# Patient Record
Sex: MALE | Race: WHITE | NOT HISPANIC OR LATINO | Employment: OTHER | URBAN - METROPOLITAN AREA
[De-identification: names, ages, dates, MRNs, and addresses within clinical notes are randomized per-mention and may not be internally consistent; named-entity substitution may affect disease eponyms.]

---

## 2018-02-14 ENCOUNTER — APPOINTMENT (OUTPATIENT)
Dept: RADIOLOGY | Facility: MEDICAL CENTER | Age: 63
DRG: 304 | End: 2018-02-14
Attending: EMERGENCY MEDICINE
Payer: COMMERCIAL

## 2018-02-14 ENCOUNTER — HOSPITAL ENCOUNTER (INPATIENT)
Facility: MEDICAL CENTER | Age: 63
LOS: 3 days | DRG: 304 | End: 2018-02-17
Attending: EMERGENCY MEDICINE | Admitting: INTERNAL MEDICINE
Payer: COMMERCIAL

## 2018-02-14 ENCOUNTER — OFFICE VISIT (OUTPATIENT)
Dept: MEDICAL GROUP | Facility: MEDICAL CENTER | Age: 63
End: 2018-02-14
Payer: COMMERCIAL

## 2018-02-14 ENCOUNTER — RESOLUTE PROFESSIONAL BILLING HOSPITAL PROF FEE (OUTPATIENT)
Dept: HOSPITALIST | Facility: MEDICAL CENTER | Age: 63
End: 2018-02-14
Payer: COMMERCIAL

## 2018-02-14 ENCOUNTER — APPOINTMENT (OUTPATIENT)
Dept: RADIOLOGY | Facility: MEDICAL CENTER | Age: 63
DRG: 304 | End: 2018-02-14
Payer: COMMERCIAL

## 2018-02-14 VITALS
HEART RATE: 104 BPM | DIASTOLIC BLOOD PRESSURE: 140 MMHG | HEIGHT: 66 IN | BODY MASS INDEX: 33.09 KG/M2 | SYSTOLIC BLOOD PRESSURE: 200 MMHG | OXYGEN SATURATION: 88 % | TEMPERATURE: 99.4 F | WEIGHT: 205.91 LBS

## 2018-02-14 DIAGNOSIS — I16.1 HYPERTENSIVE EMERGENCY: ICD-10-CM

## 2018-02-14 DIAGNOSIS — Z00.00 HEALTH CARE MAINTENANCE: ICD-10-CM

## 2018-02-14 DIAGNOSIS — Z76.89 ENCOUNTER TO ESTABLISH CARE: ICD-10-CM

## 2018-02-14 DIAGNOSIS — R00.0 TACHYCARDIA: ICD-10-CM

## 2018-02-14 DIAGNOSIS — E66.9 OBESITY (BMI 30.0-34.9): ICD-10-CM

## 2018-02-14 DIAGNOSIS — R06.02 SOB (SHORTNESS OF BREATH): ICD-10-CM

## 2018-02-14 DIAGNOSIS — R09.02 HYPOXEMIA: ICD-10-CM

## 2018-02-14 DIAGNOSIS — R06.02 SOB (SHORTNESS OF BREATH) ON EXERTION: ICD-10-CM

## 2018-02-14 DIAGNOSIS — I50.9 CONGESTIVE HEART FAILURE, UNSPECIFIED CONGESTIVE HEART FAILURE CHRONICITY, UNSPECIFIED CONGESTIVE HEART FAILURE TYPE: ICD-10-CM

## 2018-02-14 DIAGNOSIS — M79.89 LEG SWELLING: ICD-10-CM

## 2018-02-14 DIAGNOSIS — I16.0 HYPERTENSIVE URGENCY: ICD-10-CM

## 2018-02-14 PROBLEM — I11.9: Status: ACTIVE | Noted: 2018-02-14

## 2018-02-14 PROBLEM — E66.811 OBESITY (BMI 30.0-34.9): Status: ACTIVE | Noted: 2018-02-14

## 2018-02-14 LAB
ALBUMIN SERPL BCP-MCNC: 3.4 G/DL (ref 3.2–4.9)
ALBUMIN/GLOB SERPL: 1.4 G/DL
ALP SERPL-CCNC: 78 U/L (ref 30–99)
ALT SERPL-CCNC: 30 U/L (ref 2–50)
ANION GAP SERPL CALC-SCNC: 10 MMOL/L (ref 0–11.9)
APTT PPP: 28.2 SEC (ref 24.7–36)
AST SERPL-CCNC: 27 U/L (ref 12–45)
BASOPHILS # BLD AUTO: 0.5 % (ref 0–1.8)
BASOPHILS # BLD: 0.05 K/UL (ref 0–0.12)
BILIRUB SERPL-MCNC: 2.1 MG/DL (ref 0.1–1.5)
BNP SERPL-MCNC: 1187 PG/ML (ref 0–100)
BUN SERPL-MCNC: 21 MG/DL (ref 8–22)
CALCIUM SERPL-MCNC: 8.6 MG/DL (ref 8.4–10.2)
CHLORIDE SERPL-SCNC: 107 MMOL/L (ref 96–112)
CO2 SERPL-SCNC: 20 MMOL/L (ref 20–33)
CREAT SERPL-MCNC: 1.72 MG/DL (ref 0.5–1.4)
EOSINOPHIL # BLD AUTO: 0.3 K/UL (ref 0–0.51)
EOSINOPHIL NFR BLD: 3 % (ref 0–6.9)
ERYTHROCYTE [DISTWIDTH] IN BLOOD BY AUTOMATED COUNT: 41.6 FL (ref 35.9–50)
GLOBULIN SER CALC-MCNC: 2.5 G/DL (ref 1.9–3.5)
GLUCOSE SERPL-MCNC: 98 MG/DL (ref 65–99)
HCT VFR BLD AUTO: 45.2 % (ref 42–52)
HGB BLD-MCNC: 15.2 G/DL (ref 14–18)
IMM GRANULOCYTES # BLD AUTO: 0.03 K/UL (ref 0–0.11)
IMM GRANULOCYTES NFR BLD AUTO: 0.3 % (ref 0–0.9)
INR PPP: 1.18 (ref 0.87–1.13)
LYMPHOCYTES # BLD AUTO: 1.14 K/UL (ref 1–4.8)
LYMPHOCYTES NFR BLD: 11.5 % (ref 22–41)
MCH RBC QN AUTO: 29.1 PG (ref 27–33)
MCHC RBC AUTO-ENTMCNC: 33.6 G/DL (ref 33.7–35.3)
MCV RBC AUTO: 86.6 FL (ref 81.4–97.8)
MONOCYTES # BLD AUTO: 0.97 K/UL (ref 0–0.85)
MONOCYTES NFR BLD AUTO: 9.8 % (ref 0–13.4)
NEUTROPHILS # BLD AUTO: 7.45 K/UL (ref 1.82–7.42)
NEUTROPHILS NFR BLD: 74.9 % (ref 44–72)
NRBC # BLD AUTO: 0 K/UL
NRBC BLD-RTO: 0 /100 WBC
PLATELET # BLD AUTO: 225 K/UL (ref 164–446)
PMV BLD AUTO: 9 FL (ref 9–12.9)
POTASSIUM SERPL-SCNC: 3.7 MMOL/L (ref 3.6–5.5)
PROT SERPL-MCNC: 5.9 G/DL (ref 6–8.2)
PROTHROMBIN TIME: 14.6 SEC (ref 12–14.6)
RBC # BLD AUTO: 5.22 M/UL (ref 4.7–6.1)
SODIUM SERPL-SCNC: 137 MMOL/L (ref 135–145)
TROPONIN I SERPL-MCNC: 0.07 NG/ML (ref 0–0.04)
WBC # BLD AUTO: 9.9 K/UL (ref 4.8–10.8)

## 2018-02-14 PROCEDURE — 770022 HCHG ROOM/CARE - ICU (200)

## 2018-02-14 PROCEDURE — 85025 COMPLETE CBC W/AUTO DIFF WBC: CPT

## 2018-02-14 PROCEDURE — 96374 THER/PROPH/DIAG INJ IV PUSH: CPT

## 2018-02-14 PROCEDURE — 700102 HCHG RX REV CODE 250 W/ 637 OVERRIDE(OP): Performed by: INTERNAL MEDICINE

## 2018-02-14 PROCEDURE — 99223 1ST HOSP IP/OBS HIGH 75: CPT | Performed by: INTERNAL MEDICINE

## 2018-02-14 PROCEDURE — 94760 N-INVAS EAR/PLS OXIMETRY 1: CPT

## 2018-02-14 PROCEDURE — A9270 NON-COVERED ITEM OR SERVICE: HCPCS | Performed by: INTERNAL MEDICINE

## 2018-02-14 PROCEDURE — 83880 ASSAY OF NATRIURETIC PEPTIDE: CPT

## 2018-02-14 PROCEDURE — 700101 HCHG RX REV CODE 250: Performed by: EMERGENCY MEDICINE

## 2018-02-14 PROCEDURE — 99291 CRITICAL CARE FIRST HOUR: CPT

## 2018-02-14 PROCEDURE — 36415 COLL VENOUS BLD VENIPUNCTURE: CPT

## 2018-02-14 PROCEDURE — 84484 ASSAY OF TROPONIN QUANT: CPT

## 2018-02-14 PROCEDURE — 93005 ELECTROCARDIOGRAM TRACING: CPT | Performed by: EMERGENCY MEDICINE

## 2018-02-14 PROCEDURE — 96375 TX/PRO/DX INJ NEW DRUG ADDON: CPT

## 2018-02-14 PROCEDURE — 85610 PROTHROMBIN TIME: CPT

## 2018-02-14 PROCEDURE — 85730 THROMBOPLASTIN TIME PARTIAL: CPT

## 2018-02-14 PROCEDURE — 700111 HCHG RX REV CODE 636 W/ 250 OVERRIDE (IP): Performed by: EMERGENCY MEDICINE

## 2018-02-14 PROCEDURE — 93005 ELECTROCARDIOGRAM TRACING: CPT

## 2018-02-14 PROCEDURE — 71045 X-RAY EXAM CHEST 1 VIEW: CPT

## 2018-02-14 PROCEDURE — 700111 HCHG RX REV CODE 636 W/ 250 OVERRIDE (IP): Performed by: INTERNAL MEDICINE

## 2018-02-14 PROCEDURE — 96376 TX/PRO/DX INJ SAME DRUG ADON: CPT

## 2018-02-14 PROCEDURE — 80053 COMPREHEN METABOLIC PANEL: CPT

## 2018-02-14 PROCEDURE — 99205 OFFICE O/P NEW HI 60 MIN: CPT | Performed by: INTERNAL MEDICINE

## 2018-02-14 RX ORDER — LABETALOL HYDROCHLORIDE 5 MG/ML
10 INJECTION, SOLUTION INTRAVENOUS ONCE
Status: COMPLETED | OUTPATIENT
Start: 2018-02-14 | End: 2018-02-14

## 2018-02-14 RX ORDER — POLYETHYLENE GLYCOL 3350 17 G/17G
1 POWDER, FOR SOLUTION ORAL
Status: DISCONTINUED | OUTPATIENT
Start: 2018-02-14 | End: 2018-02-17 | Stop reason: HOSPADM

## 2018-02-14 RX ORDER — AMOXICILLIN 250 MG
2 CAPSULE ORAL 2 TIMES DAILY
Status: DISCONTINUED | OUTPATIENT
Start: 2018-02-14 | End: 2018-02-17 | Stop reason: HOSPADM

## 2018-02-14 RX ORDER — CAPTOPRIL 12.5 MG/1
12.5 TABLET ORAL ONCE
Status: COMPLETED | OUTPATIENT
Start: 2018-02-14 | End: 2018-02-14

## 2018-02-14 RX ORDER — DEXTROSE MONOHYDRATE 25 G/50ML
25 INJECTION, SOLUTION INTRAVENOUS
Status: DISCONTINUED | OUTPATIENT
Start: 2018-02-14 | End: 2018-02-17 | Stop reason: HOSPADM

## 2018-02-14 RX ORDER — FUROSEMIDE 10 MG/ML
20 INJECTION INTRAMUSCULAR; INTRAVENOUS ONCE
Status: COMPLETED | OUTPATIENT
Start: 2018-02-14 | End: 2018-02-14

## 2018-02-14 RX ORDER — FUROSEMIDE 10 MG/ML
40 INJECTION INTRAMUSCULAR; INTRAVENOUS ONCE
Status: COMPLETED | OUTPATIENT
Start: 2018-02-14 | End: 2018-02-14

## 2018-02-14 RX ORDER — ACETAMINOPHEN 325 MG/1
650 TABLET ORAL EVERY 6 HOURS PRN
Status: DISCONTINUED | OUTPATIENT
Start: 2018-02-14 | End: 2018-02-17 | Stop reason: HOSPADM

## 2018-02-14 RX ORDER — NITROGLYCERIN 20 MG/100ML
10 INJECTION INTRAVENOUS CONTINUOUS
Status: DISCONTINUED | OUTPATIENT
Start: 2018-02-14 | End: 2018-02-17 | Stop reason: HOSPADM

## 2018-02-14 RX ORDER — LABETALOL HYDROCHLORIDE 5 MG/ML
20 INJECTION, SOLUTION INTRAVENOUS ONCE
Status: COMPLETED | OUTPATIENT
Start: 2018-02-14 | End: 2018-02-14

## 2018-02-14 RX ORDER — BISACODYL 10 MG
10 SUPPOSITORY, RECTAL RECTAL
Status: DISCONTINUED | OUTPATIENT
Start: 2018-02-14 | End: 2018-02-17 | Stop reason: HOSPADM

## 2018-02-14 RX ADMIN — FUROSEMIDE 20 MG: 10 INJECTION, SOLUTION INTRAVENOUS at 17:00

## 2018-02-14 RX ADMIN — CAPTOPRIL 12.5 MG: 12.5 TABLET ORAL at 18:30

## 2018-02-14 RX ADMIN — NITROGLYCERIN 10 MCG/MIN: 20 INJECTION INTRAVENOUS at 18:29

## 2018-02-14 RX ADMIN — FUROSEMIDE 40 MG: 10 INJECTION, SOLUTION INTRAVENOUS at 18:10

## 2018-02-14 RX ADMIN — NITROGLYCERIN 30 MCG/MIN: 20 INJECTION INTRAVENOUS at 18:51

## 2018-02-14 RX ADMIN — LABETALOL HYDROCHLORIDE 10 MG: 5 INJECTION, SOLUTION INTRAVENOUS at 17:01

## 2018-02-14 RX ADMIN — STANDARDIZED SENNA CONCENTRATE AND DOCUSATE SODIUM 2 TABLET: 8.6; 5 TABLET, FILM COATED ORAL at 21:00

## 2018-02-14 RX ADMIN — LABETALOL HYDROCHLORIDE 20 MG: 5 INJECTION, SOLUTION INTRAVENOUS at 16:31

## 2018-02-14 ASSESSMENT — PAIN SCALES - GENERAL
PAINLEVEL_OUTOF10: 0
PAINLEVEL_OUTOF10: 0
PAINLEVEL_OUTOF10: 6
PAINLEVEL_OUTOF10: 0
PAINLEVEL_OUTOF10: 0

## 2018-02-14 ASSESSMENT — LIFESTYLE VARIABLES
AVERAGE NUMBER OF DAYS PER WEEK YOU HAVE A DRINK CONTAINING ALCOHOL: 5
ON A TYPICAL DAY WHEN YOU DRINK ALCOHOL HOW MANY DRINKS DO YOU HAVE: 4
TOTAL SCORE: 0
HAVE YOU EVER FELT YOU SHOULD CUT DOWN ON YOUR DRINKING: NO
CONSUMPTION TOTAL: POSITIVE
DO YOU DRINK ALCOHOL: YES
HAVE PEOPLE ANNOYED YOU BY CRITICIZING YOUR DRINKING: NO
EVER HAD A DRINK FIRST THING IN THE MORNING TO STEADY YOUR NERVES TO GET RID OF A HANGOVER: NO
HOW MANY TIMES IN THE PAST YEAR HAVE YOU HAD 5 OR MORE DRINKS IN A DAY: 15
EVER_SMOKED: NEVER
EVER FELT BAD OR GUILTY ABOUT YOUR DRINKING: NO
EVER_SMOKED: NEVER

## 2018-02-14 ASSESSMENT — PATIENT HEALTH QUESTIONNAIRE - PHQ9
CLINICAL INTERPRETATION OF PHQ2 SCORE: 0
1. LITTLE INTEREST OR PLEASURE IN DOING THINGS: NOT AT ALL
SUM OF ALL RESPONSES TO PHQ9 QUESTIONS 1 AND 2: 0
1. LITTLE INTEREST OR PLEASURE IN DOING THINGS: NOT AT ALL
SUM OF ALL RESPONSES TO PHQ9 QUESTIONS 1 AND 2: 0
2. FEELING DOWN, DEPRESSED, IRRITABLE, OR HOPELESS: NOT AT ALL
2. FEELING DOWN, DEPRESSED, IRRITABLE, OR HOPELESS: NOT AT ALL
SUM OF ALL RESPONSES TO PHQ QUESTIONS 1-9: 0
SUM OF ALL RESPONSES TO PHQ QUESTIONS 1-9: 0

## 2018-02-14 NOTE — PROGRESS NOTES
CHIEF COMPLAINT  Chief Complaint   Patient presents with   • Establish Care     NP   • Shortness of Breath     x 8 mon   • Ankle Pain     Ankle swelling both sides     HPI  Patient is a 62 y.o. male patient who presents today for the following     SOB, LE swelling  61 y/o, M, otherwise healthy    Onset: 2017  Developed gradually worsening:   - SOB   - subsequent development of exertional SOB   - LE swelling B/L without pain.    Denies:   - CP  - exertional CP/pressure  - palpitations, irregular heart beats  - HA  - lightheadedness, dizziness  - blurred vision  - cough      Did not ask:  -  for WT gain  - recent URI  - orthopnea/PND    Nonsmoker.   BMI: 33.   Daily physical activity.  No FH of CAD (only father with HTN).    Reviewed PMH, PSH, FH, SH, ALL, HCM/IMM  Reviewed MEDS    Patient Active Problem List    Diagnosis Date Noted   • Health care maintenance 2018   • Obesity (BMI 30.0-34.9) 2018     CURRENT MEDICATIONS  No current outpatient prescriptions on file.     No current facility-administered medications for this visit.      ALLERGIES  Allergies: Patient has no allergy information on record.  PAST MEDICAL HISTORY  Past Medical History:   Diagnosis Date   • Hypertensive urgency    • Nephrolithiasis      SURGICAL HISTORY  He  has no past surgical history on file.  SOCIAL HISTORY  Social History   Substance Use Topics   • Smoking status: Never Smoker   • Smokeless tobacco: Never Used   • Alcohol use 2.4 oz/week     4 Glasses of wine per week     Social History     Social History Narrative   • No narrative on file     FAMILY HISTORY  Family History   Problem Relation Age of Onset   • Cancer Mother      lungs, smoker   • Hypertension Father      Family Status   Relation Status   • Mother    • Father      ROS   Constitutional: Negative for fever, chills.  HENT: Negative for congestion, sore throat.  Eyes: Negative for blurred vision.   Respiratory: Negative for cough, shortness of  "breath.  Cardiovascular: Negative for chest pain, palpitations.   Gastrointestinal: Negative for heartburn, nausea, abdominal pain.   Genitourinary: Negative for dysuria.  Musculoskeletal: Negative for significant myalgias, back pain and joint pain.   Skin: Negative for rash and itching.   Neuro: Negative for dizziness, weakness and headaches.   Endo/Heme/Allergies: Does not bruise/bleed easily.   Psychiatric/Behavioral: Negative for depression, anxiety    PHYSICAL EXAM   Blood pressure (!) 200/140, pulse (!) 104, temperature 37.4 °C (99.4 °F), height 1.676 m (5' 6\"), weight 93.4 kg (205 lb 14.6 oz), SpO2 88 %. Body mass index is 33.23 kg/m².  General:  SOB, tachycardic, hypoxemic.  HEENT:   NC/AT, PERRLA, EOMI, TMs are clear. Oropharyngeal mucosa is pink,  without lesions;  no cervical / supraclavicular  lymphadenopathy, no thyromegaly.    Cardiovascular: Tachycardic. No m/r/g. No carotid bruits .      Pulse was regular.   Lungs:   CTAB, no w/r/r, no respiratory distress.  Abdomen: Soft, NT/ND + BS; no suprapubic tenderness; no masses or hepatosplenomegaly.  Extremities:  2+ DP and radial pulses bilaterally.  No c/c/e.   Skin:  Warm, dry.  No erythema. No rash.   Neurologic: Alert & oriented x 3. No focal deficits.  Psychiatric:  Affect normal, mood normal, judgment normal.    LABS     None    IMAGING     None    ASSESMENT AND PLAN        1. Hypertensive emergency  2. Hypoxemia  3. SOB (shortness of breath)  4. SOB (shortness of breath) on exertion  5. Tachycardia  6. Leg swelling    61 y/o, otherwise healthy, presented with the above diagnosis:   - he had high systolic and diastolic BP, tachycardic, hypoxemic, SOB, severe LE swelling.   I did not hear inspiratory crackles on lungs.   No irregular pulse.  He has B/L LE swelling, w/o pain;   - likely fluid retention/CHF, not PE.    He was escorted to the ER by my MA, did not feel comfortable to go by himself  - higher level of care.     7. Obesity (BMI " 30.0-34.9)  Partly due to water retention.  - Patient identified as having weight management issue.  Appropriate orders and counseling given.    8. Health care maintenance  Will be reviewed at f/u.    9. Encounter to establish care  Reviewed PMH, PSH, FH, SH, ALL, MEDS, HCM/IMM.     Followup: Return within 4 weeks, after dg is made    All questions are answered.    Please note that this dictation was created using voice recognition software, and that there might be errors of wing and possibly content.

## 2018-02-15 ENCOUNTER — APPOINTMENT (OUTPATIENT)
Dept: RADIOLOGY | Facility: MEDICAL CENTER | Age: 63
DRG: 304 | End: 2018-02-15
Attending: INTERNAL MEDICINE
Payer: COMMERCIAL

## 2018-02-15 PROBLEM — N17.9 ACUTE KIDNEY INJURY (HCC): Status: ACTIVE | Noted: 2018-02-15

## 2018-02-15 PROBLEM — R79.89 TROPONIN LEVEL ELEVATED: Status: ACTIVE | Noted: 2018-02-15

## 2018-02-15 LAB
ALBUMIN SERPL BCP-MCNC: 3.1 G/DL (ref 3.2–4.9)
ALBUMIN/GLOB SERPL: 1.4 G/DL
ALP SERPL-CCNC: 60 U/L (ref 30–99)
ALT SERPL-CCNC: 27 U/L (ref 2–50)
ANION GAP SERPL CALC-SCNC: 9 MMOL/L (ref 0–11.9)
AST SERPL-CCNC: 23 U/L (ref 12–45)
BILIRUB SERPL-MCNC: 2.2 MG/DL (ref 0.1–1.5)
BUN SERPL-MCNC: 22 MG/DL (ref 8–22)
CALCIUM SERPL-MCNC: 8 MG/DL (ref 8.4–10.2)
CHLORIDE SERPL-SCNC: 107 MMOL/L (ref 96–112)
CO2 SERPL-SCNC: 23 MMOL/L (ref 20–33)
CREAT SERPL-MCNC: 1.72 MG/DL (ref 0.5–1.4)
EKG IMPRESSION: NORMAL
EKG IMPRESSION: NORMAL
ERYTHROCYTE [DISTWIDTH] IN BLOOD BY AUTOMATED COUNT: 41.8 FL (ref 35.9–50)
GLOBULIN SER CALC-MCNC: 2.2 G/DL (ref 1.9–3.5)
GLUCOSE SERPL-MCNC: 95 MG/DL (ref 65–99)
HCT VFR BLD AUTO: 38.5 % (ref 42–52)
HGB BLD-MCNC: 12.8 G/DL (ref 14–18)
LV EJECT FRACT  99904: 55
LV EJECT FRACT MOD 2C 99903: 62.81
LV EJECT FRACT MOD 4C 99902: 39.05
LV EJECT FRACT MOD BP 99901: 48.26
MAGNESIUM SERPL-MCNC: 1.7 MG/DL (ref 1.5–2.5)
MCH RBC QN AUTO: 28.8 PG (ref 27–33)
MCHC RBC AUTO-ENTMCNC: 33.2 G/DL (ref 33.7–35.3)
MCV RBC AUTO: 86.7 FL (ref 81.4–97.8)
PLATELET # BLD AUTO: 204 K/UL (ref 164–446)
PMV BLD AUTO: 8.9 FL (ref 9–12.9)
POTASSIUM SERPL-SCNC: 3.5 MMOL/L (ref 3.6–5.5)
PROT SERPL-MCNC: 5.3 G/DL (ref 6–8.2)
RBC # BLD AUTO: 4.44 M/UL (ref 4.7–6.1)
SODIUM SERPL-SCNC: 139 MMOL/L (ref 135–145)
TROPONIN I SERPL-MCNC: 0.06 NG/ML (ref 0–0.04)
WBC # BLD AUTO: 9.4 K/UL (ref 4.8–10.8)

## 2018-02-15 PROCEDURE — 700102 HCHG RX REV CODE 250 W/ 637 OVERRIDE(OP): Performed by: INTERNAL MEDICINE

## 2018-02-15 PROCEDURE — 71045 X-RAY EXAM CHEST 1 VIEW: CPT

## 2018-02-15 PROCEDURE — 93306 TTE W/DOPPLER COMPLETE: CPT

## 2018-02-15 PROCEDURE — A9270 NON-COVERED ITEM OR SERVICE: HCPCS | Performed by: HOSPITALIST

## 2018-02-15 PROCEDURE — 85027 COMPLETE CBC AUTOMATED: CPT

## 2018-02-15 PROCEDURE — 700111 HCHG RX REV CODE 636 W/ 250 OVERRIDE (IP): Performed by: INTERNAL MEDICINE

## 2018-02-15 PROCEDURE — 84484 ASSAY OF TROPONIN QUANT: CPT

## 2018-02-15 PROCEDURE — A9270 NON-COVERED ITEM OR SERVICE: HCPCS | Performed by: INTERNAL MEDICINE

## 2018-02-15 PROCEDURE — 93005 ELECTROCARDIOGRAM TRACING: CPT | Performed by: INTERNAL MEDICINE

## 2018-02-15 PROCEDURE — 770022 HCHG ROOM/CARE - ICU (200)

## 2018-02-15 PROCEDURE — 93010 ELECTROCARDIOGRAM REPORT: CPT | Performed by: INTERNAL MEDICINE

## 2018-02-15 PROCEDURE — 99291 CRITICAL CARE FIRST HOUR: CPT | Performed by: HOSPITALIST

## 2018-02-15 PROCEDURE — 83735 ASSAY OF MAGNESIUM: CPT

## 2018-02-15 PROCEDURE — 80053 COMPREHEN METABOLIC PANEL: CPT

## 2018-02-15 PROCEDURE — 93306 TTE W/DOPPLER COMPLETE: CPT | Mod: 26 | Performed by: INTERNAL MEDICINE

## 2018-02-15 PROCEDURE — 700102 HCHG RX REV CODE 250 W/ 637 OVERRIDE(OP): Performed by: HOSPITALIST

## 2018-02-15 RX ORDER — CAPTOPRIL 12.5 MG/1
12.5 TABLET ORAL TWICE DAILY
Status: DISCONTINUED | OUTPATIENT
Start: 2018-02-15 | End: 2018-02-16

## 2018-02-15 RX ORDER — CAPTOPRIL 12.5 MG/1
12.5 TABLET ORAL TWICE DAILY
Status: DISCONTINUED | OUTPATIENT
Start: 2018-02-16 | End: 2018-02-15

## 2018-02-15 RX ORDER — POTASSIUM CHLORIDE 20 MEQ/1
40 TABLET, EXTENDED RELEASE ORAL ONCE
Status: COMPLETED | OUTPATIENT
Start: 2018-02-15 | End: 2018-02-15

## 2018-02-15 RX ORDER — FUROSEMIDE 10 MG/ML
40 INJECTION INTRAMUSCULAR; INTRAVENOUS
Status: DISCONTINUED | OUTPATIENT
Start: 2018-02-16 | End: 2018-02-17 | Stop reason: HOSPADM

## 2018-02-15 RX ORDER — IBUPROFEN 200 MG
950 CAPSULE ORAL DAILY
Status: DISCONTINUED | OUTPATIENT
Start: 2018-02-15 | End: 2018-02-17 | Stop reason: HOSPADM

## 2018-02-15 RX ADMIN — ENOXAPARIN SODIUM 40 MG: 100 INJECTION SUBCUTANEOUS at 09:14

## 2018-02-15 RX ADMIN — CAPTOPRIL 12.5 MG: 12.5 TABLET ORAL at 21:25

## 2018-02-15 RX ADMIN — NITROGLYCERIN 60 MCG/MIN: 20 INJECTION INTRAVENOUS at 03:00

## 2018-02-15 RX ADMIN — POTASSIUM CHLORIDE 40 MEQ: 1500 TABLET, EXTENDED RELEASE ORAL at 09:14

## 2018-02-15 RX ADMIN — NITROGLYCERIN 90 MCG/MIN: 20 INJECTION INTRAVENOUS at 20:24

## 2018-02-15 RX ADMIN — CALCIUM CITRATE 200 MG (950 MG) TABLET 950 MG: at 09:14

## 2018-02-15 RX ADMIN — CAPTOPRIL 12.5 MG: 12.5 TABLET ORAL at 14:53

## 2018-02-15 RX ADMIN — ACETAMINOPHEN 650 MG: 325 TABLET, FILM COATED ORAL at 09:14

## 2018-02-15 RX ADMIN — ACETAMINOPHEN 650 MG: 325 TABLET, FILM COATED ORAL at 20:30

## 2018-02-15 ASSESSMENT — ENCOUNTER SYMPTOMS
SPEECH CHANGE: 0
SPUTUM PRODUCTION: 0
FLANK PAIN: 0
CONSTIPATION: 0
HEARTBURN: 0
DIARRHEA: 0
FOCAL WEAKNESS: 0
COUGH: 1
MYALGIAS: 0
SHORTNESS OF BREATH: 0
LOSS OF CONSCIOUSNESS: 0
PALPITATIONS: 0
STRIDOR: 0
HEMOPTYSIS: 0
EYE REDNESS: 0
BLURRED VISION: 0
COUGH: 0
SENSORY CHANGE: 0
NECK PAIN: 0
FEVER: 0
ORTHOPNEA: 1
SORE THROAT: 0
DOUBLE VISION: 0
ABDOMINAL PAIN: 0
BACK PAIN: 0
EYE PAIN: 0
WHEEZING: 0
SEIZURES: 0
CHILLS: 0
SHORTNESS OF BREATH: 1
BLOOD IN STOOL: 0
TINGLING: 0
PND: 1
NAUSEA: 0
DEPRESSION: 0
EYE DISCHARGE: 0
HEADACHES: 0
SINUS PAIN: 0
VOMITING: 0

## 2018-02-15 ASSESSMENT — PAIN SCALES - GENERAL
PAINLEVEL_OUTOF10: 0
PAINLEVEL_OUTOF10: 4
PAINLEVEL_OUTOF10: 0
PAINLEVEL_OUTOF10: 0

## 2018-02-15 NOTE — ED NOTES
191/120 titrate 20mcq. Alert, denies pain. Lights turned down and pt advised to relax, he has a couple friends in the room, very talkative and laughing. NTG infusing, O2 at 2L.

## 2018-02-15 NOTE — ASSESSMENT & PLAN NOTE
- Unknown baseline, but patient denied any CKD  - Likely related to hypertensive emergency  - Using captopril for pressure control, which should improve LAI  - To consider stopping captopril if indicated LAI is worsening  - We'll clinically monitor for now

## 2018-02-15 NOTE — H&P
" Hospital Medicine History and Physical    Date of Service  2/15/2018    Chief Complaint  Chief Complaint   Patient presents with   • Hypertension     In PMD office for first visit  and BP noted to be high \" 205/140 or 205/160 I can't remember \" New Dx Denies CP or H/A   • Shortness of Breath     OE x 3-4 wks   • Ankle Swelling     Bilat x 1 mon       History of Presenting Illness  62 y.o. male who presented 2/14/2018 with shortness of breath and high blood pressure.     Patient reported that he has been having shortness of breath in the last 3 weeks. Upon further questioning he also had orthopnea and PND. However he specifically denied any chest pain headache, vision changes, focal weakness, back pain, abdominal pain. He probably claims that he has not seen a physician for several years and this is the first time he used medical insurance to check for his health.      Primary Care Physician  Albert Orellana M.D.    Consultants  None    Code Status  Full code    Review of Systems  Review of Systems   Constitutional: Negative for chills and fever.   HENT: Negative for sinus pain and sore throat.    Eyes: Negative for blurred vision, double vision, pain, discharge and redness.   Respiratory: Positive for cough. Negative for hemoptysis, sputum production, shortness of breath, wheezing and stridor.    Cardiovascular: Positive for orthopnea, leg swelling and PND. Negative for chest pain and palpitations.   Gastrointestinal: Negative for abdominal pain, blood in stool, constipation, diarrhea, heartburn, nausea and vomiting.   Genitourinary: Negative for dysuria, flank pain, frequency, hematuria and urgency.   Musculoskeletal: Negative for back pain, myalgias and neck pain.   Skin: Negative for rash.   Neurological: Negative for tingling, sensory change, speech change, focal weakness, seizures, loss of consciousness and headaches.   Psychiatric/Behavioral: Negative for depression.        Past Medical History  Past " Medical History:   Diagnosis Date   • Hypertensive urgency    • Nephrolithiasis        Surgical History  No past surgical history on file.    Medications  No current facility-administered medications on file prior to encounter.      No current outpatient prescriptions on file prior to encounter.       Family History  Family History   Problem Relation Age of Onset   • Cancer Mother      lungs, smoker   • Hypertension Father        Social History  Social History   Substance Use Topics   • Smoking status: Never Smoker   • Smokeless tobacco: Never Used   • Alcohol use 2.4 oz/week     4 Glasses of wine per week       Allergies  No Known Allergies     Physical Exam  Laboratory   Hemodynamics  Temp (24hrs), Av.4 °C (99.4 °F), Min:37.3 °C (99.2 °F), Max:37.6 °C (99.6 °F)   Temperature: 37.6 °C (99.6 °F)  Pulse  Av.2  Min: 74  Max: 100 Heart Rate (Monitored): 77  Blood Pressure: (!) 197/129, NIBP: (!) 164/99      Respiratory      Respiration: (!) 29, Pulse Oximetry: 94 %        RUL Breath Sounds: Clear, RML Breath Sounds: Clear, RLL Breath Sounds: Clear, MILLY Breath Sounds: Clear, LLL Breath Sounds: Clear    Physical Exam   Constitutional: He is oriented to person, place, and time. He appears well-developed and well-nourished. No distress.   HENT:   Head: Normocephalic and atraumatic.   Mouth/Throat: No oropharyngeal exudate.   Eyes: Conjunctivae and EOM are normal. Pupils are equal, round, and reactive to light. Right eye exhibits no discharge. Left eye exhibits no discharge. No scleral icterus.   Bedside funduscopic exam showed no obvious palpable edema    Neck: Normal range of motion. Neck supple. JVD present. No tracheal deviation present.   Significant jugular venous distention up to right earlobe   Cardiovascular: Normal rate, regular rhythm and intact distal pulses.    No murmur heard.  No aortic regurgitation murmur over the left upper sternal border with the patient in leaning position and the breath held  and expiration.    Equal and synchronized bilateral radial pulses   Pulmonary/Chest: Effort normal and breath sounds normal.   Mild respiratory distress. Minimal crackles bilaterally   Abdominal: Soft. Bowel sounds are normal. He exhibits distension. There is no tenderness. There is no rebound and no guarding.   Musculoskeletal: Normal range of motion. He exhibits no edema, tenderness or deformity.   4+ pitting bilateral lower extremity edema    Neurological: He is alert and oriented to person, place, and time. He has normal reflexes. No cranial nerve deficit. Coordination normal.   Skin: Skin is warm and dry. He is not diaphoretic. No erythema.       Recent Labs      02/14/18   1433   WBC  9.9   RBC  5.22   HEMOGLOBIN  15.2   HEMATOCRIT  45.2   MCV  86.6   MCH  29.1   MCHC  33.6*   RDW  41.6   PLATELETCT  225   MPV  9.0     Recent Labs      02/14/18   1433   SODIUM  137   POTASSIUM  3.7   CHLORIDE  107   CO2  20   GLUCOSE  98   BUN  21   CREATININE  1.72*   CALCIUM  8.6     Recent Labs      02/14/18   1433   ALTSGPT  30   ASTSGOT  27   ALKPHOSPHAT  78   TBILIRUBIN  2.1*   GLUCOSE  98     Recent Labs      02/14/18   1433   APTT  28.2   INR  1.18*     Recent Labs      02/14/18   1433   BNPBTYPENAT  1187*         Lab Results   Component Value Date    TROPONINI 0.07 (H) 02/14/2018     Urinalysis:  No results found for: SPECGRAVITY, GLUCOSEUR, KETONES, NITRITE, WBCURINE, RBCURINE, BACTERIA, EPITHELCELL     Imaging  Chest x-ray reviewed - moderate interstitial edema    Assessment/Plan     I anticipate this patient will require at least two midnights for appropriate medical management, necessitating inpatient admission.    Acute kidney injury (CMS-HCC)   Assessment & Plan    - Unknown baseline, but patient denied any CKD  - Likely related to hypertensive emergency  - Using captopril for pressure control, which should improve LAI  - To consider stopping captopril if indicated LAI is worsening  - We'll clinically monitor  for now        Troponin level elevated   Assessment & Plan    - No chest pain  - Troponin 0.07×3 in the last 12 hours  - EKG was unremarkable except left ventricular hypertrophy with repolarization abnormality  - Likely myocardial strain in the setting of hypertensive emergency  - Unlikely acute coronary syndrome  - Clinically monitor        Acute decompensated heart failure (CMS-[facility])   Assessment & Plan    - Jugular venous distention and 4+ bilateral leg edema  - BNP 1187   - Likely due to uncontrolled chronic hypertension and hypertensive heart disease  - Initiated Lasix 60 mg IV in the ED  - We'll try not to use beta blocker given acute decompensated phase  - Pending echo cardiogram  - Continue IV Lasix 40 mg -> to adjust accordingly and monitor electrolytes  - Daily weights and fluid restriction        Heart disease, hypertensive, malignant   Assessment & Plan    - Known history of hypertension per patient, but probably claimed that he never took any medications in the last decades  - Malignant hypertension with left ventricular strain and troponin leak  - No chest pain, headache. No signs suggestive of aortic dissection, intracranial hemorrhage, acute coronary syndrome  - Status post 30 mg labetalol IV & - Status post 60 mg Lasix IV by ED physician  - Initiated IV nitroglycerin drip to lower 30% in the first 24 hours (nitroprusside is not available in [place])  - Ordered captopril 12.5 mg twice a day and IV Lasix 40 mg twice a day for tomorrow 2/15  - Admitted to ICU for close monitoring            VTE prophylaxis: Lovenox

## 2018-02-15 NOTE — FLOWSHEET NOTE
02/14/18 1933   Type of Assessment   Assessment Yes   Patient History   Pulmonary Diagnosis no pulm hx   Home O2 Use Prior To Admission? No   Home Treatments/Frequency No   COPD Risk Screening   Do you have a history of COPD? No   Smoking History   Have you Ever Smoked Never   Level Of Consciousness   Level of Consciousness Alert   Respiratory WDL   Respiratory (WDL) X   Chest Exam   Respiration (!) 22   Pulse 86   Heart Rate (Monitored) 89   Breath Sounds   RUL Breath Sounds Clear   RML Breath Sounds Clear   RLL Breath Sounds Clear   MILLY Breath Sounds Clear   LLL Breath Sounds Clear   Protocol Pathways   Protocol Pathways Yes   O2 Protocol   O2 (LPM) 2   Pulse Oximetry 93 %   O2 Protocol   O2 Protocol Indications Room Air SpO2 Less Than 90%;Acute Care situation where Hypoxemia is suspected or possible   Continuous oxygen initiated for: SpO2 90-95% on Oxygen   O2 Protocol Goals/Outcome Room air SpO2 greater than 90% for 24 hours

## 2018-02-15 NOTE — ED PROVIDER NOTES
"ED Provider Note    CHIEF COMPLAINT  Chief Complaint   Patient presents with   • Hypertension     In PMD office for first visit  and BP noted to be high \" 205/140 or 205/160 I can't remember \" New Dx Denies CP or H/A   • Shortness of Breath     OE x 3-4 wks   • Ankle Swelling     Bilat x 1 mon       HPI  Chema Sarabia is a 62 y.o. male who presents with 1 month of worsening dyspnea on exertion and lower extremity edema that resolves at night. He has noted he has had high blood pressure in the past but has never been on any medication for it. He has been active he has lost 40 pounds in the last 2 years intentionally. He hikes and walks multiple miles every day. He has noted over the last few weeks that he is getting short of breath with even washing dishes. He is still able to walk 2 miles but the 1st 15 minutes is quite difficult. He also reports increased lower extremity swelling that is worse when his feet are down. But resolves overnight. He denies any chest pain with any of this exertion. He denies any orthopnea or PND.      REVIEW OF SYSTEMS  positive for increased lower extremity edema, dyspnea with exertion, negative for chest pain, orthopnea, PND. All other systems are negative.     PAST MEDICAL HISTORY   has a past medical history of Hypertensive urgency and Nephrolithiasis.    SOCIAL HISTORY  Social History     Social History Main Topics   • Smoking status: Never Smoker   • Smokeless tobacco: Never Used   • Alcohol use 2.4 oz/week     4 Glasses of wine per week   • Drug use: No   • Sexual activity: Not on file       SURGICAL HISTORY  patient denies any surgical history    CURRENT MEDICATIONS  Home Medications    **Home medications have not yet been reviewed for this encounter**         ALLERGIES  No Known Allergies    PHYSICAL EXAM  VITAL SIGNS: BP (!) 220/159   Pulse 94   Temp 37.3 °C (99.2 °F)   Resp 16   Ht 1.676 m (5' 6\")   Wt 92 kg (202 lb 13.2 oz)   SpO2 94%   BMI 32.74 kg/m² " .  Constitutional: Alert in no apparent distress.  HENT: No signs of trauma, Bilateral external ears normal, Nose normal.   Eyes: Pupils are equal and reactive, Conjunctiva normal, Non-icteric.   Neck: Normal range of motion, No tenderness, Supple, No stridor.   Cardiovascular: Regular rate and rhythm, no murmurs.   Thorax & Lungs: Normal breath sounds, No respiratory distress, No wheezing, No chest tenderness.   Abdomen: Bowel sounds normal, Soft, No tenderness, No masses, No peritoneal signs.  Skin: Warm, Dry, No erythema, No rash.   Musculoskeletal: 2+ bilateral lower extremity edema  Neurologic: Alert,  No focal deficits noted.   Psychiatric: Affect normal, Judgment normal, Mood normal.       DIAGNOSTIC STUDIES / PROCEDURES      EKG  12 lead EKG interpreted by myself  Sinus tachycardia rate 105  Normal axis  Normal intervals  TWI I, aVL, V6  Impression: sinus tachycardia with nonspecific TWI. No acute ischemia    LABS  Labs Reviewed   CBC WITH DIFFERENTIAL - Abnormal; Notable for the following:        Result Value    MCHC 33.6 (*)     Neutrophils-Polys 74.90 (*)     Lymphocytes 11.50 (*)     Neutrophils (Absolute) 7.45 (*)     Monos (Absolute) 0.97 (*)     All other components within normal limits    Narrative:     Indicate which anticoagulants the patient is on:->UNKNOWN   COMP METABOLIC PANEL - Abnormal; Notable for the following:     Creatinine 1.72 (*)     Total Bilirubin 2.1 (*)     Total Protein 5.9 (*)     All other components within normal limits    Narrative:     Indicate which anticoagulants the patient is on:->UNKNOWN   TROPONIN - Abnormal; Notable for the following:     Troponin I 0.07 (*)     All other components within normal limits    Narrative:     Indicate which anticoagulants the patient is on:->UNKNOWN   PROTHROMBIN TIME - Abnormal; Notable for the following:     INR 1.18 (*)     All other components within normal limits    Narrative:     Indicate which anticoagulants the patient is  on:->UNKNOWN   BTYPE NATRIURETIC PEPTIDE - Abnormal; Notable for the following:     B Natriuretic Peptide 1187 (*)     All other components within normal limits    Narrative:     Indicate which anticoagulants the patient is on:->UNKNOWN   ESTIMATED GFR - Abnormal; Notable for the following:     GFR If  49 (*)     GFR If Non  41 (*)     All other components within normal limits    Narrative:     Indicate which anticoagulants the patient is on:->UNKNOWN   APTT    Narrative:     Indicate which anticoagulants the patient is on:->UNKNOWN   URINALYSIS,CULTURE IF INDICATED   TROPONIN       RADIOLOGY  ECHOCARDIOGRAM COMP W/O CONT   Final Result      DX-CHEST-PORTABLE (1 VIEW)   Final Result      1.  Mild interval improvement of pulmonary edema.   2.  Stable cardiomegaly.      DX-CHEST-PORTABLE (1 VIEW)   Final Result      Moderate interstitial edema and small right pleural effusion.              COURSE & MEDICAL DECISION MAKING  Pertinent Labs & Imaging studies reviewed. (See chart for details)  This is a 61 yo man presenting with ALVARES and lower extremity edema. He is very hypertensive initially on exam. With the symptoms he is reporting hypertensive emergency, new onset heart failure, are in the differential.     Labs show an elevated BNP consistent with heart failure. Elevated troponin concerning for end organ damage. There is some evidence of renal failure with a creatinine of 1.72. Unclear if this is acute or chronic. CXR consistent with heart failure with interstitial edema and right pleural effusion.    He was given initially labetolol IV x 2 with about 30 point reduction of his BP. Given evidence of end organ damage with elevated troponin and creatinine he will need admission for BP control and diuresis. Given lasix to start diuresis.     I spoke with Dr. Macias, hospitalist for admission, he will be admitted to the ICU for BP control.    Patient will be admitted to the hospitalist in  guarded condition.          FINAL IMPRESSION  1. Hypertensive urgency    2. Congestive heart failure, unspecified congestive heart failure chronicity, unspecified congestive heart failure type (CMS-HCC)        2.   3.    This dictation has been creating using voice recognition software. The accuracy of the dictation is limited the abilities of the software.  I expect there may be some errors of grammar and possibly content. I made every attempt to manually correct the errors within my dictation. However errors related to this voice recognition software may still exist and should be interpreted within the appropriate context.      The note accurately reflects work and decisions made by me.  Leonora Henderson  2/15/2018  8:23 PM

## 2018-02-15 NOTE — PROGRESS NOTES
Report received from night RN, POC discussed. Pt resting in bed. Lines and gtts verified. Echo at bedside now. Call light in reach.

## 2018-02-15 NOTE — ASSESSMENT & PLAN NOTE
- Known history of hypertension per patient, but probably claimed that he never took any medications in the last decades  - Malignant hypertension with left ventricular strain and troponin leak  - No chest pain, headache. No signs suggestive of aortic dissection, intracranial hemorrhage, acute coronary syndrome  - Status post 30 mg labetalol IV & - Status post 60 mg Lasix IV by ED physician  - Initiated IV nitroglycerin drip to lower 30% in the first 24 hours (nitroprusside is not available in [place])  - Ordered captopril 12.5 mg twice a day and IV Lasix 40 mg twice a day for tomorrow 2/15  - Admitted to ICU for close monitoring

## 2018-02-15 NOTE — ASSESSMENT & PLAN NOTE
- No chest pain  - Troponin 0.07×3 in the last 12 hours  - EKG was unremarkable except left ventricular hypertrophy with repolarization abnormality  - Likely myocardial strain in the setting of hypertensive emergency  - Unlikely acute coronary syndrome  - Clinically monitor

## 2018-02-15 NOTE — PROGRESS NOTES
0815: Assessment completed. Lines and gtts. POC discussed, denies pain, CP or SOB. Call light in reach. Pt has no needs at this time.

## 2018-02-15 NOTE — CARE PLAN
Problem: Safety  Goal: Will remain free from falls    Intervention: Implement fall precautions  Treaded slipper socks in place, bed in low position, pt near nurses station, and call light in reach. Pt demonstrates correct use of call light.       Problem: Venous Thromboembolism (VTW)/Deep Vein Thrombosis (DVT) Prevention:  Goal: Patient will participate in Venous Thrombosis (VTE)/Deep Vein Thrombosis (DVT)Prevention Measures    Intervention: Assess and monitor for anticoagulation complications  Lovenox 40 mg given, pt will remain free from DVT. Pt getting OOB to use urinal and stand.       Problem: Knowledge Deficit  Goal: Knowledge of disease process/condition, treatment plan, diagnostic tests, and medications will improve    Intervention: Explain information regarding disease process/condition, treatment plan, diagnostic tests, and medications and document in education  POC discussed with pt. Echocardiogram completed. Nitroglycerin gtt infusing, titrate for -170's.

## 2018-02-15 NOTE — ED NOTES
"Pt ambulates to restroom without difficulty. States \"I felt a little sob of breath but that has been normal\" denies cp. Updated on poc. Family at bedside. Skin pwd. Aa0x3. resp nonlabored. Will continue to monitor.   "

## 2018-02-15 NOTE — ASSESSMENT & PLAN NOTE
- Jugular venous distention and 4+ bilateral leg edema  - BNP 1187   - Likely due to uncontrolled chronic hypertension and hypertensive heart disease  - Initiated Lasix 60 mg IV in the ED  - We'll try not to use beta blocker given acute decompensated phase  - Pending echo cardiogram  - Continue IV Lasix 40 mg -> to adjust accordingly and monitor electrolytes  - Daily weights and fluid restriction

## 2018-02-15 NOTE — ED NOTES
"Pt reports increased sob over the last couple days. Reports \"my blood pressure has been out of wack too\" denies cp or n/v/d but does reports sob with exertion. Denies hx of htn. Reports has recently lost large amount of weight with exercise. Skinpwd. Aa0x3. resp nonlabored.   "

## 2018-02-16 PROBLEM — I50.31 ACUTE DIASTOLIC HEART FAILURE (HCC): Status: ACTIVE | Noted: 2018-02-14

## 2018-02-16 LAB
ALBUMIN SERPL BCP-MCNC: 3 G/DL (ref 3.2–4.9)
BUN SERPL-MCNC: 20 MG/DL (ref 8–22)
CALCIUM SERPL-MCNC: 8 MG/DL (ref 8.4–10.2)
CHLORIDE SERPL-SCNC: 105 MMOL/L (ref 96–112)
CHOLEST SERPL-MCNC: 128 MG/DL (ref 100–199)
CO2 SERPL-SCNC: 23 MMOL/L (ref 20–33)
CREAT SERPL-MCNC: 1.75 MG/DL (ref 0.5–1.4)
EKG IMPRESSION: NORMAL
ERYTHROCYTE [DISTWIDTH] IN BLOOD BY AUTOMATED COUNT: 42.3 FL (ref 35.9–50)
GLUCOSE SERPL-MCNC: 100 MG/DL (ref 65–99)
HCT VFR BLD AUTO: 37.5 % (ref 42–52)
HDLC SERPL-MCNC: 43 MG/DL
HGB BLD-MCNC: 12.3 G/DL (ref 14–18)
LDLC SERPL CALC-MCNC: 72 MG/DL
MAGNESIUM SERPL-MCNC: 1.8 MG/DL (ref 1.5–2.5)
MCH RBC QN AUTO: 29 PG (ref 27–33)
MCHC RBC AUTO-ENTMCNC: 32.8 G/DL (ref 33.7–35.3)
MCV RBC AUTO: 88.4 FL (ref 81.4–97.8)
PHOSPHATE SERPL-MCNC: 3.5 MG/DL (ref 2.5–4.5)
PLATELET # BLD AUTO: 198 K/UL (ref 164–446)
PMV BLD AUTO: 9 FL (ref 9–12.9)
POTASSIUM SERPL-SCNC: 3.8 MMOL/L (ref 3.6–5.5)
RBC # BLD AUTO: 4.24 M/UL (ref 4.7–6.1)
SODIUM SERPL-SCNC: 137 MMOL/L (ref 135–145)
TRIGL SERPL-MCNC: 66 MG/DL (ref 0–149)
TROPONIN I SERPL-MCNC: 0.04 NG/ML (ref 0–0.04)
WBC # BLD AUTO: 8.5 K/UL (ref 4.8–10.8)

## 2018-02-16 PROCEDURE — 82043 UR ALBUMIN QUANTITATIVE: CPT

## 2018-02-16 PROCEDURE — 700102 HCHG RX REV CODE 250 W/ 637 OVERRIDE(OP): Performed by: INTERNAL MEDICINE

## 2018-02-16 PROCEDURE — 80069 RENAL FUNCTION PANEL: CPT

## 2018-02-16 PROCEDURE — 82570 ASSAY OF URINE CREATININE: CPT

## 2018-02-16 PROCEDURE — A9270 NON-COVERED ITEM OR SERVICE: HCPCS | Performed by: INTERNAL MEDICINE

## 2018-02-16 PROCEDURE — 84484 ASSAY OF TROPONIN QUANT: CPT

## 2018-02-16 PROCEDURE — 700102 HCHG RX REV CODE 250 W/ 637 OVERRIDE(OP): Performed by: HOSPITALIST

## 2018-02-16 PROCEDURE — 83735 ASSAY OF MAGNESIUM: CPT

## 2018-02-16 PROCEDURE — 80061 LIPID PANEL: CPT

## 2018-02-16 PROCEDURE — 93975 VASCULAR STUDY: CPT

## 2018-02-16 PROCEDURE — 85027 COMPLETE CBC AUTOMATED: CPT

## 2018-02-16 PROCEDURE — A9270 NON-COVERED ITEM OR SERVICE: HCPCS | Performed by: HOSPITALIST

## 2018-02-16 PROCEDURE — 93010 ELECTROCARDIOGRAM REPORT: CPT | Performed by: INTERNAL MEDICINE

## 2018-02-16 PROCEDURE — 93005 ELECTROCARDIOGRAM TRACING: CPT | Performed by: HOSPITALIST

## 2018-02-16 PROCEDURE — 700111 HCHG RX REV CODE 636 W/ 250 OVERRIDE (IP): Performed by: INTERNAL MEDICINE

## 2018-02-16 PROCEDURE — 770022 HCHG ROOM/CARE - ICU (200)

## 2018-02-16 PROCEDURE — 99291 CRITICAL CARE FIRST HOUR: CPT | Performed by: HOSPITALIST

## 2018-02-16 RX ORDER — LABETALOL 100 MG/1
100 TABLET, FILM COATED ORAL EVERY 4 HOURS PRN
Status: DISCONTINUED | OUTPATIENT
Start: 2018-02-16 | End: 2018-02-17 | Stop reason: HOSPADM

## 2018-02-16 RX ORDER — LABETALOL 100 MG/1
200 TABLET, FILM COATED ORAL 2 TIMES DAILY
Status: DISCONTINUED | OUTPATIENT
Start: 2018-02-16 | End: 2018-02-17 | Stop reason: HOSPADM

## 2018-02-16 RX ORDER — LABETALOL 100 MG/1
200 TABLET, FILM COATED ORAL 3 TIMES DAILY
Status: DISCONTINUED | OUTPATIENT
Start: 2018-02-16 | End: 2018-02-16

## 2018-02-16 RX ORDER — AMLODIPINE BESYLATE 5 MG/1
10 TABLET ORAL
Status: DISCONTINUED | OUTPATIENT
Start: 2018-02-16 | End: 2018-02-17 | Stop reason: HOSPADM

## 2018-02-16 RX ADMIN — FUROSEMIDE 40 MG: 10 INJECTION, SOLUTION INTRAVENOUS at 15:51

## 2018-02-16 RX ADMIN — NITROGLYCERIN 100 MCG/MIN: 20 INJECTION INTRAVENOUS at 05:21

## 2018-02-16 RX ADMIN — FUROSEMIDE 40 MG: 10 INJECTION, SOLUTION INTRAVENOUS at 05:20

## 2018-02-16 RX ADMIN — METOPROLOL TARTRATE 12.5 MG: 25 TABLET ORAL at 08:14

## 2018-02-16 RX ADMIN — NITROGLYCERIN 160 MCG/MIN: 20 INJECTION INTRAVENOUS at 11:44

## 2018-02-16 RX ADMIN — ACETAMINOPHEN 650 MG: 325 TABLET, FILM COATED ORAL at 16:04

## 2018-02-16 RX ADMIN — METOPROLOL TARTRATE 12.5 MG: 25 TABLET ORAL at 03:44

## 2018-02-16 RX ADMIN — AMLODIPINE BESYLATE 10 MG: 5 TABLET ORAL at 16:04

## 2018-02-16 RX ADMIN — CAPTOPRIL 12.5 MG: 12.5 TABLET ORAL at 08:14

## 2018-02-16 RX ADMIN — ENOXAPARIN SODIUM 40 MG: 100 INJECTION SUBCUTANEOUS at 08:15

## 2018-02-16 RX ADMIN — NITROGLYCERIN 160 MCG/MIN: 20 INJECTION INTRAVENOUS at 16:05

## 2018-02-16 RX ADMIN — CALCIUM CITRATE 200 MG (950 MG) TABLET 950 MG: at 08:14

## 2018-02-16 RX ADMIN — LABETALOL HYDROCHLORIDE 200 MG: 100 TABLET, FILM COATED ORAL at 20:15

## 2018-02-16 RX ADMIN — LABETALOL HYDROCHLORIDE 100 MG: 100 TABLET, FILM COATED ORAL at 16:55

## 2018-02-16 ASSESSMENT — PAIN SCALES - GENERAL
PAINLEVEL_OUTOF10: 1
PAINLEVEL_OUTOF10: 0
PAINLEVEL_OUTOF10: 0
PAINLEVEL_OUTOF10: 4
PAINLEVEL_OUTOF10: 0
PAINLEVEL_OUTOF10: 0
PAINLEVEL_OUTOF10: 2

## 2018-02-16 ASSESSMENT — ENCOUNTER SYMPTOMS
SPUTUM PRODUCTION: 1
VOMITING: 0
NAUSEA: 0
DIARRHEA: 0
WEAKNESS: 1
HEADACHES: 0
COUGH: 0
SHORTNESS OF BREATH: 0
CONSTIPATION: 0
CHILLS: 0
FEVER: 0
WHEEZING: 0

## 2018-02-16 NOTE — PROGRESS NOTES
Spoke with Dr. Cobos re: high BPs over this AM, nitroglycerin gtt now at 130 mcg; MD states he will be in hospital shortly to input orders. Report to NATALIIA Arcos.

## 2018-02-16 NOTE — PROGRESS NOTES
Pt assisted to chair at his request. Call light within reach; pt instructed to call RN with any needs to transfer. Will continue with care.

## 2018-02-16 NOTE — PROGRESS NOTES
[facility] Hospitalist Progress Note    Date of Service: 2/15/2018    Chief Complaint  62 y.o. male admitted 2018 with heart failure    Interval Problem Update  2/15: Echo shows grade 2 diastolic dysfunction. Started captopril and titrating down nitro drip. Checking lipid panel. Troponins trended down.    Disposition  Pending improvement of hypertension and heart failure    Patient is critically ill.   The patient continues to have: Hypertension and heart failure  The vital organ system that is affected is the: End organs and circulation  If untreated there is a high chance of deterioration into: Endorgan damage  And eventually death.   The critical care that I am providing today is: Exam, medications, titrating off drip  The critical that has been undertaken is medically complex.   There has been no overlap in critical care time.   Critical Care Time not including procedures: 40 mins      Review of Systems   Constitutional: Negative for chills and fever.   Respiratory: Positive for shortness of breath. Negative for cough and wheezing.    Cardiovascular: Positive for leg swelling. Negative for chest pain.   Gastrointestinal: Negative for constipation, diarrhea, nausea and vomiting.   Genitourinary: Positive for frequency. Negative for dysuria.   Neurological: Negative for headaches.      Physical Exam  Laboratory/Imaging   Hemodynamics  Temp (24hrs), Av.7 °C (99.8 °F), Min:37.5 °C (99.5 °F), Max:38 °C (100.4 °F)   Temperature: 37.6 °C (99.6 °F)  Pulse  Av.6  Min: 65  Max: 100 Heart Rate (Monitored): 87  Blood Pressure: (!) 197/129, NIBP: (!) 173/114      Respiratory      Respiration: (!) 25, Pulse Oximetry: 92 %        RUL Breath Sounds: Clear, RML Breath Sounds: Clear, RLL Breath Sounds: Clear, MILLY Breath Sounds: Clear, LLL Breath Sounds: Clear    Fluids    Intake/Output Summary (Last 24 hours) at 02/15/18 1819  Last data filed at 02/15/18 1600   Gross per 24 hour   Intake             1706 ml   Output              4300 ml   Net            -2594 ml       Nutrition  Orders Placed This Encounter   Procedures   • Diet Order     Standing Status:   Standing     Number of Occurrences:   1     Order Specific Question:   Diet:     Answer:   Regular [1]     Physical Exam   Constitutional: He appears well-developed.   HENT:   Head: Normocephalic.   Cardiovascular: Normal rate.  Exam reveals no gallop.    Pulmonary/Chest: No respiratory distress. He has no wheezes.   Abdominal: He exhibits no distension. There is no tenderness.   Musculoskeletal: He exhibits edema.   Neurological: He is alert.       Recent Labs      02/14/18   1433  02/15/18   0400   WBC  9.9  9.4   RBC  5.22  4.44*   HEMOGLOBIN  15.2  12.8*   HEMATOCRIT  45.2  38.5*   MCV  86.6  86.7   MCH  29.1  28.8   MCHC  33.6*  33.2*   RDW  41.6  41.8   PLATELETCT  225  204   MPV  9.0  8.9*     Recent Labs      02/14/18   1433  02/15/18   0400   SODIUM  137  139   POTASSIUM  3.7  3.5*   CHLORIDE  107  107   CO2  20  23   GLUCOSE  98  95   BUN  21  22   CREATININE  1.72*  1.72*   CALCIUM  8.6  8.0*     Recent Labs      02/14/18   1433   APTT  28.2   INR  1.18*     Recent Labs      02/14/18   1433   BNPBTYPENAT  1187*              Assessment/Plan     No new Assessment & Plan notes have been filed under this hospital service since the last note was generated.  Service: [facility]    Acute kidney injury (CMS-HCC)   Assessment & Plan     - Unknown baseline, but patient denied any CKD  -Possibly related to hypertensive emergency  - Using captopril for pressure control, which should improve LAI          Troponin level elevated   Assessment & Plan     - No chest pain  - Troponin 0.07×3  Knight unremarkable  - EKG was unremarkable except left ventricular hypertrophy with repolarization abnormality  - Likely myocardial strain in the setting of hypertensive emergency          Acute decompensated heart failure (CMS-HCC)   Assessment & Plan     - Jugular venous distention and 4+  bilateral leg edema  - BNP 1187   - Likely due to uncontrolled chronic hypertension and hypertensive heart disease  - Initiated Lasix 60 mg IV in the ED  - We'll try not to use beta blocker given acute decompensated phase  - Echo shows diastolic dysfunction  - Continue IV Lasix 40 mg -> to adjust accordingly and monitor electrolytes  - Daily weights and fluid restriction          Heart disease, hypertensive, malignant   Assessment & Plan     - Known history of hypertension per patient, but probably claimed that he never took any medications in the last decades  - Malignant hypertension with left ventricular strain and troponin leak  - No chest pain, headache. No signs suggestive of aortic dissection, intracranial hemorrhage, acute coronary syndrome  - Status post 30 mg labetalol IV & - Status post 60 mg Lasix IV by ED physician  - Initiated IV nitroglycerin drip to lower 30% in the first 24 hours (nitroprusside is not available in [facility])  - Ordered captopril 12.5 mg twice a day and IV Lasix 40 mg twice a day  - Admitted to ICU for close monitoring              Quality-Core Measures   Reviewed items::  Labs reviewed and Medications reviewed  Corrales catheter::  No Corrales  DVT prophylaxis pharmacological::  Enoxaparin (Lovenox)

## 2018-02-16 NOTE — CONSULTS
"Consults   Nephrology Inpatient Consultation    Date of Service  2/16/2018    Reason for Consultation  Severe HTN, CKD III    History of Presenting Illness  62 y.o. male admitted 2/14/2018 with HTN emergency    He carries a diagnosis of hypertension.  He states [place] has been out of the [place] some period of time and has not been on any medications. Furthermore, in 2015 he had a physical in [place] where his BP was > 200. He was given 2 medications which controlled his BP to about 140. He did not continue these medications.     The patient was at a health fair, and subsequently seen in the primary care office where he was found to have severe HTN and SOB. Subsequently he was brought into the ER and admitted to the ICU for hypertensive emergency. The patient notes he has been feeling short of breath and that his \"nose clogs up\" every time he lays flat on his bed. No family history of renal disease.       Referring Physician  Zaid Cobos M.D.    Consulting Physician  Jayson Pang M.D.    Review of Systems  Review of Systems   Constitutional: Positive for malaise/fatigue.   Respiratory: Positive for sputum production.    Cardiovascular: Positive for leg swelling. Negative for chest pain.   Neurological: Positive for weakness.   All other systems reviewed and are negative.     Past Medical History  Past Medical History:   Diagnosis Date   • Hypertensive urgency    • Nephrolithiasis        Surgical History  No past surgical history on file.    Medications  No current facility-administered medications on file prior to encounter.      No current outpatient prescriptions on file prior to encounter.       Family History  family history includes Cancer in his mother; Hypertension in his father.    Social History  Social History   Substance Use Topics   • Smoking status: Never Smoker   • Smokeless tobacco: Never Used   • Alcohol use 2.4 oz/week     4 Glasses of wine per week       Allergies  No Known Allergies   "   Physical Exam  Laboratory/Imaging   Hemodynamics  Temp (24hrs), Av.4 °C (99.4 °F), Min:37.1 °C (98.7 °F), Max:38.1 °C (100.5 °F)   Temperature: 37.1 °C (98.7 °F)  Pulse  Av.7  Min: 63  Max: 100 Heart Rate (Monitored): 79  Blood Pressure: (!) 214/134, NIBP: (!) 176/115      Respiratory      Respiration: 18, Pulse Oximetry: 95 %, O2 Daily Delivery Respiratory : Silicone Nasal Cannula        RUL Breath Sounds: Clear, RML Breath Sounds: Clear, RLL Breath Sounds: Clear, MILLY Breath Sounds: Clear, LLL Breath Sounds: Clear    Fluids  Date 18 0700 - 18 0659   Shift 2973-4916 3154-9038 9869-9850 24 Hour Total   I  N  T  A  K  E   P.O. 230   230    I.V. 344.1   344.1    Shift Total 574.1   574.1   O  U  T  P  U  T   Urine 1325   1325    Shift Total 1325   1325   Weight (kg) 90.4 90.4 90.4 90.4         Nutrition  Orders Placed This Encounter   Procedures   • Diet Order     Standing Status:   Standing     Number of Occurrences:   1     Order Specific Question:   Diet:     Answer:   Regular [1]       Physical Exam   Constitutional: He is oriented to person, place, and time. He appears well-developed and well-nourished.   HENT:   Head: Normocephalic and atraumatic.   Cardiovascular: Normal rate and regular rhythm.    Pulmonary/Chest: Effort normal. He has no wheezes. He has rales.   Musculoskeletal: He exhibits edema. He exhibits no tenderness.   Neurological: He is alert and oriented to person, place, and time.   Skin: Skin is warm and dry.       Recent Labs      18   1433  02/15/18   0400  18   0220   WBC  9.9  9.4  8.5   RBC  5.22  4.44*  4.24*   HEMOGLOBIN  15.2  12.8*  12.3*   HEMATOCRIT  45.2  38.5*  37.5*   MCV  86.6  86.7  88.4   MCH  29.1  28.8  29.0   MCHC  33.6*  33.2*  32.8*   RDW  41.6  41.8  42.3   PLATELETCT  225  204  198   MPV  9.0  8.9*  9.0     Recent Labs      18   1433  02/15/18   0400  18   0220   SODIUM  137  139  137   POTASSIUM  3.7  3.5*  3.8   CHLORIDE  107   107  105   CO2  20  23  23   GLUCOSE  98  95  100*   BUN  21  22  20   CREATININE  1.72*  1.72*  1.75*   CALCIUM  8.6  8.0*  8.0*     Recent Labs      02/14/18   1433   APTT  28.2   INR  1.18*     Recent Labs      02/14/18   1433   BNPBTYPENAT  1187*     Recent Labs      02/16/18   0220   TRIGLYCERIDE  66   HDL  43   LDL  72          Assessment/Plan     1. HTN emergency - currently on NTG gtt , noted current medications. Suspect given the history that this is essential HTN that has been present for some period of time and untreated.  2. LAI vs CKD - suspect given the history this is CKD likely from chronic hypertension.  3. Flash pulmonary edema - likely from HTN emergency      PLAN  -Check renal US  -Start labetalol, norvasc, and titrate off NTG  -Stop captopril for now, will eventually need ACEi  -currently on lasix, will eventually need a diuretic -- likely lasix PO  -Check lipid panel

## 2018-02-16 NOTE — PROGRESS NOTES
0745: Assessment completed. No c/o pain. Nitroglycerin gtt infusing. Pt states that he drinks times a week a glass of wine on Sunday and one extra day during the week. Dr. Cobos at bedside, POC discussed. New orders placed.    0810: PIV infiltrated, Nitro gtt stopped, will place new IV.     0830: Nitro gtt restarted. Pt calling for assist as needed. Continues to use urinal.

## 2018-02-16 NOTE — PROGRESS NOTES
Report received from NATALIIA Del Rio. POC discussed. Pt sitting up in chair this AM, BP elevated call placed to  by night RN, Md to place new orders. Nitroglycerin gtt infusing, titration per protocol. Call light in reach.

## 2018-02-16 NOTE — PROGRESS NOTES
Pt up to chair; BPs elevated when patient wakeful or OOB. Nitroglycerin titrated. Pt refuses to get back to bed at this time. States he will get right back up in chair if he gets in bed. Pt seems anxious/nervous and jittery this AM but denies any feelings of anxiety. States he would like to go home sometime soon. POC discussed and reinforced.

## 2018-02-16 NOTE — PROGRESS NOTES
Report received from Malcom Arcos. Pt resting comfortably in bed. Pt alert and oriented x 4 and in no apparent distress. RN titrating nitroglycerin drip per orders. Pt instructed to call RN with any and all needs and agrees. Will continue with care.

## 2018-02-16 NOTE — PROGRESS NOTES
PO captopril started, Nitroglycerin gtt titrated down for goal -170. Pt sat up in chair for 2.5 hours. No c/o pain. Call light in reach. Pt call ing for assist as needed.

## 2018-02-16 NOTE — CARE PLAN
Problem: Venous Thromboembolism (VTW)/Deep Vein Thrombosis (DVT) Prevention:  Goal: Patient will participate in Venous Thrombosis (VTE)/Deep Vein Thrombosis (DVT)Prevention Measures    Intervention: Assess and monitor for anticoagulation complications  Lovenox SQ daily in use, pt will remain free from DVT. Pt gets up OOB to use urinal and up to chair.      Problem: Knowledge Deficit  Goal: Knowledge of disease process/condition, treatment plan, diagnostic tests, and medications will improve    Intervention: Explain information regarding disease process/condition, treatment plan, diagnostic tests, and medications and document in education  POC discussed with pt, renal artery ultrasound completed and await Nephrology consult. Nitroglycerin gtt infusing, scheduled PO antihypertensives given see MAR.       Problem: Mobility  Goal: Risk for activity intolerance will decrease  Outcome: PROGRESSING AS EXPECTED  Pt mobilizes to chair, on Nitroglycerin gtt for elevated BP, mobilizes as BP tolerates.

## 2018-02-16 NOTE — PROGRESS NOTES
"Pt c/o substernal \"dull\" chest \"ache\" 1/10. EKG obtained and shows no change compared to admit EKG. Pt assisted to comfortable position in bed. Troponin drawn and sent. RN continuing to titrate nitroglycerin drip up for high BPs. Dr. Choi informed of current pt status and entered orders for PO metoprolol.   "

## 2018-02-16 NOTE — PROGRESS NOTES
Dr. Cobos updated on Nitroglycerin gtt rate, states will await Renal artery ultrasound and Nephrology consult. Pt continues to call for assist as needed.

## 2018-02-17 ENCOUNTER — APPOINTMENT (OUTPATIENT)
Dept: RADIOLOGY | Facility: MEDICAL CENTER | Age: 63
DRG: 304 | End: 2018-02-17
Attending: INTERNAL MEDICINE
Payer: COMMERCIAL

## 2018-02-17 ENCOUNTER — PATIENT OUTREACH (OUTPATIENT)
Dept: HEALTH INFORMATION MANAGEMENT | Facility: OTHER | Age: 63
End: 2018-02-17

## 2018-02-17 VITALS
DIASTOLIC BLOOD PRESSURE: 89 MMHG | BODY MASS INDEX: 32.03 KG/M2 | TEMPERATURE: 99.7 F | OXYGEN SATURATION: 93 % | WEIGHT: 199.3 LBS | HEIGHT: 66 IN | HEART RATE: 67 BPM | RESPIRATION RATE: 23 BRPM | SYSTOLIC BLOOD PRESSURE: 152 MMHG

## 2018-02-17 LAB
ALBUMIN SERPL BCP-MCNC: 2.9 G/DL (ref 3.2–4.9)
ANION GAP SERPL CALC-SCNC: 9 MMOL/L (ref 0–11.9)
BUN SERPL-MCNC: 14 MG/DL (ref 8–22)
CALCIUM SERPL-MCNC: 8.3 MG/DL (ref 8.4–10.2)
CHLORIDE SERPL-SCNC: 102 MMOL/L (ref 96–112)
CO2 SERPL-SCNC: 24 MMOL/L (ref 20–33)
CREAT SERPL-MCNC: 1.65 MG/DL (ref 0.5–1.4)
CREAT UR-MCNC: 13.7 MG/DL
ERYTHROCYTE [DISTWIDTH] IN BLOOD BY AUTOMATED COUNT: 41.1 FL (ref 35.9–50)
GLUCOSE SERPL-MCNC: 101 MG/DL (ref 65–99)
HCT VFR BLD AUTO: 37.8 % (ref 42–52)
HGB BLD-MCNC: 12.9 G/DL (ref 14–18)
MCH RBC QN AUTO: 29.5 PG (ref 27–33)
MCHC RBC AUTO-ENTMCNC: 34.1 G/DL (ref 33.7–35.3)
MCV RBC AUTO: 86.3 FL (ref 81.4–97.8)
MICROALBUMIN UR-MCNC: <0.7 MG/DL
MICROALBUMIN/CREAT UR: NORMAL MG/G (ref 0–30)
PHOSPHATE SERPL-MCNC: 3.4 MG/DL (ref 2.5–4.5)
PLATELET # BLD AUTO: 203 K/UL (ref 164–446)
PMV BLD AUTO: 8.7 FL (ref 9–12.9)
POTASSIUM SERPL-SCNC: 3.4 MMOL/L (ref 3.6–5.5)
RBC # BLD AUTO: 4.38 M/UL (ref 4.7–6.1)
SODIUM SERPL-SCNC: 135 MMOL/L (ref 135–145)
WBC # BLD AUTO: 7.8 K/UL (ref 4.8–10.8)

## 2018-02-17 PROCEDURE — 700102 HCHG RX REV CODE 250 W/ 637 OVERRIDE(OP): Performed by: HOSPITALIST

## 2018-02-17 PROCEDURE — A9270 NON-COVERED ITEM OR SERVICE: HCPCS | Performed by: HOSPITALIST

## 2018-02-17 PROCEDURE — 80069 RENAL FUNCTION PANEL: CPT

## 2018-02-17 PROCEDURE — 85027 COMPLETE CBC AUTOMATED: CPT

## 2018-02-17 PROCEDURE — 700111 HCHG RX REV CODE 636 W/ 250 OVERRIDE (IP): Performed by: HOSPITALIST

## 2018-02-17 PROCEDURE — 700102 HCHG RX REV CODE 250 W/ 637 OVERRIDE(OP): Performed by: INTERNAL MEDICINE

## 2018-02-17 PROCEDURE — 99239 HOSP IP/OBS DSCHRG MGMT >30: CPT | Performed by: HOSPITALIST

## 2018-02-17 PROCEDURE — 700111 HCHG RX REV CODE 636 W/ 250 OVERRIDE (IP): Performed by: INTERNAL MEDICINE

## 2018-02-17 PROCEDURE — 76775 US EXAM ABDO BACK WALL LIM: CPT

## 2018-02-17 PROCEDURE — A9270 NON-COVERED ITEM OR SERVICE: HCPCS | Performed by: INTERNAL MEDICINE

## 2018-02-17 RX ORDER — HYDRALAZINE HYDROCHLORIDE 20 MG/ML
10 INJECTION INTRAMUSCULAR; INTRAVENOUS EVERY 6 HOURS PRN
Status: DISCONTINUED | OUTPATIENT
Start: 2018-02-17 | End: 2018-02-17 | Stop reason: HOSPADM

## 2018-02-17 RX ORDER — POTASSIUM CHLORIDE 20 MEQ/1
40 TABLET, EXTENDED RELEASE ORAL ONCE
Status: COMPLETED | OUTPATIENT
Start: 2018-02-17 | End: 2018-02-17

## 2018-02-17 RX ORDER — FUROSEMIDE 20 MG/1
20 TABLET ORAL DAILY
Qty: 30 TAB | Refills: 0 | Status: SHIPPED | OUTPATIENT
Start: 2018-02-17 | End: 2018-03-27 | Stop reason: SDUPTHER

## 2018-02-17 RX ORDER — AMLODIPINE BESYLATE 10 MG/1
10 TABLET ORAL DAILY
Qty: 30 TAB | Refills: 1 | Status: SHIPPED | OUTPATIENT
Start: 2018-02-18 | End: 2018-03-27 | Stop reason: SDUPTHER

## 2018-02-17 RX ORDER — LISINOPRIL 2.5 MG/1
2.5 TABLET ORAL DAILY
Qty: 30 TAB | Refills: 0 | Status: SHIPPED | OUTPATIENT
Start: 2018-02-17 | End: 2018-03-14

## 2018-02-17 RX ORDER — LABETALOL 200 MG/1
200 TABLET, FILM COATED ORAL 2 TIMES DAILY
Qty: 60 TAB | Refills: 1 | Status: SHIPPED | OUTPATIENT
Start: 2018-02-17 | End: 2018-03-27

## 2018-02-17 RX ADMIN — CALCIUM CITRATE 200 MG (950 MG) TABLET 950 MG: at 09:00

## 2018-02-17 RX ADMIN — ENOXAPARIN SODIUM 40 MG: 100 INJECTION SUBCUTANEOUS at 09:00

## 2018-02-17 RX ADMIN — POTASSIUM CHLORIDE 40 MEQ: 1500 TABLET, EXTENDED RELEASE ORAL at 09:00

## 2018-02-17 RX ADMIN — FUROSEMIDE 40 MG: 10 INJECTION, SOLUTION INTRAVENOUS at 06:11

## 2018-02-17 RX ADMIN — AMLODIPINE BESYLATE 10 MG: 5 TABLET ORAL at 08:57

## 2018-02-17 RX ADMIN — HYDRALAZINE HYDROCHLORIDE 10 MG: 20 INJECTION INTRAMUSCULAR; INTRAVENOUS at 02:25

## 2018-02-17 RX ADMIN — LABETALOL HYDROCHLORIDE 100 MG: 100 TABLET, FILM COATED ORAL at 01:12

## 2018-02-17 RX ADMIN — LABETALOL HYDROCHLORIDE 200 MG: 100 TABLET, FILM COATED ORAL at 08:59

## 2018-02-17 ASSESSMENT — PAIN SCALES - GENERAL
PAINLEVEL_OUTOF10: 0
PAINLEVEL_OUTOF10: 1

## 2018-02-17 ASSESSMENT — ENCOUNTER SYMPTOMS
CHILLS: 0
FEVER: 0
PALPITATIONS: 0
SHORTNESS OF BREATH: 0

## 2018-02-17 NOTE — CARE PLAN
Problem: Venous Thromboembolism (VTW)/Deep Vein Thrombosis (DVT) Prevention:  Goal: Patient will participate in Venous Thrombosis (VTE)/Deep Vein Thrombosis (DVT)Prevention Measures    Intervention: Assess and monitor for anticoagulation complications  Lovenox SQ daily, pt will remain free from DVT. Pt ambulating up in room and up to chair.       Problem: Knowledge Deficit  Goal: Knowledge of disease process/condition, treatment plan, diagnostic tests, and medications will improve    Intervention: Explain information regarding disease process/condition, treatment plan, diagnostic tests, and medications and document in education  POC discussed with pt. Nephrology ok with D/C home. Pt remains off Nitroglycerin gtt.       Problem: Mobility  Goal: Risk for activity intolerance will decrease    Intervention: Assess and monitor signs of activity intolerance  Pt getting up OOB to chair, steady and tolerates well.

## 2018-02-17 NOTE — CARE PLAN
Problem: Discharge Barriers/Planning  Goal: Patient's continuum of care needs will be met    Intervention: Explain discharge instructions and medication reconcilliation to patient and significant other/support system  Discharge instructions discussed with pt. Discussed where to  prescriptions, when to take medication, follow-up appointments and educated on importance to rise slowly when getting up out of bed or chair, pt verbalizes understanding.

## 2018-02-17 NOTE — PROGRESS NOTES
Dr. Pang here this afternoon, POC discussed. New medications ordered and implemented. Microalbumin UA sent to lab.

## 2018-02-17 NOTE — PROGRESS NOTES
Spoke with Dr. Choi; notified of increase in SBP to 170s and 180s this AM after titrating off Nitroglycerin gtt last night at 2200. PRN hydralazine ordered per MD and administered with improvement in SBP to 130s-140s. Will continue to monitor.

## 2018-02-17 NOTE — PROGRESS NOTES
0700: Report received from night RN, POC discussed.     0730: Assessment completed. Lines and gtts verified. Pt sitting up in bed. -160's. No c/o pain. Pt using call light as needed. Pt up to chair for breakfast. Call light in reach.     0945: Dr. Cobos at bedside, POC discussed.     1100: Dr. Pang, nephrology, at bedside. POC discussed. Pt sitting up in chair visiting with friend. Plan is for D/C later this afternoon. Pt to follow up with PCP and Nephrology.

## 2018-02-17 NOTE — PROGRESS NOTES
Hospital Medicine Progress Note, Adult, Complex               Author: Jayson Pang Date & Time created: 2/17/2018  11:26 AM     Interval History:  62 year old with HTN emergency.    Much better BP, off NTG. Feeling good. No complaints.    Review of Systems:  Review of Systems   Constitutional: Negative for chills and fever.   Respiratory: Negative for shortness of breath.    Cardiovascular: Negative for chest pain and palpitations.       Physical Exam:  Physical Exam   Constitutional: He is oriented to person, place, and time. He appears well-developed and well-nourished.   Cardiovascular: Normal rate and regular rhythm.    Pulmonary/Chest: Effort normal and breath sounds normal.   Musculoskeletal: He exhibits no edema or tenderness.   Neurological: He is alert and oriented to person, place, and time.       Labs:        Invalid input(s): [id number]  Recent Labs      02/14/18   1433   02/14/18   2315  02/15/18   0400  02/16/18   0220   TROPONINI  0.07*   < >  0.07*  0.06*  0.04   BNPBTYPENAT  1187*   --    --    --    --     < > = values in this interval not displayed.     Recent Labs      02/15/18   0400  02/16/18   0220  02/17/18   0430   SODIUM  139  137  135   POTASSIUM  3.5*  3.8  3.4*   CHLORIDE  107  105  102   CO2  23  23  24   BUN  22  20  14   CREATININE  1.72*  1.75*  1.65*   MAGNESIUM  1.7  1.8   --    PHOSPHORUS   --   3.5  3.4   CALCIUM  8.0*  8.0*  8.3*     Recent Labs      02/14/18   1433  02/15/18   0400  02/16/18   0220  02/17/18   0430   ALTSGPT  30  27   --    --    ASTSGOT  27  23   --    --    ALKPHOSPHAT  78  60   --    --    TBILIRUBIN  2.1*  2.2*   --    --    GLUCOSE  98  95  100*  101*     Recent Labs      02/14/18   1433  02/15/18   0400  02/16/18 0220  02/17/18   0430   RBC  5.22  4.44*  4.24*  4.38*   HEMOGLOBIN  15.2  12.8*  12.3*  12.9*   HEMATOCRIT  45.2  38.5*  37.5*  37.8*   PLATELETCT  225  204  198  203   PROTHROMBTM  14.6   --    --    --    APTT  28.2   --    --    --     INR  1.18*   --    --    --      Recent Labs      18   1433  02/15/18   0400  18   0220  18   0430   WBC  9.9  9.4  8.5  7.8   NEUTSPOLYS  74.90*   --    --    --    LYMPHOCYTES  11.50*   --    --    --    MONOCYTES  9.80   --    --    --    EOSINOPHILS  3.00   --    --    --    BASOPHILS  0.50   --    --    --    ASTSGOT  27  23   --    --    ALTSGPT  30  27   --    --    ALKPHOSPHAT  78  60   --    --    TBILIRUBIN  2.1*  2.2*   --    --            Hemodynamics:  Temp (24hrs), Av.4 °C (99.4 °F), Min:37.1 °C (98.7 °F), Max:37.9 °C (100.2 °F)  Temperature: 37.4 °C (99.3 °F)  Pulse  Av.7  Min: 61  Max: 100Heart Rate (Monitored): 67  Blood Pressure: 152/89, NIBP: 158/95     Respiratory:    Respiration: (!) 59, Pulse Oximetry: 97 %        RUL Breath Sounds: Clear, RML Breath Sounds: Clear, RLL Breath Sounds: Clear, MILLY Breath Sounds: Clear, LLL Breath Sounds: Clear  Fluids:    Intake/Output Summary (Last 24 hours) at 18 1126  Last data filed at 18 0850   Gross per 24 hour   Intake           1528.3 ml   Output             3800 ml   Net          -2271.7 ml        GI/Nutrition:  Orders Placed This Encounter   Procedures   • Diet Order     Standing Status:   Standing     Number of Occurrences:   1     Order Specific Question:   Diet:     Answer:   Regular [1]     Medical Decision Making, by Problem:  Active Hospital Problems    Diagnosis   • Troponin level elevated [R74.8]   • Acute kidney injury (CMS-HCC) [N17.9]   • Heart disease, hypertensive, malignant [I11.9]   • Acute diastolic heart failure (CMS-HCC) [I50.31]     1. LAI vs CKD, will need outpatient follow up  2. HTN emergency - BP better, d/w Dr. Cobos regarding d/c medications    -outpatient nephro follow up  -d/c soon  -will sign off  Quality-Core Measures

## 2018-02-17 NOTE — PROGRESS NOTES
[facility]ist Progress Note    Date of Service: 2018    Chief Complaint  62 y.o. male admitted 2018 with heart failure    Interval Problem Update  2/15: Echo shows grade 2 diastolic dysfunction. Started captopril and titrating down nitro drip. Checking lipid panel. Troponins trended down.  : IV infiltrated. Discussed heart failure with patient. Still hypertensive after metoprolol. Consulted nephrologist Dr. Pang. Ordered renal duplex which was negative.New medications by nephrology.    Disposition  Pending improvement of hypertension and heart failure    Patient is critically ill.   The patient continues to have: Hypertension and heart failure  The vital organ system that is affected is the: End organs and circulation  If untreated there is a high chance of deterioration into: Endorgan damage  And eventually death.   The critical care that I am providing today is: Exam, medications, titrating off drip  The critical that has been undertaken is medically complex.   There has been no overlap in critical care time.   Critical Care Time not including procedures: 39 mins    Review of Systems   Constitutional: Negative for chills and fever.   Respiratory: Negative for cough, shortness of breath and wheezing.    Cardiovascular: Positive for leg swelling. Negative for chest pain.   Gastrointestinal: Negative for constipation, diarrhea, nausea and vomiting.   Genitourinary: Positive for frequency. Negative for dysuria.   Neurological: Negative for headaches.      Physical Exam  Laboratory/Imaging   Hemodynamics  Temp (24hrs), Av.5 °C (99.5 °F), Min:37.1 °C (98.7 °F), Max:38.1 °C (100.5 °F)   Temperature: 37.9 °C (100.2 °F)  Pulse  Av.2  Min: 63  Max: 100 Heart Rate (Monitored): 85  Blood Pressure: (!) 187/117, NIBP: 136/82      Respiratory      Respiration: (!) 29, Pulse Oximetry: 91 %, O2 Daily Delivery Respiratory : Silicone Nasal Cannula        RUL Breath Sounds: Clear, RML Breath Sounds:  Clear, RLL Breath Sounds: Clear, MILLY Breath Sounds: Clear, LLL Breath Sounds: Clear    Fluids    Intake/Output Summary (Last 24 hours) at 02/16/18 1830  Last data filed at 02/16/18 1800   Gross per 24 hour   Intake           1987.1 ml   Output             3025 ml   Net          -1037.9 ml       Nutrition  Orders Placed This Encounter   Procedures   • Diet Order     Standing Status:   Standing     Number of Occurrences:   1     Order Specific Question:   Diet:     Answer:   Regular [1]     Physical Exam   Constitutional: He appears well-developed.   HENT:   Head: Normocephalic.   Eyes: Conjunctivae are normal.   Cardiovascular: Normal rate.  Exam reveals no gallop.    Pulmonary/Chest: No respiratory distress. He has no wheezes.   Abdominal: He exhibits no distension. There is no tenderness.   Musculoskeletal: He exhibits edema.   Neurological: He is alert.       Recent Labs      02/14/18   1433  02/15/18   0400  02/16/18   0220   WBC  9.9  9.4  8.5   RBC  5.22  4.44*  4.24*   HEMOGLOBIN  15.2  12.8*  12.3*   HEMATOCRIT  45.2  38.5*  37.5*   MCV  86.6  86.7  88.4   MCH  29.1  28.8  29.0   MCHC  33.6*  33.2*  32.8*   RDW  41.6  41.8  42.3   PLATELETCT  225  204  198   MPV  9.0  8.9*  9.0     Recent Labs      02/14/18   1433  02/15/18   0400  02/16/18   0220   SODIUM  137  139  137   POTASSIUM  3.7  3.5*  3.8   CHLORIDE  107  107  105   CO2  20  23  23   GLUCOSE  98  95  100*   BUN  21  22  20   CREATININE  1.72*  1.72*  1.75*   CALCIUM  8.6  8.0*  8.0*     Recent Labs      02/14/18   1433   APTT  28.2   INR  1.18*     Recent Labs      02/14/18   1433   BNPBTYPENAT  1187*     Recent Labs      02/16/18   0220   TRIGLYCERIDE  66   HDL  43   LDL  72          Assessment/Plan     No new Assessment & Plan notes have been filed under this hospital service since the last note was generated.  Service: [facility]    Acute kidney injury (CMS-HCC)   Assessment & Plan     - Unknown baseline, but patient denied any  CKD  -Possibly related to hypertensive emergency  - Nephrologist Dr. Pang consulted          Troponin level elevated   Assessment & Plan     - No chest pain  - Troponin 0.07×3  Knight unremarkable  - EKG was unremarkable except left ventricular hypertrophy with repolarization abnormality  - Likely myocardial strain in the setting of hypertensive emergency          Acute diastolic heart failure (CMS-HCC)   Assessment & Plan     - Jugular venous distention and 4+ bilateral leg edema  - BNP 1187   - Likely due to uncontrolled chronic hypertension and hypertensive heart disease  - Initiated Lasix 60 mg IV in the ED  - Echo shows diastolic dysfunction  - Continue IV Lasix 40 mg -> to adjust accordingly and monitor electrolytes  - Daily weights and fluid restriction   - Started beta blocker, ACE inhibitor DC'd by nephrology        Heart disease, hypertensive, malignant   Assessment & Plan     - Known history of hypertension per patient, but probably claimed that he never took any medications in the last decades  - Malignant hypertension with left ventricular strain and troponin leak  - No chest pain, headache. No signs suggestive of aortic dissection, intracranial hemorrhage, acute coronary syndrome  - Status post 30 mg labetalol IV & - Status post 60 mg Lasix IV by ED physician  - Initiated IV nitroglycerin drip to lower 30% in the first 24 hours (nitroprusside is not available in [facility])  - Admitted to ICU for close monitoring   - Nephrologist Dr. Pang consulted   - Now on captopril            Quality-Core Measures   Reviewed items::  Labs reviewed and Medications reviewed  Corrales catheter::  No Corrales  DVT prophylaxis pharmacological::  Enoxaparin (Lovenox)

## 2018-02-17 NOTE — DISCHARGE INSTRUCTIONS
Discharge Instructions    Discharged to home by car with friend. Discharged via wheelchair, hospital escort: Yes.  Special equipment needed: Not Applicable    Be sure to schedule a follow-up appointment with your primary care doctor or any specialists as instructed.     Discharge Plan:   Diet Plan: Discussed  Activity Level: Discussed  Confirmed Follow up Appointment: Appointment Scheduled  Confirmed Symptoms Management: Discussed  Medication Reconciliation Updated: Yes  Influenza Vaccine Indication: Patient Refuses    I understand that a diet low in cholesterol, fat, and sodium is recommended for good health. Unless I have been given specific instructions below for another diet, I accept this instruction as my diet prescription.   Other diet: Heart healthy    Special Instructions: None    · Is patient discharged on Warfarin / Coumadin?   No     Depression / Suicide Risk    As you are discharged from this [facility], it is important to learn how to keep safe from harming yourself.    Recognize the warning signs:  · Abrupt changes in personality, positive or negative- including increase in energy   · Giving away possessions  · Change in eating patterns- significant weight changes-  positive or negative  · Change in sleeping patterns- unable to sleep or sleeping all the time   · Unwillingness or inability to communicate  · Depression  · Unusual sadness, discouragement and loneliness  · Talk of wanting to die  · Neglect of personal appearance   · Rebelliousness- reckless behavior  · Withdrawal from people/activities they love  · Confusion- inability to concentrate     If you or a loved one observes any of these behaviors or has concerns about self-harm, here's what you can do:  · Talk about it- your feelings and reasons for harming yourself  · Remove any means that you might use to hurt yourself (examples: pills, rope, extension cords, firearm)  · Get professional help from the community (Mental Health,  Substance Abuse, psychological counseling)  · Do not be alone:Call your Safe Contact- someone whom you trust who will be there for you.  · Call your local CRISIS HOTLINE 113-8553 or 869-212-0895  · Call your local Children's Mobile Crisis Response Team [place] (099) 633-1943 or www.TechLoaner  · Call the toll free National Suicide Prevention Hotlines   · National Suicide Prevention Lifeline 232-550-BBPF (2812)  · [employer] 800-SUICIDE (134-5838)        Hypertension  Hypertension is another name for high blood pressure. High blood pressure forces your heart to work harder to pump blood. A blood pressure reading has two numbers, which includes a higher number over a lower number (example: 110/72).  HOME CARE   · Have your blood pressure rechecked by your doctor.  · Only take medicine as told by your doctor. Follow the directions carefully. The medicine does not work as well if you skip doses. Skipping doses also puts you at risk for problems.  · Do not smoke.  · Monitor your blood pressure at home as told by your doctor.  GET HELP IF:  · You think you are having a reaction to the medicine you are taking.  · You have repeat headaches or feel dizzy.  · You have puffiness (swelling) in your ankles.  · You have trouble with your vision.  GET HELP RIGHT AWAY IF:   · You get a very bad headache and are confused.  · You feel weak, numb, or faint.  · You get chest or belly (abdominal) pain.  · You throw up (vomit).  · You cannot breathe very well.  MAKE SURE YOU:   · Understand these instructions.  · Will watch your condition.  · Will get help right away if you are not doing well or get worse.     This information is not intended to replace advice given to you by your health care provider. Make sure you discuss any questions you have with your health care provider.     Document Released: 06/05/2009 Document Revised: 12/23/2014 Document Reviewed: 10/10/2014  [employer] Patient Education  ©2016 [employer]      Managing Your High Blood Pressure  Blood pressure is a measurement of how forceful your blood is pressing against the walls of the arteries. Arteries are muscular tubes within the circulatory system. Blood pressure does not stay the same. Blood pressure rises when you are active, excited, or nervous; and it lowers during sleep and relaxation. If the numbers measuring your blood pressure stay above normal most of the time, you are at risk for health problems. High blood pressure (hypertension) is a long-term (chronic) condition in which blood pressure is elevated.  A blood pressure reading is recorded as two numbers, such as 120 over 80 (or 120/80). The first, higher number is called the systolic pressure. It is a measure of the pressure in your arteries as the heart beats. The second, lower number is called the diastolic pressure. It is a measure of the pressure in your arteries as the heart relaxes between beats.   Keeping your blood pressure in a normal range is important to your overall health and prevention of health problems, such as heart disease and stroke. When your blood pressure is uncontrolled, your heart has to work harder than normal. High blood pressure is a very common condition in adults because blood pressure tends to rise with age. Men and women are equally likely to have hypertension but at different times in life. Before age 45, men are more likely to have hypertension. After 65 years of age, women are more likely to have it. Hypertension is especially common in  Americans. This condition often has no signs or symptoms. The cause of the condition is usually not known. Your caregiver can help you come up with a plan to keep your blood pressure in a normal, healthy range.  BLOOD PRESSURE STAGES  Blood pressure is classified into four stages: normal, prehypertension, stage 1, and stage 2. Your blood pressure reading will be used to determine what type of treatment, if  any, is necessary. Appropriate treatment options are tied to these four stages:   Normal  · Systolic pressure (mm Hg): below 120.  · Diastolic pressure (mm Hg): below 80.  Prehypertension  · Systolic pressure (mm Hg): 120 to 139.  · Diastolic pressure (mm Hg): 80 to 89.  Stage 1  · Systolic pressure (mm Hg): 140 to 159.  · Diastolic pressure (mm Hg): 90 to 99.  Stage 2  · Systolic pressure (mm Hg): 160 or above.  · Diastolic pressure (mm Hg): 100 or above.  RISKS RELATED TO HIGH BLOOD PRESSURE  Managing your blood pressure is an important responsibility. Uncontrolled high blood pressure can lead to:  · A heart attack.  · A stroke.  · A weakened blood vessel (aneurysm).  · Heart failure.  · Kidney damage.  · Eye damage.  · Metabolic syndrome.  · Memory and concentration problems.  HOW TO MANAGE YOUR BLOOD PRESSURE  Blood pressure can be managed effectively with lifestyle changes and medicines (if needed). Your caregiver will help you come up with a plan to bring your blood pressure within a normal range. Your plan should include the following:  Education  · Read all information provided by your caregivers about how to control blood pressure.  · Educate yourself on the latest guidelines and treatment recommendations. New research is always being done to further define the risks and treatments for high blood pressure.  Lifestyle changes  · Control your weight.  · Avoid smoking.  · Stay physically active.  · Reduce the amount of salt in your diet.  · Reduce stress.  · Control any chronic conditions, such as high cholesterol or diabetes.  · Reduce your alcohol intake.  Medicines  · Several medicines (antihypertensive medicines) are available, if needed, to bring blood pressure within a normal range.  Communication  · Review all the medicines you take with your caregiver because there may be side effects or interactions.  · Talk with your caregiver about your diet, exercise habits, and other lifestyle factors that may be  contributing to high blood pressure.  · See your caregiver regularly. Your caregiver can help you create and adjust your plan for managing high blood pressure.  RECOMMENDATIONS FOR TREATMENT AND FOLLOW-UP   The following recommendations are based on current guidelines for managing high blood pressure in nonpregnant adults. Use these recommendations to identify the proper follow-up period or treatment option based on your blood pressure reading. You can discuss these options with your caregiver.  · Systolic pressure of 120 to 139 or diastolic pressure of 80 to 89: Follow up with your caregiver as directed.  · Systolic pressure of 140 to 160 or diastolic pressure of 90 to 100: Follow up with your caregiver within 2 months.  · Systolic pressure above 160 or diastolic pressure above 100: Follow up with your caregiver within 1 month.  · Systolic pressure above 180 or diastolic pressure above 110: Consider antihypertensive therapy; follow up with your caregiver within 1 week.  · Systolic pressure above 200 or diastolic pressure above 120: Begin antihypertensive therapy; follow up with your caregiver within 1 week.     This information is not intended to replace advice given to you by your health care provider. Make sure you discuss any questions you have with your health care provider.     Document Released: 09/11/2013 Document Reviewed: 09/11/2013  [employer] Interactive Patient Education ©2016 [employer]

## 2018-02-17 NOTE — PROGRESS NOTES
Pt discharged home with all personal belongings, ambulated out to car with RN escort and accompanied by pt's friend.

## 2018-02-17 NOTE — PROGRESS NOTES
Report received from NATALIIA Arcos. Pt resting in bed comfortably, alert and oriented x 4, in no apparent distress. Pt c/o slight headache but requests no intervention. Titrating nitroglycerin per orders. Pt instructed to call RN with any needs and agrees. Will continue with care.

## 2018-02-18 NOTE — DISCHARGE SUMMARY
Hospital Medicine Discharge Note     Admit Date:  2/14/2018       Discharge Date:   2/17/2018    Attending Physician:  Zaid Cobos     Diagnoses (includes active and resolved):   Active Problems:    Heart disease, hypertensive, malignant POA: Yes    Acute diastolic heart failure (CMS-HCC) POA: Yes    Troponin level elevated POA: Yes    Acute kidney injury (CMS-HCC) POA: Yes  Resolved Problems:    * No resolved hospital problems. *      Hospital Summary (Brief Narrative):       62 y.o. male who presented 2/14/2018 with shortness of breath and high blood pressure. Patient reported that he has been having shortness of breath in the last 3 weeks. Upon further questioning he also had orthopnea and PND.  He was admitted for acute diastolic heart failure which was confirmed on echocardiogram. IV lasix was used for diuresis. Patient was also found to have chronic hypertension. He is placed in the ICU on a nitro glycerin drip. Medications were titrated. Nephrology was consulted and a renal ultrasound and Doppler were negative. Patient was changed over to labetalol and able to be taken off the drip. Electrolytes were monitored and corrected. CHF meds of ACEI and BB were reconciled. The pt was reassured and discharged home with close follow up (1 week).   The patient met 2-midnight criteria for an inpatient stay at the time of discharge.     Consultants:      Nephrologist Dr. Pang    Procedures:        None    Discharge Medications:           Medication List      START taking these medications      Instructions   amLODIPine 10 MG Tabs  Start taking on:  2/18/2018  Commonly known as:  NORVASC   Take 1 Tab by mouth every day.  Dose:  10 mg     furosemide 20 MG Tabs  Commonly known as:  LASIX   Take 1 Tab by mouth every day.  Dose:  20 mg     labetalol 200 MG Tabs  Commonly known as:  NORMODYNE   Take 1 Tab by mouth 2 Times a Day.  Dose:  200 mg     lisinopril 2.5 MG Tabs  Commonly known as:  PRINIVIL   Take 1 Tab by mouth  every day.  Dose:  2.5 mg        CONTINUE taking these medications      Instructions   multivitamin Tabs   Take 1 Tab by mouth every day.  Dose:  1 Tab          Disposition:   Discharge home    Diet:   Regular    Activity:   As tolerated    Code status:   Full code    Primary Care Provider:    Albert Orellana M.D.    Follow up appointment details :      Albert Orellana M.D.  [address]  [address]  [place] [address]  [phone]    On 2/22/2018  Appointment scheduled for 2:20 p.m.     Jayson Pang M.D.  [address]  [address]  [place] [address]  [phone]    On 3/2/2018  Appointment scheduled for 4:45 p.m.     Santosh Castelan M.D.  [address]  [address]  [place] [address]  [phone]    On 3/14/2018  Appointment scheduled for 3:00 p.m.     Future Appointments  Date Time Provider Department Center   2/22/2018 2:20 PM [facility]   3/2/2018 4:45 PM Jayson Pang M.D. [facility] [address]   3/14/2018 3:00 PM Santosh Castelan M.D. [facility] None       Pending Studies:        None    Time spent on discharge day patient visit: 45 minutes    #################################################    Interval history/exam for day of discharge:    Vitals:    02/17/18 1000 02/17/18 1100 02/17/18 1200 02/17/18 1300   BP:       Pulse: 67 67 67    Resp: (!) 24 (!) 24 (!) 24 (!) 23   Temp:   37.6 °C (99.7 °F)    SpO2: 92% 92% 93%    Weight:       Height:         Weight/BMI: Body mass index is 32.17 kg/m².  Pulse Oximetry: 93 %, O2 (LPM): 0, O2 Delivery: None (Room Air)    General:  NAD  CVS:  RRR  PULM:  CTAB, no respiratory distress    Most Recent Labs:    Lab Results   Component Value Date/Time    WBC 7.8 02/17/2018 04:30 AM    RBC 4.38 (L) 02/17/2018 04:30 AM    HEMOGLOBIN 12.9 (L) 02/17/2018 04:30 AM    HEMATOCRIT 37.8 (L) 02/17/2018 04:30 AM    MCV 86.3 02/17/2018 04:30 AM    MCH 29.5 02/17/2018 04:30 AM    MCHC 34.1 02/17/2018 04:30 AM    MPV 8.7 (L) 02/17/2018 04:30  AM    NEUTSPOLYS 74.90 (H) 02/14/2018 02:33 PM    LYMPHOCYTES 11.50 (L) 02/14/2018 02:33 PM    MONOCYTES 9.80 02/14/2018 02:33 PM    EOSINOPHILS 3.00 02/14/2018 02:33 PM    BASOPHILS 0.50 02/14/2018 02:33 PM      Lab Results   Component Value Date/Time    SODIUM 135 02/17/2018 04:30 AM    POTASSIUM 3.4 (L) 02/17/2018 04:30 AM    CHLORIDE 102 02/17/2018 04:30 AM    CO2 24 02/17/2018 04:30 AM    GLUCOSE 101 (H) 02/17/2018 04:30 AM    BUN 14 02/17/2018 04:30 AM    CREATININE 1.65 (H) 02/17/2018 04:30 AM      Lab Results   Component Value Date/Time    ALTSGPT 27 02/15/2018 04:00 AM    ASTSGOT 23 02/15/2018 04:00 AM    ALKPHOSPHAT 60 02/15/2018 04:00 AM    TBILIRUBIN 2.2 (H) 02/15/2018 04:00 AM    ALBUMIN 2.9 (L) 02/17/2018 04:30 AM    GLOBULIN 2.2 02/15/2018 04:00 AM    INR 1.18 (H) 02/14/2018 02:33 PM     Lab Results   Component Value Date/Time    PROTHROMBTM 14.6 02/14/2018 02:33 PM    INR 1.18 (H) 02/14/2018 02:33 PM        Imaging/ Testing:      US-RENAL   Final Result      1.  No hydronephrosis.      2.  1.5 cm right renal cyst.      3.  Increased echogenicity could indicate medical renal disease.      NON INVASIVE LAB TESTING   Final Result      ECHOCARDIOGRAM COMP W/O CONT   Final Result      DX-CHEST-PORTABLE (1 VIEW)   Final Result      1.  Mild interval improvement of pulmonary edema.   2.  Stable cardiomegaly.      DX-CHEST-PORTABLE (1 VIEW)   Final Result      Moderate interstitial edema and small right pleural effusion.          Instructions:      The patient was instructed to return to the ER in the event of worsening symptoms. I have counseled the patient on the importance of compliance and the patient has agreed to proceed with all medical recommendations and follow up plan indicated above.   The patient understands that all medications come with benefits and risks. Risks may include permanent injury or death and these risks can be minimized with close reassessment and monitoring.

## 2018-02-21 ENCOUNTER — TELEPHONE (OUTPATIENT)
Dept: MEDICAL GROUP | Facility: MEDICAL CENTER | Age: 63
End: 2018-02-21

## 2018-02-21 NOTE — TELEPHONE ENCOUNTER
ESTABLISHED PATIENT PRE-VISIT PLANNING     Note: Patient will not be contacted if there is no indication to call.     1.  Reviewed notes from the last few office visits within the medical group: Yes 02/14/2018    2.  If any orders were placed at last visit or intended to be done for this visit (i.e. 6 mos follow-up), do we have Results/Consult Notes?        •  Labs - Labs were not ordered at last office visit.   Note: If patient appointment is for lab review and patient did not complete labs, check with provider if OK to reschedule patient until labs completed.       •  Imaging - Imaging ordered, completed and results are in chart.       •  Referrals - No referrals were ordered at last office visit.    3. Is this appointment scheduled as a Hospital Follow-Up? No    4.  Immunizations were updated in Epic using WebIZ?: No WebIZ record       •  Web Iz Recommendations:    5.  Patient is due for the following Health Maintenance Topics:   Health Maintenance Due   Topic Date Due   • IMM DTaP/Tdap/Td Vaccine (1 - Tdap) 10/23/1975   • COLONOSCOPY  10/23/2006   • IMM ZOSTER VACCINE  10/23/2016   • IMM INFLUENZA (1) 09/01/2017         6.  Patient was informed to arrive 15 min prior to their scheduled appointment and bring in their medication bottles.

## 2018-02-22 ENCOUNTER — OFFICE VISIT (OUTPATIENT)
Dept: MEDICAL GROUP | Facility: MEDICAL CENTER | Age: 63
End: 2018-02-22
Payer: COMMERCIAL

## 2018-02-22 VITALS
OXYGEN SATURATION: 97 % | WEIGHT: 196.21 LBS | DIASTOLIC BLOOD PRESSURE: 92 MMHG | TEMPERATURE: 99 F | HEIGHT: 66 IN | HEART RATE: 69 BPM | SYSTOLIC BLOOD PRESSURE: 142 MMHG | BODY MASS INDEX: 31.53 KG/M2

## 2018-02-22 DIAGNOSIS — Z00.00 HEALTH CARE MAINTENANCE: ICD-10-CM

## 2018-02-22 DIAGNOSIS — Z12.11 SCREENING FOR MALIGNANT NEOPLASM OF COLON: ICD-10-CM

## 2018-02-22 DIAGNOSIS — D64.9 ANEMIA, UNSPECIFIED TYPE: ICD-10-CM

## 2018-02-22 DIAGNOSIS — Z09 HOSPITAL DISCHARGE FOLLOW-UP: ICD-10-CM

## 2018-02-22 DIAGNOSIS — N17.9 ACUTE KIDNEY INJURY (HCC): ICD-10-CM

## 2018-02-22 DIAGNOSIS — I11.0 MALIGNANT HYPERTENSIVE HEART DISEASE WITH HEART FAILURE (HCC): ICD-10-CM

## 2018-02-22 DIAGNOSIS — R79.89 TROPONIN LEVEL ELEVATED: ICD-10-CM

## 2018-02-22 DIAGNOSIS — I50.31 ACUTE DIASTOLIC HEART FAILURE (HCC): ICD-10-CM

## 2018-02-22 PROCEDURE — 99215 OFFICE O/P EST HI 40 MIN: CPT | Performed by: INTERNAL MEDICINE

## 2018-02-22 NOTE — PROGRESS NOTES
CHIEF COMPLAINT  Chief Complaint   Patient presents with   • Hospital Follow-up     Hypterstive      HPI  Patient is a 62 y.o. male patient who presents today for the following     Hospital discharge follow-up, hypertensive emergency, hypertensive heart disease, acute diastolic heart failure, elevated troponin level, acute kidney injury  The patient was hospitalized from 2/14/18-2/17/18, discharge summary was reviewed, with the following:  Diagnoses  Active Problems:    Heart disease, hypertensive, malignant POA: Yes    Acute diastolic heart failure (CMS-HCC) POA: Yes    Troponin level elevated POA: Yes    Acute kidney injury (CMS-[facility]) POA: Yes      Hospital Summary (Brief Narrative):       62 y.o. male who presented 2/14/2018 with shortness of breath and high blood pressure.   Upon further questioning he also had orthopnea and PND.  He was admitted for acute diastolic heart failure which was confirmed on echocardiogram.  IV lasix was used for diuresis.  Patient was also found to have chronic hypertension. He is placed in the ICU on a nitro drip. Medications were titrated.  Nephrology was consulted and a renal ultrasound and Doppler were negative.  Patient was switched to labetalol.   Electrolytes were monitored and corrected.   CHF meds of ACEI and BB were reconciled.     Reviewed labs from hospitalization significant for  · CBC: Developed anemia with hemoglobin of 12.9 and  RBC 4.38 at discharge  · CMP: creatinine 1.65, GFR 43.  At discharge, similar as on admission  · Troponins: Maximum 0.7  · BN P: 1187    Reviewed imaging from hospitalization, significant for  · Chest x-ray: Cardiomegaly with pulmonary edema  · Renal ultrasound: Negative  · Doppler ultrasound: No renal artery stenosis  · Cardiac ultrasound: LVEF 55%, grade 2 diastolic dysfunction, pulmonary hypertension of 55  ·   Posthospitaliation course:  · The patient was stable  · Compliant with medications  · Improved shortness of breath, orthopnea and  PND  · Still has bilateral LE swelling  · Weight change 5 pounds since admission  · WT 93 kg at admission, 89 kg today    Reviewed PMH, PSH, FH, SH, ALL, HCM/IMM  Reviewed MEDS    Patient Active Problem List    Diagnosis Date Noted   • Troponin level elevated 02/15/2018   • Acute kidney injury (CMS-HCC) 02/15/2018   • Health care maintenance 2018   • Obesity (BMI 30.0-34.9) 2018   • Heart disease, hypertensive, malignant 2018   • Acute diastolic heart failure (CMS-[facility]) 2018     CURRENT MEDICATIONS  Current Outpatient Prescriptions   Medication Sig Dispense Refill   • furosemide (LASIX) 20 MG Tab Take 1 Tab by mouth every day. 30 Tab 0   • lisinopril (PRINIVIL) 2.5 MG Tab Take 1 Tab by mouth every day. 30 Tab 0   • amLODIPine (NORVASC) 10 MG Tab Take 1 Tab by mouth every day. 30 Tab 1   • labetalol (NORMODYNE) 200 MG Tab Take 1 Tab by mouth 2 Times a Day. 60 Tab 1   • multivitamin (THERAGRAN) Tab Take 1 Tab by mouth every day.       No current facility-administered medications for this visit.      ALLERGIES  Allergies: Patient has no known allergies.  PAST MEDICAL HISTORY  Past Medical History:   Diagnosis Date   • Acute kidney injury (CMS-HCC)    • Hypertensive urgency    • Nephrolithiasis      SURGICAL HISTORY  He  has no past surgical history on file.  SOCIAL HISTORY  Social History   Substance Use Topics   • Smoking status: Never Smoker   • Smokeless tobacco: Never Used   • Alcohol use 2.4 oz/week     4 Glasses of wine per week     Social History     Social History Narrative   • No narrative on file     FAMILY HISTORY  Family History   Problem Relation Age of Onset   • Cancer Mother      lungs, smoker   • Hypertension Father    • Hyperlipidemia Father    • Diabetes Neg Hx    • Heart Disease Neg Hx      Family Status   Relation Status   • Mother    • Father    • Neg Hx      ROS   Constitutional: Negative for fever, chills.  HENT: Negative for congestion, sore throat.  Eyes:  "Negative for blurred vision.   Respiratory: Negative for cough, shortness of breath.  Cardiovascular: as above  Gastrointestinal: Negative for heartburn, nausea, abdominal pain.   Genitourinary: Negative for dysuria. And per HPI.  Musculoskeletal: Negative for significant myalgias, back pain and joint pain.   Skin: Negative for rash and itching.   Neuro: Negative for dizziness, weakness and headaches.   Endo/Heme/Allergies: Does not bruise/bleed easily.   Psychiatric/Behavioral: Negative for depression.    PHYSICAL EXAM   Blood pressure 150/92, pulse 69, temperature 37.2 °C (99 °F), height 1.676 m (5' 6\"), weight 89 kg (196 lb 3.4 oz), SpO2 97 %. Body mass index is 31.67 kg/m².  General:  NAD, well appearing  HEENT:   NC/AT, PERRLA, EOMI, TMs are clear. Oropharyngeal mucosa is pink,  without lesions;  no cervical / supraclavicular  lymphadenopathy, no thyromegaly.    Cardiovascular: RRR.   No m/r/g. No carotid bruits .      Lungs:   CTAB, no w/r/r, no respiratory distress.  Abdomen: Soft, NT/ND + BS; no suprapubic tenderness; no masses or hepatosplenomegaly.  Extremities:  2+ DP and radial pulses bilaterally.  No c/c/e.   Skin:  Warm, dry.  No erythema. No rash.   Neurologic: Alert & oriented x 3. CN II-XII grossly intact. Brachioradialis / knee DTR are 2/4, symmetric. Strength and sensation grossly intact.  No focal deficits.  Psychiatric:  Affect normal, mood normal, judgment normal.    LABS     Labs are reviewed and discussed with a patient  Lab Results   Component Value Date/Time    CHOLSTRLTOT 128 02/16/2018 02:20 AM    LDL 72 02/16/2018 02:20 AM    HDL 43 02/16/2018 02:20 AM    TRIGLYCERIDE 66 02/16/2018 02:20 AM       Lab Results   Component Value Date/Time    SODIUM 135 02/17/2018 04:30 AM    POTASSIUM 3.4 (L) 02/17/2018 04:30 AM    CHLORIDE 102 02/17/2018 04:30 AM    CO2 24 02/17/2018 04:30 AM    GLUCOSE 101 (H) 02/17/2018 04:30 AM    BUN 14 02/17/2018 04:30 AM    CREATININE 1.65 (H) 02/17/2018 04:30 AM "     Lab Results   Component Value Date/Time    ALKPHOSPHAT 60 02/15/2018 04:00 AM    ASTSGOT 23 02/15/2018 04:00 AM    ALTSGPT 27 02/15/2018 04:00 AM    TBILIRUBIN 2.2 (H) 02/15/2018 04:00 AM       Ref. Range 2/14/2018 14:33   Troponin I Latest Ref Range: 0.00 - 0.04 ng/mL 0.07 (H)   B Natriuretic Peptide Latest Ref Range: 0 - 100 pg/mL 1187 (H)     Lab Results   Component Value Date/Time    WBC 7.8 02/17/2018 04:30 AM    RBC 4.38 (L) 02/17/2018 04:30 AM    HEMOGLOBIN 12.9 (L) 02/17/2018 04:30 AM    HEMATOCRIT 37.8 (L) 02/17/2018 04:30 AM    MCV 86.3 02/17/2018 04:30 AM    MCH 29.5 02/17/2018 04:30 AM    MCHC 34.1 02/17/2018 04:30 AM    MPV 8.7 (L) 02/17/2018 04:30 AM    NEUTSPOLYS 74.90 (H) 02/14/2018 02:33 PM    LYMPHOCYTES 11.50 (L) 02/14/2018 02:33 PM    MONOCYTES 9.80 02/14/2018 02:33 PM    EOSINOPHILS 3.00 02/14/2018 02:33 PM    BASOPHILS 0.50 02/14/2018 02:33 PM      IMAGING     Reviewed the following imaging:     Chest x-ray, 2/14/18:  Moderate bilateral perihilar lung base interstitial opacities.  Small right costophrenic angle effusion.  Cardiomegaly.    Echocardiography, 2/15/18:  No prior study is available for comparison.   Left ventricular systolic function is normal.  Left ventricular ejection fraction is visually estimated to be 55%.  Grade II diastolic dysfunction.  Mild mitral regurgitation.  Moderate aortic insufficiency.  Moderate tricuspid regurgitation.  Estimated right ventricular systolic pressure is 55 mmHg.    Renal ultrasound, 2/17/18:  1.  No hydronephrosis.  2.  1.5 cm right renal cyst.  3.  Increased echogenicity could indicate medical renal disease.    Renal Artery Duplex   There is no evidence of hemodynamically significant renal artery stenosis.     ASSESMENT AND PLAN        1. Hospital discharge follow-up  2. 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 Acute diastolic heart failure (CMS-HCC)  - B TYPE NATRIURETIC    The patient has been stable at home,  improved symptoms.   Blood pressure is higher in the office twice:  - Increase lisinopril from 2.5-5 mg daily and continue monitoring blood pressure at home  - Increase Lasix from 20-30 mg daily  - Continue monitoring weight  - Advised to go to the ER if develops in terms back    4. Troponin level elevated  Normalized    5. Acute kidney injury (CMS-HCC)  Kidney function remains similar since beginning of hospitalization.  He will have follow-up by nephrology in 10 days.  - COMP METABOLIC PANEL; Future    6. Anemia, unspecified type  Probably due to blood draw/IV fluids.   Follow-up labs  - CBC WITH DIFFERENTIAL; Future    7. Screening for malignant neoplasm of colon  - REFERRAL TO GI FOR COLONOSCOPY    8. Healthcare maintenance  Pending colonoscopy.     Counseling:   - Smoking:  Nonsmoker    Followup:     All questions are answered.    Time spent 40 minutes face to face, with > 50% spent counseling and coordinating care.      Please note that this dictation was created using voice recognition software, and that there might be errors of wing and possibly content.

## 2018-03-02 ENCOUNTER — APPOINTMENT (OUTPATIENT)
Dept: NEPHROLOGY | Facility: MEDICAL CENTER | Age: 63
End: 2018-03-02
Payer: COMMERCIAL

## 2018-03-14 ENCOUNTER — OFFICE VISIT (OUTPATIENT)
Dept: CARDIOLOGY | Facility: MEDICAL CENTER | Age: 63
End: 2018-03-14
Payer: COMMERCIAL

## 2018-03-14 VITALS
HEIGHT: 66 IN | WEIGHT: 190 LBS | HEART RATE: 76 BPM | SYSTOLIC BLOOD PRESSURE: 150 MMHG | OXYGEN SATURATION: 95 % | BODY MASS INDEX: 30.53 KG/M2 | DIASTOLIC BLOOD PRESSURE: 96 MMHG

## 2018-03-14 DIAGNOSIS — I50.30 ACC/AHA STAGE C HEART FAILURE WITH PRESERVED EJECTION FRACTION (HCC): ICD-10-CM

## 2018-03-14 DIAGNOSIS — I10 HTN (HYPERTENSION), MALIGNANT: ICD-10-CM

## 2018-03-14 DIAGNOSIS — Z79.899 HIGH RISK MEDICATION USE: ICD-10-CM

## 2018-03-14 DIAGNOSIS — R06.09 DYSPNEA ON EXERTION: ICD-10-CM

## 2018-03-14 DIAGNOSIS — I51.89 LEFT VENTRICULAR DIASTOLIC DYSFUNCTION, NYHA CLASS 2: ICD-10-CM

## 2018-03-14 PROCEDURE — 99205 OFFICE O/P NEW HI 60 MIN: CPT | Mod: 25 | Performed by: INTERNAL MEDICINE

## 2018-03-14 PROCEDURE — 94618 PULMONARY STRESS TESTING: CPT | Performed by: INTERNAL MEDICINE

## 2018-03-14 RX ORDER — SPIRONOLACTONE 25 MG/1
25 TABLET ORAL DAILY
Qty: 30 TAB | Refills: 3 | Status: SHIPPED | OUTPATIENT
Start: 2018-03-14 | End: 2018-03-27 | Stop reason: SDUPTHER

## 2018-03-14 ASSESSMENT — ENCOUNTER SYMPTOMS
SENSORY CHANGE: 0
SHORTNESS OF BREATH: 1
BLURRED VISION: 0
PALPITATIONS: 0
MEMORY LOSS: 0
COUGH: 0
DIAPHORESIS: 0
DEPRESSION: 0
HEADACHES: 0
DOUBLE VISION: 0
BRUISES/BLEEDS EASILY: 0
DIZZINESS: 0
FEVER: 0
ABDOMINAL PAIN: 0
FALLS: 0
MYALGIAS: 0

## 2018-03-14 ASSESSMENT — MINNESOTA LIVING WITH HEART FAILURE QUESTIONNAIRE (MLHF)
TOTAL_SCORE: 4
MAKING YOU FEEL DEPRESSED: 0
LOSS OF SELF CONTROL IN YOUR LIFE: 0
DIFFICULTY WITH SEXUAL ACTIVITIES: 0
DIFFICULTY WORKING TO EARN A LIVING: 0
COSTING YOU MONEY FOR MEDICAL CARE: 1
EATING LESS FOODS YOU LIKE: 0
DIFFICULTY SOCIALIZING WITH FAMILY OR FRIENDS: 0
DIFFICULTY WITH RECREATIONAL PASTIMES, SPORTS, HOBBIES: 0
DIFFICULTY SLEEPING WELL AT NIGHT: 0
FEELING LIKE A BURDEN TO FAMILY AND FRIENDS: 0
SWELLING IN ANKLES OR LEGS: 3
DIFFICULTY GOING AWAY FROM HOME: 0
HAVING TO SIT OR LIE DOWN DURING THE DAY: 0
DIFFICULTY TO CONCENTRATE OR REMEMBERING THINGS: 0
WALKING ABOUT OR CLIMBING STAIRS DIFFICULT: 0
MAKING YOU SHORT OF BREATH: 0
WORKING AROUND THE HOUSE OR YARD DIFFICULT: 0
TIRED, FATIGUED OR LOW ON ENERGY: 0
MAKING YOU STAY IN A HOSPITAL: 0
MAKING YOU WORRY: 0
GIVING YOU SIDE EFFECTS FROM TREATMENTS: 0

## 2018-03-14 ASSESSMENT — 6 MINUTE WALK TEST (6MWT): TOTAL DISTANCE WALKED (METERS): 550

## 2018-03-14 ASSESSMENT — NEW YORK HEART ASSOCIATION (NYHA) CLASSIFICATION: NYHA FUNCTIONAL CLASS: CLASS II

## 2018-03-14 NOTE — PROGRESS NOTES
Subjective:   Chema Sarabia is a 62 y.o. male who presents today for cardiac care and evaluation in our heart failure clinic after his recent episode of hypertensive emergency for which he went into heart failure exacerbation as well. Patient does have documented transthoracic echocardiogram which showed relatively preserved LV function of 55%. No significant valvular disease.    Patient is feeling better these days. Does get winded upon walking up inclines or for distance. No symptoms at rest or with daily living activities.    Patient was able to complete 550 m during his 6 minute walk test. his O2 saturation at baseline was 94% and at the end of the test, the O2 saturation was 95%. he reported 0 level of dyspnea on Jim scale.    Chief Complaint: dyspnea.    Past Medical History:   Diagnosis Date   • Acute kidney injury (CMS-HCC)    • Hypertensive urgency    • Nephrolithiasis      History reviewed. No pertinent surgical history.  Family History   Problem Relation Age of Onset   • Cancer Mother      lungs, smoker   • Hypertension Father    • Hyperlipidemia Father    • Diabetes Neg Hx    • Heart Disease Neg Hx      History   Smoking Status   • Never Smoker   Smokeless Tobacco   • Never Used     No Known Allergies  Outpatient Encounter Prescriptions as of 3/14/2018   Medication Sig Dispense Refill   • spironolactone (ALDACTONE) 25 MG Tab Take 1 Tab by mouth every day. 30 Tab 3   • furosemide (LASIX) 20 MG Tab Take 1 Tab by mouth every day. 30 Tab 0   • amLODIPine (NORVASC) 10 MG Tab Take 1 Tab by mouth every day. 30 Tab 1   • labetalol (NORMODYNE) 200 MG Tab Take 1 Tab by mouth 2 Times a Day. 60 Tab 1   • multivitamin (THERAGRAN) Tab Take 1 Tab by mouth every day.     • [DISCONTINUED] lisinopril (PRINIVIL) 2.5 MG Tab Take 1 Tab by mouth every day. 30 Tab 0     No facility-administered encounter medications on file as of 3/14/2018.      Review of Systems   Constitutional: Negative for diaphoresis and fever.  "  HENT: Negative for nosebleeds.    Eyes: Negative for blurred vision and double vision.   Respiratory: Positive for shortness of breath. Negative for cough.    Cardiovascular: Negative for chest pain and palpitations.   Gastrointestinal: Negative for abdominal pain.   Genitourinary: Negative for dysuria and frequency.   Musculoskeletal: Negative for falls and myalgias.   Skin: Negative for rash.   Neurological: Negative for dizziness, sensory change and headaches.   Endo/Heme/Allergies: Does not bruise/bleed easily.   Psychiatric/Behavioral: Negative for depression and memory loss.        Objective:   /96   Pulse 76   Ht 1.676 m (5' 6\")   Wt 86.2 kg (190 lb)   SpO2 95%   BMI 30.67 kg/m²     Physical Exam   Constitutional: He is oriented to person, place, and time. No distress.   HENT:   Head: Normocephalic and atraumatic.   Eyes: EOM are normal.   Neck: Normal range of motion. No JVD present.   Cardiovascular: Normal rate, regular rhythm, normal heart sounds and intact distal pulses.  Exam reveals no gallop and no friction rub.    No murmur heard.  Bilateral femoral pulses are 2+, bilateral dorsalis pedis pulses are 2+, bilateral posterior tibialis pulses are 2+.   Pulmonary/Chest: No respiratory distress. He has no wheezes. He has no rales. He exhibits no tenderness.   Abdominal: Soft. Bowel sounds are normal. There is no tenderness. There is no rebound and no guarding.   The is no presence of abdominal bruits   Musculoskeletal: Normal range of motion.   Neurological: He is alert and oriented to person, place, and time.   Skin: Skin is warm and dry.   Psychiatric: He has a normal mood and affect.   Nursing note and vitals reviewed.      Assessment:     1. HTN (hypertension), malignant  CT PULMONARY STRESS TESTING 6 MIN WALK    spironolactone (ALDACTONE) 25 MG Tab    BASIC METABOLIC PANEL   2. ACC/AHA stage C heart failure with preserved ejection fraction (CMS-HCC)  spironolactone (ALDACTONE) 25 MG Tab "    BASIC METABOLIC PANEL   3. Left ventricular diastolic dysfunction, NYHA class 2  spironolactone (ALDACTONE) 25 MG Tab    BASIC METABOLIC PANEL   4. Dyspnea on exertion  MI PULMONARY STRESS TESTING 6 MIN WALK    spironolactone (ALDACTONE) 25 MG Tab    BASIC METABOLIC PANEL   5. High risk medication use         Medical Decision Making:  Today's Assessment / Status / Plan:   Today, based on physical examination findings, patient is euvolemic. No JVD, lungs are clear to auscultation, no pitting edema in bilateral lower extremities, no ascites.    Dry weight is 190 lbs.    Start Spironolactone 25 mg po daily.    Stop Lisinopril.    Continue Amlodipine and Labetalol.    Perspective trial???    Will continue to closely monitor for side effects of patient's high risk medication(s) including liver, renal function and electrolytes.    I will see patient back in our Heart Failure Clinic with lab tests and studies results in 1 weeks.    I thank you for referring patient to our Heart Failure Clinic today.

## 2018-03-14 NOTE — LETTER
[facility]   [address], [address]  [place] [address]  Phone: [phone]  Fax: [phone]              Chema Sarabia  1955    Encounter Date: 3/14/2018    Santosh Castelan M.D.          PROGRESS NOTE:  Subjective:   Chema Sarabia is a 62 y.o. male who presents today for cardiac care and evaluation in our heart failure clinic after his recent episode of hypertensive emergency for which he went into heart failure exacerbation as well. Patient does have documented transthoracic echocardiogram which showed relatively preserved LV function of 55%. No significant valvular disease.    Patient is feeling better these days. Does get winded upon walking up inclines or for distance. No symptoms at rest or with daily living activities.    Patient was able to complete 550 m during his 6 minute walk test. his O2 saturation at baseline was 94% and at the end of the test, the O2 saturation was 95%. he reported 0 level of dyspnea on Jim scale.    Chief Complaint: dyspnea.    Past Medical History:   Diagnosis Date   • Acute kidney injury (CMS-HCC)    • Hypertensive urgency    • Nephrolithiasis      History reviewed. No pertinent surgical history.  Family History   Problem Relation Age of Onset   • Cancer Mother      lungs, smoker   • Hypertension Father    • Hyperlipidemia Father    • Diabetes Neg Hx    • Heart Disease Neg Hx      History   Smoking Status   • Never Smoker   Smokeless Tobacco   • Never Used     No Known Allergies  Outpatient Encounter Prescriptions as of 3/14/2018   Medication Sig Dispense Refill   • spironolactone (ALDACTONE) 25 MG Tab Take 1 Tab by mouth every day. 30 Tab 3   • furosemide (LASIX) 20 MG Tab Take 1 Tab by mouth every day. 30 Tab 0   • amLODIPine (NORVASC) 10 MG Tab Take 1 Tab by mouth every day. 30 Tab 1   • labetalol (NORMODYNE) 200 MG Tab Take 1 Tab by mouth 2 Times a Day. 60 Tab 1   • multivitamin (THERAGRAN) Tab Take 1 Tab by  "mouth every day.     • [DISCONTINUED] lisinopril (PRINIVIL) 2.5 MG Tab Take 1 Tab by mouth every day. 30 Tab 0     No facility-administered encounter medications on file as of 3/14/2018.      Review of Systems   Constitutional: Negative for diaphoresis and fever.   HENT: Negative for nosebleeds.    Eyes: Negative for blurred vision and double vision.   Respiratory: Positive for shortness of breath. Negative for cough.    Cardiovascular: Negative for chest pain and palpitations.   Gastrointestinal: Negative for abdominal pain.   Genitourinary: Negative for dysuria and frequency.   Musculoskeletal: Negative for falls and myalgias.   Skin: Negative for rash.   Neurological: Negative for dizziness, sensory change and headaches.   Endo/Heme/Allergies: Does not bruise/bleed easily.   Psychiatric/Behavioral: Negative for depression and memory loss.        Objective:   /96   Pulse 76   Ht 1.676 m (5' 6\")   Wt 86.2 kg (190 lb)   SpO2 95%   BMI 30.67 kg/m²      Physical Exam   Constitutional: He is oriented to person, place, and time. No distress.   HENT:   Head: Normocephalic and atraumatic.   Eyes: EOM are normal.   Neck: Normal range of motion. No JVD present.   Cardiovascular: Normal rate, regular rhythm, normal heart sounds and intact distal pulses.  Exam reveals no gallop and no friction rub.    No murmur heard.  Bilateral femoral pulses are 2+, bilateral dorsalis pedis pulses are 2+, bilateral posterior tibialis pulses are 2+.   Pulmonary/Chest: No respiratory distress. He has no wheezes. He has no rales. He exhibits no tenderness.   Abdominal: Soft. Bowel sounds are normal. There is no tenderness. There is no rebound and no guarding.   The is no presence of abdominal bruits   Musculoskeletal: Normal range of motion.   Neurological: He is alert and oriented to person, place, and time.   Skin: Skin is warm and dry.   Psychiatric: He has a normal mood and affect.   Nursing note and vitals " reviewed.      Assessment:     1. HTN (hypertension), malignant  [place] PULMONARY STRESS TESTING 6 MIN WALK    spironolactone (ALDACTONE) 25 MG Tab    BASIC METABOLIC PANEL   2. ACC/AHA stage C heart failure with preserved ejection fraction (CMS-HCC)  spironolactone (ALDACTONE) 25 MG Tab    BASIC METABOLIC PANEL   3. Left ventricular diastolic dysfunction, NYHA class 2  spironolactone (ALDACTONE) 25 MG Tab    BASIC METABOLIC PANEL   4. Dyspnea on exertion  VT PULMONARY STRESS TESTING 6 MIN WALK    spironolactone (ALDACTONE) 25 MG Tab    BASIC METABOLIC PANEL   5. High risk medication use         Medical Decision Making:  Today's Assessment / Status / Plan:   Today, based on physical examination findings, patient is euvolemic. No JVD, lungs are clear to auscultation, no pitting edema in bilateral lower extremities, no ascites.    Dry weight is 190 lbs.    Start Spironolactone 25 mg po daily.    Stop Lisinopril.    Continue Amlodipine and Labetalol.    Perspective trial???    Will continue to closely monitor for side effects of patient's high risk medication(s) including liver, renal function and electrolytes.    I will see patient back in our Heart Failure Clinic with lab tests and studies results in 1 weeks.    I thank you for referring patient to our Heart Failure Clinic today.                Albert Orellana M.D.  [address]  [address]  [place] [address]  VIA In Basket

## 2018-03-19 ENCOUNTER — HOSPITAL ENCOUNTER (OUTPATIENT)
Dept: LAB | Facility: MEDICAL CENTER | Age: 63
End: 2018-03-19
Attending: INTERNAL MEDICINE
Payer: COMMERCIAL

## 2018-03-19 DIAGNOSIS — R06.09 DYSPNEA ON EXERTION: ICD-10-CM

## 2018-03-19 DIAGNOSIS — I10 HTN (HYPERTENSION), MALIGNANT: ICD-10-CM

## 2018-03-19 DIAGNOSIS — I51.89 LEFT VENTRICULAR DIASTOLIC DYSFUNCTION, NYHA CLASS 2: ICD-10-CM

## 2018-03-19 DIAGNOSIS — I50.30 ACC/AHA STAGE C HEART FAILURE WITH PRESERVED EJECTION FRACTION (HCC): ICD-10-CM

## 2018-03-19 LAB
ANION GAP SERPL CALC-SCNC: 8 MMOL/L (ref 0–11.9)
BUN SERPL-MCNC: 33 MG/DL (ref 8–22)
CALCIUM SERPL-MCNC: 9.3 MG/DL (ref 8.5–10.5)
CHLORIDE SERPL-SCNC: 107 MMOL/L (ref 96–112)
CO2 SERPL-SCNC: 25 MMOL/L (ref 20–33)
CREAT SERPL-MCNC: 1.78 MG/DL (ref 0.5–1.4)
GLUCOSE SERPL-MCNC: 97 MG/DL (ref 65–99)
POTASSIUM SERPL-SCNC: 4.8 MMOL/L (ref 3.6–5.5)
SODIUM SERPL-SCNC: 140 MMOL/L (ref 135–145)

## 2018-03-19 PROCEDURE — 80048 BASIC METABOLIC PNL TOTAL CA: CPT

## 2018-03-19 PROCEDURE — 36415 COLL VENOUS BLD VENIPUNCTURE: CPT

## 2018-03-20 ENCOUNTER — OFFICE VISIT (OUTPATIENT)
Dept: CARDIOLOGY | Facility: MEDICAL CENTER | Age: 63
End: 2018-03-20
Payer: COMMERCIAL

## 2018-03-20 VITALS
WEIGHT: 193 LBS | SYSTOLIC BLOOD PRESSURE: 204 MMHG | HEIGHT: 66 IN | HEART RATE: 74 BPM | BODY MASS INDEX: 31.02 KG/M2 | OXYGEN SATURATION: 96 % | DIASTOLIC BLOOD PRESSURE: 116 MMHG

## 2018-03-20 DIAGNOSIS — I50.30 ACC/AHA STAGE C HEART FAILURE WITH PRESERVED EJECTION FRACTION (HCC): ICD-10-CM

## 2018-03-20 DIAGNOSIS — I50.30 NYHA CLASS 1 HEART FAILURE WITH PRESERVED EJECTION FRACTION (HCC): ICD-10-CM

## 2018-03-20 DIAGNOSIS — I10 HTN (HYPERTENSION), MALIGNANT: ICD-10-CM

## 2018-03-20 PROCEDURE — 99214 OFFICE O/P EST MOD 30 MIN: CPT | Performed by: NURSE PRACTITIONER

## 2018-03-20 ASSESSMENT — ENCOUNTER SYMPTOMS
DIZZINESS: 0
DOUBLE VISION: 0
SHORTNESS OF BREATH: 0
HEADACHES: 0
COUGH: 0
BLURRED VISION: 0
FEVER: 0
PALPITATIONS: 0
PND: 0
ORTHOPNEA: 0
MYALGIAS: 0
CLAUDICATION: 0
ABDOMINAL PAIN: 0

## 2018-03-20 NOTE — PROGRESS NOTES
Chief Complaint   Patient presents with   • HTN (Uncontrolled)     Labs in Epic/ pt assumed that he had to stop all other BP meds and only take Spironolactone but that is not what Dr. Castelan stated in his last note.        Subjective:   Chema Sarabia is a 62 y.o. male who presents today for follow-up on his heart failure and hypertension.    New patient cardiology, patient was initially seen in heart failure clinic on 3/14/18 with Dr. Castelan. During that visit patient was started on spironolactone 25 mg daily and lisinopril was stopped. Patient comes in to clinic reporting that he misunderstood the instructions and stopped his lisinopril, labetalol and amlodipine.     Patient has noticed his blood pressure has been increasing through the week. Patient denies any symptoms. Patient denies chest pain, shortness of breath at rest, ADLs exertion, palpitations, headaches, vision changes, weakness, dizziness/lightheadedness, orthopnea, PND, or mobility problems. Patient continues to have lower extremity edema, but reports it is improving. Patient has even shoveled snow the last few days and has had no symptoms.    Patient reports his weight is going down, and he is currently down 4 pounds, weighing 185 pounds.     Patient has a history of some kidney disease in the past.    Past Medical History:   Diagnosis Date   • Acute kidney injury (CMS-HCC)    • Hypertensive urgency    • Nephrolithiasis      History reviewed. No pertinent surgical history.  Family History   Problem Relation Age of Onset   • Cancer Mother      lungs, smoker   • Hypertension Father    • Hyperlipidemia Father    • Diabetes Neg Hx    • Heart Disease Neg Hx      Social History     Social History   • Marital status: Single     Spouse name: N/A   • Number of children: N/A   • Years of education: N/A     Occupational History   • Not on file.     Social History Main Topics   • Smoking status: Never Smoker   • Smokeless tobacco: Never Used   • Alcohol use 2.4  "oz/week     4 Glasses of wine per week   • Drug use: No   • Sexual activity: Not on file     Other Topics Concern   • Not on file     Social History Narrative   • No narrative on file     No Known Allergies  Outpatient Encounter Prescriptions as of 3/20/2018   Medication Sig Dispense Refill   • spironolactone (ALDACTONE) 25 MG Tab Take 1 Tab by mouth every day. 30 Tab 3   • multivitamin (THERAGRAN) Tab Take 1 Tab by mouth every day.     • furosemide (LASIX) 20 MG Tab Take 1 Tab by mouth every day. 30 Tab 0   • amLODIPine (NORVASC) 10 MG Tab Take 1 Tab by mouth every day. 30 Tab 1   • labetalol (NORMODYNE) 200 MG Tab Take 1 Tab by mouth 2 Times a Day. 60 Tab 1     No facility-administered encounter medications on file as of 3/20/2018.      Review of Systems   Constitutional: Negative for fever and malaise/fatigue.   Eyes: Negative for blurred vision and double vision.   Respiratory: Negative for cough and shortness of breath.    Cardiovascular: Positive for leg swelling. Negative for chest pain, palpitations, orthopnea, claudication and PND.   Gastrointestinal: Negative for abdominal pain.   Musculoskeletal: Negative for myalgias.   Neurological: Negative for dizziness and headaches.   All other systems reviewed and are negative.       Objective:   BP (!) 204/116   Pulse 74   Ht 1.676 m (5' 6\")   Wt 87.5 kg (193 lb)   SpO2 96%   BMI 31.15 kg/m²     Physical Exam   Constitutional: He is oriented to person, place, and time. He appears well-developed and well-nourished.   HENT:   Head: Normocephalic and atraumatic.   Eyes: EOM are normal. Pupils are equal, round, and reactive to light.   Neck: Normal range of motion. Neck supple. No JVD present.   Cardiovascular: Normal rate, regular rhythm and normal heart sounds.    Pulmonary/Chest: Effort normal and breath sounds normal. No respiratory distress. He has no wheezes. He has no rales.   Musculoskeletal: He exhibits edema (bilateral 2+ LE edema (Calf to ankle)). "   Neurological: He is alert and oriented to person, place, and time.   Grossly intact   Skin: Skin is warm and dry.   Psychiatric: He has a normal mood and affect. His behavior is normal.     Lab Results   Component Value Date/Time    CHOLSTRLTOT 128 02/16/2018 02:20 AM    LDL 72 02/16/2018 02:20 AM    HDL 43 02/16/2018 02:20 AM    TRIGLYCERIDE 66 02/16/2018 02:20 AM       Lab Results   Component Value Date/Time    SODIUM 140 03/19/2018 07:02 AM    POTASSIUM 4.8 03/19/2018 07:02 AM    CHLORIDE 107 03/19/2018 07:02 AM    CO2 25 03/19/2018 07:02 AM    GLUCOSE 97 03/19/2018 07:02 AM    BUN 33 (H) 03/19/2018 07:02 AM    CREATININE 1.78 (H) 03/19/2018 07:02 AM     Lab Results   Component Value Date/Time    ALKPHOSPHAT 60 02/15/2018 04:00 AM    ASTSGOT 23 02/15/2018 04:00 AM    ALTSGPT 27 02/15/2018 04:00 AM    TBILIRUBIN 2.2 (H) 02/15/2018 04:00 AM      Transthoracic Echo Report 2/15/18  No prior study is available for comparison.   Left ventricular systolic function is normal.  Left ventricular ejection fraction is visually estimated to be 55%.  Grade II diastolic dysfunction.  Mild mitral regurgitation.  Moderate aortic insufficiency.  Moderate tricuspid regurgitation.  Estimated right ventricular systolic pressure is 55 mmHg.  Mild pulmonic insufficiency.    Assessment:     1. HTN (hypertension), malignant  BASIC METABOLIC PANEL   2. ACC/AHA stage C heart failure with preserved ejection fraction (CMS-HCC)  BASIC METABOLIC PANEL   3. NYHA class 1 heart failure with preserved ejection fraction (CMS-HCC)  BASIC METABOLIC PANEL     Medical Decision Making:  Today's Assessment / Status / Plan:   1. HTN: BP is extremely elevated in the office today, patient declines going to the emergency room for further evaluation and treatment. Patient is asymptomatic and will go directly home and take his blood pressure medications.  -Restart amlodipine 10 mg daily  -Restart labetalol 200 mg twice a day  -Continue spironolactone 25 mg  daily  -Monitor and log Blood pressures at home. Call office or RTC if BP increasing or >180/100 or if symptoms of elevated blood pressure present after restarting the meds. Reviewed s/sx of stroke and heart attack. Patient to go to ER or call 911 if present.   -Patient to call office tomorrow afternoon to report blood pressures after medications today and tomorrow. Patient can use MyChart as well    2. HFpEF, stage C, class 1: Patient continues to have lower extremity edema  -Continue furosemide 20 mg daily  -Continue spironolactone 25 mg daily  -BMP in 1 week  -Reinforced s/sx of worsening heart failure with patient and weight monitoring. Pt verbalizes understanding. Pt to call office or RTC if present.     FU in clinic in 1 week with Dr. Ragsdale Sooner if needed.    Patient verbalizes understanding and agrees with the plan of care.     Collaborating MD: Blanca Acosta MD

## 2018-03-21 ENCOUNTER — TELEPHONE (OUTPATIENT)
Dept: CARDIOLOGY | Facility: MEDICAL CENTER | Age: 63
End: 2018-03-21

## 2018-03-21 NOTE — TELEPHONE ENCOUNTER
RE: Blood pressure reading   Received: Today   Message Contents   ROBEL Goldstein R.N.   Phone Number: [phone]             Can you please call him and check to see if that BP is after his medications. Also, what is his BP now? Hopefully lower.     Thanks, Yennifer    Previous Messages      ----- Message -----   From: Heather Gomes R.N.   Sent: 3/21/2018  12:20 PM   To: ROBEL Goldstein   Subject: FW: Blood pressure reading                           ----- Message -----   From: Nadya Tinoco, Med Ass't   Sent: 3/21/2018  12:15 PM   To: Heather Gomes R.N.   Subject: FW: Blood pressure reading                       Please advise   ----- Message -----   From: Antolin Parekh Med Ass't   Sent: 3/21/2018   9:13 AM   To: Nadya Tinoco Med Ass't   Subject: Blood pressure reading                           CATHERINE   TT     Hello there Nadya,     Holden De Oliveira wanted to know what Pt's blood pressure is. He stated that it is 190/110 this morning at 9am, He stated it is going down.     He can reached at: [phone].     Thank you so much,     Antolin Hatch called. No answer. LVM to check BP in AM 90 mins after medications and to call me with that reading. Left contact information.

## 2018-03-22 ENCOUNTER — TELEPHONE (OUTPATIENT)
Dept: CARDIOLOGY | Facility: MEDICAL CENTER | Age: 63
End: 2018-03-22

## 2018-03-22 NOTE — TELEPHONE ENCOUNTER
From: Meron London   Sent: 3/22/2018  10:43 AM   To: SERVANDO GascaPSOPHY   Subject: Patient calling to provide Blood Pressure           HERMELINDO Boyd patient called to provide an update on his latest blood pressure reading. Patient said it is much better; he took it around 10:30 or so and it was 129/83. Patient just wanted to let Yennifer know; if any further info needed please call patient back, otherwise no call back was requested.     Thank you!     Meron [phone]     =================================================================================    APRN notified.

## 2018-03-23 ENCOUNTER — HOSPITAL ENCOUNTER (OUTPATIENT)
Dept: LAB | Facility: MEDICAL CENTER | Age: 63
End: 2018-03-23
Attending: NURSE PRACTITIONER
Payer: COMMERCIAL

## 2018-03-23 ENCOUNTER — HOSPITAL ENCOUNTER (OUTPATIENT)
Dept: LAB | Facility: MEDICAL CENTER | Age: 63
End: 2018-03-23
Attending: INTERNAL MEDICINE
Payer: COMMERCIAL

## 2018-03-23 DIAGNOSIS — I50.30 NYHA CLASS 1 HEART FAILURE WITH PRESERVED EJECTION FRACTION (HCC): ICD-10-CM

## 2018-03-23 DIAGNOSIS — I50.30 ACC/AHA STAGE C HEART FAILURE WITH PRESERVED EJECTION FRACTION (HCC): ICD-10-CM

## 2018-03-23 DIAGNOSIS — I10 HTN (HYPERTENSION), MALIGNANT: ICD-10-CM

## 2018-03-23 DIAGNOSIS — N17.9 ACUTE KIDNEY INJURY (HCC): ICD-10-CM

## 2018-03-23 DIAGNOSIS — D64.9 ANEMIA, UNSPECIFIED TYPE: ICD-10-CM

## 2018-03-23 LAB
ALBUMIN SERPL BCP-MCNC: 4.7 G/DL (ref 3.2–4.9)
ALBUMIN/GLOB SERPL: 2.1 G/DL
ALP SERPL-CCNC: 53 U/L (ref 30–99)
ALT SERPL-CCNC: 63 U/L (ref 2–50)
ANION GAP SERPL CALC-SCNC: 10 MMOL/L (ref 0–11.9)
ANION GAP SERPL CALC-SCNC: 10 MMOL/L (ref 0–11.9)
AST SERPL-CCNC: 56 U/L (ref 12–45)
BASOPHILS # BLD AUTO: 0.6 % (ref 0–1.8)
BASOPHILS # BLD: 0.05 K/UL (ref 0–0.12)
BILIRUB SERPL-MCNC: 1.4 MG/DL (ref 0.1–1.5)
BNP SERPL-MCNC: 141 PG/ML (ref 0–100)
BUN SERPL-MCNC: 34 MG/DL (ref 8–22)
BUN SERPL-MCNC: 35 MG/DL (ref 8–22)
CALCIUM SERPL-MCNC: 9.6 MG/DL (ref 8.5–10.5)
CALCIUM SERPL-MCNC: 9.9 MG/DL (ref 8.5–10.5)
CHLORIDE SERPL-SCNC: 106 MMOL/L (ref 96–112)
CHLORIDE SERPL-SCNC: 106 MMOL/L (ref 96–112)
CO2 SERPL-SCNC: 21 MMOL/L (ref 20–33)
CO2 SERPL-SCNC: 22 MMOL/L (ref 20–33)
CREAT SERPL-MCNC: 1.57 MG/DL (ref 0.5–1.4)
CREAT SERPL-MCNC: 1.68 MG/DL (ref 0.5–1.4)
EOSINOPHIL # BLD AUTO: 0.16 K/UL (ref 0–0.51)
EOSINOPHIL NFR BLD: 2 % (ref 0–6.9)
ERYTHROCYTE [DISTWIDTH] IN BLOOD BY AUTOMATED COUNT: 42.3 FL (ref 35.9–50)
GLOBULIN SER CALC-MCNC: 2.2 G/DL (ref 1.9–3.5)
GLUCOSE SERPL-MCNC: 91 MG/DL (ref 65–99)
GLUCOSE SERPL-MCNC: 92 MG/DL (ref 65–99)
HCT VFR BLD AUTO: 51.8 % (ref 42–52)
HGB BLD-MCNC: 16.7 G/DL (ref 14–18)
IMM GRANULOCYTES # BLD AUTO: 0.03 K/UL (ref 0–0.11)
IMM GRANULOCYTES NFR BLD AUTO: 0.4 % (ref 0–0.9)
LYMPHOCYTES # BLD AUTO: 1.56 K/UL (ref 1–4.8)
LYMPHOCYTES NFR BLD: 19.8 % (ref 22–41)
MCH RBC QN AUTO: 27.4 PG (ref 27–33)
MCHC RBC AUTO-ENTMCNC: 32.2 G/DL (ref 33.7–35.3)
MCV RBC AUTO: 84.9 FL (ref 81.4–97.8)
MONOCYTES # BLD AUTO: 0.77 K/UL (ref 0–0.85)
MONOCYTES NFR BLD AUTO: 9.8 % (ref 0–13.4)
NEUTROPHILS # BLD AUTO: 5.3 K/UL (ref 1.82–7.42)
NEUTROPHILS NFR BLD: 67.4 % (ref 44–72)
NRBC # BLD AUTO: 0 K/UL
NRBC BLD-RTO: 0 /100 WBC
PLATELET # BLD AUTO: 198 K/UL (ref 164–446)
PMV BLD AUTO: 9.6 FL (ref 9–12.9)
POTASSIUM SERPL-SCNC: 4.4 MMOL/L (ref 3.6–5.5)
POTASSIUM SERPL-SCNC: 4.6 MMOL/L (ref 3.6–5.5)
PROT SERPL-MCNC: 6.9 G/DL (ref 6–8.2)
RBC # BLD AUTO: 6.1 M/UL (ref 4.7–6.1)
SODIUM SERPL-SCNC: 137 MMOL/L (ref 135–145)
SODIUM SERPL-SCNC: 138 MMOL/L (ref 135–145)
WBC # BLD AUTO: 7.9 K/UL (ref 4.8–10.8)

## 2018-03-23 PROCEDURE — 83880 ASSAY OF NATRIURETIC PEPTIDE: CPT

## 2018-03-23 PROCEDURE — 80048 BASIC METABOLIC PNL TOTAL CA: CPT

## 2018-03-23 PROCEDURE — 36415 COLL VENOUS BLD VENIPUNCTURE: CPT

## 2018-03-23 PROCEDURE — 80053 COMPREHEN METABOLIC PANEL: CPT

## 2018-03-23 PROCEDURE — 85025 COMPLETE CBC W/AUTO DIFF WBC: CPT

## 2018-03-27 ENCOUNTER — OFFICE VISIT (OUTPATIENT)
Dept: CARDIOLOGY | Facility: MEDICAL CENTER | Age: 63
End: 2018-03-27
Payer: COMMERCIAL

## 2018-03-27 VITALS
SYSTOLIC BLOOD PRESSURE: 150 MMHG | WEIGHT: 195 LBS | OXYGEN SATURATION: 97 % | BODY MASS INDEX: 31.34 KG/M2 | DIASTOLIC BLOOD PRESSURE: 88 MMHG | HEIGHT: 66 IN | HEART RATE: 64 BPM

## 2018-03-27 DIAGNOSIS — I50.30 ACC/AHA STAGE C HEART FAILURE WITH PRESERVED EJECTION FRACTION (HCC): ICD-10-CM

## 2018-03-27 DIAGNOSIS — I51.89 LEFT VENTRICULAR DIASTOLIC DYSFUNCTION, NYHA CLASS 2: ICD-10-CM

## 2018-03-27 DIAGNOSIS — I10 HTN (HYPERTENSION), MALIGNANT: ICD-10-CM

## 2018-03-27 DIAGNOSIS — Z79.899 HIGH RISK MEDICATION USE: ICD-10-CM

## 2018-03-27 DIAGNOSIS — R06.09 DYSPNEA ON EXERTION: ICD-10-CM

## 2018-03-27 PROCEDURE — 99214 OFFICE O/P EST MOD 30 MIN: CPT | Performed by: INTERNAL MEDICINE

## 2018-03-27 RX ORDER — FUROSEMIDE 20 MG/1
20 TABLET ORAL DAILY
Qty: 30 TAB | Refills: 11 | Status: SHIPPED | OUTPATIENT
Start: 2018-03-27 | End: 2018-04-05 | Stop reason: SDUPTHER

## 2018-03-27 RX ORDER — SPIRONOLACTONE 25 MG/1
25 TABLET ORAL DAILY
Qty: 30 TAB | Refills: 11 | Status: SHIPPED | OUTPATIENT
Start: 2018-03-27 | End: 2018-04-05 | Stop reason: SDUPTHER

## 2018-03-27 RX ORDER — LABETALOL 200 MG/1
400 TABLET, FILM COATED ORAL 2 TIMES DAILY
Qty: 120 TAB | Refills: 11 | Status: SHIPPED | OUTPATIENT
Start: 2018-03-27 | End: 2018-12-24 | Stop reason: SDUPTHER

## 2018-03-27 RX ORDER — AMLODIPINE BESYLATE 10 MG/1
10 TABLET ORAL DAILY
Qty: 30 TAB | Refills: 1 | Status: SHIPPED | OUTPATIENT
Start: 2018-03-27 | End: 2018-04-05 | Stop reason: SDUPTHER

## 2018-03-27 NOTE — LETTER
[facility]   [address], [address]  [place] [address]  Phone: [phone]  Fax: [phone]              Chema Sarabia  1955    Encounter Date: 3/27/2018    Santosh Castelan M.D.          PROGRESS NOTE:  Chief Complaint   Patient presents with   • CHF (Diastolic)       Subjective:   Chema Sarabia is a 62 y.o. male who presents today for cardiac care and evaluation in our heart failure clinic after his recent episode of hypertensive emergency for which he went into heart failure exacerbation as well. Patient does have documented transthoracic echocardiogram which showed relatively preserved LV function of 55%. No significant valvular disease.     Patient is feeling better these days. Does get winded upon walking up inclines or for distance. No symptoms at rest or with daily living activities.     Patient was able to complete 550 m during his 6 minute walk test. his O2 saturation at baseline was 94% and at the end of the test, the O2 saturation was 95%. he reported 0 level of dyspnea on Jim scale.    Blood pressure continues to be high.     Past Medical History:   Diagnosis Date   • Acute kidney injury (CMS-HCC)    • Hypertensive urgency    • Nephrolithiasis      History reviewed. No pertinent surgical history.  Family History   Problem Relation Age of Onset   • Cancer Mother      lungs, smoker   • Hypertension Father    • Hyperlipidemia Father    • Diabetes Neg Hx    • Heart Disease Neg Hx      Social History     Social History   • Marital status: Single     Spouse name: N/A   • Number of children: N/A   • Years of education: N/A     Occupational History   • Not on file.     Social History Main Topics   • Smoking status: Never Smoker   • Smokeless tobacco: Never Used   • Alcohol use 2.4 oz/week     4 Glasses of wine per week   • Drug use: No   • Sexual activity: Not on file     Other Topics Concern   • Not on file     Social History Narrative   • No  "narrative on file     No Known Allergies  Outpatient Encounter Prescriptions as of 3/27/2018   Medication Sig Dispense Refill   • labetalol (NORMODYNE) 200 MG Tab Take 2 Tabs by mouth 2 times a day. 120 Tab 11   • amLODIPine (NORVASC) 10 MG Tab Take 1 Tab by mouth every day. 30 Tab 1   • spironolactone (ALDACTONE) 25 MG Tab Take 1 Tab by mouth every day. 30 Tab 11   • furosemide (LASIX) 20 MG Tab Take 1 Tab by mouth every day. 30 Tab 11   • multivitamin (THERAGRAN) Tab Take 1 Tab by mouth every day.     • [DISCONTINUED] spironolactone (ALDACTONE) 25 MG Tab Take 1 Tab by mouth every day. 30 Tab 3   • [DISCONTINUED] furosemide (LASIX) 20 MG Tab Take 1 Tab by mouth every day. 30 Tab 0   • [DISCONTINUED] amLODIPine (NORVASC) 10 MG Tab Take 1 Tab by mouth every day. 30 Tab 1   • [DISCONTINUED] labetalol (NORMODYNE) 200 MG Tab Take 1 Tab by mouth 2 Times a Day. 60 Tab 1     No facility-administered encounter medications on file as of 3/27/2018.      Review of Systems   Constitutional: Negative for diaphoresis and fever.   HENT: Negative for nosebleeds.    Eyes: Negative for blurred vision and double vision.   Respiratory: Positive for shortness of breath. Negative for cough.    Cardiovascular: Negative for chest pain and palpitations.   Gastrointestinal: Negative for abdominal pain.   Genitourinary: Negative for dysuria and frequency.   Musculoskeletal: Negative for falls and myalgias.   Skin: Negative for rash.   Neurological: Negative for dizziness, sensory change and headaches.   Endo/Heme/Allergies: Does not bruise/bleed easily.   Psychiatric/Behavioral: Negative for depression and memory loss.        Objective:   /88   Pulse 64   Ht 1.676 m (5' 6\")   Wt 88.5 kg (195 lb)   SpO2 97%   BMI 31.47 kg/m²      Physical Exam   Constitutional: He is oriented to person, place, and time. No distress.   HENT:   Head: Normocephalic and atraumatic.   Right Ear: External ear normal.   Left Ear: External ear normal.   "   Eyes: Right eye exhibits no discharge. Left eye exhibits no discharge.   Neck: No JVD present. No thyromegaly present.   Cardiovascular: Normal rate, regular rhythm, normal heart sounds and intact distal pulses.  Exam reveals no gallop and no friction rub.    No murmur heard.  Pulmonary/Chest: Breath sounds normal. No respiratory distress.   Abdominal: Bowel sounds are normal. He exhibits no distension. There is no tenderness.   Musculoskeletal: He exhibits no edema or tenderness.   Neurological: He is alert and oriented to person, place, and time. No cranial nerve deficit.   Skin: Skin is warm and dry. He is not diaphoretic.   Psychiatric: He has a normal mood and affect. His behavior is normal.   Nursing note and vitals reviewed.      Assessment:     1. ACC/AHA stage C heart failure with preserved ejection fraction (CMS-HCC)  spironolactone (ALDACTONE) 25 MG Tab   2. Left ventricular diastolic dysfunction, NYHA class 2  spironolactone (ALDACTONE) 25 MG Tab   3. HTN (hypertension), malignant  spironolactone (ALDACTONE) 25 MG Tab   4. High risk medication use     5. Dyspnea on exertion  spironolactone (ALDACTONE) 25 MG Tab       Medical Decision Making:  Today's Assessment / Status / Plan:   Will add Spironolactone 25 mg po daily for HFpEF and BP control as well.    Will increase Labetalol to 400 mg bid.    Today, based on physical examination findings, patient is euvolemic. No JVD, lungs are clear to auscultation, no pitting edema in bilateral lower extremities, no ascites.    Dry weight is 195 lbs.    Perspective trial?    Will continue to closely monitor for side effects of patient's high risk medication(s) including liver, renal function and electrolytes.    I will see patient back in our Heart Failure Clinic with lab tests and studies results in 4 weeks.    I thank you for referring patient to our Heart Failure Clinic today.        Albert Orellana M.D.  [address]  [address]  [place]  [address]  VIA In Basket

## 2018-03-28 ASSESSMENT — ENCOUNTER SYMPTOMS
MEMORY LOSS: 0
COUGH: 0
HEADACHES: 0
PALPITATIONS: 0
DOUBLE VISION: 0
SHORTNESS OF BREATH: 1
BLURRED VISION: 0
MYALGIAS: 0
DEPRESSION: 0
DIZZINESS: 0
SENSORY CHANGE: 0
FEVER: 0
DIAPHORESIS: 0
ABDOMINAL PAIN: 0
FALLS: 0
BRUISES/BLEEDS EASILY: 0

## 2018-03-29 NOTE — PROGRESS NOTES
Chief Complaint   Patient presents with   • CHF (Diastolic)       Subjective:   Chema Sarabia is a 62 y.o. male who presents today for cardiac care and evaluation in our heart failure clinic after his recent episode of hypertensive emergency for which he went into heart failure exacerbation as well. Patient does have documented transthoracic echocardiogram which showed relatively preserved LV function of 55%. No significant valvular disease.     Patient is feeling better these days. Does get winded upon walking up inclines or for distance. No symptoms at rest or with daily living activities.     Patient was able to complete 550 m during his 6 minute walk test. his O2 saturation at baseline was 94% and at the end of the test, the O2 saturation was 95%. he reported 0 level of dyspnea on Jim scale.    Blood pressure continues to be high.     Past Medical History:   Diagnosis Date   • Acute kidney injury (CMS-HCC)    • Hypertensive urgency    • Nephrolithiasis      History reviewed. No pertinent surgical history.  Family History   Problem Relation Age of Onset   • Cancer Mother      lungs, smoker   • Hypertension Father    • Hyperlipidemia Father    • Diabetes Neg Hx    • Heart Disease Neg Hx      Social History     Social History   • Marital status: Single     Spouse name: N/A   • Number of children: N/A   • Years of education: N/A     Occupational History   • Not on file.     Social History Main Topics   • Smoking status: Never Smoker   • Smokeless tobacco: Never Used   • Alcohol use 2.4 oz/week     4 Glasses of wine per week   • Drug use: No   • Sexual activity: Not on file     Other Topics Concern   • Not on file     Social History Narrative   • No narrative on file     No Known Allergies  Outpatient Encounter Prescriptions as of 3/27/2018   Medication Sig Dispense Refill   • labetalol (NORMODYNE) 200 MG Tab Take 2 Tabs by mouth 2 times a day. 120 Tab 11   • amLODIPine (NORVASC) 10 MG Tab Take 1 Tab by mouth  "every day. 30 Tab 1   • spironolactone (ALDACTONE) 25 MG Tab Take 1 Tab by mouth every day. 30 Tab 11   • furosemide (LASIX) 20 MG Tab Take 1 Tab by mouth every day. 30 Tab 11   • multivitamin (THERAGRAN) Tab Take 1 Tab by mouth every day.     • [DISCONTINUED] spironolactone (ALDACTONE) 25 MG Tab Take 1 Tab by mouth every day. 30 Tab 3   • [DISCONTINUED] furosemide (LASIX) 20 MG Tab Take 1 Tab by mouth every day. 30 Tab 0   • [DISCONTINUED] amLODIPine (NORVASC) 10 MG Tab Take 1 Tab by mouth every day. 30 Tab 1   • [DISCONTINUED] labetalol (NORMODYNE) 200 MG Tab Take 1 Tab by mouth 2 Times a Day. 60 Tab 1     No facility-administered encounter medications on file as of 3/27/2018.      Review of Systems   Constitutional: Negative for diaphoresis and fever.   HENT: Negative for nosebleeds.    Eyes: Negative for blurred vision and double vision.   Respiratory: Positive for shortness of breath. Negative for cough.    Cardiovascular: Negative for chest pain and palpitations.   Gastrointestinal: Negative for abdominal pain.   Genitourinary: Negative for dysuria and frequency.   Musculoskeletal: Negative for falls and myalgias.   Skin: Negative for rash.   Neurological: Negative for dizziness, sensory change and headaches.   Endo/Heme/Allergies: Does not bruise/bleed easily.   Psychiatric/Behavioral: Negative for depression and memory loss.        Objective:   /88   Pulse 64   Ht 1.676 m (5' 6\")   Wt 88.5 kg (195 lb)   SpO2 97%   BMI 31.47 kg/m²     Physical Exam   Constitutional: He is oriented to person, place, and time. No distress.   HENT:   Head: Normocephalic and atraumatic.   Right Ear: External ear normal.   Left Ear: External ear normal.   Eyes: Right eye exhibits no discharge. Left eye exhibits no discharge.   Neck: No JVD present. No thyromegaly present.   Cardiovascular: Normal rate, regular rhythm, normal heart sounds and intact distal pulses.  Exam reveals no gallop and no friction rub.    No murmur " heard.  Pulmonary/Chest: Breath sounds normal. No respiratory distress.   Abdominal: Bowel sounds are normal. He exhibits no distension. There is no tenderness.   Musculoskeletal: He exhibits no edema or tenderness.   Neurological: He is alert and oriented to person, place, and time. No cranial nerve deficit.   Skin: Skin is warm and dry. He is not diaphoretic.   Psychiatric: He has a normal mood and affect. His behavior is normal.   Nursing note and vitals reviewed.      Assessment:     1. ACC/AHA stage C heart failure with preserved ejection fraction (CMS-HCC)  spironolactone (ALDACTONE) 25 MG Tab   2. Left ventricular diastolic dysfunction, NYHA class 2  spironolactone (ALDACTONE) 25 MG Tab   3. HTN (hypertension), malignant  spironolactone (ALDACTONE) 25 MG Tab   4. High risk medication use     5. Dyspnea on exertion  spironolactone (ALDACTONE) 25 MG Tab       Medical Decision Making:  Today's Assessment / Status / Plan:   Will add Spironolactone 25 mg po daily for HFpEF and BP control as well.    Will increase Labetalol to 400 mg bid.    Today, based on physical examination findings, patient is euvolemic. No JVD, lungs are clear to auscultation, no pitting edema in bilateral lower extremities, no ascites.    Dry weight is 195 lbs.    Perspective trial?    Will continue to closely monitor for side effects of patient's high risk medication(s) including liver, renal function and electrolytes.    I will see patient back in our Heart Failure Clinic with lab tests and studies results in 4 weeks.    I thank you for referring patient to our Heart Failure Clinic today.

## 2018-04-01 NOTE — PROGRESS NOTES
"Heart Failure New Appointment     6MWT- 550 meters  MLWHF- 4    OP Heart Failure  Vitals  Appointment Type: Heart Failure New  Weight: 86.2 kg (190 lb)  How Weight Obtained: Stand Up Scale  Height: 167.6 cm (5' 6\")  BMI (Calculated): 30.67  Blood Pressure: 150/96  Pulse: 76    System Assessment  NYHA Functional Class Assessment: Class II  ACC/AHA HF Stage: C    Smoking Hx  Have you Ever Smoked: Never    Alcohol Hx  Do you Drink?: No     Illicit Drug Hx  Illicit Drug History: No    Social Hx  Social History: Lives with Other  Level of Support: Unknown  Advance Directives: Began discussion, Paperwork provided    Education  Symptoms: Verbalizes understanding  Weighing: Verbalizes Understanding  Weight Gain Response: Verbalizes Understanding   Recording Data: Verbalizes understanding  Teach Back Failures: Teach Back Successful  Compliance: Patient is Compliant     Medications  Medication Reconciliation : Complete  Medication Counseling Provided: Needs Reinforcement  Able to Accurately Identify Medication Indications: Some  Medication Discrepancies: None  Is Patient on an Evidence Based Beta Blocker: Yes  Is Patient on ACE-1 or ARB: Yes    Dietary Assessment  Food Labels: Verbalizes Understanding  Foods High in Sodium: Verbalizes Understanding  Daily Sodium Intake: Verbalizes Understanding  Diet: Verbalizes Understanding  Food Preparation: Verbalizes Understanding  Eating Out Plan: Verbalizes understanding  Healthier Options: Verbalizes Understanding  Fluid Restriction: Not Applicable    MN Living with Heart Failure  Swelling in Ankles or Legs: 3  Having to Sit or Lie Down During the Day: 0  Walking About or Climbing Stairs Difficult: 0  Working Around the House or Yard Difficult: 0  Difficulty Going Away from Home: 0  Difficulty Sleeping Well at Night: 0  Difficulty Socializing with Family or Friends: 0  Difficulty Working to Earn a Livin  Difficulty with Recreational Pastimes, Sports, Hobbies: 0  Difficulty with " Sexual Activities: 0  Eating Less Foods You Like: 0  Making you Short of Breath: 0  Tired, Fatigued or Low on Energy: 0  Making you Stay in a Hospital: 0  Costing you Money for Medical Care?: 1  Giving you Side Effects from Treatments: 0  Feeling like a [place] to Family and Friends: 0  Loss of Self Control in your Life: 0  Making You Worry: 0  Difficulty to Concentrate or Remembering Things: 0  Making you Feel Depressed: 0  MLHF Total Score : 4    6 Minute Walk Test  Baseline to end of test: 6:00  Total meters walked: 550       Education Narrative  Reviewed anatomy and physiology of heart failure with patient. Went over heart failure worksheet and patient's individual HF diagnosis, EF, risk factors, general medication classes and indications, as well as personal goals.  Goals: Patient's primary goal is to maintain his current state of health.    Discussed daily weights, sodium restriction, worsening signs and symptoms to report to physician, heart medications, and importance of adherence to medication regimen. Emphasized recommendation from AHA/AAHFN to keep daily sodium intake between 1500mg-2000mg.     Reviewed dietary handouts, advanced care planning classes, and advance directive planning handout. Discussed dietary considerations and reviewed Seven Day Heart Healthy Meal Plan by [employer].    Invited patient and family members/friends to HF support group and encouraged patient to call Heart Failure clinic during normal business hours with any questions.  Heart Failure program card with number given to patient.        Patient states full understanding of all information given.     Julissa HF RN  [phone]

## 2018-04-05 ENCOUNTER — OFFICE VISIT (OUTPATIENT)
Dept: MEDICAL GROUP | Facility: MEDICAL CENTER | Age: 63
End: 2018-04-05
Payer: COMMERCIAL

## 2018-04-05 VITALS
HEART RATE: 70 BPM | BODY MASS INDEX: 31.5 KG/M2 | HEIGHT: 66 IN | WEIGHT: 196 LBS | DIASTOLIC BLOOD PRESSURE: 82 MMHG | OXYGEN SATURATION: 98 % | TEMPERATURE: 98.7 F | SYSTOLIC BLOOD PRESSURE: 124 MMHG

## 2018-04-05 DIAGNOSIS — N17.9 ACUTE KIDNEY INJURY (HCC): ICD-10-CM

## 2018-04-05 DIAGNOSIS — I50.30 ACC/AHA STAGE C HEART FAILURE WITH PRESERVED EJECTION FRACTION (HCC): ICD-10-CM

## 2018-04-05 DIAGNOSIS — I51.89 LEFT VENTRICULAR DIASTOLIC DYSFUNCTION, NYHA CLASS 2: ICD-10-CM

## 2018-04-05 DIAGNOSIS — Z00.00 HEALTH CARE MAINTENANCE: ICD-10-CM

## 2018-04-05 DIAGNOSIS — I10 ESSENTIAL HYPERTENSION: ICD-10-CM

## 2018-04-05 DIAGNOSIS — I35.1 MODERATE AORTIC INSUFFICIENCY: ICD-10-CM

## 2018-04-05 DIAGNOSIS — R74.01 TRANSAMINITIS: ICD-10-CM

## 2018-04-05 PROBLEM — R79.89 TROPONIN LEVEL ELEVATED: Status: RESOLVED | Noted: 2018-02-15 | Resolved: 2018-04-05

## 2018-04-05 PROCEDURE — 99214 OFFICE O/P EST MOD 30 MIN: CPT | Performed by: INTERNAL MEDICINE

## 2018-04-05 RX ORDER — AMLODIPINE BESYLATE 10 MG/1
10 TABLET ORAL DAILY
Qty: 90 TAB | Refills: 3 | Status: SHIPPED | OUTPATIENT
Start: 2018-04-05 | End: 2018-12-24 | Stop reason: SDUPTHER

## 2018-04-05 RX ORDER — SPIRONOLACTONE 25 MG/1
25 TABLET ORAL DAILY
Qty: 90 TAB | Refills: 3 | Status: SHIPPED | OUTPATIENT
Start: 2018-04-05 | End: 2018-12-24 | Stop reason: SDUPTHER

## 2018-04-05 RX ORDER — FUROSEMIDE 20 MG/1
20 TABLET ORAL DAILY
Qty: 30 TAB | Refills: 3 | Status: SHIPPED | OUTPATIENT
Start: 2018-04-05 | End: 2018-04-19

## 2018-04-05 NOTE — PROGRESS NOTES
CHIEF COMPLAINT  Chief Complaint   Patient presents with   • hypertension     HPI  Patient is a 62 y.o. male patient who presents today for the following     HYPERTENSION, diastolic heart failure  Meds: Amlodipine 10 mg daily, labetalol 200 mg twice daily, spironolactone 25 mg daily, Lasix 20 mg daily, taking as prescribed.   Measuring BP at home: yes, no. It has been < 130/80  Denies:  -  headaches, vision problems, tinnitus.                 -  chest pain/pressure, palpitations, irregular heart beats, exertional, dyspnea, peripheral edema.      - medication side effect: unusual fatigue, slow heartbeat, foot/leg swelling, cough.  Low salt diet: Y  Diet: Improved  Exercise: improved  BMI: Body mass index is 31.64 kg/m².  FH of HTN: father  He has been followed up by cardiology.    Moderate aortic insufficiency  Found on the last echocardiography.   Denies chest pain, dizziness, lightheadedness, exertional dyspnea.  He has been followed up by cardiology.    Transaminitis, acute kidney injury  Developed during the episode of malignant hypertension in February 2018, 6 weeks ago.   Since, blood pressure has been normalized.   Need follow-up labs.    Reviewed PMH, PSH, FH, SH, ALL, HCM/IMM, no changes  Reviewed MEDS, no changes    Patient Active Problem List    Diagnosis Date Noted   • Health care maintenance 02/14/2018   • Obesity (BMI 30.0-34.9) 02/14/2018   • Heart disease, hypertensive, malignant 02/14/2018   • Acute diastolic heart failure (CMS-HCC) 02/14/2018     CURRENT MEDICATIONS  Current Outpatient Prescriptions   Medication Sig Dispense Refill   • labetalol (NORMODYNE) 200 MG Tab Take 2 Tabs by mouth 2 times a day. 120 Tab 11   • amLODIPine (NORVASC) 10 MG Tab Take 1 Tab by mouth every day. 30 Tab 1   • spironolactone (ALDACTONE) 25 MG Tab Take 1 Tab by mouth every day. 30 Tab 11   • furosemide (LASIX) 20 MG Tab Take 1 Tab by mouth every day. 30 Tab 11   • multivitamin (THERAGRAN) Tab Take 1 Tab by mouth every  "day.       No current facility-administered medications for this visit.      ALLERGIES  Allergies: Patient has no known allergies.  PAST MEDICAL HISTORY  Past Medical History:   Diagnosis Date   • Acute kidney injury (CMS-HCC)    • Hypertensive urgency    • Nephrolithiasis      SURGICAL HISTORY  He  has no past surgical history on file.  SOCIAL HISTORY  Social History   Substance Use Topics   • Smoking status: Never Smoker   • Smokeless tobacco: Never Used   • Alcohol use 2.4 oz/week     4 Glasses of wine per week     Social History     Social History Narrative   • No narrative on file     FAMILY HISTORY  Family History   Problem Relation Age of Onset   • Cancer Mother      lungs, smoker   • Hypertension Father    • Hyperlipidemia Father    • Diabetes Neg Hx    • Heart Disease Neg Hx      Family Status   Relation Status   • Mother    • Father    • Neg Hx        ROS   Constitutional: Negative for fever, chills.  HENT: Negative for congestion, sore throat.  Eyes: Negative for blurred vision.   Respiratory: Negative for cough, shortness of breath.  Cardiovascular: Negative for chest pain, palpitations. And per HPI.  Gastrointestinal: Negative for heartburn, nausea, abdominal pain. And per HPI.  Genitourinary: Negative for dysuria.  Musculoskeletal: Negative for significant myalgias, back pain and joint pain.   Skin: Negative for rash and itching.   Neuro: Negative for dizziness, weakness and headaches.   Endo/Heme/Allergies: Does not bruise/bleed easily.   Psychiatric/Behavioral: Negative for depression, anxiety    PHYSICAL EXAM   Blood pressure 124/82, pulse 70, temperature 37.1 °C (98.7 °F), height 1.676 m (5' 6\"), SpO2 98 %. There is no height or weight on file to calculate BMI.  General:  NAD, well appearing  HEENT:   NC/AT, PERRLA, EOMI, TMs are clear. Oropharyngeal mucosa is pink,  without lesions;  no cervical / supraclavicular  lymphadenopathy, no thyromegaly.    Cardiovascular: RRR.   No m/r/g. " No carotid bruits .      Lungs:   CTAB, no w/r/r, no respiratory distress.  Abdomen: Soft, NT/ND + BS; no suprapubic tenderness; no masses or hepatosplenomegaly.  Extremities:  2+ DP and radial pulses bilaterally.  No c/c/e.   Skin:  Warm, dry.  No erythema. No rash.   Neurologic: Alert & oriented x 3. No focal deficits.  Psychiatric:  Affect normal, mood normal, judgment normal.    LABS     Labs are reviewed and discussed with a patient  Lab Results   Component Value Date/Time    CHOLSTRLTOT 128 02/16/2018 02:20 AM    LDL 72 02/16/2018 02:20 AM    HDL 43 02/16/2018 02:20 AM    TRIGLYCERIDE 66 02/16/2018 02:20 AM       Lab Results   Component Value Date/Time    SODIUM 138 03/23/2018 03:39 PM    POTASSIUM 4.6 03/23/2018 03:39 PM    CHLORIDE 106 03/23/2018 03:39 PM    CO2 22 03/23/2018 03:39 PM    GLUCOSE 92 03/23/2018 03:39 PM    BUN 34 (H) 03/23/2018 03:39 PM    CREATININE 1.57 (H) 03/23/2018 03:39 PM     Lab Results   Component Value Date/Time    ALKPHOSPHAT 53 03/23/2018 03:39 PM    ASTSGOT 56 (H) 03/23/2018 03:39 PM    ALTSGPT 63 (H) 03/23/2018 03:39 PM    TBILIRUBIN 1.4 03/23/2018 03:39 PM      Lab Results   Component Value Date/Time    WBC 7.9 03/23/2018 03:39 PM    RBC 6.10 03/23/2018 03:39 PM    HEMOGLOBIN 16.7 03/23/2018 03:39 PM    HEMATOCRIT 51.8 03/23/2018 03:39 PM    MCV 84.9 03/23/2018 03:39 PM    MCH 27.4 03/23/2018 03:39 PM    MCHC 32.2 (L) 03/23/2018 03:39 PM    MPV 9.6 03/23/2018 03:39 PM    NEUTSPOLYS 67.40 03/23/2018 03:39 PM    LYMPHOCYTES 19.80 (L) 03/23/2018 03:39 PM    MONOCYTES 9.80 03/23/2018 03:39 PM    EOSINOPHILS 2.00 03/23/2018 03:39 PM    BASOPHILS 0.60 03/23/2018 03:39 PM      IMAGING     Reviewed the following imaging:    Echocardiography from 2/15/18  Left ventricular systolic function is normal.  Left ventricular ejection fraction is visually estimated to be 55%.  Grade II diastolic dysfunction.  Mild mitral regurgitation.  Moderate aortic insufficiency.  Moderate tricuspid  regurgitation.  Estimated right ventricular systolic pressure is 55 mmHg.  Mild pulmonic insufficiency.    Renal US, 2/17/18:  1.  No hydronephrosis.  2.  1.5 cm right renal cyst.  3.  Increased echogenicity could indicate medical renal disease.    Doppler renal artery US, 2/16/18:  There is no evidence of hemodynamically significant renal artery stenosis.     ASSESMENT AND PLAN        1. Essential hypertension  Controlled, continue current treatment and monitoring blood pressure at home  - COMP METABOLIC PANEL; Future    2. ACC/AHA stage C heart failure with preserved ejection fraction (CMS-HCC)  Blood pressure is controlled.  - spironolactone (ALDACTONE) 25 MG Tab; Take 1 Tab by mouth every day.  Dispense: 90 Tab; Refill: 3    3. Left ventricular diastolic dysfunction, NYHA class 2  May improve with normalization of blood pressure  - spironolactone (ALDACTONE) 25 MG Tab; Take 1 Tab by mouth every day.  Dispense: 90 Tab; Refill: 3    4. Moderate aortic insufficiency  Asymptomatic, continue cardiology follow-up    5. Transaminitis  Mild, follow-up labs  - COMP METABOLIC PANEL; Future    6. Acute kidney injury (CMS-HCC)  Slightly improved, follow-up labs  - COMP METABOLIC PANEL; Future    7. Health care maintenance  Pending colonoscopy records.    Counseling:   - Smoking:  Nonsmoker    Followup: in 2 months    All questions are answered.    Please note that this dictation was created using voice recognition software, and that there might be errors of wing and possibly content.

## 2018-04-09 ENCOUNTER — DOCUMENTATION (OUTPATIENT)
Dept: ADMINISTRATIVE | Facility: MEDICAL CENTER | Age: 63
End: 2018-04-09

## 2018-04-09 DIAGNOSIS — Z00.6 RESEARCH STUDY PATIENT: ICD-10-CM

## 2018-04-09 DIAGNOSIS — I50.30 ACC/AHA STAGE C HEART FAILURE WITH PRESERVED EJECTION FRACTION (HCC): ICD-10-CM

## 2018-04-09 NOTE — PROGRESS NOTES
Patient seen for screening visit for Perspective Clinical Trial. ICF reviewed with PI. All questions and concerns addressed. ICF signed and copy provided to patient. Assessment, Cogstate Battery, MMSE, functional questionnaires, and lab draw completed per protocol. Appropriate instructions regarding vitals, medications, etc. provided. Future appointments reviewed with patient. Contact information for study team provided to patient. RN will notify patient when lab results are completed and MRI is scheduled.

## 2018-04-19 ENCOUNTER — OFFICE VISIT (OUTPATIENT)
Dept: CARDIOLOGY | Facility: MEDICAL CENTER | Age: 63
End: 2018-04-19
Payer: COMMERCIAL

## 2018-04-19 VITALS
OXYGEN SATURATION: 95 % | SYSTOLIC BLOOD PRESSURE: 146 MMHG | HEIGHT: 66 IN | HEART RATE: 64 BPM | WEIGHT: 201 LBS | BODY MASS INDEX: 32.3 KG/M2 | DIASTOLIC BLOOD PRESSURE: 84 MMHG

## 2018-04-19 DIAGNOSIS — I10 HTN (HYPERTENSION), MALIGNANT: ICD-10-CM

## 2018-04-19 DIAGNOSIS — I51.89 LEFT VENTRICULAR DIASTOLIC DYSFUNCTION, NYHA CLASS 2: ICD-10-CM

## 2018-04-19 DIAGNOSIS — R06.09 DYSPNEA ON EXERTION: ICD-10-CM

## 2018-04-19 DIAGNOSIS — Z79.899 HIGH RISK MEDICATION USE: ICD-10-CM

## 2018-04-19 DIAGNOSIS — I50.30 ACC/AHA STAGE C HEART FAILURE WITH PRESERVED EJECTION FRACTION (HCC): ICD-10-CM

## 2018-04-19 PROCEDURE — 99214 OFFICE O/P EST MOD 30 MIN: CPT | Performed by: INTERNAL MEDICINE

## 2018-04-19 ASSESSMENT — ENCOUNTER SYMPTOMS
DIZZINESS: 0
DOUBLE VISION: 0
HEADACHES: 0
MYALGIAS: 0
FALLS: 0
EYE PAIN: 0
DEPRESSION: 0
HALLUCINATIONS: 0
FEVER: 0
COUGH: 0
NAUSEA: 0
ABDOMINAL PAIN: 0
EYE DISCHARGE: 0
VOMITING: 0
SHORTNESS OF BREATH: 1
PALPITATIONS: 0
CLAUDICATION: 0
BLOOD IN STOOL: 0
LOSS OF CONSCIOUSNESS: 0
SPEECH CHANGE: 0
BLURRED VISION: 0
ORTHOPNEA: 0
WEIGHT LOSS: 0
CHILLS: 0
SENSORY CHANGE: 0
BRUISES/BLEEDS EASILY: 0
PND: 0

## 2018-04-19 NOTE — LETTER
[facility]   [address] [place] [address] [address]  [place] [address]  Phone: [phone]  Fax: [phone]              Chema Sarabia  1955    Encounter Date: 4/19/2018    Santosh Castelan M.D.          PROGRESS NOTE:  Chief Complaint   Patient presents with   • Congestive Heart Failure     F/V: 3 WEEK       Subjective:   Chema Sarabia is a 62 y.o. male who presents today for cardiac care and evaluation in our heart failure clinic after his recent episode of hypertensive emergency for which he went into heart failure exacerbation as well. Patient does have documented transthoracic echocardiogram which showed relatively preserved LV function of 55%. No significant valvular disease.     Patient is feeling better these days. Does get winded upon walking up inclines or for distance. No symptoms at rest or with daily living activities.     At prior visit, patient was able to complete 550 m during his 6 minute walk test. his O2 saturation at baseline was 94% and at the end of the test, the O2 saturation was 95%. he reported 0 level of dyspnea on Jim scale.     Blood pressure continues to be high but better control.    Past Medical History:   Diagnosis Date   • Acute kidney injury (CMS-HCC)    • Hypertensive urgency    • Nephrolithiasis      History reviewed. No pertinent surgical history.  Family History   Problem Relation Age of Onset   • Cancer Mother      lungs, smoker   • Hypertension Father    • Hyperlipidemia Father    • Diabetes Neg Hx    • Heart Disease Neg Hx      Social History     Social History   • Marital status: Single     Spouse name: N/A   • Number of children: N/A   • Years of education: N/A     Occupational History   • Not on file.     Social History Main Topics   • Smoking status: Never Smoker   • Smokeless tobacco: Never Used   • Alcohol use 2.4 oz/week     4 Glasses of wine per week   • Drug use: No   • Sexual activity: Not on file     Other Topics  "Concern   • Not on file     Social History Narrative   • No narrative on file     No Known Allergies  Outpatient Encounter Prescriptions as of 4/19/2018   Medication Sig Dispense Refill   • amLODIPine (NORVASC) 10 MG Tab Take 1 Tab by mouth every day. 90 Tab 3   • spironolactone (ALDACTONE) 25 MG Tab Take 1 Tab by mouth every day. 90 Tab 3   • labetalol (NORMODYNE) 200 MG Tab Take 2 Tabs by mouth 2 times a day. 120 Tab 11   • multivitamin (THERAGRAN) Tab Take 1 Tab by mouth every day.     • [DISCONTINUED] furosemide (LASIX) 20 MG Tab Take 1 Tab by mouth every day. 30 Tab 3     No facility-administered encounter medications on file as of 4/19/2018.      Review of Systems   Constitutional: Negative for chills, fever, malaise/fatigue and weight loss.   HENT: Negative for ear discharge, ear pain, hearing loss and nosebleeds.    Eyes: Negative for blurred vision, double vision, pain and discharge.   Respiratory: Positive for shortness of breath. Negative for cough.    Cardiovascular: Negative for chest pain, palpitations, orthopnea, claudication, leg swelling and PND.   Gastrointestinal: Negative for abdominal pain, blood in stool, melena, nausea and vomiting.   Genitourinary: Negative for dysuria and hematuria.   Musculoskeletal: Negative for falls, joint pain and myalgias.   Skin: Negative for itching and rash.   Neurological: Negative for dizziness, sensory change, speech change, loss of consciousness and headaches.   Endo/Heme/Allergies: Negative for environmental allergies. Does not bruise/bleed easily.   Psychiatric/Behavioral: Negative for depression, hallucinations and suicidal ideas.        Objective:   /84   Pulse 64   Ht 1.676 m (5' 6\")   Wt 91.2 kg (201 lb)   SpO2 95%   BMI 32.44 kg/m²      Physical Exam   Constitutional: He is oriented to person, place, and time. No distress.   HENT:   Head: Normocephalic and atraumatic.   Right Ear: External ear normal.   Left Ear: External ear normal.   Eyes: " Right eye exhibits no discharge. Left eye exhibits no discharge.   Neck: No JVD present. No thyromegaly present.   Cardiovascular: Normal rate, regular rhythm, normal heart sounds and intact distal pulses.  Exam reveals no gallop and no friction rub.    No murmur heard.  Pulmonary/Chest: Breath sounds normal. No respiratory distress.   Abdominal: Bowel sounds are normal. He exhibits no distension. There is no tenderness.   Musculoskeletal: He exhibits no edema or tenderness.   Neurological: He is alert and oriented to person, place, and time. No cranial nerve deficit.   Skin: Skin is warm and dry. He is not diaphoretic.   Psychiatric: He has a normal mood and affect. His behavior is normal.   Nursing note and vitals reviewed.      Assessment:     1. ACC/AHA stage C heart failure with preserved ejection fraction (CMS-HCC)     2. Left ventricular diastolic dysfunction, NYHA class 2     3. HTN (hypertension), malignant     4. High risk medication use     5. Dyspnea on exertion         Medical Decision Making:  Today's Assessment / Status / Plan:   Today, based on physical examination findings, patient is euvolemic. No JVD, lungs are clear to auscultation, no pitting edema in bilateral lower extremities, no ascites.    Dry weight is 201 lbs.    Lasix was stopped due to GFR down to 31.    Will continue to closely monitor.    Being screened for Perspective trial.    Will continue to closely monitor for side effects of patient's high risk medication(s) including liver, renal function and electrolytes.    I will see patient back in our Heart Failure Clinic with lab tests and studies results in 12 weeks.    I thank you for referring patient to our Heart Failure Clinic today.            Albert Orellana M.D.  [address]  [address]  [facility] [address]  VIA In Basket

## 2018-04-20 ENCOUNTER — OFFICE VISIT (OUTPATIENT)
Dept: NEPHROLOGY | Facility: MEDICAL CENTER | Age: 63
End: 2018-04-20
Payer: COMMERCIAL

## 2018-04-20 ENCOUNTER — HOSPITAL ENCOUNTER (OUTPATIENT)
Dept: RADIOLOGY | Facility: MEDICAL CENTER | Age: 63
End: 2018-04-20
Attending: INTERNAL MEDICINE

## 2018-04-20 VITALS
HEIGHT: 66 IN | WEIGHT: 202 LBS | SYSTOLIC BLOOD PRESSURE: 132 MMHG | BODY MASS INDEX: 32.47 KG/M2 | OXYGEN SATURATION: 95 % | TEMPERATURE: 98.9 F | DIASTOLIC BLOOD PRESSURE: 78 MMHG | HEART RATE: 65 BPM | RESPIRATION RATE: 14 BRPM

## 2018-04-20 DIAGNOSIS — N18.30 STAGE 3 CHRONIC KIDNEY DISEASE (HCC): ICD-10-CM

## 2018-04-20 DIAGNOSIS — I50.30 ACC/AHA STAGE C HEART FAILURE WITH PRESERVED EJECTION FRACTION (HCC): ICD-10-CM

## 2018-04-20 DIAGNOSIS — I10 ESSENTIAL HYPERTENSION: ICD-10-CM

## 2018-04-20 DIAGNOSIS — Z00.6 RESEARCH STUDY PATIENT: ICD-10-CM

## 2018-04-20 PROCEDURE — 70551 MRI BRAIN STEM W/O DYE: CPT

## 2018-04-20 PROCEDURE — 99213 OFFICE O/P EST LOW 20 MIN: CPT | Performed by: INTERNAL MEDICINE

## 2018-04-20 ASSESSMENT — ENCOUNTER SYMPTOMS
FEVER: 0
CHILLS: 0
PALPITATIONS: 0

## 2018-04-20 NOTE — PROGRESS NOTES
"Subjective:      Chema Sarabia is a 62 y.o. male who presents with CKD3, HTN Follow-Up             HPI    62 year old with a long history of malignant HTN, untreated. Admitted to the hospital from clinic and controlled. Has been taking his medications. Now off the diuretic. No NSAIDs. No problems urinating. Norvasc, Aldactone, and labetalol. Not on ASA daily, but will be enrolled in a clinical trial with anticoagulation.      Review of Systems   Constitutional: Negative for chills and fever.   Cardiovascular: Negative for chest pain and palpitations.   All other systems reviewed and are negative.         Objective:     /78   Pulse 65   Temp 37.2 °C (98.9 °F)   Resp 14   Ht 1.676 m (5' 6\")   Wt 91.6 kg (202 lb)   SpO2 95%   BMI 32.60 kg/m²      Physical Exam   Constitutional: He is oriented to person, place, and time. He appears well-developed and well-nourished.   Cardiovascular: Normal rate and regular rhythm.    Pulmonary/Chest: Effort normal and breath sounds normal.   Musculoskeletal: He exhibits no edema or deformity.   Neurological: He is alert and oriented to person, place, and time.   Skin: Skin is warm and dry.   Psychiatric: He has a normal mood and affect. His behavior is normal.               Assessment/Plan:     1. Stage 3 chronic kidney disease  Expect continued improvement for the next 6 months or so, before he reaches his baseline. Continue current medications.     2. Essential hypertension  BP now controlled. Continue current medications.      "

## 2018-05-14 ENCOUNTER — DOCUMENTATION (OUTPATIENT)
Dept: ADMINISTRATIVE | Facility: MEDICAL CENTER | Age: 63
End: 2018-05-14

## 2018-05-14 NOTE — PROGRESS NOTES
Patient seen for Perspective visit 101. Vitals assessed per protocol. Con meds reviewed with patient. IP dispensed per protocol. Updated consent (version 2) signed at this visit. Copy provided to patient.

## 2018-05-21 ENCOUNTER — DOCUMENTATION (OUTPATIENT)
Dept: ADMINISTRATIVE | Facility: MEDICAL CENTER | Age: 63
End: 2018-05-21

## 2018-05-21 NOTE — PROGRESS NOTES
Patient seen for Perspective clinical trial visit 102. Lab assessment and vital signs completed per protocol. PI Dr. Castelan present to complete physical assessment. Con meds reviewed with patient. IP not returned at this visit. Patient will bring all study medications at visit 103. Unable to count remaining tablets or complete IP compliance at this time. New IP dispensed. Appropriate instructions regarding vitals, medications, etc. provided. Future appointments reviewed with patient. Contact information for study team provided to patient. All questions and concerns addressed\ at this time.

## 2018-05-31 ENCOUNTER — DOCUMENTATION (OUTPATIENT)
Dept: ADMINISTRATIVE | Facility: MEDICAL CENTER | Age: 63
End: 2018-05-31

## 2018-05-31 NOTE — PROGRESS NOTES
Patient seen for Perspective visit 103. PI Dr. Castelan present for physical examination. Laboratory, CogState and vital sign assessment completed per protocol. Concomitant medications reviewed with patient and log updated appropriately. Study medication returned to study staff and new medication dispensed. Appropriate instructions regarding vitals, medications, etc. provided. Future appointments reviewed with patient. Contact information for study team provided to patient. All questions and concerns addressed.

## 2018-06-01 ENCOUNTER — HOSPITAL ENCOUNTER (OUTPATIENT)
Dept: LAB | Facility: MEDICAL CENTER | Age: 63
End: 2018-06-01
Attending: INTERNAL MEDICINE
Payer: COMMERCIAL

## 2018-06-01 DIAGNOSIS — I10 ESSENTIAL HYPERTENSION: ICD-10-CM

## 2018-06-01 DIAGNOSIS — R74.01 TRANSAMINITIS: ICD-10-CM

## 2018-06-01 DIAGNOSIS — N17.9 ACUTE KIDNEY INJURY (HCC): ICD-10-CM

## 2018-06-01 LAB
ALBUMIN SERPL BCP-MCNC: 4.4 G/DL (ref 3.2–4.9)
ALBUMIN/GLOB SERPL: 1.7 G/DL
ALP SERPL-CCNC: 61 U/L (ref 30–99)
ALT SERPL-CCNC: 19 U/L (ref 2–50)
ANION GAP SERPL CALC-SCNC: 10 MMOL/L (ref 0–11.9)
AST SERPL-CCNC: 19 U/L (ref 12–45)
BILIRUB SERPL-MCNC: 1 MG/DL (ref 0.1–1.5)
BUN SERPL-MCNC: 40 MG/DL (ref 8–22)
CALCIUM SERPL-MCNC: 9.2 MG/DL (ref 8.5–10.5)
CHLORIDE SERPL-SCNC: 106 MMOL/L (ref 96–112)
CO2 SERPL-SCNC: 23 MMOL/L (ref 20–33)
CREAT SERPL-MCNC: 1.72 MG/DL (ref 0.5–1.4)
GLOBULIN SER CALC-MCNC: 2.6 G/DL (ref 1.9–3.5)
GLUCOSE SERPL-MCNC: 89 MG/DL (ref 65–99)
POTASSIUM SERPL-SCNC: 4.5 MMOL/L (ref 3.6–5.5)
PROT SERPL-MCNC: 7 G/DL (ref 6–8.2)
SODIUM SERPL-SCNC: 139 MMOL/L (ref 135–145)

## 2018-06-01 PROCEDURE — 80053 COMPREHEN METABOLIC PANEL: CPT

## 2018-06-01 PROCEDURE — 36415 COLL VENOUS BLD VENIPUNCTURE: CPT

## 2018-06-05 ENCOUNTER — OFFICE VISIT (OUTPATIENT)
Dept: MEDICAL GROUP | Facility: MEDICAL CENTER | Age: 63
End: 2018-06-05
Payer: COMMERCIAL

## 2018-06-05 VITALS
DIASTOLIC BLOOD PRESSURE: 64 MMHG | BODY MASS INDEX: 33.41 KG/M2 | HEART RATE: 62 BPM | SYSTOLIC BLOOD PRESSURE: 112 MMHG | WEIGHT: 207.89 LBS | OXYGEN SATURATION: 95 % | TEMPERATURE: 98.7 F | HEIGHT: 66 IN

## 2018-06-05 DIAGNOSIS — Z00.00 HEALTH CARE MAINTENANCE: ICD-10-CM

## 2018-06-05 DIAGNOSIS — I50.30 ACC/AHA STAGE C HEART FAILURE WITH PRESERVED EJECTION FRACTION (HCC): ICD-10-CM

## 2018-06-05 DIAGNOSIS — Z00.6 RESEARCH STUDY PATIENT: ICD-10-CM

## 2018-06-05 DIAGNOSIS — N18.30 STAGE 3 CHRONIC KIDNEY DISEASE (HCC): ICD-10-CM

## 2018-06-05 DIAGNOSIS — I35.1 MODERATE AORTIC INSUFFICIENCY: ICD-10-CM

## 2018-06-05 DIAGNOSIS — E66.9 OBESITY (BMI 30.0-34.9): ICD-10-CM

## 2018-06-05 DIAGNOSIS — Z23 NEED FOR TDAP VACCINATION: ICD-10-CM

## 2018-06-05 DIAGNOSIS — I10 ESSENTIAL HYPERTENSION: ICD-10-CM

## 2018-06-05 DIAGNOSIS — I11.0 MALIGNANT HYPERTENSIVE HEART DISEASE WITH HEART FAILURE (HCC): ICD-10-CM

## 2018-06-05 PROCEDURE — 99214 OFFICE O/P EST MOD 30 MIN: CPT | Mod: 25 | Performed by: INTERNAL MEDICINE

## 2018-06-05 PROCEDURE — 90715 TDAP VACCINE 7 YRS/> IM: CPT | Performed by: INTERNAL MEDICINE

## 2018-06-05 PROCEDURE — 90471 IMMUNIZATION ADMIN: CPT | Performed by: INTERNAL MEDICINE

## 2018-06-05 NOTE — PROGRESS NOTES
CHIEF COMPLAINT  Chief Complaint   Patient presents with   • Follow-Up     hypertension     HPI  Patient is a 62 y.o. male patient who presents today for the following     Hypertension, hypertensive heart disease, CKD stage III  Stable and controlled.  Discontinued Lasix.    Meds: Amlodipine 10 mg daily, labetalol 200 mg twice daily, spironolactone 25 mg daily, taking as prescribed.   Measuring BP at home: yes, no. It has been < 130/80  Denies: - headaches, vision problems, tinnitus.  - chest pain/pressure, palpitations, irregular heart beats, exertional, dyspnea, peripheral edema.              - medication side effect: unusual fatigue, slow heartbeat, foot/leg swelling, cough.  Low salt diet: Y  Diet: Improved  Exercise: improved  BMI: Body mass index is 33 kg/m².  FH of HTN: father renal panel showed CKD stage III, with the last GFR at 31.  He has been followed up by cardiology.    Diastolic dysfunction with preserved ejection fraction, research study  Echocardiogram which showed relatively preserved LV function of 55%.   Denies:  -Palpitations, irregular heartbeat, chest pain or pressure  -Orthopnea, PND, lower extremity swelling.   He started [employer] experimental trial.  He has been followed up by cardiology     Moderate aortic insufficiency  Found on the last echocardiography from 2/15/18.   Denies chest pain, dizziness, lightheadedness, exertional dyspnea.  He has been followed up by cardiology.     Reviewed PMH, PSH, FH, SH, ALL, HCM/IMM, no changes  Reviewed MEDS, no changes    Patient Active Problem List    Diagnosis Date Noted   • Stage 3 chronic kidney disease 04/20/2018   • Essential hypertension 04/20/2018   • [facility] Billing 04/19/2018   • Research study patient 04/09/2018   • Moderate aortic insufficiency 04/05/2018   • ACC/AHA stage C heart failure with preserved ejection fraction (HCC) 04/05/2018   • Health care maintenance 02/14/2018   • Obesity (BMI 30.0-34.9) 02/14/2018   •  Heart disease, hypertensive, malignant 2018     CURRENT MEDICATIONS  Current Outpatient Prescriptions   Medication Sig Dispense Refill   • amLODIPine (NORVASC) 10 MG Tab Take 1 Tab by mouth every day. 90 Tab 3   • spironolactone (ALDACTONE) 25 MG Tab Take 1 Tab by mouth every day. 90 Tab 3   • labetalol (NORMODYNE) 200 MG Tab Take 2 Tabs by mouth 2 times a day. 120 Tab 11   • multivitamin (THERAGRAN) Tab Take 1 Tab by mouth every day.       No current facility-administered medications for this visit.      ALLERGIES  Allergies: Patient has no known allergies.  PAST MEDICAL HISTORY  Past Medical History:   Diagnosis Date   • Acute kidney injury (HCC)    • Hypertensive urgency    • Nephrolithiasis      SURGICAL HISTORY  He  has no past surgical history on file.  SOCIAL HISTORY  Social History   Substance Use Topics   • Smoking status: Never Smoker   • Smokeless tobacco: Never Used   • Alcohol use 2.4 oz/week     4 Glasses of wine per week     Social History     Social History Narrative   • No narrative on file     FAMILY HISTORY  Family History   Problem Relation Age of Onset   • Cancer Mother      lungs, smoker   • Hypertension Father    • Hyperlipidemia Father    • Diabetes Neg Hx    • Heart Disease Neg Hx      Family Status   Relation Status   • Mother    • Father    • Neg Hx      ROS   Constitutional: Negative for fever, chills.  HENT: Negative for congestion, sore throat.  Eyes: Negative for blurred vision.   Respiratory: Negative for cough, shortness of breath.  Cardiovascular: Negative for chest pain, palpitations. And per HPI.  Gastrointestinal: Negative for heartburn, nausea, abdominal pain.   Genitourinary: Negative for dysuria.  Musculoskeletal: Negative for significant myalgias, back pain and joint pain.   Skin: Negative for rash and itching.   Neuro: Negative for dizziness, weakness and headaches.   Endo/Heme/Allergies: Does not bruise/bleed easily.   Psychiatric/Behavioral: Negative  "for depression, anxiety    PHYSICAL EXAM   Blood pressure 112/64, pulse 62, temperature 37.1 °C (98.7 °F), height 1.676 m (5' 6\"), weight 94.3 kg (207 lb 14.3 oz), SpO2 95 %. Body mass index is 33.55 kg/m².  General:  NAD, well appearing  HEENT:   NC/AT, PERRLA, EOMI, TMs are clear. Oropharyngeal mucosa is pink,  without lesions;  no cervical / supraclavicular  lymphadenopathy, no thyromegaly.    Cardiovascular: RRR.   No m/r/g. No carotid bruits .      Lungs:   CTAB, no w/r/r, no respiratory distress.  Abdomen: Soft, NT/ND + BS; no suprapubic tenderness; unable to palpate liver/spleen due to obesity.  Extremities:  2+ DP and radial pulses bilaterally.  No c/c/e.   Skin:  Warm, dry.  No erythema. No rash.   Neurologic: Alert & oriented x 3.  No focal deficits.  Psychiatric:  Affect normal, mood normal, judgment normal.    LABS     Labs are reviewed and discussed with a patient  Lab Results   Component Value Date/Time    CHOLSTRLTOT 128 02/16/2018 02:20 AM    LDL 72 02/16/2018 02:20 AM    HDL 43 02/16/2018 02:20 AM    TRIGLYCERIDE 66 02/16/2018 02:20 AM       Lab Results   Component Value Date/Time    SODIUM 139 06/01/2018 12:55 PM    POTASSIUM 4.5 06/01/2018 12:55 PM    CHLORIDE 106 06/01/2018 12:55 PM    CO2 23 06/01/2018 12:55 PM    GLUCOSE 89 06/01/2018 12:55 PM    BUN 40 (H) 06/01/2018 12:55 PM    CREATININE 1.72 (H) 06/01/2018 12:55 PM     Lab Results   Component Value Date/Time    ALKPHOSPHAT 61 06/01/2018 12:55 PM    ASTSGOT 19 06/01/2018 12:55 PM    ALTSGPT 19 06/01/2018 12:55 PM    TBILIRUBIN 1.0 06/01/2018 12:55 PM      No results found for: HBA1C  No results found for: TSH  No results found for: FREET4    Lab Results   Component Value Date/Time    WBC 7.9 03/23/2018 03:39 PM    RBC 6.10 03/23/2018 03:39 PM    HEMOGLOBIN 16.7 03/23/2018 03:39 PM    HEMATOCRIT 51.8 03/23/2018 03:39 PM    MCV 84.9 03/23/2018 03:39 PM    MCH 27.4 03/23/2018 03:39 PM    MCHC 32.2 (L) 03/23/2018 03:39 PM    MPV 9.6 03/23/2018 " 03:39 PM    NEUTSPOLYS 67.40 03/23/2018 03:39 PM    LYMPHOCYTES 19.80 (L) 03/23/2018 03:39 PM    MONOCYTES 9.80 03/23/2018 03:39 PM    EOSINOPHILS 2.00 03/23/2018 03:39 PM    BASOPHILS 0.60 03/23/2018 03:39 PM      IMAGING     Reviewed  echocardiography 2/15/18:  No prior study is available for comparison.   Left ventricular systolic function is normal.  Left ventricular ejection fraction is visually estimated to be 55%.  Grade II diastolic dysfunction.  Mild mitral regurgitation.  Moderate aortic insufficiency.  Moderate tricuspid regurgitation.  Estimated right ventricular systolic pressure is 55 mmHg.  Mild pulmonic insufficiency.    ASSESMENT AND PLAN        1. Essential hypertension  2. 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   Advised to monitor blood pressure at home    2. Stage 3 chronic kidney disease      3. Stage 3 chronic kidney disease  Lasix was discontinued.  Advised to avoid NSAIDs.  Follow-up labs.    4. ACC/AHA stage C heart failure with preserved ejection fraction (HCC)  5. Research study patient  The patient is in clinical trial for new medication by [employer].   Labs will be done through clinical trial.    6. Moderate aortic insufficiency  Stable, asymptomatic, continue cardiology follow-up    7. Obesity (BMI 30.0-34.9)  Appropriate counseling given    8. Health care maintenance  Colonoscopy done within few years, pending records    9. Need for Tdap vaccination  - TDAP VACCINE =>8YO IM  Information was provided to the patient regarding the vaccine, including side effects. Vaccine was given by my medical assistant under my supervision.    Counseling:   - Smoking:  Nonsmoker    Followup: in 3 months    All questions are answered.    Please note that this dictation was created using voice recognition software, and that there might be errors of wing and possibly content.

## 2018-06-13 DIAGNOSIS — I50.30 ACC/AHA STAGE C HEART FAILURE WITH PRESERVED EJECTION FRACTION (HCC): ICD-10-CM

## 2018-06-13 DIAGNOSIS — Z00.6 RESEARCH STUDY PATIENT: ICD-10-CM

## 2018-06-15 ENCOUNTER — DOCUMENTATION (OUTPATIENT)
Dept: ADMINISTRATIVE | Facility: MEDICAL CENTER | Age: 63
End: 2018-06-15

## 2018-06-15 NOTE — PROGRESS NOTES
Patient seen for Perspective clinical trial visit 199. CSSRS assessment completed and reviewed for AEs first. Study medication compliance evaluated and study medication returned to patient. Lab assessment collected per protocol. Sub I, Dr. Ruffin, present for physical exam. All questions and concerns addressed. Appropriate instructions regarding vitals, medications, etc. provided. Future appointments reviewed with patient. PET scan appointment confirmed. Contact information for study team provided to patient.

## 2018-06-18 ENCOUNTER — HOSPITAL ENCOUNTER (OUTPATIENT)
Facility: MEDICAL CENTER | Age: 63
End: 2018-06-18
Attending: INTERNAL MEDICINE
Payer: COMMERCIAL

## 2018-06-18 ENCOUNTER — APPOINTMENT (OUTPATIENT)
Dept: RADIOLOGY | Facility: MEDICAL CENTER | Age: 63
End: 2018-06-18
Attending: INTERNAL MEDICINE
Payer: COMMERCIAL

## 2018-06-18 DIAGNOSIS — Z00.6 RESEARCH STUDY PATIENT: ICD-10-CM

## 2018-06-18 DIAGNOSIS — I50.30 ACC/AHA STAGE C HEART FAILURE WITH PRESERVED EJECTION FRACTION (HCC): ICD-10-CM

## 2018-06-18 LAB
ALBUMIN SERPL BCP-MCNC: 4.1 G/DL (ref 3.2–4.9)
ALBUMIN/GLOB SERPL: 2.2 G/DL
ALP SERPL-CCNC: 54 U/L (ref 30–99)
ALT SERPL-CCNC: 14 U/L (ref 2–50)
ANION GAP SERPL CALC-SCNC: 9 MMOL/L (ref 0–11.9)
AST SERPL-CCNC: 13 U/L (ref 12–45)
BILIRUB SERPL-MCNC: 0.7 MG/DL (ref 0.1–1.5)
BUN SERPL-MCNC: 37 MG/DL (ref 8–22)
CALCIUM SERPL-MCNC: 9.2 MG/DL (ref 8.5–10.5)
CHLORIDE SERPL-SCNC: 108 MMOL/L (ref 96–112)
CO2 SERPL-SCNC: 21 MMOL/L (ref 20–33)
CREAT SERPL-MCNC: 2.06 MG/DL (ref 0.5–1.4)
GLOBULIN SER CALC-MCNC: 1.9 G/DL (ref 1.9–3.5)
GLUCOSE SERPL-MCNC: 98 MG/DL (ref 65–99)
POTASSIUM SERPL-SCNC: 4.8 MMOL/L (ref 3.6–5.5)
PROT SERPL-MCNC: 6 G/DL (ref 6–8.2)
SODIUM SERPL-SCNC: 138 MMOL/L (ref 135–145)

## 2018-06-18 PROCEDURE — 80053 COMPREHEN METABOLIC PANEL: CPT

## 2018-06-19 ENCOUNTER — DOCUMENTATION (OUTPATIENT)
Dept: ADMINISTRATIVE | Facility: MEDICAL CENTER | Age: 63
End: 2018-06-19

## 2018-06-19 NOTE — PROGRESS NOTES
Patient seen for Perspective clinical trial unscheduled visit on 18-Jun-2018. Due to increased potassium at study visit 199 on 14-Jun-2018, another chemistry sample was drawn and sent to both the local lab and central lab. Concomitant medications were reviewed with no changes noted. Vital signs assessed per protocol. All questions and concerns addressed. Appropriate instructions regarding vitals, medications, etc. provided. Future appointments reviewed with patient. Contact information for study team provided to patient. Case discussed with Dr. Castelan, PI. Per conversation, PI agrees with evaluation.

## 2018-07-31 ENCOUNTER — OFFICE VISIT (OUTPATIENT)
Dept: CARDIOLOGY | Facility: MEDICAL CENTER | Age: 63
End: 2018-07-31
Payer: COMMERCIAL

## 2018-07-31 VITALS
OXYGEN SATURATION: 96 % | DIASTOLIC BLOOD PRESSURE: 76 MMHG | BODY MASS INDEX: 33.33 KG/M2 | HEART RATE: 68 BPM | HEIGHT: 66 IN | WEIGHT: 207.4 LBS | SYSTOLIC BLOOD PRESSURE: 120 MMHG

## 2018-07-31 DIAGNOSIS — Z79.899 HIGH RISK MEDICATION USE: ICD-10-CM

## 2018-07-31 DIAGNOSIS — Z00.6 RESEARCH STUDY PATIENT: ICD-10-CM

## 2018-07-31 DIAGNOSIS — I51.89 LEFT VENTRICULAR DIASTOLIC DYSFUNCTION, NYHA CLASS 2: ICD-10-CM

## 2018-07-31 DIAGNOSIS — I10 HTN (HYPERTENSION), MALIGNANT: ICD-10-CM

## 2018-07-31 DIAGNOSIS — I50.30 ACC/AHA STAGE C HEART FAILURE WITH PRESERVED EJECTION FRACTION (HCC): ICD-10-CM

## 2018-07-31 DIAGNOSIS — R06.09 DYSPNEA ON EXERTION: ICD-10-CM

## 2018-07-31 PROCEDURE — 94618 PULMONARY STRESS TESTING: CPT | Performed by: INTERNAL MEDICINE

## 2018-07-31 PROCEDURE — 99214 OFFICE O/P EST MOD 30 MIN: CPT | Mod: 25 | Performed by: INTERNAL MEDICINE

## 2018-07-31 ASSESSMENT — ENCOUNTER SYMPTOMS
ABDOMINAL PAIN: 0
SHORTNESS OF BREATH: 0
BRUISES/BLEEDS EASILY: 0
DEPRESSION: 0
VOMITING: 0
NAUSEA: 0
ORTHOPNEA: 0
DOUBLE VISION: 0
CLAUDICATION: 0
FALLS: 0
EYE PAIN: 0
BLOOD IN STOOL: 0
HALLUCINATIONS: 0
HEADACHES: 0
WEIGHT LOSS: 0
BLURRED VISION: 0
LOSS OF CONSCIOUSNESS: 0
CHILLS: 0
EYE DISCHARGE: 0
MYALGIAS: 0
PALPITATIONS: 0
PND: 0
DIZZINESS: 0
COUGH: 0
SENSORY CHANGE: 0
SPEECH CHANGE: 0
FEVER: 0

## 2018-07-31 ASSESSMENT — MINNESOTA LIVING WITH HEART FAILURE QUESTIONNAIRE (MLHF)
TOTAL_SCORE: 6
SWELLING IN ANKLES OR LEGS: 1
WORKING AROUND THE HOUSE OR YARD DIFFICULT: 0
DIFFICULTY WORKING TO EARN A LIVING: 0
DIFFICULTY SLEEPING WELL AT NIGHT: 0
DIFFICULTY TO CONCENTRATE OR REMEMBERING THINGS: 0
DIFFICULTY WITH SEXUAL ACTIVITIES: 0
DIFFICULTY GOING AWAY FROM HOME: 0
WALKING ABOUT OR CLIMBING STAIRS DIFFICULT: 2
MAKING YOU SHORT OF BREATH: 0
EATING LESS FOODS YOU LIKE: 1
FEELING LIKE A BURDEN TO FAMILY AND FRIENDS: 0
GIVING YOU SIDE EFFECTS FROM TREATMENTS: 1
COSTING YOU MONEY FOR MEDICAL CARE: 0
DIFFICULTY SOCIALIZING WITH FAMILY OR FRIENDS: 0
LOSS OF SELF CONTROL IN YOUR LIFE: 0
DIFFICULTY WITH RECREATIONAL PASTIMES, SPORTS, HOBBIES: 0
MAKING YOU FEEL DEPRESSED: 0
MAKING YOU WORRY: 0
TIRED, FATIGUED OR LOW ON ENERGY: 0
HAVING TO SIT OR LIE DOWN DURING THE DAY: 1
MAKING YOU STAY IN A HOSPITAL: 0

## 2018-07-31 ASSESSMENT — 6 MINUTE WALK TEST (6MWT): TOTAL DISTANCE WALKED (METERS): 549

## 2018-07-31 NOTE — PROGRESS NOTES
Chief Complaint   Patient presents with   • Congestive Heart Failure       Subjective:   Chema Sarabia is a 62 y.o. male who presents today for cardiac care and evaluation in our heart failure clinic after his recent episode of hypertensive emergency for which he went into heart failure exacerbation as well. Patient does have documented transthoracic echocardiogram which showed relatively preserved LV function of 55%. No significant valvular disease.     Patient is feeling better these days. Does get winded upon walking up inclines or for distance. No symptoms at rest or with daily living activities.     At prior visit, patient was able to complete 550 m during his 6 minute walk test. his O2 saturation at baseline was 94% and at the end of the test, the O2 saturation was 95%. he reported 0 level of dyspnea on Jim scale.     Blood pressure continues to be high but better control.    Today, patient was able to complete 549 m during his 6 minute walk test. his O2 saturation at baseline was 95% and at the end of the test, the O2 saturation was 96%. he reported 4 level of dyspnea on Jim scale.      Past Medical History:   Diagnosis Date   • Acute kidney injury (HCC)    • Hypertensive urgency    • Nephrolithiasis      History reviewed. No pertinent surgical history.  Family History   Problem Relation Age of Onset   • Cancer Mother         lungs, smoker   • Hypertension Father    • Hyperlipidemia Father    • Diabetes Neg Hx    • Heart Disease Neg Hx      Social History     Social History   • Marital status: Single     Spouse name: N/A   • Number of children: N/A   • Years of education: N/A     Occupational History   • Not on file.     Social History Main Topics   • Smoking status: Never Smoker   • Smokeless tobacco: Never Used   • Alcohol use 2.4 oz/week     4 Glasses of wine per week   • Drug use: No   • Sexual activity: Not on file     Other Topics Concern   • Not on file     Social History Narrative   • No  "narrative on file     No Known Allergies  Outpatient Encounter Prescriptions as of 7/31/2018   Medication Sig Dispense Refill   • amLODIPine (NORVASC) 10 MG Tab Take 1 Tab by mouth every day. 90 Tab 3   • spironolactone (ALDACTONE) 25 MG Tab Take 1 Tab by mouth every day. 90 Tab 3   • labetalol (NORMODYNE) 200 MG Tab Take 2 Tabs by mouth 2 times a day. 120 Tab 11   • multivitamin (THERAGRAN) Tab Take 1 Tab by mouth every day.       No facility-administered encounter medications on file as of 7/31/2018.      Review of Systems   Constitutional: Negative for chills, fever, malaise/fatigue and weight loss.   HENT: Negative for ear discharge, ear pain, hearing loss and nosebleeds.    Eyes: Negative for blurred vision, double vision, pain and discharge.   Respiratory: Negative for cough and shortness of breath.    Cardiovascular: Negative for chest pain, palpitations, orthopnea, claudication, leg swelling and PND.   Gastrointestinal: Negative for abdominal pain, blood in stool, melena, nausea and vomiting.   Genitourinary: Negative for dysuria and hematuria.   Musculoskeletal: Negative for falls, joint pain and myalgias.   Skin: Negative for itching and rash.   Neurological: Negative for dizziness, sensory change, speech change, loss of consciousness and headaches.   Endo/Heme/Allergies: Negative for environmental allergies. Does not bruise/bleed easily.   Psychiatric/Behavioral: Negative for depression, hallucinations and suicidal ideas.        Objective:   /76   Pulse 68   Ht 1.676 m (5' 6\")   Wt 94.1 kg (207 lb 6.4 oz)   SpO2 96%   BMI 33.48 kg/m²     Physical Exam   Constitutional: He is oriented to person, place, and time. No distress.   HENT:   Head: Normocephalic and atraumatic.   Right Ear: External ear normal.   Left Ear: External ear normal.   Eyes: Right eye exhibits no discharge. Left eye exhibits no discharge.   Neck: No JVD present. No thyromegaly present.   Cardiovascular: Normal rate, regular " rhythm, normal heart sounds and intact distal pulses.  Exam reveals no gallop and no friction rub.    No murmur heard.  Pulmonary/Chest: Breath sounds normal. No respiratory distress.   Abdominal: Bowel sounds are normal. He exhibits no distension. There is no tenderness.   Musculoskeletal: He exhibits no edema or tenderness.   Neurological: He is alert and oriented to person, place, and time. No cranial nerve deficit.   Skin: Skin is warm and dry. He is not diaphoretic.   Psychiatric: He has a normal mood and affect. His behavior is normal.   Nursing note and vitals reviewed.      Assessment:     1. ACC/AHA stage C heart failure with preserved ejection fraction (HCC)  [place] PULMONARY STRESS TESTING 6 MIN WALK    COMP METABOLIC PANEL    LIPID PANEL   2. Left ventricular diastolic dysfunction, NYHA class 2  [place] PULMONARY STRESS TESTING 6 MIN WALK    COMP METABOLIC PANEL    LIPID PANEL   3. HTN (hypertension), malignant     4. Dyspnea on exertion  [place] PULMONARY STRESS TESTING 6 MIN WALK   5. High risk medication use     6. Research study patient         Medical Decision Making:  Today's Assessment / Status / Plan:   Today, based on physical examination findings, patient is euvolemic. No JVD, lungs are clear to auscultation, no pitting edema in bilateral lower extremities, no ascites.    Dry weight is 207 lbs.    Blood pressure is well controlled.    Cont current medications at current dose.     I will see patient back in clinic with lab tests and studies results in 6 months.    I thank you for referring patient to our Cardiology Clinic today.

## 2018-07-31 NOTE — LETTER
[facility]   [address]  [place] [address]  Phone: [phone]  Fax: [phone]              Chema Sarabia  1955    Encounter Date: 7/31/2018    Santosh Castelan M.D.          PROGRESS NOTE:  Chief Complaint   Patient presents with   • Congestive Heart Failure       Subjective:   Chema Sarabia is a 62 y.o. male who presents today for cardiac care and evaluation in our heart failure clinic after his recent episode of hypertensive emergency for which he went into heart failure exacerbation as well. Patient does have documented transthoracic echocardiogram which showed relatively preserved LV function of 55%. No significant valvular disease.     Patient is feeling better these days. Does get winded upon walking up inclines or for distance. No symptoms at rest or with daily living activities.     At prior visit, patient was able to complete 550 m during his 6 minute walk test. his O2 saturation at baseline was 94% and at the end of the test, the O2 saturation was 95%. he reported 0 level of dyspnea on Jim scale.     Blood pressure continues to be high but better control.    Today, patient was able to complete 549 m during his 6 minute walk test. his O2 saturation at baseline was 95% and at the end of the test, the O2 saturation was 96%. he reported 4 level of dyspnea on Jim scale.      Past Medical History:   Diagnosis Date   • Acute kidney injury (HCC)    • Hypertensive urgency    • Nephrolithiasis      History reviewed. No pertinent surgical history.  Family History   Problem Relation Age of Onset   • Cancer Mother         lungs, smoker   • Hypertension Father    • Hyperlipidemia Father    • Diabetes Neg Hx    • Heart Disease Neg Hx      Social History     Social History   • Marital status: Single     Spouse name: N/A   • Number of children: N/A   • Years of education: N/A     Occupational History   • Not on file.     Social History Main  "Topics   • Smoking status: Never Smoker   • Smokeless tobacco: Never Used   • Alcohol use 2.4 oz/week     4 Glasses of wine per week   • Drug use: No   • Sexual activity: Not on file     Other Topics Concern   • Not on file     Social History Narrative   • No narrative on file     No Known Allergies  Outpatient Encounter Prescriptions as of 7/31/2018   Medication Sig Dispense Refill   • amLODIPine (NORVASC) 10 MG Tab Take 1 Tab by mouth every day. 90 Tab 3   • spironolactone (ALDACTONE) 25 MG Tab Take 1 Tab by mouth every day. 90 Tab 3   • labetalol (NORMODYNE) 200 MG Tab Take 2 Tabs by mouth 2 times a day. 120 Tab 11   • multivitamin (THERAGRAN) Tab Take 1 Tab by mouth every day.       No facility-administered encounter medications on file as of 7/31/2018.      Review of Systems   Constitutional: Negative for chills, fever, malaise/fatigue and weight loss.   HENT: Negative for ear discharge, ear pain, hearing loss and nosebleeds.    Eyes: Negative for blurred vision, double vision, pain and discharge.   Respiratory: Negative for cough and shortness of breath.    Cardiovascular: Negative for chest pain, palpitations, orthopnea, claudication, leg swelling and PND.   Gastrointestinal: Negative for abdominal pain, blood in stool, melena, nausea and vomiting.   Genitourinary: Negative for dysuria and hematuria.   Musculoskeletal: Negative for falls, joint pain and myalgias.   Skin: Negative for itching and rash.   Neurological: Negative for dizziness, sensory change, speech change, loss of consciousness and headaches.   Endo/Heme/Allergies: Negative for environmental allergies. Does not bruise/bleed easily.   Psychiatric/Behavioral: Negative for depression, hallucinations and suicidal ideas.        Objective:   /76   Pulse 68   Ht 1.676 m (5' 6\")   Wt 94.1 kg (207 lb 6.4 oz)   SpO2 96%   BMI 33.48 kg/m²      Physical Exam   Constitutional: He is oriented to person, place, and time. No distress.   HENT:   "   Head: Normocephalic and atraumatic.   Right Ear: External ear normal.   Left Ear: External ear normal.   Eyes: Right eye exhibits no discharge. Left eye exhibits no discharge.   Neck: No JVD present. No thyromegaly present.   Cardiovascular: Normal rate, regular rhythm, normal heart sounds and intact distal pulses.  Exam reveals no gallop and no friction rub.    No murmur heard.  Pulmonary/Chest: Breath sounds normal. No respiratory distress.   Abdominal: Bowel sounds are normal. He exhibits no distension. There is no tenderness.   Musculoskeletal: He exhibits no edema or tenderness.   Neurological: He is alert and oriented to person, place, and time. No cranial nerve deficit.   Skin: Skin is warm and dry. He is not diaphoretic.   Psychiatric: He has a normal mood and affect. His behavior is normal.   Nursing note and vitals reviewed.      Assessment:     1. ACC/AHA stage C heart failure with preserved ejection fraction (HCC)  [place] PULMONARY STRESS TESTING 6 MIN WALK    COMP METABOLIC PANEL    LIPID PANEL   2. Left ventricular diastolic dysfunction, NYHA class 2  [place] PULMONARY STRESS TESTING 6 MIN WALK    COMP METABOLIC PANEL    LIPID PANEL   3. HTN (hypertension), malignant     4. Dyspnea on exertion  [place] PULMONARY STRESS TESTING 6 MIN WALK   5. High risk medication use     6. Research study patient         Medical Decision Making:  Today's Assessment / Status / Plan:   Today, based on physical examination findings, patient is euvolemic. No JVD, lungs are clear to auscultation, no pitting edema in bilateral lower extremities, no ascites.    Dry weight is 207 lbs.    Blood pressure is well controlled.    Cont current medications at current dose.     I will see patient back in clinic with lab tests and studies results in 6 months.    I thank you for referring patient to our Cardiology Clinic today.            Albert Orellana M.D.  [address]  [address]  [facility] [address]  VIA In Basket

## 2018-09-10 ENCOUNTER — OFFICE VISIT (OUTPATIENT)
Dept: MEDICAL GROUP | Facility: MEDICAL CENTER | Age: 63
End: 2018-09-10
Payer: COMMERCIAL

## 2018-09-10 VITALS
DIASTOLIC BLOOD PRESSURE: 78 MMHG | HEIGHT: 66 IN | OXYGEN SATURATION: 96 % | HEART RATE: 74 BPM | WEIGHT: 209.88 LBS | BODY MASS INDEX: 33.73 KG/M2 | TEMPERATURE: 98.8 F | SYSTOLIC BLOOD PRESSURE: 108 MMHG

## 2018-09-10 DIAGNOSIS — Z12.5 SCREENING FOR MALIGNANT NEOPLASM OF PROSTATE: ICD-10-CM

## 2018-09-10 DIAGNOSIS — N18.30 STAGE 3 CHRONIC KIDNEY DISEASE (HCC): ICD-10-CM

## 2018-09-10 DIAGNOSIS — I11.9 MALIGNANT HYPERTENSIVE HEART DISEASE WITHOUT CONGESTIVE HEART FAILURE: ICD-10-CM

## 2018-09-10 DIAGNOSIS — E66.9 OBESITY (BMI 30.0-34.9): ICD-10-CM

## 2018-09-10 DIAGNOSIS — I50.30 ACC/AHA STAGE C HEART FAILURE WITH PRESERVED EJECTION FRACTION (HCC): ICD-10-CM

## 2018-09-10 DIAGNOSIS — Z00.00 HEALTH CARE MAINTENANCE: ICD-10-CM

## 2018-09-10 DIAGNOSIS — I35.1 MODERATE AORTIC INSUFFICIENCY: ICD-10-CM

## 2018-09-10 DIAGNOSIS — Z12.11 COLON CANCER SCREENING: ICD-10-CM

## 2018-09-10 DIAGNOSIS — Z00.6 RESEARCH STUDY PATIENT: ICD-10-CM

## 2018-09-10 DIAGNOSIS — Z23 NEEDS FLU SHOT: ICD-10-CM

## 2018-09-10 DIAGNOSIS — I10 ESSENTIAL HYPERTENSION: ICD-10-CM

## 2018-09-10 PROCEDURE — 90686 IIV4 VACC NO PRSV 0.5 ML IM: CPT | Performed by: INTERNAL MEDICINE

## 2018-09-10 PROCEDURE — 99214 OFFICE O/P EST MOD 30 MIN: CPT | Mod: 25 | Performed by: INTERNAL MEDICINE

## 2018-09-10 PROCEDURE — 90471 IMMUNIZATION ADMIN: CPT | Performed by: INTERNAL MEDICINE

## 2018-09-10 NOTE — PROGRESS NOTES
CHIEF COMPLAINT  Chief Complaint   Patient presents with   • Hypertension     HPI  Patient is a 62 y.o. male patient who presents today for the following     Hypertension, hypertensive heart disease, CKD stage III  Meds: Amlodipine 10 mg daily, labetalol 200 mg twice daily, spironolactone 25 mg daily, taking as prescribed.   Measuring BP at home: yes, no. It has been < 130/80  Denies: - headaches, vision problems, tinnitus.  - chest pain/pressure, palpitations, irregular heart beats, exertional, dyspnea, peripheral edema.  - medication side effect: unusual fatigue, slow heartbeat, foot/leg swelling, cough.  Low salt diet: Y  Diet: Improved  Exercise: improved  BMI: 33  FH of HTN: father renal panel showed CKD stage III, with the last GFR at 31.  He has been followed up [facility] cardiology and [facility]     Diastolic dysfunction with preserved ejection fraction, research study  Reviewed the last echocardiogram (2/15/18)   - showed relatively preserved LV function of 55%.  Denies:  -Palpitations, irregular heartbeat, chest pain or pressure  -Orthopnea, PND, lower extremity swelling.   He started [employer] experimental trial with 2 medications.  He has been followed up by cardiology     Moderate aortic insufficiency  Found on the last echocardiography from 2/15/18.   Denies chest pain, dizziness, lightheadedness, exertional dyspnea, syncope.  He has been followed up by cardiology.    Reviewed PMH, PSH, FH, SH, ALL, HCM/IMM, no changes  Reviewed MEDS, no changes    Patient Active Problem List    Diagnosis Date Noted   • Stage 3 chronic kidney disease 04/20/2018   • Essential hypertension 04/20/2018   • [facility] Billing 04/19/2018   • Research study patient 04/09/2018   • Moderate aortic insufficiency 04/05/2018   • ACC/AHA stage C heart failure with preserved ejection fraction (HCC) 04/05/2018   • Health care maintenance 02/14/2018   • Obesity (BMI 30.0-34.9) 02/14/2018   • Heart  disease, hypertensive, malignant 2018     CURRENT MEDICATIONS  Current Outpatient Prescriptions   Medication Sig Dispense Refill   • amLODIPine (NORVASC) 10 MG Tab Take 1 Tab by mouth every day. 90 Tab 3   • spironolactone (ALDACTONE) 25 MG Tab Take 1 Tab by mouth every day. 90 Tab 3   • labetalol (NORMODYNE) 200 MG Tab Take 2 Tabs by mouth 2 times a day. 120 Tab 11   • multivitamin (THERAGRAN) Tab Take 1 Tab by mouth every day.       No current facility-administered medications for this visit.      ALLERGIES  Allergies: Patient has no known allergies.  PAST MEDICAL HISTORY  Past Medical History:   Diagnosis Date   • Acute kidney injury (HCC)    • Hypertensive urgency    • Nephrolithiasis      SURGICAL HISTORY  He  has no past surgical history on file.  SOCIAL HISTORY  Social History   Substance Use Topics   • Smoking status: Never Smoker   • Smokeless tobacco: Never Used   • Alcohol use 2.4 oz/week     4 Glasses of wine per week     Social History     Social History Narrative   • No narrative on file     FAMILY HISTORY  Family History   Problem Relation Age of Onset   • Cancer Mother         lungs, smoker   • Hypertension Father    • Hyperlipidemia Father    • Diabetes Neg Hx    • Heart Disease Neg Hx      Family Status   Relation Status   • Mo    • Fa    • Neg Hx (Not Specified)     ROS   Constitutional: Negative for fever, chills.  HENT: Negative for congestion, sore throat.  Eyes: Negative for blurred vision.   Respiratory: Negative for cough, shortness of breath.  Cardiovascular: Negative for chest pain, palpitations. And per HPI.  Gastrointestinal: Negative for heartburn, nausea, abdominal pain.   Genitourinary: Negative for dysuria. And per HPI.  Musculoskeletal: Negative for significant myalgias, back pain and joint pain.   Skin: Negative for rash and itching.   Neuro: Negative for dizziness, weakness and headaches.   Endo/Heme/Allergies: Does not bruise/bleed easily.  "  Psychiatric/Behavioral: Negative for depression, anxiety    PHYSICAL EXAM   Blood pressure 108/78, pulse 74, temperature 37.1 °C (98.8 °F), height 1.676 m (5' 6\"), weight 95.2 kg (209 lb 14.1 oz), SpO2 96 %. Body mass index is 33.88 kg/m².  General:  NAD, well appearing  HEENT:   NC/AT, PERRLA, EOMI, TMs are clear. Oropharyngeal mucosa is pink,  without lesions;  no cervical / supraclavicular  lymphadenopathy, no thyromegaly.    Cardiovascular: RRR.   No m/r/g. No carotid bruits .      Lungs:   CTAB, no w/r/r, no respiratory distress.  Abdomen: Soft, NT/ND + BS; no suprapubic tenderness; no masses or hepatosplenomegaly.  Extremities:  2+ DP and radial pulses bilaterally.  No c/c/e.   Skin:  Warm, dry.  No erythema. No rash.   Neurologic: Alert & oriented x 3. No focal deficits.  Psychiatric:  Affect normal, mood normal, judgment normal.    LABS     Labs are reviewed and discussed with a patient  Lab Results   Component Value Date/Time    CHOLSTRLTOT 128 02/16/2018 02:20 AM    LDL 72 02/16/2018 02:20 AM    HDL 43 02/16/2018 02:20 AM    TRIGLYCERIDE 66 02/16/2018 02:20 AM       Lab Results   Component Value Date/Time    SODIUM 138 06/18/2018 01:49 PM    POTASSIUM 4.8 06/18/2018 01:49 PM    CHLORIDE 108 06/18/2018 01:49 PM    CO2 21 06/18/2018 01:49 PM    GLUCOSE 98 06/18/2018 01:49 PM    BUN 37 (H) 06/18/2018 01:49 PM    CREATININE 2.06 (H) 06/18/2018 01:49 PM     Lab Results   Component Value Date/Time    ALKPHOSPHAT 54 06/18/2018 01:49 PM    ASTSGOT 13 06/18/2018 01:49 PM    ALTSGPT 14 06/18/2018 01:49 PM    TBILIRUBIN 0.7 06/18/2018 01:49 PM      Lab Results   Component Value Date/Time    WBC 7.9 03/23/2018 03:39 PM    RBC 6.10 03/23/2018 03:39 PM    HEMOGLOBIN 16.7 03/23/2018 03:39 PM    HEMATOCRIT 51.8 03/23/2018 03:39 PM    MCV 84.9 03/23/2018 03:39 PM    MCH 27.4 03/23/2018 03:39 PM    MCHC 32.2 (L) 03/23/2018 03:39 PM    MPV 9.6 03/23/2018 03:39 PM    NEUTSPOLYS 67.40 03/23/2018 03:39 PM    LYMPHOCYTES 19.80 " (L) 03/23/2018 03:39 PM    MONOCYTES 9.80 03/23/2018 03:39 PM    EOSINOPHILS 2.00 03/23/2018 03:39 PM    BASOPHILS 0.60 03/23/2018 03:39 PM      IMAGING     None    ASSESMENT AND PLAN        1. Essential hypertension  - COMP METABOLIC PANEL; Future  2. 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 Stage 3 chronic kidney disease  Stable, continue to avoid NSAIDs, have good fluid intake.  Continue nephrology follow-up  - COMP METABOLIC PANEL; Future    4. ACC/AHA stage C heart failure with preserved ejection fraction (HCC)  5. Research study patient  Asymptomatic, continue current treatment and cardiology follow-up    6. Moderate aortic insufficiency  Asymptomatic, continue cardiology follow-up    7. Obesity (BMI 30.0-34.9)  - OBESITY COUNSELING (No Charge): Patient identified as having weight management issue.  Appropriate orders and counseling given.    8. Health care maintenance  Advised shingles shot    9. Colon cancer screening  - OCCULT BLOOD FECES IMMUNOASSAY (FIT); Future    10. Screening for malignant neoplasm of prostate  - PROSTATE SPECIFIC AG SCREENING; Future    11. Needs flu shot  - INFLUENZA VACCINE QUAD INJ >3Y(PF)  Information was provided to the patient regarding the vaccine, including side effects. Vaccine was given by my medical assistant under my supervision.    Counseling:   - Smoking:  Nonsmoker    Followup: Return in about 3 months (around 12/10/2018) for Short.    All questions are answered.    Please note that this dictation was created using voice recognition software, and that there might be errors of wing and possibly content.

## 2018-10-04 ENCOUNTER — HOSPITAL ENCOUNTER (OUTPATIENT)
Facility: MEDICAL CENTER | Age: 63
End: 2018-10-04
Attending: INTERNAL MEDICINE
Payer: COMMERCIAL

## 2018-10-04 PROCEDURE — 82274 ASSAY TEST FOR BLOOD FECAL: CPT

## 2018-10-06 DIAGNOSIS — Z12.11 COLON CANCER SCREENING: ICD-10-CM

## 2018-10-09 LAB — HEMOCCULT STL QL IA: NEGATIVE

## 2018-10-12 ENCOUNTER — DOCUMENTATION (OUTPATIENT)
Dept: ADMINISTRATIVE | Facility: MEDICAL CENTER | Age: 63
End: 2018-10-12

## 2018-10-12 NOTE — PROGRESS NOTES
Patient seen for Perspective clinical trial visit 201 for randomization. New ICF signed and copy provided to patient. Cogstate Battery, MMSE, FAQ and GDS completed prior to any other clinical assessments. Concomitant medications reviewed with patient with no changes noted. Vital signs assessed and labs drawn. Study medication returned to study staff. Patient randomization completed and new study medication dispensed. Patient education to take one tablet from each bottle between 06:00 and 09:00 and one tablet from each bottle between 18:00 and 21:00. Patient demonstrated verbal understanding. No AEs, SAEs or safety events reported per patient. All questions and concerns addressed. Appropriate instructions regarding vitals, medications, etc. provided. Future appointments reviewed with patient. Contact information for study team provided to patient.

## 2018-10-16 ENCOUNTER — HOSPITAL ENCOUNTER (OUTPATIENT)
Dept: LAB | Facility: MEDICAL CENTER | Age: 63
End: 2018-10-16
Attending: INTERNAL MEDICINE
Payer: COMMERCIAL

## 2018-10-16 DIAGNOSIS — N18.30 STAGE 3 CHRONIC KIDNEY DISEASE (HCC): ICD-10-CM

## 2018-10-16 LAB
CREAT UR-MCNC: 95.9 MG/DL
ERYTHROCYTE [DISTWIDTH] IN BLOOD BY AUTOMATED COUNT: 43.5 FL (ref 35.9–50)
HCT VFR BLD AUTO: 44.6 % (ref 42–52)
HGB BLD-MCNC: 15.2 G/DL (ref 14–18)
MCH RBC QN AUTO: 31.7 PG (ref 27–33)
MCHC RBC AUTO-ENTMCNC: 34.1 G/DL (ref 33.7–35.3)
MCV RBC AUTO: 93.1 FL (ref 81.4–97.8)
MICROALBUMIN UR-MCNC: 0.8 MG/DL
MICROALBUMIN/CREAT UR: 8 MG/G (ref 0–30)
PLATELET # BLD AUTO: 159 K/UL (ref 164–446)
PMV BLD AUTO: 9.7 FL (ref 9–12.9)
RBC # BLD AUTO: 4.79 M/UL (ref 4.7–6.1)
WBC # BLD AUTO: 6.8 K/UL (ref 4.8–10.8)

## 2018-10-16 PROCEDURE — 80048 BASIC METABOLIC PNL TOTAL CA: CPT

## 2018-10-16 PROCEDURE — 82043 UR ALBUMIN QUANTITATIVE: CPT

## 2018-10-16 PROCEDURE — 36415 COLL VENOUS BLD VENIPUNCTURE: CPT

## 2018-10-16 PROCEDURE — 82306 VITAMIN D 25 HYDROXY: CPT

## 2018-10-16 PROCEDURE — 82570 ASSAY OF URINE CREATININE: CPT

## 2018-10-16 PROCEDURE — 85027 COMPLETE CBC AUTOMATED: CPT

## 2018-10-17 LAB
25(OH)D3 SERPL-MCNC: 25 NG/ML (ref 30–100)
ANION GAP SERPL CALC-SCNC: 10 MMOL/L (ref 0–11.9)
BUN SERPL-MCNC: 35 MG/DL (ref 8–22)
CALCIUM SERPL-MCNC: 9.4 MG/DL (ref 8.5–10.5)
CHLORIDE SERPL-SCNC: 110 MMOL/L (ref 96–112)
CO2 SERPL-SCNC: 22 MMOL/L (ref 20–33)
CREAT SERPL-MCNC: 1.95 MG/DL (ref 0.5–1.4)
GLUCOSE SERPL-MCNC: 103 MG/DL (ref 65–99)
POTASSIUM SERPL-SCNC: 4.7 MMOL/L (ref 3.6–5.5)
SODIUM SERPL-SCNC: 142 MMOL/L (ref 135–145)

## 2018-10-19 ENCOUNTER — OFFICE VISIT (OUTPATIENT)
Dept: NEPHROLOGY | Facility: MEDICAL CENTER | Age: 63
End: 2018-10-19
Payer: COMMERCIAL

## 2018-10-19 VITALS
HEIGHT: 66 IN | RESPIRATION RATE: 14 BRPM | SYSTOLIC BLOOD PRESSURE: 138 MMHG | DIASTOLIC BLOOD PRESSURE: 76 MMHG | TEMPERATURE: 98.6 F | BODY MASS INDEX: 35.03 KG/M2 | WEIGHT: 218 LBS | OXYGEN SATURATION: 96 % | HEART RATE: 65 BPM

## 2018-10-19 DIAGNOSIS — I10 ESSENTIAL HYPERTENSION: ICD-10-CM

## 2018-10-19 DIAGNOSIS — N28.1 ACQUIRED RENAL CYST OF RIGHT KIDNEY: ICD-10-CM

## 2018-10-19 DIAGNOSIS — N18.30 STAGE 3 CHRONIC KIDNEY DISEASE (HCC): ICD-10-CM

## 2018-10-19 PROCEDURE — 99214 OFFICE O/P EST MOD 30 MIN: CPT | Performed by: INTERNAL MEDICINE

## 2018-10-19 ASSESSMENT — ENCOUNTER SYMPTOMS
FEVER: 0
CHILLS: 0
PALPITATIONS: 0

## 2018-10-19 NOTE — PROGRESS NOTES
"Subjective:      Chema Sarabia is a 62 y.o. male who presents with Follow-Up            HPI  62 year old with a long history of malignant HTN, untreated. Admitted to the hospital from clinic and controlled. Has been taking his medications. Now off the diuretic. No NSAIDs. No problems urinating. Norvasc, Aldactone, and labetalol.     He notes that he is enrolled in a clinical trial.  Serum creatinine is 1.95 with is basically unchanged from Alexa.  No proteinuria is noted.  He is taking his medications as prescribed.  Feeling good overall.  No nephrotoxins noted.    Review of Systems   Constitutional: Negative for chills and fever.   Cardiovascular: Negative for chest pain and palpitations.   All other systems reviewed and are negative.         Objective:     /76 (BP Location: Right arm, Patient Position: Sitting, BP Cuff Size: Adult)   Pulse 65   Temp 37 °C (98.6 °F) (Temporal)   Resp 14   Ht 1.676 m (5' 6\")   Wt 98.9 kg (218 lb)   SpO2 96%   BMI 35.19 kg/m²      Physical Exam   Constitutional: He is oriented to person, place, and time. He appears well-developed and well-nourished.   HENT:   Head: Normocephalic and atraumatic.   Cardiovascular: Normal rate and regular rhythm.    Pulmonary/Chest: Effort normal and breath sounds normal.   Abdominal: Soft. Bowel sounds are normal.   Musculoskeletal: He exhibits no edema or deformity.   Neurological: He is alert and oriented to person, place, and time.   Skin: Skin is warm and dry.   Psychiatric: He has a normal mood and affect. His behavior is normal.               Assessment/Plan:     1. Stage 3 chronic kidney disease (HCC)  Patient's chronic kidney disease is stable.  His chronic kidney disease is due to hypertension.  Blood pressure is controlled currently.  Continue current medications.    - BASIC METABOLIC PANEL; Future  - PTH INTACT (PTH ONLY); Future  - PHOSPHORUS; Future  - CBC WITHOUT DIFFERENTIAL; Future  - VITAMIN D,25 HYDROXY; Future  - " MICROALBUMIN CREAT RATIO URINE; Future    2. Essential hypertension  Blood pressure is at goal.    3. Acquired renal cyst of right kidney  Renal cyst is noted in the February ultrasound.  No follow-up scans needed.

## 2018-11-14 DIAGNOSIS — I50.30 ACC/AHA STAGE C HEART FAILURE WITH PRESERVED EJECTION FRACTION (HCC): ICD-10-CM

## 2018-11-14 DIAGNOSIS — Z00.6 RESEARCH STUDY PATIENT: ICD-10-CM

## 2018-11-30 ENCOUNTER — HOSPITAL ENCOUNTER (OUTPATIENT)
Dept: LAB | Facility: MEDICAL CENTER | Age: 63
End: 2018-11-30
Attending: INTERNAL MEDICINE
Payer: COMMERCIAL

## 2018-11-30 DIAGNOSIS — I10 ESSENTIAL HYPERTENSION: ICD-10-CM

## 2018-11-30 DIAGNOSIS — Z12.5 SCREENING FOR MALIGNANT NEOPLASM OF PROSTATE: ICD-10-CM

## 2018-11-30 DIAGNOSIS — N18.30 STAGE 3 CHRONIC KIDNEY DISEASE (HCC): ICD-10-CM

## 2018-11-30 LAB
ALBUMIN SERPL BCP-MCNC: 4.4 G/DL (ref 3.2–4.9)
ALBUMIN/GLOB SERPL: 2.2 G/DL
ALP SERPL-CCNC: 45 U/L (ref 30–99)
ALT SERPL-CCNC: 22 U/L (ref 2–50)
ANION GAP SERPL CALC-SCNC: 8 MMOL/L (ref 0–11.9)
AST SERPL-CCNC: 21 U/L (ref 12–45)
BILIRUB SERPL-MCNC: 1 MG/DL (ref 0.1–1.5)
BUN SERPL-MCNC: 35 MG/DL (ref 8–22)
CALCIUM SERPL-MCNC: 9.2 MG/DL (ref 8.5–10.5)
CHLORIDE SERPL-SCNC: 109 MMOL/L (ref 96–112)
CO2 SERPL-SCNC: 24 MMOL/L (ref 20–33)
CREAT SERPL-MCNC: 1.8 MG/DL (ref 0.5–1.4)
FASTING STATUS PATIENT QL REPORTED: NORMAL
GLOBULIN SER CALC-MCNC: 2 G/DL (ref 1.9–3.5)
GLUCOSE SERPL-MCNC: 97 MG/DL (ref 65–99)
POTASSIUM SERPL-SCNC: 4.5 MMOL/L (ref 3.6–5.5)
PROT SERPL-MCNC: 6.4 G/DL (ref 6–8.2)
PSA SERPL-MCNC: 2.58 NG/ML (ref 0–4)
SODIUM SERPL-SCNC: 141 MMOL/L (ref 135–145)

## 2018-11-30 PROCEDURE — 84153 ASSAY OF PSA TOTAL: CPT

## 2018-11-30 PROCEDURE — 80053 COMPREHEN METABOLIC PANEL: CPT

## 2018-11-30 PROCEDURE — 36415 COLL VENOUS BLD VENIPUNCTURE: CPT

## 2018-12-10 ENCOUNTER — APPOINTMENT (OUTPATIENT)
Dept: MEDICAL GROUP | Facility: MEDICAL CENTER | Age: 63
End: 2018-12-10
Payer: COMMERCIAL

## 2018-12-19 ENCOUNTER — TELEPHONE (OUTPATIENT)
Dept: CARDIOLOGY | Facility: MEDICAL CENTER | Age: 63
End: 2018-12-19

## 2018-12-19 ENCOUNTER — HOSPITAL ENCOUNTER (OUTPATIENT)
Dept: LAB | Facility: MEDICAL CENTER | Age: 63
End: 2018-12-19
Attending: INTERNAL MEDICINE
Payer: COMMERCIAL

## 2018-12-19 PROCEDURE — 36415 COLL VENOUS BLD VENIPUNCTURE: CPT

## 2018-12-19 PROCEDURE — 80061 LIPID PANEL: CPT

## 2018-12-19 NOTE — TELEPHONE ENCOUNTER
ENEIDA to remind patient to complete fasting labs before upcoming appt with Dr. Castelan on 12/24/018.

## 2018-12-20 LAB
CHOLEST SERPL-MCNC: 228 MG/DL (ref 100–199)
FASTING STATUS PATIENT QL REPORTED: NORMAL
HDLC SERPL-MCNC: 69 MG/DL
LDLC SERPL CALC-MCNC: 127 MG/DL
TRIGL SERPL-MCNC: 162 MG/DL (ref 0–149)

## 2018-12-24 ENCOUNTER — OFFICE VISIT (OUTPATIENT)
Dept: CARDIOLOGY | Facility: MEDICAL CENTER | Age: 63
End: 2018-12-24
Payer: COMMERCIAL

## 2018-12-24 VITALS
HEART RATE: 65 BPM | HEIGHT: 66 IN | OXYGEN SATURATION: 94 % | BODY MASS INDEX: 36.16 KG/M2 | WEIGHT: 225 LBS | SYSTOLIC BLOOD PRESSURE: 138 MMHG | DIASTOLIC BLOOD PRESSURE: 80 MMHG

## 2018-12-24 DIAGNOSIS — Z00.6 RESEARCH STUDY PATIENT: ICD-10-CM

## 2018-12-24 DIAGNOSIS — Z79.899 HIGH RISK MEDICATION USE: ICD-10-CM

## 2018-12-24 DIAGNOSIS — I10 HTN (HYPERTENSION), MALIGNANT: ICD-10-CM

## 2018-12-24 DIAGNOSIS — E78.2 MIXED HYPERLIPIDEMIA: ICD-10-CM

## 2018-12-24 DIAGNOSIS — I50.30 ACC/AHA STAGE C HEART FAILURE WITH PRESERVED EJECTION FRACTION (HCC): ICD-10-CM

## 2018-12-24 DIAGNOSIS — I51.89 LEFT VENTRICULAR DIASTOLIC DYSFUNCTION, NYHA CLASS 2: ICD-10-CM

## 2018-12-24 PROCEDURE — 99214 OFFICE O/P EST MOD 30 MIN: CPT | Performed by: INTERNAL MEDICINE

## 2018-12-24 RX ORDER — AMLODIPINE BESYLATE 10 MG/1
10 TABLET ORAL DAILY
Qty: 90 TAB | Refills: 3 | Status: SHIPPED | OUTPATIENT
Start: 2018-12-24 | End: 2019-12-18 | Stop reason: SDUPTHER

## 2018-12-24 RX ORDER — SPIRONOLACTONE 25 MG/1
25 TABLET ORAL DAILY
Qty: 90 TAB | Refills: 3 | Status: SHIPPED | OUTPATIENT
Start: 2018-12-24 | End: 2019-12-18 | Stop reason: SDUPTHER

## 2018-12-24 RX ORDER — LABETALOL 200 MG/1
400 TABLET, FILM COATED ORAL 2 TIMES DAILY
Qty: 120 TAB | Refills: 11 | Status: SHIPPED | OUTPATIENT
Start: 2018-12-24 | End: 2019-04-26 | Stop reason: SDUPTHER

## 2018-12-24 ASSESSMENT — ENCOUNTER SYMPTOMS
ABDOMINAL PAIN: 0
FALLS: 0
DOUBLE VISION: 0
SENSORY CHANGE: 0
SHORTNESS OF BREATH: 1
DIAPHORESIS: 0
HEADACHES: 0
PALPITATIONS: 0
FEVER: 0
BRUISES/BLEEDS EASILY: 0
DIZZINESS: 0
BLURRED VISION: 0
DEPRESSION: 0
COUGH: 0
MEMORY LOSS: 0
MYALGIAS: 0

## 2018-12-24 NOTE — LETTER
[facility]   [address], [address]  [place] [address]  Phone: [phone]  Fax: [phone]              Chema Sarabia  1955    Encounter Date: 12/24/2018    Santosh Castelan M.D.          PROGRESS NOTE:  Chief Complaint   Patient presents with   • Congestive Heart Failure       Subjective:   Chema Sarabia is a 63 y.o. male who presents today for cardiac care and evaluation in our heart failure clinic after his recent episode of hypertensive emergency for which he went into heart failure exacerbation as well. Patient does have documented transthoracic echocardiogram which showed relatively preserved LV function of 55%. No significant valvular disease.     Patient is feeling better these days. Does get winded upon walking up inclines or for distance. No symptoms at rest or with daily living activities.     At prior visit, patient was able to complete 550 m during his 6 minute walk test. his O2 saturation at baseline was 94% and at the end of the test, the O2 saturation was 95%. he reported 0 level of dyspnea on Jim scale.     Blood pressure continues to be high but better control.     At the last visit, patient was able to complete 549 m during his 6 minute walk test. his O2 saturation at baseline was 95% and at the end of the test, the O2 saturation was 96%. he reported 4 level of dyspnea on Jim scale.    I have independently interpreted and reviewed blood tests results with patient in clinic which shows elevated LDL level, with normal triglycerides, renal and liver function.       Past Medical History:   Diagnosis Date   • Acute kidney injury (HCC)    • Hypertensive urgency    • Nephrolithiasis      History reviewed. No pertinent surgical history.  Family History   Problem Relation Age of Onset   • Cancer Mother         lungs, smoker   • Hypertension Father    • Hyperlipidemia Father    • Diabetes Neg Hx    • Heart Disease Neg Hx      Social  History     Social History   • Marital status: Single     Spouse name: N/A   • Number of children: N/A   • Years of education: N/A     Occupational History   • Not on file.     Social History Main Topics   • Smoking status: Never Smoker   • Smokeless tobacco: Never Used   • Alcohol use 2.4 oz/week     4 Glasses of wine per week   • Drug use: No   • Sexual activity: Not on file     Other Topics Concern   • Not on file     Social History Narrative   • No narrative on file     No Known Allergies  Outpatient Encounter Prescriptions as of 12/24/2018   Medication Sig Dispense Refill   • amLODIPine (NORVASC) 10 MG Tab Take 1 Tab by mouth every day. 90 Tab 3   • spironolactone (ALDACTONE) 25 MG Tab Take 1 Tab by mouth every day. 90 Tab 3   • labetalol (NORMODYNE) 200 MG Tab Take 2 Tabs by mouth 2 times a day. 120 Tab 11   • multivitamin (THERAGRAN) Tab Take 1 Tab by mouth every day.     • Non Formulary Request Subject is the Perspective research study, Protocol Number: [id number]. Patient is taking Entresto or Placebo 1 tab by mouth twice a day. DO NOT remove this from the medication list. Dr Jorge A Castelan is the principle investigator.       No facility-administered encounter medications on file as of 12/24/2018.      Review of Systems   Constitutional: Negative for diaphoresis and fever.   HENT: Negative for nosebleeds.    Eyes: Negative for blurred vision and double vision.   Respiratory: Positive for shortness of breath. Negative for cough.    Cardiovascular: Negative for chest pain and palpitations.   Gastrointestinal: Negative for abdominal pain.   Genitourinary: Negative for dysuria and frequency.   Musculoskeletal: Negative for falls and myalgias.   Skin: Negative for rash.   Neurological: Negative for dizziness, sensory change and headaches.   Endo/Heme/Allergies: Does not bruise/bleed easily.   Psychiatric/Behavioral: Negative for depression and memory loss.        Objective:   /80 (BP Location: Left arm,  "Patient Position: Sitting, BP Cuff Size: Adult)   Pulse 65   Ht 1.676 m (5' 6\")   Wt 102.1 kg (225 lb)   SpO2 94%   BMI 36.32 kg/m²      Physical Exam   Constitutional: He is oriented to person, place, and time. No distress.   HENT:   Head: Normocephalic and atraumatic.   Right Ear: External ear normal.   Left Ear: External ear normal.   Eyes: Right eye exhibits no discharge. Left eye exhibits no discharge.   Neck: No JVD present. No thyromegaly present.   Cardiovascular: Normal rate, regular rhythm, normal heart sounds and intact distal pulses.  Exam reveals no gallop and no friction rub.    No murmur heard.  Pulmonary/Chest: Breath sounds normal. No respiratory distress.   Abdominal: Bowel sounds are normal. He exhibits no distension. There is no tenderness.   Musculoskeletal: He exhibits no edema or tenderness.   Neurological: He is alert and oriented to person, place, and time. No cranial nerve deficit.   Skin: Skin is warm and dry. He is not diaphoretic.   Psychiatric: He has a normal mood and affect. His behavior is normal.   Nursing note and vitals reviewed.      Assessment:     1. Research study patient     2. ACC/AHA stage C heart failure with preserved ejection fraction (HCC)     3. Left ventricular diastolic dysfunction, NYHA class 2     4. HTN (hypertension), malignant     5. Mixed hyperlipidemia     6. High risk medication use         Medical Decision Making:  Today's Assessment / Status / Plan:   At this time patient is clinically stable in terms of his cardiac standpoint.  Blood pressure is well controlled.  Cont current medications at current dose.   LDL is high but patient would like to give dieting a chance, he was on vacation and has been eating unhealthy foods.  I advised patient that at the next visit, if his LDL remains elevated, we should consider adding statin.    I will see patient back in clinic with lab tests and studies results in 6 months.    I thank you for referring patient to our " Cardiology Clinic today.        Albert Orellana M.D.  [address]  [address]  [facility] [address]  VIA In Basket

## 2018-12-24 NOTE — PROGRESS NOTES
Chief Complaint   Patient presents with   • Congestive Heart Failure       Subjective:   Chema Sarabia is a 63 y.o. male who presents today for cardiac care and evaluation in our heart failure clinic after his recent episode of hypertensive emergency for which he went into heart failure exacerbation as well. Patient does have documented transthoracic echocardiogram which showed relatively preserved LV function of 55%. No significant valvular disease.     Patient is feeling better these days. Does get winded upon walking up inclines or for distance. No symptoms at rest or with daily living activities.     At prior visit, patient was able to complete 550 m during his 6 minute walk test. his O2 saturation at baseline was 94% and at the end of the test, the O2 saturation was 95%. he reported 0 level of dyspnea on Jim scale.     Blood pressure continues to be high but better control.     At the last visit, patient was able to complete 549 m during his 6 minute walk test. his O2 saturation at baseline was 95% and at the end of the test, the O2 saturation was 96%. he reported 4 level of dyspnea on Jim scale.    I have independently interpreted and reviewed blood tests results with patient in clinic which shows elevated LDL level, with normal triglycerides, renal and liver function.       Past Medical History:   Diagnosis Date   • Acute kidney injury (HCC)    • Hypertensive urgency    • Nephrolithiasis      History reviewed. No pertinent surgical history.  Family History   Problem Relation Age of Onset   • Cancer Mother         lungs, smoker   • Hypertension Father    • Hyperlipidemia Father    • Diabetes Neg Hx    • Heart Disease Neg Hx      Social History     Social History   • Marital status: Single     Spouse name: N/A   • Number of children: N/A   • Years of education: N/A     Occupational History   • Not on file.     Social History Main Topics   • Smoking status: Never Smoker   • Smokeless tobacco: Never Used   •  "Alcohol use 2.4 oz/week     4 Glasses of wine per week   • Drug use: No   • Sexual activity: Not on file     Other Topics Concern   • Not on file     Social History Narrative   • No narrative on file     No Known Allergies  Outpatient Encounter Prescriptions as of 12/24/2018   Medication Sig Dispense Refill   • amLODIPine (NORVASC) 10 MG Tab Take 1 Tab by mouth every day. 90 Tab 3   • spironolactone (ALDACTONE) 25 MG Tab Take 1 Tab by mouth every day. 90 Tab 3   • labetalol (NORMODYNE) 200 MG Tab Take 2 Tabs by mouth 2 times a day. 120 Tab 11   • multivitamin (THERAGRAN) Tab Take 1 Tab by mouth every day.     • Non Formulary Request Subject is the Perspective research study, Protocol Number: [id number]. Patient is taking Entresto or Placebo 1 tab by mouth twice a day. DO NOT remove this from the medication list. Dr Jorge A Castelan is the principle investigator.       No facility-administered encounter medications on file as of 12/24/2018.      Review of Systems   Constitutional: Negative for diaphoresis and fever.   HENT: Negative for nosebleeds.    Eyes: Negative for blurred vision and double vision.   Respiratory: Positive for shortness of breath. Negative for cough.    Cardiovascular: Negative for chest pain and palpitations.   Gastrointestinal: Negative for abdominal pain.   Genitourinary: Negative for dysuria and frequency.   Musculoskeletal: Negative for falls and myalgias.   Skin: Negative for rash.   Neurological: Negative for dizziness, sensory change and headaches.   Endo/Heme/Allergies: Does not bruise/bleed easily.   Psychiatric/Behavioral: Negative for depression and memory loss.        Objective:   /80 (BP Location: Left arm, Patient Position: Sitting, BP Cuff Size: Adult)   Pulse 65   Ht 1.676 m (5' 6\")   Wt 102.1 kg (225 lb)   SpO2 94%   BMI 36.32 kg/m²     Physical Exam   Constitutional: He is oriented to person, place, and time. No distress.   HENT:   Head: Normocephalic and atraumatic. "   Right Ear: External ear normal.   Left Ear: External ear normal.   Eyes: Right eye exhibits no discharge. Left eye exhibits no discharge.   Neck: No JVD present. No thyromegaly present.   Cardiovascular: Normal rate, regular rhythm, normal heart sounds and intact distal pulses.  Exam reveals no gallop and no friction rub.    No murmur heard.  Pulmonary/Chest: Breath sounds normal. No respiratory distress.   Abdominal: Bowel sounds are normal. He exhibits no distension. There is no tenderness.   Musculoskeletal: He exhibits no edema or tenderness.   Neurological: He is alert and oriented to person, place, and time. No cranial nerve deficit.   Skin: Skin is warm and dry. He is not diaphoretic.   Psychiatric: He has a normal mood and affect. His behavior is normal.   Nursing note and vitals reviewed.      Assessment:     1. Research study patient  COMP METABOLIC PANEL    LIPID PANEL   2. ACC/AHA stage C heart failure with preserved ejection fraction (HCC)  COMP METABOLIC PANEL    LIPID PANEL   3. Left ventricular diastolic dysfunction, NYHA class 2  COMP METABOLIC PANEL    LIPID PANEL   4. HTN (hypertension), malignant  COMP METABOLIC PANEL    LIPID PANEL   5. Mixed hyperlipidemia  COMP METABOLIC PANEL    LIPID PANEL   6. High risk medication use  COMP METABOLIC PANEL    LIPID PANEL       Medical Decision Making:  Today's Assessment / Status / Plan:   At this time patient is clinically stable in terms of his cardiac standpoint.  Blood pressure is well controlled.  Cont current medications at current dose.   LDL is high but patient would like to give dieting a chance, he was on vacation and has been eating unhealthy foods.  I advised patient that at the next visit, if his LDL remains elevated, we should consider adding statin.    I will see patient back in clinic with lab tests and studies results in 6 months.    I thank you for referring patient to our Cardiology Clinic today.

## 2018-12-26 ENCOUNTER — OFFICE VISIT (OUTPATIENT)
Dept: MEDICAL GROUP | Facility: MEDICAL CENTER | Age: 63
End: 2018-12-26
Payer: COMMERCIAL

## 2018-12-26 VITALS
WEIGHT: 223.55 LBS | SYSTOLIC BLOOD PRESSURE: 132 MMHG | OXYGEN SATURATION: 95 % | TEMPERATURE: 98.8 F | DIASTOLIC BLOOD PRESSURE: 82 MMHG | HEIGHT: 66 IN | HEART RATE: 66 BPM | BODY MASS INDEX: 35.93 KG/M2

## 2018-12-26 DIAGNOSIS — I10 ESSENTIAL HYPERTENSION: ICD-10-CM

## 2018-12-26 DIAGNOSIS — I50.30 ACC/AHA STAGE C HEART FAILURE WITH PRESERVED EJECTION FRACTION (HCC): ICD-10-CM

## 2018-12-26 DIAGNOSIS — I11.0 MALIGNANT HYPERTENSIVE HEART DISEASE WITH HEART FAILURE (HCC): ICD-10-CM

## 2018-12-26 DIAGNOSIS — E78.5 DYSLIPIDEMIA: ICD-10-CM

## 2018-12-26 DIAGNOSIS — N18.30 STAGE 3 CHRONIC KIDNEY DISEASE (HCC): ICD-10-CM

## 2018-12-26 DIAGNOSIS — D69.6 THROMBOCYTOPENIA (HCC): ICD-10-CM

## 2018-12-26 DIAGNOSIS — E66.9 OBESITY (BMI 30-39.9): ICD-10-CM

## 2018-12-26 DIAGNOSIS — Z00.6 RESEARCH STUDY PATIENT: ICD-10-CM

## 2018-12-26 PROCEDURE — 99214 OFFICE O/P EST MOD 30 MIN: CPT | Performed by: INTERNAL MEDICINE

## 2018-12-26 NOTE — PROGRESS NOTES
CHIEF COMPLAINT  Chief Complaint   Patient presents with   • Results   HTN    HPI  Patient is a 63 y.o. male patient who presents today for the following     Hypertension, hypertensive heart disease  Meds: Amlodipine 10 mg daily, labetalol 200 mg twice daily, spironolactone 25 mg daily, taking as prescribed.   Measuring BP at home: yes, no. It has been < 130/80  Denies: - headaches, vision problems, tinnitus.  - chest pain/pressure, palpitations, irregular heart beats, exertional, dyspnea, peripheral edema.  - medication side effect: unusual fatigue, slow heartbeat, foot/leg swelling, cough.  Low salt diet: Y  Diet: Improved  Exercise: improved  BMI: 33  FH of HTN: father renal panel showed CKD stage III, with the last GFR at 31.  He has [facility]     Diastolic dysfunction with preserved ejection fraction, research study  Reviewed the last echocardiogram (2/15/18)              - showed relatively preserved LV function of 55%.  Denies:  -Palpitations, irregular heartbeat, chest pain or pressure  -Orthopnea, PND, lower extremity swelling.   He started [employer] experimental trial with 2 medications.  He has been followed up by cardiology     Moderate aortic insufficiency  Dg: on echocardiography from 2/15/18.   Denies chest pain, dizziness, lightheadedness, exertional dyspnea, syncope.  He has been followed up by cardiology.    CKD stage III  The patient has had stable CKD stage III, with abnormal creatinine and GFR, normal electrolytes.   He has not been on NSAIDs consistently, has had good fluid intake.    Dyslipidemia  The patient had abnormal lipid panel, no medications.   Diet /Exercise/BMI:  As above  FH: Father    Thrombocytopenia  The patient had borderline low platelets; previous have been in normal.  Denies easy bleeding or bruising.  Reviewed medication list.    Reviewed PMH, PSH, FH, SH, ALL, HCM/IMM, no changes  Reviewed MEDS, no  changes    Patient Active Problem List    Diagnosis Date Noted   • Acquired renal cyst of right kidney 10/19/2018   • Stage 3 chronic kidney disease (HCC) 04/20/2018   • Essential hypertension 04/20/2018   [facility]   • Research study patient 04/09/2018   • Moderate aortic insufficiency 04/05/2018   • ACC/AHA stage C heart failure with preserved ejection fraction (HCC) 04/05/2018   • Health care maintenance 02/14/2018   • Obesity (BMI 30.0-34.9) 02/14/2018   • Heart disease, hypertensive, malignant 02/14/2018     CURRENT MEDICATIONS  Current Outpatient Prescriptions   Medication Sig Dispense Refill   • amLODIPine (NORVASC) 10 MG Tab Take 1 Tab by mouth every day. 90 Tab 3   • labetalol (NORMODYNE) 200 MG Tab Take 2 Tabs by mouth 2 times a day. 120 Tab 11   • spironolactone (ALDACTONE) 25 MG Tab Take 1 Tab by mouth every day. 90 Tab 3   • Non Formulary Request Subject is the Perspective research study, Protocol Number: [id number]. Patient is taking Entresto or Placebo 1 tab by mouth twice a day. DO NOT remove this from the medication list. Dr Jorge A Castelan is the principle investigator.     • multivitamin (THERAGRAN) Tab Take 1 Tab by mouth every day.       No current facility-administered medications for this visit.      ALLERGIES  Allergies: Patient has no known allergies.  PAST MEDICAL HISTORY  Past Medical History:   Diagnosis Date   • Acute kidney injury (HCC)    • Hypertensive urgency    • Nephrolithiasis      SURGICAL HISTORY  He  has no past surgical history on file.  SOCIAL HISTORY  Social History   Substance Use Topics   • Smoking status: Never Smoker   • Smokeless tobacco: Never Used   • Alcohol use 2.4 oz/week     4 Glasses of wine per week     Social History     Social History Narrative   • No narrative on file     FAMILY HISTORY  Family History   Problem Relation Age of Onset   • Cancer Mother         lungs, smoker   • Hypertension Father    • Hyperlipidemia Father   "  • Diabetes Neg Hx    • Heart Disease Neg Hx      Family Status   Relation Status   • Mo    • Fa    • Neg Hx (Not Specified)       ROS   Constitutional: Negative for fatigue.  HENT: Negative for congestion, sore throat.  Eyes: Negative for vision problems.   Respiratory: Negative for shortness of breath.  Cardiovascular: Negative for chest pain, palpitations. And per HPI.  Gastrointestinal: Negative for heartburn,  abdominal pain.   Genitourinary: Negative for urinary problems.  Musculoskeletal: Negative for significant myalgias, back pain and joint pain.   Skin: Negative for rash and itching.   Neuro: Negative for dizziness, weakness and headaches.   Endo/Heme/Allergies: Does not bruise/bleed easily. And per HPI.  Psychiatric/Behavioral: Negative for depression.    PHYSICAL EXAM   Blood pressure 132/82, pulse 66, temperature 37.1 °C (98.8 °F), temperature source Temporal, height 1.676 m (5' 6\"), weight 101.4 kg (223 lb 8.7 oz), SpO2 95 %. Body mass index is 36.08 kg/m².  General:  NAD, well appearing  HEENT:   NC/AT, PERRLA, EOMI, TMs are clear. Oropharyngeal mucosa is pink,  without lesions;  no cervical / supraclavicular  lymphadenopathy, no thyromegaly.    Cardiovascular: RRR.   No m/r/g. No carotid bruits .      Lungs:   CTAB, no w/r/r, no respiratory distress.  Abdomen: Soft, NT/ND + BS; unable to palpate liver/spleen due to obesity.  Extremities:  2+ DP and radial pulses bilaterally.  No c/c/e.   Skin:  Warm, dry.  No erythema. No rash.   Neurologic: Alert & oriented x 3. No focal deficits.  Psychiatric:  Affect normal, mood normal, judgment normal.    LABS     Labs are reviewed and discussed with a patient  Lab Results   Component Value Date/Time    CHOLSTRLTOT 228 (H) 2018 11:10 AM     (H) 2018 11:10 AM    HDL 69 2018 11:10 AM    TRIGLYCERIDE 162 (H) 2018 11:10 AM       Lab Results   Component Value Date/Time    SODIUM 141 2018 09:48 AM    POTASSIUM 4.5 " 11/30/2018 09:48 AM    CHLORIDE 109 11/30/2018 09:48 AM    CO2 24 11/30/2018 09:48 AM    GLUCOSE 97 11/30/2018 09:48 AM    BUN 35 (H) 11/30/2018 09:48 AM    CREATININE 1.80 (H) 11/30/2018 09:48 AM     Lab Results   Component Value Date/Time    ALKPHOSPHAT 45 11/30/2018 09:48 AM    ASTSGOT 21 11/30/2018 09:48 AM    ALTSGPT 22 11/30/2018 09:48 AM    TBILIRUBIN 1.0 11/30/2018 09:48 AM      Lab Results   Component Value Date/Time    WBC 6.8 10/16/2018 07:24 AM    RBC 4.79 10/16/2018 07:24 AM    HEMOGLOBIN 15.2 10/16/2018 07:24 AM    HEMATOCRIT 44.6 10/16/2018 07:24 AM    MCV 93.1 10/16/2018 07:24 AM    MCH 31.7 10/16/2018 07:24 AM    MCHC 34.1 10/16/2018 07:24 AM    MPV 9.7 10/16/2018 07:24 AM    NEUTSPOLYS 67.40 03/23/2018 03:39 PM    LYMPHOCYTES 19.80 (L) 03/23/2018 03:39 PM    MONOCYTES 9.80 03/23/2018 03:39 PM    EOSINOPHILS 2.00 03/23/2018 03:39 PM    BASOPHILS 0.60 03/23/2018 03:39 PM        IMAGING     None    ASSESMENT AND PLAN        1. Essential hypertension  Controlled, continue current treatment  - COMP METABOLIC PANEL; Future    2. 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 ACC/AHA stage C heart failure with preserved ejection fraction (HCC)  4. Research study patient  Stable, asymptomatic, continue current treatment and cardiology follow-up    5. Stage 3 chronic kidney disease (HCC)  Stable, advised to continue good fluid intake, avoid NSAIDs  - COMP METABOLIC PANEL; Future    6. Dyslipidemia  Advised low-calorie diet, daily exercise, weight control  - COMP METABOLIC PANEL; Future  - Lipid Profile; Future    7. Thrombocytopenia (HCC)  Follow-up labs, borderline abnormality  - CBC WITH DIFFERENTIAL; Future    8. Obesity (BMI 30-39.9)  - OBESITY COUNSELING (No Charge): Patient identified as having weight management issue.  Appropriate orders and counseling given.    Counseling:   - Smoking:  Nonsmoker    Followup: Return in about 4 months (around 4/26/2019).    All questions are  answered.    Please note that this dictation was created using voice recognition software, and that there might be errors of wing and possibly content.

## 2019-01-09 ENCOUNTER — DOCUMENTATION (OUTPATIENT)
Dept: ADMINISTRATIVE | Facility: MEDICAL CENTER | Age: 64
End: 2019-01-09

## 2019-01-09 DIAGNOSIS — Z00.6 RESEARCH STUDY PATIENT: ICD-10-CM

## 2019-01-09 DIAGNOSIS — I50.30 ACC/AHA STAGE C HEART FAILURE WITH PRESERVED EJECTION FRACTION (HCC): ICD-10-CM

## 2019-01-09 NOTE — PROGRESS NOTES
Perspective Clinical Trial Visit 202-3 month:    Chema in today for 3 month study visit.  All assessments completed per protocol.  BP from right arm 126/87, pulse 69, weight 227.  Re-consented with ICF modifications, V02.03 per sponsor request.  ICF reviewed, questions answered, patient signed, and signed by this writer. Medication list reviewed.  No AE/DIANA's noted. New study drug prescription dispensed, instructions for use were given.  Per Dr. Castelan, patient remains at dose 3 of study drug.  Blood draw from left AC, processed per Protocol.  Next study visit planned for April 10, 2019.  Study team contact information provided.

## 2019-04-09 ENCOUNTER — HOSPITAL ENCOUNTER (OUTPATIENT)
Dept: LAB | Facility: MEDICAL CENTER | Age: 64
End: 2019-04-09
Attending: INTERNAL MEDICINE
Payer: COMMERCIAL

## 2019-04-09 DIAGNOSIS — N18.30 STAGE 3 CHRONIC KIDNEY DISEASE (HCC): ICD-10-CM

## 2019-04-09 LAB
25(OH)D3 SERPL-MCNC: 24 NG/ML (ref 30–100)
ANION GAP SERPL CALC-SCNC: 7 MMOL/L (ref 0–11.9)
BUN SERPL-MCNC: 28 MG/DL (ref 8–22)
CALCIUM SERPL-MCNC: 8.8 MG/DL (ref 8.5–10.5)
CHLORIDE SERPL-SCNC: 107 MMOL/L (ref 96–112)
CO2 SERPL-SCNC: 24 MMOL/L (ref 20–33)
CREAT SERPL-MCNC: 1.71 MG/DL (ref 0.5–1.4)
CREAT UR-MCNC: 112.9 MG/DL
ERYTHROCYTE [DISTWIDTH] IN BLOOD BY AUTOMATED COUNT: 43.8 FL (ref 35.9–50)
FASTING STATUS PATIENT QL REPORTED: NORMAL
GLUCOSE SERPL-MCNC: 107 MG/DL (ref 65–99)
HCT VFR BLD AUTO: 49.5 % (ref 42–52)
HGB BLD-MCNC: 16.5 G/DL (ref 14–18)
MCH RBC QN AUTO: 31.1 PG (ref 27–33)
MCHC RBC AUTO-ENTMCNC: 33.3 G/DL (ref 33.7–35.3)
MCV RBC AUTO: 93.2 FL (ref 81.4–97.8)
MICROALBUMIN UR-MCNC: <0.7 MG/DL
MICROALBUMIN/CREAT UR: NORMAL MG/G (ref 0–30)
PHOSPHATE SERPL-MCNC: 2.7 MG/DL (ref 2.5–4.5)
PLATELET # BLD AUTO: 179 K/UL (ref 164–446)
PMV BLD AUTO: 9.1 FL (ref 9–12.9)
POTASSIUM SERPL-SCNC: 4.6 MMOL/L (ref 3.6–5.5)
PTH-INTACT SERPL-MCNC: 61.4 PG/ML (ref 14–72)
RBC # BLD AUTO: 5.31 M/UL (ref 4.7–6.1)
SODIUM SERPL-SCNC: 138 MMOL/L (ref 135–145)
WBC # BLD AUTO: 6.9 K/UL (ref 4.8–10.8)

## 2019-04-09 PROCEDURE — 83970 ASSAY OF PARATHORMONE: CPT

## 2019-04-09 PROCEDURE — 80048 BASIC METABOLIC PNL TOTAL CA: CPT

## 2019-04-09 PROCEDURE — 82306 VITAMIN D 25 HYDROXY: CPT

## 2019-04-09 PROCEDURE — 82570 ASSAY OF URINE CREATININE: CPT

## 2019-04-09 PROCEDURE — 85027 COMPLETE CBC AUTOMATED: CPT

## 2019-04-09 PROCEDURE — 82043 UR ALBUMIN QUANTITATIVE: CPT

## 2019-04-09 PROCEDURE — 84100 ASSAY OF PHOSPHORUS: CPT

## 2019-04-09 PROCEDURE — 36415 COLL VENOUS BLD VENIPUNCTURE: CPT

## 2019-04-10 ENCOUNTER — TELEPHONE (OUTPATIENT)
Dept: CARDIOLOGY | Facility: MEDICAL CENTER | Age: 64
End: 2019-04-10

## 2019-04-19 ENCOUNTER — OFFICE VISIT (OUTPATIENT)
Dept: NEPHROLOGY | Facility: MEDICAL CENTER | Age: 64
End: 2019-04-19
Payer: COMMERCIAL

## 2019-04-19 VITALS
RESPIRATION RATE: 14 BRPM | HEART RATE: 64 BPM | WEIGHT: 228 LBS | BODY MASS INDEX: 36.64 KG/M2 | OXYGEN SATURATION: 95 % | DIASTOLIC BLOOD PRESSURE: 72 MMHG | HEIGHT: 66 IN | SYSTOLIC BLOOD PRESSURE: 110 MMHG | TEMPERATURE: 99.3 F

## 2019-04-19 DIAGNOSIS — N28.1 ACQUIRED RENAL CYST OF RIGHT KIDNEY: ICD-10-CM

## 2019-04-19 DIAGNOSIS — N18.30 STAGE 3 CHRONIC KIDNEY DISEASE (HCC): ICD-10-CM

## 2019-04-19 DIAGNOSIS — I10 ESSENTIAL HYPERTENSION: ICD-10-CM

## 2019-04-19 PROCEDURE — 99213 OFFICE O/P EST LOW 20 MIN: CPT | Performed by: INTERNAL MEDICINE

## 2019-04-19 ASSESSMENT — ENCOUNTER SYMPTOMS
PALPITATIONS: 0
CHILLS: 0
FEVER: 0
TREMORS: 1

## 2019-04-19 NOTE — PROGRESS NOTES
"Subjective:      Chema Sarabia is a 63 y.o. male who presents with Follow-Up            HPI  63 year old with a long history of malignant HTN, untreated. Admitted to the hospital from clinic and controlled. Has been taking his medications. Now off the diuretic. No NSAIDs. No problems urinating. Norvasc, Aldactone, and labetalol.     Cr stable at 1.71.  Overall he seems to be doing well.  No proteinuria noted.  Vitamin D levels at 24.  PTH is at 61.4.      Review of Systems   Constitutional: Negative for chills and fever.   Cardiovascular: Negative for chest pain and palpitations.   Neurological: Positive for tremors.   All other systems reviewed and are negative.         Objective:     /72 (BP Location: Right arm, Patient Position: Sitting, BP Cuff Size: Adult)   Pulse 64   Temp 37.4 °C (99.3 °F) (Temporal)   Resp 14   Ht 1.676 m (5' 6\")   Wt 103.4 kg (228 lb)   SpO2 95%   BMI 36.80 kg/m²      Physical Exam   Constitutional: He is oriented to person, place, and time. He appears well-developed and well-nourished.   HENT:   Head: Normocephalic and atraumatic.   Eyes: Pupils are equal, round, and reactive to light. EOM are normal.   Neck: Normal range of motion. Neck supple.   Cardiovascular: Normal rate and regular rhythm.    Pulmonary/Chest: Effort normal and breath sounds normal.   Abdominal: Soft. Bowel sounds are normal.   Musculoskeletal: He exhibits no edema or deformity.   Neurological: He is alert and oriented to person, place, and time.   Skin: Skin is warm and dry.   Psychiatric: He has a normal mood and affect. His behavior is normal.               Assessment/Plan:     1. Essential hypertension  Blood pressure stable at this time.  The patient is on labetalol as well as Norvasc.  Given lack of proteinuria on the UA no need for an ACE inhibitor at this time.    2. Stage 3 chronic kidney disease (HCC)  Patient with chronic kidney disease stage III, is stable.    3. Acquired renal cyst of right " kidney  No need for reimaging cyst at this time.

## 2019-04-22 ENCOUNTER — HOSPITAL ENCOUNTER (OUTPATIENT)
Dept: LAB | Facility: MEDICAL CENTER | Age: 64
End: 2019-04-22
Attending: INTERNAL MEDICINE
Payer: COMMERCIAL

## 2019-04-22 DIAGNOSIS — D69.6 THROMBOCYTOPENIA (HCC): ICD-10-CM

## 2019-04-22 DIAGNOSIS — N18.30 STAGE 3 CHRONIC KIDNEY DISEASE (HCC): ICD-10-CM

## 2019-04-22 DIAGNOSIS — E78.5 DYSLIPIDEMIA: ICD-10-CM

## 2019-04-22 DIAGNOSIS — I10 ESSENTIAL HYPERTENSION: ICD-10-CM

## 2019-04-22 LAB
ALBUMIN SERPL BCP-MCNC: 4.5 G/DL (ref 3.2–4.9)
ALBUMIN/GLOB SERPL: 2 G/DL
ALP SERPL-CCNC: 53 U/L (ref 30–99)
ALT SERPL-CCNC: 18 U/L (ref 2–50)
ANION GAP SERPL CALC-SCNC: 6 MMOL/L (ref 0–11.9)
AST SERPL-CCNC: 16 U/L (ref 12–45)
BASOPHILS # BLD AUTO: 0.6 % (ref 0–1.8)
BASOPHILS # BLD: 0.04 K/UL (ref 0–0.12)
BILIRUB SERPL-MCNC: 1.1 MG/DL (ref 0.1–1.5)
BUN SERPL-MCNC: 26 MG/DL (ref 8–22)
CALCIUM SERPL-MCNC: 9.2 MG/DL (ref 8.5–10.5)
CHLORIDE SERPL-SCNC: 108 MMOL/L (ref 96–112)
CHOLEST SERPL-MCNC: 190 MG/DL (ref 100–199)
CO2 SERPL-SCNC: 24 MMOL/L (ref 20–33)
CREAT SERPL-MCNC: 1.68 MG/DL (ref 0.5–1.4)
EOSINOPHIL # BLD AUTO: 0.12 K/UL (ref 0–0.51)
EOSINOPHIL NFR BLD: 1.9 % (ref 0–6.9)
ERYTHROCYTE [DISTWIDTH] IN BLOOD BY AUTOMATED COUNT: 43.9 FL (ref 35.9–50)
FASTING STATUS PATIENT QL REPORTED: NORMAL
GLOBULIN SER CALC-MCNC: 2.2 G/DL (ref 1.9–3.5)
GLUCOSE SERPL-MCNC: 96 MG/DL (ref 65–99)
HCT VFR BLD AUTO: 49.8 % (ref 42–52)
HDLC SERPL-MCNC: 65 MG/DL
HGB BLD-MCNC: 16.2 G/DL (ref 14–18)
IMM GRANULOCYTES # BLD AUTO: 0.05 K/UL (ref 0–0.11)
IMM GRANULOCYTES NFR BLD AUTO: 0.8 % (ref 0–0.9)
LDLC SERPL CALC-MCNC: 100 MG/DL
LYMPHOCYTES # BLD AUTO: 1.19 K/UL (ref 1–4.8)
LYMPHOCYTES NFR BLD: 18.6 % (ref 22–41)
MCH RBC QN AUTO: 30.6 PG (ref 27–33)
MCHC RBC AUTO-ENTMCNC: 32.5 G/DL (ref 33.7–35.3)
MCV RBC AUTO: 94 FL (ref 81.4–97.8)
MONOCYTES # BLD AUTO: 0.62 K/UL (ref 0–0.85)
MONOCYTES NFR BLD AUTO: 9.7 % (ref 0–13.4)
NEUTROPHILS # BLD AUTO: 4.37 K/UL (ref 1.82–7.42)
NEUTROPHILS NFR BLD: 68.4 % (ref 44–72)
NRBC # BLD AUTO: 0 K/UL
NRBC BLD-RTO: 0 /100 WBC
PLATELET # BLD AUTO: 169 K/UL (ref 164–446)
PMV BLD AUTO: 9.3 FL (ref 9–12.9)
POTASSIUM SERPL-SCNC: 4.8 MMOL/L (ref 3.6–5.5)
PROT SERPL-MCNC: 6.7 G/DL (ref 6–8.2)
RBC # BLD AUTO: 5.3 M/UL (ref 4.7–6.1)
SODIUM SERPL-SCNC: 138 MMOL/L (ref 135–145)
TRIGL SERPL-MCNC: 125 MG/DL (ref 0–149)
WBC # BLD AUTO: 6.4 K/UL (ref 4.8–10.8)

## 2019-04-22 PROCEDURE — 80061 LIPID PANEL: CPT

## 2019-04-22 PROCEDURE — 85025 COMPLETE CBC W/AUTO DIFF WBC: CPT

## 2019-04-22 PROCEDURE — 80053 COMPREHEN METABOLIC PANEL: CPT

## 2019-04-22 PROCEDURE — 36415 COLL VENOUS BLD VENIPUNCTURE: CPT

## 2019-04-26 ENCOUNTER — OFFICE VISIT (OUTPATIENT)
Dept: MEDICAL GROUP | Facility: MEDICAL CENTER | Age: 64
End: 2019-04-26
Payer: COMMERCIAL

## 2019-04-26 VITALS
WEIGHT: 222 LBS | TEMPERATURE: 97.6 F | BODY MASS INDEX: 35.68 KG/M2 | DIASTOLIC BLOOD PRESSURE: 82 MMHG | SYSTOLIC BLOOD PRESSURE: 136 MMHG | HEIGHT: 66 IN | OXYGEN SATURATION: 93 % | HEART RATE: 73 BPM

## 2019-04-26 DIAGNOSIS — E78.5 DYSLIPIDEMIA: ICD-10-CM

## 2019-04-26 DIAGNOSIS — I50.30 ACC/AHA STAGE C HEART FAILURE WITH PRESERVED EJECTION FRACTION (HCC): ICD-10-CM

## 2019-04-26 DIAGNOSIS — I11.0 MALIGNANT HYPERTENSIVE HEART DISEASE WITH HEART FAILURE (HCC): ICD-10-CM

## 2019-04-26 DIAGNOSIS — E55.9 HYPOVITAMINOSIS D: ICD-10-CM

## 2019-04-26 DIAGNOSIS — E66.9 OBESITY (BMI 30.0-34.9): ICD-10-CM

## 2019-04-26 DIAGNOSIS — Z00.00 HEALTH CARE MAINTENANCE: ICD-10-CM

## 2019-04-26 DIAGNOSIS — I35.1 MODERATE AORTIC INSUFFICIENCY: ICD-10-CM

## 2019-04-26 DIAGNOSIS — Z00.6 RESEARCH STUDY PATIENT: ICD-10-CM

## 2019-04-26 DIAGNOSIS — K21.9 GASTROESOPHAGEAL REFLUX DISEASE, ESOPHAGITIS PRESENCE NOT SPECIFIED: ICD-10-CM

## 2019-04-26 DIAGNOSIS — N18.30 STAGE 3 CHRONIC KIDNEY DISEASE (HCC): ICD-10-CM

## 2019-04-26 DIAGNOSIS — I10 ESSENTIAL HYPERTENSION: ICD-10-CM

## 2019-04-26 PROCEDURE — 99214 OFFICE O/P EST MOD 30 MIN: CPT | Performed by: INTERNAL MEDICINE

## 2019-04-26 RX ORDER — ERGOCALCIFEROL 1.25 MG/1
50000 CAPSULE ORAL
Qty: 12 CAP | Refills: 1 | Status: SHIPPED | OUTPATIENT
Start: 2019-04-26 | End: 2024-01-23

## 2019-04-26 RX ORDER — OMEPRAZOLE 20 MG/1
20 CAPSULE, DELAYED RELEASE ORAL DAILY
Qty: 90 CAP | Refills: 1 | Status: SHIPPED | OUTPATIENT
Start: 2019-04-26 | End: 2021-07-13

## 2019-04-26 RX ORDER — LABETALOL 200 MG/1
400 TABLET, FILM COATED ORAL 2 TIMES DAILY
Qty: 360 TAB | Refills: 3 | Status: SHIPPED | OUTPATIENT
Start: 2019-04-26 | End: 2019-12-18 | Stop reason: SDUPTHER

## 2019-04-26 NOTE — PROGRESS NOTES
CHIEF COMPLAINT  Hypertension, labs    HPI  Patient is a 63 y.o. male patient who presents today for the following     Hypertension, hypertensive heart disease  Meds: Amlodipine 10 mg daily, labetalol 200 mg twice daily, spironolactone 25 mg daily, taking as prescribed.   Measuring BP at home: yes, no. It has been < 130/80  Denies: - headaches, vision problems, tinnitus.  - chest pain/pressure, palpitations, irregular heart beats, exertional, dyspnea, peripheral edema.  - medication side effect: unusual fatigue, slow heartbeat, foot/leg swelling, cough.  Low salt diet: Y  Diet: Improved  Exercise: improved  BMI: 33  FH of HTN: father renal panel showed CKD stage III, with the last GFR at 31.  He has been followed up by cardiology and nephrology [facility] 0 [facility].     Diastolic dysfunction with preserved ejection fraction, research study  Reviewed the last echocardiogram (2/15/18)  - showed relatively preserved LV function of 55%.  Denies:  -Palpitations, irregular heartbeat, chest pain or pressure  -Orthopnea, PND, lower extremity swelling.   He started [employer] experimental trial with 2 medications.  He has been followed up by cardiology     Moderate aortic insufficiency   by echocardiography  Denies chest pain, dizziness, lightheadedness, exertional dyspnea, syncope.  He has been followed up by cardiology.    Echocardiography from 2/15/18  No prior study is available for comparison.   Left ventricular systolic function is normal.  Left ventricular ejection fraction is visually estimated to be 55%.  Grade II diastolic dysfunction.  Mild mitral regurgitation.  Moderate aortic insufficiency.  Moderate tricuspid regurgitation.  Estimated right ventricular systolic pressure is 55 mmHg.  Mild pulmonic insufficiency.    CKD stage III  Stable.  The patient has had stable CKD stage III, with abnormal creatinine and GFR, normal electrolytes.   He has not been on NSAIDs consistently, has had good fluid  intake.  He has been followed up by nephrology.     Dyslipidemia  The patient had abnormal lipid panel, no medications.   Diet /Exercise/BMI: As above  FH: Father      Hypovitaminosis D  The patient had low vitamin D level at 24.  Vitamin D supplement: OTC, not daily.    GERD  Patient he has had both: 14 epigastric area after taking medications with subsequent increase fluid intake and inability to control weight.  Denies:   - heartburn, epigastric pain.   -  nausea, vomiting, or diarrhea  - dysphagia  - abnormal cough  - blood in the stool or dark tarry stools.  - early satiety  - tobacco use.    Reviewed PMH, PSH, FH, SH, ALL, HCM/IMM, no changes  Reviewed MEDS, no changes    Patient Active Problem List    Diagnosis Date Noted   • Dyslipidemia 04/26/2019   • Gastroesophageal reflux disease 04/26/2019   • Acquired renal cyst of right kidney 10/19/2018   • Stage 3 chronic kidney disease (HCC) 04/20/2018   • Essential hypertension 04/20/2018   • [facility] Research-Cardiology Billing 04/19/2018   • Research study patient 04/09/2018   • Moderate aortic insufficiency 04/05/2018   • ACC/AHA stage C heart failure with preserved ejection fraction (HCC) 04/05/2018   • Health care maintenance 02/14/2018   • Obesity (BMI 30.0-34.9) 02/14/2018   • Heart disease, hypertensive, malignant 02/14/2018     CURRENT MEDICATIONS  Current Outpatient Prescriptions   Medication Sig Dispense Refill   • vitamin D, Ergocalciferol, (DRISDOL) 41416 units Cap capsule Take 1 Cap by mouth every 7 days. 12 Cap 1   • omeprazole (PRILOSEC) 20 MG delayed-release capsule Take 1 Cap by mouth every day. 90 Cap 1   • labetalol (NORMODYNE) 200 MG Tab Take 2 Tabs by mouth 2 times a day. 360 Tab 3   • STUDY DRUG or PLACEBO Take 2 Each by mouth 2 Times a Day. Per research protocol [id number]. Patient takes one tab from each bottle twice a day, treatment assignment is blinded. Please call Research Pharmacist Tj Archuleta at [phone] with questions.     • amLODIPine  "(NORVASC) 10 MG Tab Take 1 Tab by mouth every day. 90 Tab 3   • spironolactone (ALDACTONE) 25 MG Tab Take 1 Tab by mouth every day. 90 Tab 3   • multivitamin (THERAGRAN) Tab Take 1 Tab by mouth every day.       No current facility-administered medications for this visit.      ALLERGIES  Allergies: Patient has no known allergies.  PAST MEDICAL HISTORY  Past Medical History:   Diagnosis Date   • Acute kidney injury (HCC)    • Hypertensive urgency    • Nephrolithiasis      SURGICAL HISTORY  He  has no past surgical history on file.  SOCIAL HISTORY  Social History   Substance Use Topics   • Smoking status: Never Smoker   • Smokeless tobacco: Never Used   • Alcohol use 2.4 oz/week     4 Glasses of wine per week     Social History     Social History Narrative   • No narrative on file     FAMILY HISTORY  Family History   Problem Relation Age of Onset   • Cancer Mother         lungs, smoker   • Hypertension Father    • Hyperlipidemia Father    • Diabetes Neg Hx    • Heart Disease Neg Hx      Family Status   Relation Status   • Mo    • Fa    • Neg Hx (Not Specified)     ROS   Constitutional: Negative for fatigue.  HENT: Negative for sore throat.  Eyes: Negative for blurred vision.   Respiratory: Negative for cough, shortness of breath.  Cardiovascular: Negative for chest pain, palpitations.   Gastrointestinal: Negative for abdominal pain. And per HPI.  Genitourinary: Negative for dysuria.  Musculoskeletal: Negative for significant back pain.  Skin: Negative for rash.   Neuro: Negative for dizziness, headaches.   Endo/Heme/Allergies: Does not bruise/bleed easily.   Psychiatric/Behavioral: Negative for depression.    PHYSICAL EXAM   /82 (BP Location: Right arm, Patient Position: Sitting, BP Cuff Size: Adult)   Pulse 73   Temp 36.4 °C (97.6 °F) (Temporal)   Ht 1.676 m (5' 6\")   Wt 100.7 kg (222 lb 0.1 oz)   SpO2 93%  Body mass index is 35.83 kg/m².  General:  NAD, well appearing  HEENT:   NC/AT, " PERRLA, EOMI, TMs are clear. Oropharyngeal mucosa is pink,  without lesions;  no cervical / supraclavicular  lymphadenopathy, no thyromegaly.    Cardiovascular: RRR.   No m/r/g. No carotid bruits .      Lungs:   CTAB, no w/r/r, no respiratory distress.  Abdomen: Soft, NT/ND + BS; unable to palpate liver/spleen due to obesity.  Extremities:  2+ DP and radial pulses bilaterally.  No c/c/e.   Skin:  Warm, dry.  No erythema. No rash.   Neurologic: Alert & oriented x 3. CN II-XII grossly intact.  No focal deficits.  Psychiatric:  Affect normal, mood normal, judgment normal.    LABS     Labs are reviewed and discussed with a patient  Lab Results   Component Value Date/Time    CHOLSTRLTOT 190 04/22/2019 10:26 AM     (H) 04/22/2019 10:26 AM    HDL 65 04/22/2019 10:26 AM    TRIGLYCERIDE 125 04/22/2019 10:26 AM       Lab Results   Component Value Date/Time    SODIUM 138 04/22/2019 10:26 AM    POTASSIUM 4.8 04/22/2019 10:26 AM    CHLORIDE 108 04/22/2019 10:26 AM    CO2 24 04/22/2019 10:26 AM    GLUCOSE 96 04/22/2019 10:26 AM    BUN 26 (H) 04/22/2019 10:26 AM    CREATININE 1.68 (H) 04/22/2019 10:26 AM     Lab Results   Component Value Date/Time    ALKPHOSPHAT 53 04/22/2019 10:26 AM    ASTSGOT 16 04/22/2019 10:26 AM    ALTSGPT 18 04/22/2019 10:26 AM    TBILIRUBIN 1.1 04/22/2019 10:26 AM      Lab Results   Component Value Date/Time    WBC 6.4 04/22/2019 10:26 AM    RBC 5.30 04/22/2019 10:26 AM    HEMOGLOBIN 16.2 04/22/2019 10:26 AM    HEMATOCRIT 49.8 04/22/2019 10:26 AM    MCV 94.0 04/22/2019 10:26 AM    MCH 30.6 04/22/2019 10:26 AM    MCHC 32.5 (L) 04/22/2019 10:26 AM    MPV 9.3 04/22/2019 10:26 AM    NEUTSPOLYS 68.40 04/22/2019 10:26 AM    LYMPHOCYTES 18.60 (L) 04/22/2019 10:26 AM    MONOCYTES 9.70 04/22/2019 10:26 AM    EOSINOPHILS 1.90 04/22/2019 10:26 AM    BASOPHILS 0.60 04/22/2019 10:26 AM      IMAGING     None    ASSESMENT AND PLAN        1. Essential hypertension  Controlled, continue current treatment  - Comp  Metabolic Panel; Future  - labetalol (NORMODYNE) 200 MG Tab; Take 2 Tabs by mouth 2 times a day.  Dispense: 360 Tab; Refill: 3    2. 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 ACC/AHA stage C heart failure with preserved ejection fraction (HCC)  4. Research study patient  No significant symptoms, continue current treatment and urology follow-up.    5. Moderate aortic insufficiency  Asymptomatic, continue cardiology follow-up    6. Stage 3 chronic kidney disease (HCC)  Stable, continue to have good fluid intake, avoid NSAIDs  - Comp Metabolic Panel; Future    7. Dyslipidemia  Discussed about treatment options.  He has been followed up by cardiology as well  - Comp Metabolic Panel; Future  - Lipid Profile; Future    8. Hypovitaminosis D  Start high-dose of vitamin D  - VITAMIN D,25 HYDROXY; Future  - vitamin D, Ergocalciferol, (DRISDOL) 23933 units Cap capsule; Take 1 Cap by mouth every 7 days.  Dispense: 12 Cap; Refill: 1    9. Gastroesophageal reflux disease, esophagitis presence not specified  Trial of omeprazole for a couple of weeks; may continue medication if there is improvement.  - omeprazole (PRILOSEC) 20 MG delayed-release capsule; Take 1 Cap by mouth every day.  Dispense: 90 Cap; Refill: 1    10. Obesity (BMI 30.0-34.9)  - OBESITY COUNSELING (No Charge): Patient identified as having weight management issue.  Appropriate orders and counseling given.    11. Health care maintenance  Advised shingrix.    Counseling:   - Smoking:  Nonsmoker    Followup: Return in about 4 months (around 8/26/2019).    All questions are answered.    Please note that this dictation was created using voice recognition software, and that there might be errors of wing and possibly content.

## 2019-04-29 NOTE — TELEPHONE ENCOUNTER
Saw Chema for the Perspective Study Visit 203 on 4/10/2019. [place] State, FAQ, CSSRS and GDS assessments were done prior to any clinical assessments. Con Meds where updated with Chema. Vitamin D added to EDC. Vital were done.Labs were drawn and processed according to protocol. Dr Castelan checked Chema for signs and symptoms of HF and NYHA class. SD compliance was done. He was 92% compliant. He returned 6 bottles of SD and 12 new bottles were dispensed to him. Next visit is a phone call 7/9/2019.

## 2019-06-01 ENCOUNTER — HOSPITAL ENCOUNTER (OUTPATIENT)
Dept: LAB | Facility: MEDICAL CENTER | Age: 64
End: 2019-06-01
Attending: INTERNAL MEDICINE
Payer: COMMERCIAL

## 2019-06-01 DIAGNOSIS — Z79.899 HIGH RISK MEDICATION USE: ICD-10-CM

## 2019-06-01 DIAGNOSIS — E78.2 MIXED HYPERLIPIDEMIA: ICD-10-CM

## 2019-06-01 DIAGNOSIS — I10 HTN (HYPERTENSION), MALIGNANT: ICD-10-CM

## 2019-06-01 DIAGNOSIS — I50.30 ACC/AHA STAGE C HEART FAILURE WITH PRESERVED EJECTION FRACTION (HCC): ICD-10-CM

## 2019-06-01 DIAGNOSIS — I51.89 LEFT VENTRICULAR DIASTOLIC DYSFUNCTION, NYHA CLASS 2: ICD-10-CM

## 2019-06-01 DIAGNOSIS — Z00.6 RESEARCH STUDY PATIENT: ICD-10-CM

## 2019-06-01 LAB
ALBUMIN SERPL BCP-MCNC: 4.3 G/DL (ref 3.2–4.9)
ALBUMIN/GLOB SERPL: 2.2 G/DL
ALP SERPL-CCNC: 50 U/L (ref 30–99)
ALT SERPL-CCNC: 22 U/L (ref 2–50)
ANION GAP SERPL CALC-SCNC: 7 MMOL/L (ref 0–11.9)
AST SERPL-CCNC: 20 U/L (ref 12–45)
BILIRUB SERPL-MCNC: 1 MG/DL (ref 0.1–1.5)
BUN SERPL-MCNC: 27 MG/DL (ref 8–22)
CALCIUM SERPL-MCNC: 9.1 MG/DL (ref 8.5–10.5)
CHLORIDE SERPL-SCNC: 108 MMOL/L (ref 96–112)
CHOLEST SERPL-MCNC: 214 MG/DL (ref 100–199)
CO2 SERPL-SCNC: 24 MMOL/L (ref 20–33)
CREAT SERPL-MCNC: 1.69 MG/DL (ref 0.5–1.4)
FASTING STATUS PATIENT QL REPORTED: NORMAL
GLOBULIN SER CALC-MCNC: 2 G/DL (ref 1.9–3.5)
GLUCOSE SERPL-MCNC: 100 MG/DL (ref 65–99)
HDLC SERPL-MCNC: 62 MG/DL
LDLC SERPL CALC-MCNC: 108 MG/DL
POTASSIUM SERPL-SCNC: 4.5 MMOL/L (ref 3.6–5.5)
PROT SERPL-MCNC: 6.3 G/DL (ref 6–8.2)
SODIUM SERPL-SCNC: 139 MMOL/L (ref 135–145)
TRIGL SERPL-MCNC: 219 MG/DL (ref 0–149)

## 2019-06-01 PROCEDURE — 36415 COLL VENOUS BLD VENIPUNCTURE: CPT

## 2019-06-01 PROCEDURE — 80053 COMPREHEN METABOLIC PANEL: CPT

## 2019-06-01 PROCEDURE — 80061 LIPID PANEL: CPT

## 2019-06-06 ENCOUNTER — OFFICE VISIT (OUTPATIENT)
Dept: CARDIOLOGY | Facility: MEDICAL CENTER | Age: 64
End: 2019-06-06
Payer: COMMERCIAL

## 2019-06-06 VITALS
DIASTOLIC BLOOD PRESSURE: 80 MMHG | OXYGEN SATURATION: 95 % | SYSTOLIC BLOOD PRESSURE: 128 MMHG | HEART RATE: 72 BPM | HEIGHT: 66 IN | WEIGHT: 219 LBS | BODY MASS INDEX: 35.2 KG/M2

## 2019-06-06 DIAGNOSIS — E78.2 MIXED HYPERLIPIDEMIA: ICD-10-CM

## 2019-06-06 DIAGNOSIS — N18.30 CKD (CHRONIC KIDNEY DISEASE) STAGE 3, GFR 30-59 ML/MIN (HCC): ICD-10-CM

## 2019-06-06 DIAGNOSIS — Z79.899 HIGH RISK MEDICATION USE: ICD-10-CM

## 2019-06-06 DIAGNOSIS — Z00.6 RESEARCH STUDY PATIENT: ICD-10-CM

## 2019-06-06 DIAGNOSIS — I10 HTN (HYPERTENSION), MALIGNANT: ICD-10-CM

## 2019-06-06 DIAGNOSIS — I50.30 ACC/AHA STAGE C HEART FAILURE WITH PRESERVED EJECTION FRACTION (HCC): ICD-10-CM

## 2019-06-06 DIAGNOSIS — I51.89 LEFT VENTRICULAR DIASTOLIC DYSFUNCTION, NYHA CLASS 2: ICD-10-CM

## 2019-06-06 PROCEDURE — 94618 PULMONARY STRESS TESTING: CPT | Performed by: INTERNAL MEDICINE

## 2019-06-06 PROCEDURE — 99214 OFFICE O/P EST MOD 30 MIN: CPT | Mod: 25 | Performed by: INTERNAL MEDICINE

## 2019-06-06 ASSESSMENT — MINNESOTA LIVING WITH HEART FAILURE QUESTIONNAIRE (MLHF)
GIVING YOU SIDE EFFECTS FROM TREATMENTS: 1
DIFFICULTY SLEEPING WELL AT NIGHT: 1
DIFFICULTY SOCIALIZING WITH FAMILY OR FRIENDS: 0
MAKING YOU SHORT OF BREATH: 1
SWELLING IN ANKLES OR LEGS: 1
LOSS OF SELF CONTROL IN YOUR LIFE: 0
EATING LESS FOODS YOU LIKE: 0
TOTAL_SCORE: 7
DIFFICULTY WITH SEXUAL ACTIVITIES: 0
MAKING YOU STAY IN A HOSPITAL: 0
WORKING AROUND THE HOUSE OR YARD DIFFICULT: 0
MAKING YOU WORRY: 0
DIFFICULTY WITH RECREATIONAL PASTIMES, SPORTS, HOBBIES: 0
DIFFICULTY TO CONCENTRATE OR REMEMBERING THINGS: 0
COSTING YOU MONEY FOR MEDICAL CARE: 1
TIRED, FATIGUED OR LOW ON ENERGY: 1
WALKING ABOUT OR CLIMBING STAIRS DIFFICULT: 1
FEELING LIKE A BURDEN TO FAMILY AND FRIENDS: 0
DIFFICULTY GOING AWAY FROM HOME: 0
HAVING TO SIT OR LIE DOWN DURING THE DAY: 0
DIFFICULTY WORKING TO EARN A LIVING: 0
MAKING YOU FEEL DEPRESSED: 0

## 2019-06-06 ASSESSMENT — ENCOUNTER SYMPTOMS
PALPITATIONS: 0
PND: 0
BRUISES/BLEEDS EASILY: 0
SPEECH CHANGE: 0
COUGH: 0
CLAUDICATION: 0
ORTHOPNEA: 0
FEVER: 0
VOMITING: 0
MYALGIAS: 0
WEIGHT LOSS: 0
CHILLS: 0
DOUBLE VISION: 0
HEADACHES: 0
BLOOD IN STOOL: 0
LOSS OF CONSCIOUSNESS: 0
FALLS: 0
EYE DISCHARGE: 0
DIZZINESS: 0
NAUSEA: 0
EYE PAIN: 0
HALLUCINATIONS: 0
BLURRED VISION: 0
SENSORY CHANGE: 0
SHORTNESS OF BREATH: 0
ABDOMINAL PAIN: 0
DEPRESSION: 0

## 2019-06-06 ASSESSMENT — 6 MINUTE WALK TEST (6MWT): TOTAL DISTANCE WALKED (METERS): 549

## 2019-06-06 NOTE — LETTER
[facility], [place] [facility]   [address], [facility] [address]  [place] [address]  Phone: [phone]  Fax: [phone]              Chema Sarabia  1955    Encounter Date: 6/6/2019    Santosh Castelan M.D.          PROGRESS NOTE:  Chief Complaint   Patient presents with   • CHF (Acute)     HF Est.        Subjective:   Chema Sarabia is a 63 y.o. male who presents today for cardiac care and evaluation in our heart failure clinic after his recent episode of hypertensive emergency for which he went into heart failure exacerbation as well. Patient does have documented transthoracic echocardiogram which showed relatively preserved LV function of 55%. No significant valvular disease.     Feeling very well. No dyspnea.     At prior visit, patient was able to complete 549 m during his 6 minute walk test. his O2 saturation at baseline was 94% and at the end of the test, the O2 saturation was 95%. he reported 0 level of dyspnea on Jim scale.      I have independently interpreted and reviewed blood tests results with patient in clinic which shows fine LDL level 108, with elevated triglycerides,  stable renal  (GFR of 41) and liver function.    06/06/2019 Patient was able to complete 549 m during his 6 minute walk test. his O2 saturation at baseline was 95% and at the end of the test, the O2 saturation was 93%. he reported 1 level of dyspnea on Jim scale.      Past Medical History:   Diagnosis Date   • Acute kidney injury (HCC)    • Hypertensive urgency    • Nephrolithiasis      History reviewed. No pertinent surgical history.  Family History   Problem Relation Age of Onset   • Cancer Mother         lungs, smoker   • Hypertension Father    • Hyperlipidemia Father    • Diabetes Neg Hx    • Heart Disease Neg Hx      Social History     Social History   • Marital status: Single     Spouse name: N/A   • Number of children: N/A   • Years of education: N/A     Occupational History   • Not on  file.     Social History Main Topics   • Smoking status: Never Smoker   • Smokeless tobacco: Never Used   • Alcohol use 2.4 oz/week     4 Glasses of wine per week   • Drug use: No   • Sexual activity: Not on file     Other Topics Concern   • Not on file     Social History Narrative   • No narrative on file     No Known Allergies  Outpatient Encounter Prescriptions as of 6/6/2019   Medication Sig Dispense Refill   • vitamin D, Ergocalciferol, (DRISDOL) 97751 units Cap capsule Take 1 Cap by mouth every 7 days. 12 Cap 1   • omeprazole (PRILOSEC) 20 MG delayed-release capsule Take 1 Cap by mouth every day. 90 Cap 1   • labetalol (NORMODYNE) 200 MG Tab Take 2 Tabs by mouth 2 times a day. 360 Tab 3   • STUDY DRUG or PLACEBO Take 2 Each by mouth 2 Times a Day. Per research protocol [id number]. Patient takes one tab from each bottle twice a day, treatment assignment is blinded. Please call Research Pharmacist Tj Archuleta at [phone] with questions.     • amLODIPine (NORVASC) 10 MG Tab Take 1 Tab by mouth every day. 90 Tab 3   • spironolactone (ALDACTONE) 25 MG Tab Take 1 Tab by mouth every day. 90 Tab 3   • multivitamin (THERAGRAN) Tab Take 1 Tab by mouth every day.       No facility-administered encounter medications on file as of 6/6/2019.      Review of Systems   Constitutional: Negative for chills, fever, malaise/fatigue and weight loss.   HENT: Negative for ear discharge, ear pain, hearing loss and nosebleeds.    Eyes: Negative for blurred vision, double vision, pain and discharge.   Respiratory: Negative for cough and shortness of breath.    Cardiovascular: Negative for chest pain, palpitations, orthopnea, claudication, leg swelling and PND.   Gastrointestinal: Negative for abdominal pain, blood in stool, melena, nausea and vomiting.   Genitourinary: Negative for dysuria and hematuria.   Musculoskeletal: Negative for falls, joint pain and myalgias.   Skin: Negative for itching and rash.   Neurological: Negative for  "dizziness, sensory change, speech change, loss of consciousness and headaches.   Endo/Heme/Allergies: Negative for environmental allergies. Does not bruise/bleed easily.   Psychiatric/Behavioral: Negative for depression, hallucinations and suicidal ideas.        Objective:   /80 (BP Location: Left arm, Patient Position: Sitting, BP Cuff Size: Adult)   Pulse 72   Ht 1.676 m (5' 6\")   Wt 99.3 kg (219 lb)   SpO2 95%   BMI 35.35 kg/m²      Physical Exam   Constitutional: He is oriented to person, place, and time. No distress.   HENT:   Head: Normocephalic and atraumatic.   Right Ear: External ear normal.   Left Ear: External ear normal.   Eyes: Right eye exhibits no discharge. Left eye exhibits no discharge.   Neck: No JVD present. No thyromegaly present.   Cardiovascular: Normal rate, regular rhythm, normal heart sounds and intact distal pulses.  Exam reveals no gallop and no friction rub.    No murmur heard.  Pulmonary/Chest: Breath sounds normal. No respiratory distress.   Abdominal: Bowel sounds are normal. He exhibits no distension. There is no tenderness.   Musculoskeletal: He exhibits no edema or tenderness.   Neurological: He is alert and oriented to person, place, and time. No cranial nerve deficit.   Skin: Skin is warm and dry. He is not diaphoretic.   Psychiatric: He has a normal mood and affect. His behavior is normal.   Nursing note and vitals reviewed.      Assessment:     1. ACC/AHA stage C heart failure with preserved ejection fraction (HCC)  Comp Metabolic Panel   2. Left ventricular diastolic dysfunction, NYHA class 2  Comp Metabolic Panel   3. HTN (hypertension), malignant     4. Mixed hyperlipidemia  LIPID PANEL   5. Research study patient     6. CKD (chronic kidney disease) stage 3, GFR 30-59 ml/min ([facility])     7. High risk medication use  Comp Metabolic Panel       Medical Decision Making:  Today's Assessment / Status / Plan:   Today, based on physical examination findings, patient is " euvolemic. No JVD, lungs are clear to auscultation, no pitting edema in bilateral lower extremities, no ascites.    Dry weight is 219 lbs.    Blood pressure is well controlled. Continue current medications at current dose as above. Refills were prescribed today and patient was instructed with plan of care.    LDL is better, no meds for now.     I will see patient back in clinic with lab tests and studies results in 6 months.     I thank you for referring patient to our Cardiology Clinic today.        Albert Orellana M.D.  [address]  [address]  [facility] [address]  VIA In Basket

## 2019-06-06 NOTE — PROGRESS NOTES
Chief Complaint   Patient presents with   • CHF (Acute)     HF Est.        Subjective:   Chema Sarabia is a 63 y.o. male who presents today for cardiac care and evaluation in our heart failure clinic after his recent episode of hypertensive emergency for which he went into heart failure exacerbation as well. Patient does have documented transthoracic echocardiogram which showed relatively preserved LV function of 55%. No significant valvular disease.     Feeling very well. No dyspnea.     At prior visit, patient was able to complete 549 m during his 6 minute walk test. his O2 saturation at baseline was 94% and at the end of the test, the O2 saturation was 95%. he reported 0 level of dyspnea on Jim scale.      I have independently interpreted and reviewed blood tests results with patient in clinic which shows fine LDL level 108, with elevated triglycerides, stable renal  (GFR of 41) and liver function.    06/06/2019 Patient was able to complete 549 m during his 6 minute walk test. his O2 saturation at baseline was 95% and at the end of the test, the O2 saturation was 93%. he reported 1 level of dyspnea on Jim scale.      Past Medical History:   Diagnosis Date   • Acute kidney injury (HCC)    • Hypertensive urgency    • Nephrolithiasis      History reviewed. No pertinent surgical history.  Family History   Problem Relation Age of Onset   • Cancer Mother         lungs, smoker   • Hypertension Father    • Hyperlipidemia Father    • Diabetes Neg Hx    • Heart Disease Neg Hx      Social History     Social History   • Marital status: Single     Spouse name: N/A   • Number of children: N/A   • Years of education: N/A     Occupational History   • Not on file.     Social History Main Topics   • Smoking status: Never Smoker   • Smokeless tobacco: Never Used   • Alcohol use 2.4 oz/week     4 Glasses of wine per week   • Drug use: No   • Sexual activity: Not on file     Other Topics Concern   • Not on file     Social  History Narrative   • No narrative on file     No Known Allergies  Outpatient Encounter Prescriptions as of 6/6/2019   Medication Sig Dispense Refill   • vitamin D, Ergocalciferol, (DRISDOL) 01380 units Cap capsule Take 1 Cap by mouth every 7 days. 12 Cap 1   • omeprazole (PRILOSEC) 20 MG delayed-release capsule Take 1 Cap by mouth every day. 90 Cap 1   • labetalol (NORMODYNE) 200 MG Tab Take 2 Tabs by mouth 2 times a day. 360 Tab 3   • STUDY DRUG or PLACEBO Take 2 Each by mouth 2 Times a Day. Per research protocol [id number]. Patient takes one tab from each bottle twice a day, treatment assignment is blinded. Please call Research Pharmacist Tj Archuleta at [phone] with questions.     • amLODIPine (NORVASC) 10 MG Tab Take 1 Tab by mouth every day. 90 Tab 3   • spironolactone (ALDACTONE) 25 MG Tab Take 1 Tab by mouth every day. 90 Tab 3   • multivitamin (THERAGRAN) Tab Take 1 Tab by mouth every day.       No facility-administered encounter medications on file as of 6/6/2019.      Review of Systems   Constitutional: Negative for chills, fever, malaise/fatigue and weight loss.   HENT: Negative for ear discharge, ear pain, hearing loss and nosebleeds.    Eyes: Negative for blurred vision, double vision, pain and discharge.   Respiratory: Negative for cough and shortness of breath.    Cardiovascular: Negative for chest pain, palpitations, orthopnea, claudication, leg swelling and PND.   Gastrointestinal: Negative for abdominal pain, blood in stool, melena, nausea and vomiting.   Genitourinary: Negative for dysuria and hematuria.   Musculoskeletal: Negative for falls, joint pain and myalgias.   Skin: Negative for itching and rash.   Neurological: Negative for dizziness, sensory change, speech change, loss of consciousness and headaches.   Endo/Heme/Allergies: Negative for environmental allergies. Does not bruise/bleed easily.   Psychiatric/Behavioral: Negative for depression, hallucinations and suicidal ideas.         "Objective:   /80 (BP Location: Left arm, Patient Position: Sitting, BP Cuff Size: Adult)   Pulse 72   Ht 1.676 m (5' 6\")   Wt 99.3 kg (219 lb)   SpO2 95%   BMI 35.35 kg/m²     Physical Exam   Constitutional: He is oriented to person, place, and time. No distress.   HENT:   Head: Normocephalic and atraumatic.   Right Ear: External ear normal.   Left Ear: External ear normal.   Eyes: Right eye exhibits no discharge. Left eye exhibits no discharge.   Neck: No JVD present. No thyromegaly present.   Cardiovascular: Normal rate, regular rhythm, normal heart sounds and intact distal pulses.  Exam reveals no gallop and no friction rub.    No murmur heard.  Pulmonary/Chest: Breath sounds normal. No respiratory distress.   Abdominal: Bowel sounds are normal. He exhibits no distension. There is no tenderness.   Musculoskeletal: He exhibits no edema or tenderness.   Neurological: He is alert and oriented to person, place, and time. No cranial nerve deficit.   Skin: Skin is warm and dry. He is not diaphoretic.   Psychiatric: He has a normal mood and affect. His behavior is normal.   Nursing note and vitals reviewed.      Assessment:     1. ACC/AHA stage C heart failure with preserved ejection fraction (HCC)  Comp Metabolic Panel   2. Left ventricular diastolic dysfunction, NYHA class 2  Comp Metabolic Panel   3. HTN (hypertension), malignant     4. Mixed hyperlipidemia  LIPID PANEL   5. Research study patient     6. CKD (chronic kidney disease) stage 3, GFR 30-59 ml/min (HCC)     7. High risk medication use  Comp Metabolic Panel       Medical Decision Making:  Today's Assessment / Status / Plan:   Today, based on physical examination findings, patient is euvolemic. No JVD, lungs are clear to auscultation, no pitting edema in bilateral lower extremities, no ascites.    Dry weight is 219 lbs.    Blood pressure is well controlled. Continue current medications at current dose as above. Refills were prescribed today and " patient was instructed with plan of care.    LDL is better, no meds for now.     I will see patient back in clinic with lab tests and studies results in 6 months.     I thank you for referring patient to our Cardiology Clinic today.

## 2019-07-08 ENCOUNTER — TELEPHONE (OUTPATIENT)
Dept: CARDIOLOGY | Facility: MEDICAL CENTER | Age: 64
End: 2019-07-08

## 2019-10-05 ENCOUNTER — HOSPITAL ENCOUNTER (OUTPATIENT)
Dept: LAB | Facility: MEDICAL CENTER | Age: 64
End: 2019-10-05
Attending: INTERNAL MEDICINE
Payer: COMMERCIAL

## 2019-10-05 DIAGNOSIS — E78.5 DYSLIPIDEMIA: ICD-10-CM

## 2019-10-05 DIAGNOSIS — I10 ESSENTIAL HYPERTENSION: ICD-10-CM

## 2019-10-05 DIAGNOSIS — N18.30 STAGE 3 CHRONIC KIDNEY DISEASE (HCC): ICD-10-CM

## 2019-10-05 DIAGNOSIS — E55.9 HYPOVITAMINOSIS D: ICD-10-CM

## 2019-10-05 LAB
25(OH)D3 SERPL-MCNC: 14 NG/ML (ref 30–100)
ALBUMIN SERPL BCP-MCNC: 4.6 G/DL (ref 3.2–4.9)
ALBUMIN/GLOB SERPL: 2 G/DL
ALP SERPL-CCNC: 58 U/L (ref 30–99)
ALT SERPL-CCNC: 24 U/L (ref 2–50)
ANION GAP SERPL CALC-SCNC: 8 MMOL/L (ref 0–11.9)
AST SERPL-CCNC: 21 U/L (ref 12–45)
BILIRUB SERPL-MCNC: 0.8 MG/DL (ref 0.1–1.5)
BUN SERPL-MCNC: 25 MG/DL (ref 8–22)
CALCIUM SERPL-MCNC: 9.5 MG/DL (ref 8.5–10.5)
CHLORIDE SERPL-SCNC: 105 MMOL/L (ref 96–112)
CHOLEST SERPL-MCNC: 220 MG/DL (ref 100–199)
CO2 SERPL-SCNC: 24 MMOL/L (ref 20–33)
CREAT SERPL-MCNC: 1.55 MG/DL (ref 0.5–1.4)
GLOBULIN SER CALC-MCNC: 2.3 G/DL (ref 1.9–3.5)
GLUCOSE SERPL-MCNC: 92 MG/DL (ref 65–99)
HDLC SERPL-MCNC: 62 MG/DL
LDLC SERPL CALC-MCNC: 113 MG/DL
POTASSIUM SERPL-SCNC: 4.3 MMOL/L (ref 3.6–5.5)
PROT SERPL-MCNC: 6.9 G/DL (ref 6–8.2)
SODIUM SERPL-SCNC: 137 MMOL/L (ref 135–145)
TRIGL SERPL-MCNC: 225 MG/DL (ref 0–149)

## 2019-10-05 PROCEDURE — 82306 VITAMIN D 25 HYDROXY: CPT

## 2019-10-05 PROCEDURE — 80053 COMPREHEN METABOLIC PANEL: CPT

## 2019-10-05 PROCEDURE — 80061 LIPID PANEL: CPT

## 2019-10-05 PROCEDURE — 36415 COLL VENOUS BLD VENIPUNCTURE: CPT

## 2019-10-10 ENCOUNTER — TELEPHONE (OUTPATIENT)
Dept: CARDIOLOGY | Facility: MEDICAL CENTER | Age: 64
End: 2019-10-10

## 2019-10-10 NOTE — TELEPHONE ENCOUNTER
Saw Chema for the Perspective Study Visit 205/Month 12 on 10/8/2019. COG State, MMSE, FAQ, CSSRS and GDS assessments were done prior to any clinical assessments. Con Meds where updated with Chema. No changes to medications. Vital were done. Labs were drawn and processed according to protocol. Dr Castelan checked Chema for signs and symptoms of HF and NYHA class. SD compliance was done.He returned 18 bottles (12 from V203 & 6 from other visits) and 12 new bottles of SD were dispensed to him. By my calculation he should of had 116 tablets left. He returned 210 tablets. I reminded him of dosing instruction, to take 1 tablet from each bottle once in the morning and once in the evening, 12 hours apart. If he misses a dose to take it as soon as he remembers but to skip it if it is too close to the next dose, then return to his regular schedule.    Next visit is a phone call and wll be done about 1/8/2020..

## 2019-10-29 ENCOUNTER — OFFICE VISIT (OUTPATIENT)
Dept: MEDICAL GROUP | Facility: MEDICAL CENTER | Age: 64
End: 2019-10-29
Payer: COMMERCIAL

## 2019-10-29 VITALS
BODY MASS INDEX: 35.03 KG/M2 | RESPIRATION RATE: 12 BRPM | OXYGEN SATURATION: 97 % | WEIGHT: 218 LBS | HEIGHT: 66 IN | TEMPERATURE: 98.7 F | DIASTOLIC BLOOD PRESSURE: 70 MMHG | HEART RATE: 62 BPM | SYSTOLIC BLOOD PRESSURE: 122 MMHG

## 2019-10-29 DIAGNOSIS — I10 ESSENTIAL HYPERTENSION: ICD-10-CM

## 2019-10-29 DIAGNOSIS — Z00.6 RESEARCH STUDY PATIENT: ICD-10-CM

## 2019-10-29 DIAGNOSIS — Z12.11 SCREENING FOR MALIGNANT NEOPLASM OF COLON: ICD-10-CM

## 2019-10-29 DIAGNOSIS — E66.9 OBESITY (BMI 30.0-34.9): ICD-10-CM

## 2019-10-29 DIAGNOSIS — Z12.5 SCREENING FOR MALIGNANT NEOPLASM OF PROSTATE: ICD-10-CM

## 2019-10-29 DIAGNOSIS — E78.5 DYSLIPIDEMIA: ICD-10-CM

## 2019-10-29 DIAGNOSIS — I51.89 LEFT VENTRICULAR DIASTOLIC DYSFUNCTION WITH PRESERVED SYSTOLIC FUNCTION: ICD-10-CM

## 2019-10-29 DIAGNOSIS — I35.1 MODERATE AORTIC INSUFFICIENCY: ICD-10-CM

## 2019-10-29 DIAGNOSIS — I11.9 MALIGNANT HYPERTENSIVE HEART DISEASE WITHOUT CONGESTIVE HEART FAILURE: ICD-10-CM

## 2019-10-29 DIAGNOSIS — N18.30 STAGE 3 CHRONIC KIDNEY DISEASE (HCC): ICD-10-CM

## 2019-10-29 DIAGNOSIS — Z23 NEEDS FLU SHOT: ICD-10-CM

## 2019-10-29 DIAGNOSIS — Z11.59 NEED FOR HEPATITIS C SCREENING TEST: ICD-10-CM

## 2019-10-29 DIAGNOSIS — Z11.4 SCREENING FOR HIV (HUMAN IMMUNODEFICIENCY VIRUS): ICD-10-CM

## 2019-10-29 DIAGNOSIS — I50.30 ACC/AHA STAGE C HEART FAILURE WITH PRESERVED EJECTION FRACTION (HCC): ICD-10-CM

## 2019-10-29 PROCEDURE — 90686 IIV4 VACC NO PRSV 0.5 ML IM: CPT | Performed by: INTERNAL MEDICINE

## 2019-10-29 PROCEDURE — 90471 IMMUNIZATION ADMIN: CPT | Performed by: INTERNAL MEDICINE

## 2019-10-29 PROCEDURE — 99214 OFFICE O/P EST MOD 30 MIN: CPT | Mod: 25 | Performed by: INTERNAL MEDICINE

## 2019-10-29 RX ORDER — ERGOCALCIFEROL 1.25 MG/1
50000 CAPSULE ORAL
Qty: 12 CAP | Refills: 3 | Status: SHIPPED | OUTPATIENT
Start: 2019-10-29 | End: 2021-07-13

## 2019-10-29 ASSESSMENT — PATIENT HEALTH QUESTIONNAIRE - PHQ9: CLINICAL INTERPRETATION OF PHQ2 SCORE: 0

## 2019-10-29 NOTE — PROGRESS NOTES
CHIEF COMPLAINT  Chief Complaint   Patient presents with   • Follow-Up   HTN    HPI  Chema Sarabia is a 64 y.o. male who presents today for the following     Hypertension, hypertensive heart disease  Meds: Amlodipine 10 mg daily, labetalol 200 mg twice daily, spironolactone 25 mg daily, taking as prescribed.   Measuring BP at home: yes, no. It has been < 130/80  Denies: - headaches, vision problems, tinnitus.  - chest pain/pressure, palpitations, irregular heart beats, exertional, dyspnea, peripheral edema.  - medication side effect: unusual fatigue, slow heartbeat, foot/leg swelling, cough.  Low salt diet: Y  Diet: Improved  Exercise: improved  BMI: 35  FH of HTN: father renal panel showed CKD stage III, with the last GFR at 31.  [facility]     Diastolic dysfunction with preserved ejection fraction, research study  Reviewed the last echocardiogram (2/15/18)  - showed relatively preserved LV function of 55%.  Denies:  -Palpitations, irregular heartbeat, chest pain or pressure  -Orthopnea, PND, lower extremity swelling.   He started [employer] experimental trial with 2 medications.  He has been followed up by cardiology     Moderate aortic insufficiency   by echocardiography  Denies chest pain, dizziness, lightheadedness, exertional dyspnea, syncope.  He has been followed up by cardiology.     Echocardiography from 2/15/18  No prior study is available for comparison.   Left ventricular systolic function is normal.  Left ventricular ejection fraction is visually estimated to be 55%.  Grade II diastolic dysfunction.  Mild mitral regurgitation.  Moderate aortic insufficiency.  Moderate tricuspid regurgitation.  Estimated right ventricular systolic pressure is 55 mmHg.  Mild pulmonic insufficiency.     CKD stage III  Stable.  The patient has had stable CKD stage III, with abnormal creatinine and GFR, normal electrolytes.   He has not been on  NSAIDs consistently, has had good fluid intake.  He has been followed up by nephrology.     Dyslipidemia  The patient had abnormal lipid panel, no medications.   Diet /Exercise/BMI: As above  FH: Father      Hypovitaminosis D  The patient had low vitamin D level at 24.  Vitamin D supplement: OTC, not daily.     OBESITY, Body mass index is 35.19 kg/m².  Diet: regular  Exercise: as above  No temperature intolerance. No change in hair/skin quality, BMs.   No HTN, buffalo hump, purple striae, flushing.  FH of obesity: yes    Reviewed PMH, PSH, FH, SH, ALL, HCM/IMM, no changes  Reviewed MEDS, no changes    Patient Active Problem List    Diagnosis Date Noted   • Dyslipidemia 04/26/2019   • Gastroesophageal reflux disease 04/26/2019   • Acquired renal cyst of right kidney 10/19/2018   • Stage 3 chronic kidney disease (HCC) 04/20/2018   • Essential hypertension 04/20/2018   • Pryv-Cardiology Billing 04/19/2018   • Research study patient 04/09/2018   • Moderate aortic insufficiency 04/05/2018   • ACC/AHA stage C heart failure with preserved ejection fraction (HCC) 04/05/2018   • Health care maintenance 02/14/2018   • Obesity (BMI 30.0-34.9) 02/14/2018   • Heart disease, hypertensive, malignant 02/14/2018     CURRENT MEDICATIONS  Current Outpatient Medications   Medication Sig Dispense Refill   • vitamin D, Ergocalciferol, (DRISDOL) 41946 units Cap capsule Take 1 Cap by mouth every 7 days. 12 Cap 3   • vitamin D, Ergocalciferol, (DRISDOL) 83874 units Cap capsule Take 1 Cap by mouth every 7 days. 12 Cap 1   • omeprazole (PRILOSEC) 20 MG delayed-release capsule Take 1 Cap by mouth every day. 90 Cap 1   • labetalol (NORMODYNE) 200 MG Tab Take 2 Tabs by mouth 2 times a day. 360 Tab 3   • STUDY DRUG or PLACEBO Take 2 Each by mouth 2 Times a Day. Per research protocol [id number]. Patient takes one tab from each bottle twice a day, treatment assignment is blinded. Please call Research Pharmacist Tj Archuleta at [phone] with  questions.     • amLODIPine (NORVASC) 10 MG Tab Take 1 Tab by mouth every day. 90 Tab 3   • spironolactone (ALDACTONE) 25 MG Tab Take 1 Tab by mouth every day. 90 Tab 3   • multivitamin (THERAGRAN) Tab Take 1 Tab by mouth every day.       No current facility-administered medications for this visit.      ALLERGIES  Allergies: Patient has no known allergies.  PAST MEDICAL HISTORY  Past Medical History:   Diagnosis Date   • Acute kidney injury (HCC)    • Hypertensive urgency    • Nephrolithiasis      SURGICAL HISTORY  He  has no past surgical history on file.  SOCIAL HISTORY  Social History     Tobacco Use   • Smoking status: Never Smoker   • Smokeless tobacco: Never Used   Substance Use Topics   • Alcohol use: Yes     Alcohol/week: 2.4 oz     Types: 4 Glasses of wine per week   • Drug use: No     Social History     Social History Narrative   • Not on file     FAMILY HISTORY  Family History   Problem Relation Age of Onset   • Cancer Mother         lungs, smoker   • Hypertension Father    • Hyperlipidemia Father    • Diabetes Neg Hx    • Heart Disease Neg Hx      Family Status   Relation Name Status   • Mo     • Fa     • Neg Hx  (Not Specified)     ROS   Constitutional: Negative for fever, chills, fatigue.  HENT: Negative for congestion, sore throat.  Eyes: Negative for vision problems.   Respiratory: Negative for cough, shortness of breath.  Cardiovascular: Negative for chest pain, palpitations.   Gastrointestinal: Negative for heartburn, nausea, abdominal pain.   Genitourinary: Negative for dysuria.  Musculoskeletal: Negative for significant myalgia, back and joint pain.   Skin: Negative for rash.   Neuro: Negative for dizziness, weakness and headaches.   Endo/Heme/Allergies: Does not bruise/bleed easily.   Psychiatric/Behavioral: Negative for depression.    PHYSICAL EXAM   Blood Pressure 122/70 (BP Location: Left arm, Patient Position: Sitting, BP Cuff Size: Adult)   Pulse 62   Temperature 37.1 °C  "(98.7 °F) (Temporal)   Respiration 12   Height 1.676 m (5' 6\")   Oxygen Saturation 97%  Body mass index is 35.35 kg/m².  General:  NAD, well appearing  HEENT:   NC/AT, PERRLA, EOMI, TMs are clear. Oropharyngeal mucosa is pink,  without lesions;  no cervical / supraclavicular  lymphadenopathy, no thyromegaly.    Cardiovascular: RRR.   No m/r/g.       Lungs:   CTAB, no w/r/r, no respiratory distress.  Abdomen: Soft, NT/ND; unable to palpate liver/spleen due to WT.  Extremities:  2+ DP and radial pulses bilaterally.  No c/c/e.   Skin:  Warm, dry.  No erythema. No rash.   Neurologic: Alert & oriented x 3. CN II-XII grossly intact. No focal deficits.  Psychiatric:  Affect normal, mood normal, judgment normal.    Labs     Labs are reviewed and discussed with a patient  Lab Results   Component Value Date/Time    CHOLSTRLTOT 220 (H) 10/05/2019 07:07 AM     (H) 10/05/2019 07:07 AM    HDL 62 10/05/2019 07:07 AM    TRIGLYCERIDE 225 (H) 10/05/2019 07:07 AM       Lab Results   Component Value Date/Time    SODIUM 137 10/05/2019 07:07 AM    POTASSIUM 4.3 10/05/2019 07:07 AM    CHLORIDE 105 10/05/2019 07:07 AM    CO2 24 10/05/2019 07:07 AM    GLUCOSE 92 10/05/2019 07:07 AM    BUN 25 (H) 10/05/2019 07:07 AM    CREATININE 1.55 (H) 10/05/2019 07:07 AM     Lab Results   Component Value Date/Time    ALKPHOSPHAT 58 10/05/2019 07:07 AM    ASTSGOT 21 10/05/2019 07:07 AM    ALTSGPT 24 10/05/2019 07:07 AM    TBILIRUBIN 0.8 10/05/2019 07:07 AM      Lab Results   Component Value Date/Time    WBC 6.4 04/22/2019 10:26 AM    RBC 5.30 04/22/2019 10:26 AM    HEMOGLOBIN 16.2 04/22/2019 10:26 AM    HEMATOCRIT 49.8 04/22/2019 10:26 AM    MCV 94.0 04/22/2019 10:26 AM    MCH 30.6 04/22/2019 10:26 AM    MCHC 32.5 (L) 04/22/2019 10:26 AM    MPV 9.3 04/22/2019 10:26 AM    NEUTSPOLYS 68.40 04/22/2019 10:26 AM    LYMPHOCYTES 18.60 (L) 04/22/2019 10:26 AM    MONOCYTES 9.70 04/22/2019 10:26 AM    EOSINOPHILS 1.90 04/22/2019 10:26 AM    BASOPHILS 0.60 " 04/22/2019 10:26 AM      Imaging     None    Assessment and Plan     Chema Sarabia is a 64 y.o. male    1. Essential hypertension  Controlled, continue with current treatment, and cardiology f/u  - Comp Metabolic Panel; Future  2. 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 ACC/AHA stage C heart failure with preserved ejection fraction (HCC  4. Left ventricular diastolic dysfunction with preserved systolic function  5. Research study patient  6. Moderate aortic insufficiency    7. Stage 3 chronic kidney disease (HCC)  Stable, continue to avoid NSAIDs, have good fluid intake  - Comp Metabolic Panel; Future    8. Dyslipidemia  Controlled, continue current treatment  - Comp Metabolic Panel; Future  - Lipid Profile; Future    9. Obesity (BMI 30.0-34.9)  - OBESITY COUNSELING (No Charge): Patient identified as having weight management issue.  Appropriate orders and counseling given.    10. Need for hepatitis C screening test  - HEP C VIRUS ANTIBODY; Future    11. Screening for malignant neoplasm of colon  - COLOGUARD (FIT DNA)    12. Needs flu shot  - Influenza Vaccine Quad Injection (PF)  Information was provided to the patient regarding the vaccine, including side effects. Vaccine was given by my medical assistant under my supervision.    13. Screening for HIV (human immunodeficiency virus)  - HIV AG/AB COMBO ASSAY SCREENING; Future    14. Screening for malignant neoplasm of prostate  - PROSTATE SPECIFIC AG SCREENING; Future    Counseling:   - Smoking:  Nonsmoker    Followup: Return in about 6 months (around 4/29/2020) for Labs.    All questions are answered.    Please note that this dictation was created using voice recognition software, and that there might be errors of wing and possibly content.

## 2019-12-13 ENCOUNTER — HOSPITAL ENCOUNTER (OUTPATIENT)
Dept: LAB | Facility: MEDICAL CENTER | Age: 64
End: 2019-12-13
Attending: INTERNAL MEDICINE
Payer: COMMERCIAL

## 2019-12-13 DIAGNOSIS — I10 ESSENTIAL HYPERTENSION: ICD-10-CM

## 2019-12-13 DIAGNOSIS — N18.30 STAGE 3 CHRONIC KIDNEY DISEASE (HCC): ICD-10-CM

## 2019-12-13 DIAGNOSIS — E78.5 DYSLIPIDEMIA: ICD-10-CM

## 2019-12-13 LAB
ALBUMIN SERPL BCP-MCNC: 4.8 G/DL (ref 3.2–4.9)
ALBUMIN/GLOB SERPL: 2 G/DL
ALP SERPL-CCNC: 59 U/L (ref 30–99)
ALT SERPL-CCNC: 20 U/L (ref 2–50)
ANION GAP SERPL CALC-SCNC: 11 MMOL/L (ref 0–11.9)
AST SERPL-CCNC: 19 U/L (ref 12–45)
BILIRUB SERPL-MCNC: 1.4 MG/DL (ref 0.1–1.5)
BUN SERPL-MCNC: 26 MG/DL (ref 8–22)
CALCIUM SERPL-MCNC: 9.5 MG/DL (ref 8.5–10.5)
CHLORIDE SERPL-SCNC: 106 MMOL/L (ref 96–112)
CHOLEST SERPL-MCNC: 197 MG/DL (ref 100–199)
CO2 SERPL-SCNC: 22 MMOL/L (ref 20–33)
CREAT SERPL-MCNC: 1.67 MG/DL (ref 0.5–1.4)
FASTING STATUS PATIENT QL REPORTED: NORMAL
GLOBULIN SER CALC-MCNC: 2.4 G/DL (ref 1.9–3.5)
GLUCOSE SERPL-MCNC: 86 MG/DL (ref 65–99)
HDLC SERPL-MCNC: 59 MG/DL
LDLC SERPL CALC-MCNC: 85 MG/DL
POTASSIUM SERPL-SCNC: 4 MMOL/L (ref 3.6–5.5)
PROT SERPL-MCNC: 7.2 G/DL (ref 6–8.2)
SODIUM SERPL-SCNC: 139 MMOL/L (ref 135–145)
TRIGL SERPL-MCNC: 267 MG/DL (ref 0–149)

## 2019-12-13 PROCEDURE — 36415 COLL VENOUS BLD VENIPUNCTURE: CPT

## 2019-12-13 PROCEDURE — 80053 COMPREHEN METABOLIC PANEL: CPT

## 2019-12-13 PROCEDURE — 80061 LIPID PANEL: CPT

## 2019-12-18 ENCOUNTER — OFFICE VISIT (OUTPATIENT)
Dept: CARDIOLOGY | Facility: MEDICAL CENTER | Age: 64
End: 2019-12-18
Payer: COMMERCIAL

## 2019-12-18 VITALS
HEIGHT: 66 IN | SYSTOLIC BLOOD PRESSURE: 130 MMHG | BODY MASS INDEX: 36.32 KG/M2 | DIASTOLIC BLOOD PRESSURE: 80 MMHG | HEART RATE: 60 BPM | OXYGEN SATURATION: 96 % | WEIGHT: 226 LBS

## 2019-12-18 DIAGNOSIS — I50.30 ACC/AHA STAGE C HEART FAILURE WITH PRESERVED EJECTION FRACTION (HCC): ICD-10-CM

## 2019-12-18 DIAGNOSIS — E78.2 MIXED HYPERLIPIDEMIA: ICD-10-CM

## 2019-12-18 DIAGNOSIS — Z79.899 HIGH RISK MEDICATION USE: ICD-10-CM

## 2019-12-18 DIAGNOSIS — I51.89 LEFT VENTRICULAR DIASTOLIC DYSFUNCTION, NYHA CLASS 1: ICD-10-CM

## 2019-12-18 DIAGNOSIS — I10 ESSENTIAL HYPERTENSION: ICD-10-CM

## 2019-12-18 DIAGNOSIS — I10 HTN (HYPERTENSION), MALIGNANT: ICD-10-CM

## 2019-12-18 DIAGNOSIS — I51.89 LEFT VENTRICULAR DIASTOLIC DYSFUNCTION, NYHA CLASS 2: ICD-10-CM

## 2019-12-18 DIAGNOSIS — Z00.6 RESEARCH STUDY PATIENT: ICD-10-CM

## 2019-12-18 PROCEDURE — 99214 OFFICE O/P EST MOD 30 MIN: CPT | Performed by: INTERNAL MEDICINE

## 2019-12-18 RX ORDER — LABETALOL 200 MG/1
400 TABLET, FILM COATED ORAL 2 TIMES DAILY
Qty: 360 TAB | Refills: 3 | Status: SHIPPED | OUTPATIENT
Start: 2019-12-18 | End: 2021-01-05 | Stop reason: SDUPTHER

## 2019-12-18 RX ORDER — AMLODIPINE BESYLATE 10 MG/1
10 TABLET ORAL DAILY
Qty: 90 TAB | Refills: 3 | Status: SHIPPED | OUTPATIENT
Start: 2019-12-18 | End: 2021-01-05 | Stop reason: SDUPTHER

## 2019-12-18 RX ORDER — SPIRONOLACTONE 25 MG/1
25 TABLET ORAL DAILY
Qty: 90 TAB | Refills: 3 | Status: SHIPPED | OUTPATIENT
Start: 2019-12-18 | End: 2021-01-05 | Stop reason: SDUPTHER

## 2019-12-18 ASSESSMENT — ENCOUNTER SYMPTOMS
HEADACHES: 0
NAUSEA: 0
SPEECH CHANGE: 0
BRUISES/BLEEDS EASILY: 0
EYE DISCHARGE: 0
ABDOMINAL PAIN: 0
MYALGIAS: 0
HALLUCINATIONS: 0
CHILLS: 0
SENSORY CHANGE: 0
ORTHOPNEA: 0
WEIGHT LOSS: 0
FALLS: 0
PALPITATIONS: 0
FEVER: 0
PND: 0
SHORTNESS OF BREATH: 0
BLURRED VISION: 0
LOSS OF CONSCIOUSNESS: 0
BLOOD IN STOOL: 0
DEPRESSION: 0
DOUBLE VISION: 0
EYE PAIN: 0
DIZZINESS: 0
COUGH: 0
CLAUDICATION: 0
VOMITING: 0

## 2019-12-18 NOTE — PROGRESS NOTES
Chief Complaint   Patient presents with   • Congestive Heart Failure       Subjective:   Chema Sarabia is a 64 y.o. male who presents today for cardiac care and evaluation in our heart failure clinic after his recent episode of hypertensive emergency for which he went into heart failure exacerbation as well. Patient does have documented transthoracic echocardiogram which showed relatively preserved LV function of 55%. No significant valvular disease.     Feeling very well. No dyspnea.     At prior visit, patient was able to complete 549 m during his 6 minute walk test. his O2 saturation at baseline was 94% and at the end of the test, the O2 saturation was 95%. he reported 0 level of dyspnea on Jim scale.     06/06/2019 Patient was able to complete 549 m during his 6 minute walk test. his O2 saturation at baseline was 95% and at the end of the test, the O2 saturation was 93%. he reported 1 level of dyspnea on Jim scale.    I have independently interpreted and reviewed blood tests results with patient in clinic which shows fine LDL level 85, with elevated triglycerides 267, stable renal  (GFR of 42) and liver function.    Past Medical History:   Diagnosis Date   • Acute kidney injury (HCC)    • Hypertensive urgency    • Nephrolithiasis      History reviewed. No pertinent surgical history.  Family History   Problem Relation Age of Onset   • Cancer Mother         lungs, smoker   • Hypertension Father    • Hyperlipidemia Father    • Diabetes Neg Hx    • Heart Disease Neg Hx      Social History     Socioeconomic History   • Marital status: Single     Spouse name: Not on file   • Number of children: Not on file   • Years of education: Not on file   • Highest education level: Not on file   Occupational History   • Not on file   Social Needs   • Financial resource strain: Not on file   • Food insecurity:     Worry: Not on file     Inability: Not on file   • Transportation needs:     Medical: Not on file     Non-medical:  Not on file   Tobacco Use   • Smoking status: Never Smoker   • Smokeless tobacco: Never Used   Substance and Sexual Activity   • Alcohol use: Yes     Alcohol/week: 2.4 oz     Types: 4 Glasses of wine per week   • Drug use: No   • Sexual activity: Not on file   Lifestyle   • Physical activity:     Days per week: Not on file     Minutes per session: Not on file   • Stress: Not on file   Relationships   • Social connections:     Talks on phone: Not on file     Gets together: Not on file     Attends Confucianism service: Not on file     Active member of club or organization: Not on file     Attends meetings of clubs or organizations: Not on file     Relationship status: Not on file   • Intimate partner violence:     Fear of current or ex partner: Not on file     Emotionally abused: Not on file     Physically abused: Not on file     Forced sexual activity: Not on file   Other Topics Concern   • Not on file   Social History Narrative   • Not on file     No Known Allergies  Outpatient Encounter Medications as of 12/18/2019   Medication Sig Dispense Refill   • spironolactone (ALDACTONE) 25 MG Tab Take 1 Tab by mouth every day. 90 Tab 3   • labetalol (NORMODYNE) 200 MG Tab Take 2 Tabs by mouth 2 times a day. 360 Tab 3   • amLODIPine (NORVASC) 10 MG Tab Take 1 Tab by mouth every day. 90 Tab 3   • vitamin D, Ergocalciferol, (DRISDOL) 74967 units Cap capsule Take 1 Cap by mouth every 7 days. 12 Cap 3   • vitamin D, Ergocalciferol, (DRISDOL) 19660 units Cap capsule Take 1 Cap by mouth every 7 days. 12 Cap 1   • omeprazole (PRILOSEC) 20 MG delayed-release capsule Take 1 Cap by mouth every day. 90 Cap 1   • STUDY DRUG or PLACEBO Take 2 Each by mouth 2 Times a Day. Per research protocol [id number]. Patient takes one tab from each bottle twice a day, treatment assignment is blinded. Please call Research Pharmacist Tj Archuleta at [phone] with questions.     • multivitamin (THERAGRAN) Tab Take 1 Tab by mouth every day.     •  "[DISCONTINUED] labetalol (NORMODYNE) 200 MG Tab Take 2 Tabs by mouth 2 times a day. 360 Tab 3   • [DISCONTINUED] amLODIPine (NORVASC) 10 MG Tab Take 1 Tab by mouth every day. 90 Tab 3   • [DISCONTINUED] spironolactone (ALDACTONE) 25 MG Tab Take 1 Tab by mouth every day. 90 Tab 3     No facility-administered encounter medications on file as of 12/18/2019.      Review of Systems   Constitutional: Negative for chills, fever, malaise/fatigue and weight loss.   HENT: Negative for ear discharge, ear pain, hearing loss and nosebleeds.    Eyes: Negative for blurred vision, double vision, pain and discharge.   Respiratory: Negative for cough and shortness of breath.    Cardiovascular: Negative for chest pain, palpitations, orthopnea, claudication, leg swelling and PND.   Gastrointestinal: Negative for abdominal pain, blood in stool, melena, nausea and vomiting.   Genitourinary: Negative for dysuria and hematuria.   Musculoskeletal: Negative for falls, joint pain and myalgias.   Skin: Negative for itching and rash.   Neurological: Negative for dizziness, sensory change, speech change, loss of consciousness and headaches.   Endo/Heme/Allergies: Negative for environmental allergies. Does not bruise/bleed easily.   Psychiatric/Behavioral: Negative for depression, hallucinations and suicidal ideas.        Objective:   /80 (BP Location: Left arm, Patient Position: Sitting, BP Cuff Size: Adult)   Pulse 60   Ht 1.676 m (5' 6\")   Wt 102.5 kg (226 lb)   SpO2 96%   BMI 36.48 kg/m²     Physical Exam   Constitutional: He is oriented to person, place, and time. No distress.   HENT:   Head: Normocephalic and atraumatic.   Right Ear: External ear normal.   Left Ear: External ear normal.   Eyes: Right eye exhibits no discharge. Left eye exhibits no discharge.   Neck: No JVD present. No thyromegaly present.   Cardiovascular: Normal rate, regular rhythm, normal heart sounds and intact distal pulses. Exam reveals no gallop and no " friction rub.   No murmur heard.  Pulmonary/Chest: Breath sounds normal. No respiratory distress.   Abdominal: Bowel sounds are normal. He exhibits no distension. There is no tenderness.   Musculoskeletal:         General: No tenderness or edema.   Neurological: He is alert and oriented to person, place, and time. No cranial nerve deficit.   Skin: Skin is warm and dry. He is not diaphoretic.   Psychiatric: He has a normal mood and affect. His behavior is normal.   Nursing note and vitals reviewed.      Assessment:     1. ACC/AHA stage C heart failure with preserved ejection fraction (HCC)  spironolactone (ALDACTONE) 25 MG Tab   2. Left ventricular diastolic dysfunction, NYHA class 1     3. HTN (hypertension), malignant     4. Mixed hyperlipidemia  LIPID PANEL    Comp Metabolic Panel   5. Research study patient     6. High risk medication use     7. Left ventricular diastolic dysfunction, NYHA class 2  spironolactone (ALDACTONE) 25 MG Tab   8. Essential hypertension  labetalol (NORMODYNE) 200 MG Tab       Medical Decision Making:  Today's Assessment / Status / Plan:   At this time patient is clinically stable in terms of his cardiac standpoint.  Blood pressure is well controlled.  Prefer no meds for triglyceride at this time. Will try to diet and exercise.  Continue current medications at current dose as above. Refills were prescribed today and patient was instructed with plan of care.    I will see patient back in clinic with lab tests and studies results in 12 months.    I thank you for referring patient to our Cardiology Clinic today.

## 2020-01-07 ENCOUNTER — TELEPHONE (OUTPATIENT)
Dept: CARDIOLOGY | Facility: MEDICAL CENTER | Age: 65
End: 2020-01-07

## 2020-01-07 NOTE — TELEPHONE ENCOUNTER
Perspective Clinical Trial Visit 206-month 15  Spoke with Chema today, 1/7/2020.  He reports no new AE/DIANA's since last study visit.  Conmeds were reviewed and updated.  He reports haven taken Omeprazole 20mg PRN starting 4/26/2019 and stopping 6/5/2019 due to GERD. He feels the GERD symptoms have been off and on since Februrary 2018 when he started many new medications.    Chema states he is continuing to take study medication as directed- 1 tablet from each bottle once in the morning and once in the evening, 12 hours apart. If he misses a dose to take it as soon as he remembers but to skip it if it is too close to the next dose, then return to his regular schedule.    Next study visit planned for April 4th.  Chema instructed to bring all study medications with him to this appointment and to call with any questions.

## 2020-03-16 ENCOUNTER — TELEPHONE (OUTPATIENT)
Dept: CARDIOLOGY | Facility: MEDICAL CENTER | Age: 65
End: 2020-03-16

## 2020-03-26 NOTE — TELEPHONE ENCOUNTER
I talked to Chema 3/16/2020 For the Perspective study. He is scheduled for 4/8/2020 for a Pet Scan at [facility]. I called [facility] and he is still on the schedule. I told Chema we will keep that appointment for now. I will follow up with [facility] and Chema when we are closer to the date to see if the Pet Scan has to be rescheduled due to Covid-19. He will need to be scheduled for a study appt and an MRI Brain once staff is allowed back onsite.

## 2020-03-31 NOTE — TELEPHONE ENCOUNTER
I talked to Chema 3/16/2020 For the Perspective study. He is scheduled for 4/8/2020 for a Pet Scan at [facility]. I called [facility] and he is still on the schedule. I told Chema we will keep that appointment for now. I will follow up with [facility] and Chema when we are closer to the date to see if the Pet Scan has to be rescheduled due to Covid-19. He will need to be scheduled for a study appt and an MRI Brain once staff is allowed back onsite.    3/31/2020  Canceled Pet Scan at [facility] today per Chema's request. He left me a voice msg. He would do it if we needed him to but since this is an elective procedure and with the current Covid-19 situation, I canceled the procedure at [facility]. Will reschedule it when we are allowed back in the office. I left Fely message informing him of this and to see how he is with SD. I gave him several options, picking it up out front of the hospital, mailing or meeting somewhere. Waiting to hear back from him.     3/31/2020  Chema called back and said he has about 50 days left of SD. I told him I will check in with him towards the end of April to see how he is and how much SD.

## 2020-04-28 ENCOUNTER — TELEPHONE (OUTPATIENT)
Dept: CARDIOLOGY | Facility: MEDICAL CENTER | Age: 65
End: 2020-04-28

## 2020-04-28 NOTE — TELEPHONE ENCOUNTER
Called Chema to check up up on how he is doing and to see how much study drug he has left. Due to Covid 19 we are still off site and not able to do Perspective Study Visit 207 at this time. I left a message for him to call my cell to discuss options to get study drug to him.

## 2020-05-06 ENCOUNTER — DOCUMENTATION (OUTPATIENT)
Dept: CARDIOLOGY | Facility: MEDICAL CENTER | Age: 65
End: 2020-05-06

## 2020-05-06 NOTE — PROGRESS NOTES
Perspective Clinical Trial:  Study visit 207, study medication dispensed.  No vital signs collected.  No other assessments completed.  Patient returned used study medications, these bottles returned to Research Pharmacist.  Patient encouraged to contact study staff with questions or concerns.

## 2020-07-06 ENCOUNTER — HOSPITAL ENCOUNTER (OUTPATIENT)
Dept: RADIOLOGY | Facility: MEDICAL CENTER | Age: 65
End: 2020-07-06
Attending: INTERNAL MEDICINE | Admitting: INTERNAL MEDICINE

## 2020-07-06 DIAGNOSIS — Z00.6 RESEARCH EXAM: ICD-10-CM

## 2020-07-06 PROCEDURE — 70551 MRI BRAIN STEM W/O DYE: CPT

## 2020-07-20 ENCOUNTER — TELEPHONE (OUTPATIENT)
Dept: CARDIOLOGY | Facility: MEDICAL CENTER | Age: 65
End: 2020-07-20

## 2020-07-20 NOTE — TELEPHONE ENCOUNTER
Called Chema for the Perspective Study, visit 208 telephone visit. Nury reviewed, no changes, per Chema. No AE/SAEs to report. Next visit will be 7/22/2020 for V207. Due to Covid 19, visits  are out of order as visit 207 requires testing that wasn't able to be done earlier due to Covid 19 restrictions in place.

## 2020-07-23 ENCOUNTER — DOCUMENTATION (OUTPATIENT)
Dept: CARDIOLOGY | Facility: MEDICAL CENTER | Age: 65
End: 2020-07-23

## 2020-07-23 NOTE — PROGRESS NOTES
Perspective Clinical Trial Study Visit 207:  Chema mandel for study visit 207.  All study assessments completed per Protocol.  Questionnaires FAQ, CSSRS, and GDS completed.  Chefs [employer] ipad assessments completed. Weight 225 pounds.  Blood pressure right arm 139/94, pulse 70.  Dr. Flip mandel for protocol assessments.  No study drug prescribed as it was prescribed May 6th, 2020. He returned four empty bottles of study drug from the May 6, 2020 dispense, bottles given to Pharmacy.  Medication list reviewed, states he stopped Labetalol about six months ago. MRI completed July 6, 2020.  PET scan scheduled for August 13, 2020.  VIsit 208 is telephone contact, visit complete.  Next study visit 209 will be in October, 2020.

## 2020-08-13 ENCOUNTER — HOSPITAL ENCOUNTER (OUTPATIENT)
Dept: HOSPITAL 8 - PETCFH | Age: 65
Discharge: HOME | End: 2020-08-13
Attending: INTERNAL MEDICINE
Payer: COMMERCIAL

## 2020-08-13 ENCOUNTER — APPOINTMENT (OUTPATIENT)
Dept: VASCULAR LAB | Facility: MEDICAL CENTER | Age: 65
End: 2020-08-13
Payer: COMMERCIAL

## 2020-08-13 DIAGNOSIS — Z00.6: Primary | ICD-10-CM

## 2020-08-18 NOTE — RESEARCH NOTE
Saw Chema 8/13/2020 at [facility] to check him in for the Perspective Study V207 Pet Scan. I verified with the the  that he was checked in as research and no insurance or copay was collected. I told Chema to call me or Jenny immediately should he receive a bill for any service that was done today. I left when he went back for the test. Gladys did fax to me the Pet Acquisition, Pet Imaging Data Transfer and Study Site Reporting forms.

## 2020-09-21 ENCOUNTER — IMMUNIZATION (OUTPATIENT)
Dept: SOCIAL WORK | Facility: CLINIC | Age: 65
End: 2020-09-21
Payer: COMMERCIAL

## 2020-09-21 DIAGNOSIS — Z23 NEED FOR VACCINATION: ICD-10-CM

## 2020-09-21 PROCEDURE — 90686 IIV4 VACC NO PRSV 0.5 ML IM: CPT | Performed by: REGISTERED NURSE

## 2020-09-21 PROCEDURE — 90471 IMMUNIZATION ADMIN: CPT | Performed by: REGISTERED NURSE

## 2020-10-26 ENCOUNTER — PHARMACY VISIT (OUTPATIENT)
Dept: PHARMACY | Facility: MEDICAL CENTER | Age: 65
End: 2020-10-26
Payer: COMMERCIAL

## 2020-10-26 ENCOUNTER — OFFICE VISIT (OUTPATIENT)
Dept: CARDIOLOGY | Facility: MEDICAL CENTER | Age: 65
End: 2020-10-26
Payer: COMMERCIAL

## 2020-10-26 ENCOUNTER — HOSPITAL ENCOUNTER (OUTPATIENT)
Dept: LAB | Facility: MEDICAL CENTER | Age: 65
End: 2020-10-26
Attending: INTERNAL MEDICINE

## 2020-10-26 DIAGNOSIS — I50.30 ACC/AHA STAGE C HEART FAILURE WITH PRESERVED EJECTION FRACTION (HCC): ICD-10-CM

## 2020-10-26 PROCEDURE — 99999 PR NO CHARGE: CPT | Performed by: INTERNAL MEDICINE

## 2020-10-26 PROCEDURE — RXMED WILLOW AMBULATORY MEDICATION CHARGE: Performed by: INTERNAL MEDICINE

## 2020-10-26 NOTE — PROGRESS NOTES
Perspective Study visit number:  209    Study Drug medication dosage; No change       NYHA Class:  1    Angioedema assessment:   Absent    General Appearance:  Normal            Comments:     Physical Assessment Signs and symptoms of Heart Failure:        Paroxysmal nocturnal dyspnea:  Absent     Dyspnea at rest:   Absent      Dyspnea on effort:  Absent               If present:  Mild   Moderate   Severe     Fatigue:   Absent     Orthopnea:  Absent              If present:   1 pillow    2 pillows   > 2 pillows     Jugular venous distention:   Absent             If present   < 6cm     6 to 10 cm    > 10 cm     Edema:  Absent              If present:    trace     1+   2+   3+             If present location:     feet   ankles  lower legs  thighs     Rales:   Absent             If present:   <1/3    1/3 to 2/3    >2/3    Comments

## 2020-10-27 ENCOUNTER — RESEARCH ENCOUNTER (OUTPATIENT)
Dept: CARDIOLOGY | Facility: MEDICAL CENTER | Age: 65
End: 2020-10-27

## 2020-10-27 VITALS
DIASTOLIC BLOOD PRESSURE: 75 MMHG | SYSTOLIC BLOOD PRESSURE: 113 MMHG | HEART RATE: 60 BPM | WEIGHT: 226.6 LBS | BODY MASS INDEX: 36.57 KG/M2

## 2020-10-27 ASSESSMENT — FIBROSIS 4 INDEX: FIB4 SCORE: 1.61

## 2020-10-27 NOTE — RESEARCH NOTE
Perspective Clinical Trial Visit 209: Patient in for visit 209 0900 Monday October 26, 2020. Blood pressure and weight collected at 1000 10-. Assessments completed per Protocol. Medication list reviewed.  IVRS completed.  AE review, none noted. Cognitive assessments completed. Labs processed per protocol.. Dr. Ruffin, Sub PI in for physical exam and NYHA classification.  Please see his note. Study medication returned, new study medication prescribed, instructions provided.  Next study appointment Visit 210 end of December 2020.

## 2020-12-15 ENCOUNTER — HOSPITAL ENCOUNTER (OUTPATIENT)
Dept: LAB | Facility: MEDICAL CENTER | Age: 65
End: 2020-12-15
Attending: INTERNAL MEDICINE
Payer: COMMERCIAL

## 2020-12-15 DIAGNOSIS — E78.2 MIXED HYPERLIPIDEMIA: ICD-10-CM

## 2020-12-15 LAB
ALBUMIN SERPL BCP-MCNC: 4.5 G/DL (ref 3.2–4.9)
ALBUMIN/GLOB SERPL: 2 G/DL
ALP SERPL-CCNC: 65 U/L (ref 30–99)
ALT SERPL-CCNC: 26 U/L (ref 2–50)
ANION GAP SERPL CALC-SCNC: 7 MMOL/L (ref 7–16)
AST SERPL-CCNC: 22 U/L (ref 12–45)
BILIRUB SERPL-MCNC: 0.9 MG/DL (ref 0.1–1.5)
BUN SERPL-MCNC: 25 MG/DL (ref 8–22)
CALCIUM SERPL-MCNC: 9.2 MG/DL (ref 8.5–10.5)
CHLORIDE SERPL-SCNC: 105 MMOL/L (ref 96–112)
CHOLEST SERPL-MCNC: 186 MG/DL (ref 100–199)
CO2 SERPL-SCNC: 23 MMOL/L (ref 20–33)
CREAT SERPL-MCNC: 1.61 MG/DL (ref 0.5–1.4)
FASTING STATUS PATIENT QL REPORTED: NORMAL
GLOBULIN SER CALC-MCNC: 2.2 G/DL (ref 1.9–3.5)
GLUCOSE SERPL-MCNC: 101 MG/DL (ref 65–99)
HDLC SERPL-MCNC: 59 MG/DL
LDLC SERPL CALC-MCNC: 96 MG/DL
POTASSIUM SERPL-SCNC: 4.6 MMOL/L (ref 3.6–5.5)
PROT SERPL-MCNC: 6.7 G/DL (ref 6–8.2)
SODIUM SERPL-SCNC: 135 MMOL/L (ref 135–145)
TRIGL SERPL-MCNC: 157 MG/DL (ref 0–149)

## 2020-12-15 PROCEDURE — 36415 COLL VENOUS BLD VENIPUNCTURE: CPT

## 2020-12-15 PROCEDURE — 80053 COMPREHEN METABOLIC PANEL: CPT

## 2020-12-15 PROCEDURE — 80061 LIPID PANEL: CPT

## 2021-01-05 ENCOUNTER — OFFICE VISIT (OUTPATIENT)
Dept: CARDIOLOGY | Facility: MEDICAL CENTER | Age: 66
End: 2021-01-05
Payer: COMMERCIAL

## 2021-01-05 VITALS
SYSTOLIC BLOOD PRESSURE: 126 MMHG | HEART RATE: 75 BPM | WEIGHT: 220 LBS | OXYGEN SATURATION: 90 % | BODY MASS INDEX: 35.36 KG/M2 | RESPIRATION RATE: 18 BRPM | HEIGHT: 66 IN | DIASTOLIC BLOOD PRESSURE: 80 MMHG

## 2021-01-05 DIAGNOSIS — I50.30 ACC/AHA STAGE C HEART FAILURE WITH PRESERVED EJECTION FRACTION (HCC): ICD-10-CM

## 2021-01-05 DIAGNOSIS — N18.32 STAGE 3B CHRONIC KIDNEY DISEASE: ICD-10-CM

## 2021-01-05 DIAGNOSIS — Z79.899 HIGH RISK MEDICATION USE: ICD-10-CM

## 2021-01-05 DIAGNOSIS — I10 HTN (HYPERTENSION), MALIGNANT: ICD-10-CM

## 2021-01-05 DIAGNOSIS — E78.2 MIXED HYPERLIPIDEMIA: ICD-10-CM

## 2021-01-05 DIAGNOSIS — I10 ESSENTIAL HYPERTENSION: ICD-10-CM

## 2021-01-05 DIAGNOSIS — I51.89 LEFT VENTRICULAR DIASTOLIC DYSFUNCTION, NYHA CLASS 2: ICD-10-CM

## 2021-01-05 DIAGNOSIS — I51.89 LEFT VENTRICULAR DIASTOLIC DYSFUNCTION, NYHA CLASS 1: ICD-10-CM

## 2021-01-05 PROCEDURE — 99214 OFFICE O/P EST MOD 30 MIN: CPT | Performed by: INTERNAL MEDICINE

## 2021-01-05 RX ORDER — AMLODIPINE BESYLATE 10 MG/1
10 TABLET ORAL DAILY
Qty: 90 TAB | Refills: 3 | Status: SHIPPED | OUTPATIENT
Start: 2021-01-05 | End: 2022-03-30

## 2021-01-05 RX ORDER — LABETALOL 200 MG/1
400 TABLET, FILM COATED ORAL 2 TIMES DAILY
Qty: 360 TAB | Refills: 3 | Status: SHIPPED
Start: 2021-01-05 | End: 2021-07-13

## 2021-01-05 RX ORDER — SPIRONOLACTONE 25 MG/1
25 TABLET ORAL DAILY
Qty: 90 TAB | Refills: 3 | Status: SHIPPED | OUTPATIENT
Start: 2021-01-05 | End: 2022-03-30

## 2021-01-05 ASSESSMENT — ENCOUNTER SYMPTOMS
BLOOD IN STOOL: 0
HEADACHES: 0
DOUBLE VISION: 0
CHILLS: 0
SPEECH CHANGE: 0
BRUISES/BLEEDS EASILY: 0
PND: 0
PALPITATIONS: 0
SHORTNESS OF BREATH: 0
FEVER: 0
SENSORY CHANGE: 0
WEIGHT LOSS: 0
NAUSEA: 0
CLAUDICATION: 0
DEPRESSION: 0
VOMITING: 0
MYALGIAS: 0
BLURRED VISION: 0
EYE PAIN: 0
DIZZINESS: 0
LOSS OF CONSCIOUSNESS: 0
FALLS: 0
HALLUCINATIONS: 0
ORTHOPNEA: 0
ABDOMINAL PAIN: 0
COUGH: 0
EYE DISCHARGE: 0

## 2021-01-05 ASSESSMENT — FIBROSIS 4 INDEX: FIB4 SCORE: 1.63

## 2021-01-05 NOTE — PROGRESS NOTES
Chief Complaint   Patient presents with   • Congestive Heart Failure     ACC/AHA stage C heart failure with preserved ejection fraction (HCC)       Subjective:   Chema Sarabia is a 65 y.o. male who presents today for cardiac care and evaluation in our heart failure clinic after his recent episode of hypertensive emergency for which he went into heart failure exacerbation as well. Patient does have documented transthoracic echocardiogram which showed relatively preserved LV function of 55%. No significant valvular disease.     Feeling very well. No dyspnea.     At prior visit, patient was able to complete 549 m during his 6 minute walk test. his O2 saturation at baseline was 94% and at the end of the test, the O2 saturation was 95%. he reported 0 level of dyspnea on Jim scale.     06/06/2019 Patient was able to complete 549 m during his 6 minute walk test. his O2 saturation at baseline was 95% and at the end of the test, the O2 saturation was 93%. he reported 1 level of dyspnea on Jim scale.    I have independently interpreted and reviewed blood tests results with patient in clinic which shows fine LDL level 96, with elevated triglycerides 157 but down from 267, stable renal (GFR of 43) and liver function.    Past Medical History:   Diagnosis Date   • Acute kidney injury (HCC)    • Hypertensive urgency    • Nephrolithiasis      No past surgical history on file.  Family History   Problem Relation Age of Onset   • Cancer Mother         lungs, smoker   • Hypertension Father    • Hyperlipidemia Father    • Diabetes Neg Hx    • Heart Disease Neg Hx      Social History     Socioeconomic History   • Marital status: Single     Spouse name: Not on file   • Number of children: Not on file   • Years of education: Not on file   • Highest education level: Not on file   Occupational History   • Not on file   Social Needs   • Financial resource strain: Not on file   • Food insecurity     Worry: Not on file     Inability: Not  on file   • Transportation needs     Medical: Not on file     Non-medical: Not on file   Tobacco Use   • Smoking status: Never Smoker   • Smokeless tobacco: Never Used   Substance and Sexual Activity   • Alcohol use: Yes     Alcohol/week: 2.4 oz     Types: 4 Glasses of wine per week   • Drug use: No   • Sexual activity: Not on file   Lifestyle   • Physical activity     Days per week: Not on file     Minutes per session: Not on file   • Stress: Not on file   Relationships   • Social connections     Talks on phone: Not on file     Gets together: Not on file     Attends Alevism service: Not on file     Active member of club or organization: Not on file     Attends meetings of clubs or organizations: Not on file     Relationship status: Not on file   • Intimate partner violence     Fear of current or ex partner: Not on file     Emotionally abused: Not on file     Physically abused: Not on file     Forced sexual activity: Not on file   Other Topics Concern   • Not on file   Social History Narrative   • Not on file     No Known Allergies  Outpatient Encounter Medications as of 1/5/2021   Medication Sig Dispense Refill   • spironolactone (ALDACTONE) 25 MG Tab Take 1 Tab by mouth every day. 90 Tab 3   • amLODIPine (NORVASC) 10 MG Tab Take 1 Tab by mouth every day. 90 Tab 3   • labetalol (NORMODYNE) 200 MG Tab Take 2 Tabs by mouth 2 times a day. 360 Tab 3   • XCV476 or PLACEBO (STUDY DRUG) 200 mg tablet Take 1 Tab by mouth 2 Times a Day. For investigational use only. 420 Tab 2   • valsartan or PLACEBO (STUDY DRUG) 160 MG tablet Take 1 Tab by mouth 2 Times a Day. For investigational use only. 420 Tab 2   • vitamin D, Ergocalciferol, (DRISDOL) 15376 units Cap capsule Take 1 Cap by mouth every 7 days. 12 Cap 1   • omeprazole (PRILOSEC) 20 MG delayed-release capsule Take 1 Cap by mouth every day. 90 Cap 1   • multivitamin (THERAGRAN) Tab Take 1 Tab by mouth every day.     • [DISCONTINUED] spironolactone (ALDACTONE) 25 MG Tab  "Take 1 Tab by mouth every day. 90 Tab 3   • [DISCONTINUED] labetalol (NORMODYNE) 200 MG Tab Take 2 Tabs by mouth 2 times a day. 360 Tab 3   • [DISCONTINUED] amLODIPine (NORVASC) 10 MG Tab Take 1 Tab by mouth every day. 90 Tab 3   • vitamin D, Ergocalciferol, (DRISDOL) 64215 units Cap capsule Take 1 Cap by mouth every 7 days. (Patient not taking: Reported on 1/5/2021) 12 Cap 3     No facility-administered encounter medications on file as of 1/5/2021.      Review of Systems   Constitutional: Negative for chills, fever, malaise/fatigue and weight loss.   HENT: Negative for ear discharge, ear pain, hearing loss and nosebleeds.    Eyes: Negative for blurred vision, double vision, pain and discharge.   Respiratory: Negative for cough and shortness of breath.    Cardiovascular: Negative for chest pain, palpitations, orthopnea, claudication, leg swelling and PND.   Gastrointestinal: Negative for abdominal pain, blood in stool, melena, nausea and vomiting.   Genitourinary: Negative for dysuria and hematuria.   Musculoskeletal: Negative for falls, joint pain and myalgias.   Skin: Negative for itching and rash.   Neurological: Negative for dizziness, sensory change, speech change, loss of consciousness and headaches.   Endo/Heme/Allergies: Negative for environmental allergies. Does not bruise/bleed easily.   Psychiatric/Behavioral: Negative for depression, hallucinations and suicidal ideas.        Objective:   /80 (BP Location: Left arm, Patient Position: Sitting, BP Cuff Size: Adult)   Pulse 75   Resp 18   Ht 1.676 m (5' 6\")   Wt 99.8 kg (220 lb)   SpO2 90%   BMI 35.51 kg/m²     Physical Exam   Constitutional: He is oriented to person, place, and time. No distress.   HENT:   Head: Normocephalic and atraumatic.   Right Ear: External ear normal.   Left Ear: External ear normal.   Eyes: Right eye exhibits no discharge. Left eye exhibits no discharge.   Neck: No JVD present. No thyromegaly present.   Cardiovascular: " Normal rate, regular rhythm and intact distal pulses.   Ausculation was not performed to minimize the risk of COVID spread during current pandemic. This complies with Medicare policies and guidelines.     Pulmonary/Chest: No respiratory distress.   Abdominal: He exhibits no distension. There is no abdominal tenderness.   Musculoskeletal:         General: No edema.   Neurological: He is alert and oriented to person, place, and time. No cranial nerve deficit.   Skin: Skin is warm and dry. He is not diaphoretic.   Psychiatric: He has a normal mood and affect. His behavior is normal.   Nursing note and vitals reviewed.      Assessment:     1. ACC/AHA stage C heart failure with preserved ejection fraction (HCC)  spironolactone (ALDACTONE) 25 MG Tab   2. Left ventricular diastolic dysfunction, NYHA class 1     3. HTN (hypertension), malignant     4. Mixed hyperlipidemia     5. High risk medication use     6. Stage 3b chronic kidney disease     7. Left ventricular diastolic dysfunction, NYHA class 2  spironolactone (ALDACTONE) 25 MG Tab   8. Essential hypertension  labetalol (NORMODYNE) 200 MG Tab       Medical Decision Making:  Today's Assessment / Status / Plan:   At this time patient is clinically stable in terms of his cardiac standpoint.  Blood pressure is well controlled.  Prefer no meds still for triglyceride at this time. Will try to diet and exercise more.  Continue current medications at current dose as above. Refills were prescribed today and patient was instructed with plan of care.    I will see patient back in clinic with lab tests and studies results in 12 months.    I thank you for referring patient to our Cardiology Clinic today.

## 2021-01-06 ENCOUNTER — TELEPHONE (OUTPATIENT)
Dept: CARDIOLOGY | Facility: MEDICAL CENTER | Age: 66
End: 2021-01-06

## 2021-01-06 ENCOUNTER — RESEARCH ENCOUNTER (OUTPATIENT)
Dept: CARDIOLOGY | Facility: MEDICAL CENTER | Age: 66
End: 2021-01-06

## 2021-01-06 NOTE — TELEPHONE ENCOUNTER
Noted. Rx already sent by [facility] yesterday, will keep it on file so pt can  when he does eventually run out.

## 2021-01-06 NOTE — TELEPHONE ENCOUNTER
[facility]    Patient called to advise they have 2 big bottles of labetalol (NORMODYNE) 200 MG Tab and no longer needs the medication at this time. If you have any questions you can reach the Pt at [phone].    Thank you,  Tana RUBIO

## 2021-01-06 NOTE — RESEARCH NOTE
Name: Chema Sarabia   MRN: [id number]  Participant ID: [id number]  : 1955  Visit Date/Time: 2021 9:06 AM  Who is present: Tamie/Chema    Study:    [id number] - Perspective / [id number]   Status Consented/Enrolled   Start Date 20   Coordinator Tamie Choudhury; Erica Spence   IRB [id number]   NCT [id number]    Santosh Castelan M.D.     Telephone visit with Chema for the Perspective study V-210. Medications were reviewed. Labetalol 400 mg BID was added 2021. No AE/DIANA to report. Next visit, V211 is due around 3/30/2021.

## 2021-03-30 ENCOUNTER — RESEARCH ENCOUNTER (OUTPATIENT)
Dept: VASCULAR LAB | Facility: MEDICAL CENTER | Age: 66
End: 2021-03-30
Payer: COMMERCIAL

## 2021-03-30 ENCOUNTER — PHARMACY VISIT (OUTPATIENT)
Dept: PHARMACY | Facility: MEDICAL CENTER | Age: 66
End: 2021-03-30

## 2021-03-30 PROCEDURE — RXMED WILLOW AMBULATORY MEDICATION CHARGE: Performed by: INTERNAL MEDICINE

## 2021-03-31 VITALS — DIASTOLIC BLOOD PRESSURE: 93 MMHG | SYSTOLIC BLOOD PRESSURE: 126 MMHG | HEART RATE: 73 BPM

## 2021-03-31 NOTE — RESEARCH NOTE
Name: Chema Sarabia   MRN: [id number]  Participant ID: [id number]      : 1955  Visit Date/Time: 3/30/2021 11:00 AM  Who is present: Research coordinators    Study:    [id number] - Perspective / [id number]   Status Consented/Enrolled   Start Date 20   Coordinator Tamie Choudhury; English Erica ALBA   IRB [id number]   NCT [id number]    Santosh Castelan M.D.     Follow-up: Visit 212 phone call due 2021    Saw Chema 3/30/2021 for Perspective study V211. All cognitive assessments were preformed prior to other clinical assessments. FAQ, CSSRS & GDS were reviewed for AEs. No AEs to report.  Vitals were done after resting for 5 minutes. /93, HR 73, Weight 103.1 kg.  Con meds reviewed and updated. Stated labetalol he had not started.   Lab work was drawn, processed and shipped per protocol.  IVRS was done. 12 bottles were dispensed. 8 bottles were returned. 4 returned bottles had no tabs, 2 returned bottles had 70 tabs and 2 returned bottles has 66 tabs. 4 bottles not returned. Drug compliance can not be done due to missing bottles.   No additional AEs to report.  HF assessment was done by Dr Castelan.

## 2021-04-21 ENCOUNTER — IMMUNIZATION (OUTPATIENT)
Dept: FAMILY PLANNING/WOMEN'S HEALTH CLINIC | Facility: IMMUNIZATION CENTER | Age: 66
End: 2021-04-21
Payer: COMMERCIAL

## 2021-04-21 DIAGNOSIS — Z23 ENCOUNTER FOR VACCINATION: Primary | ICD-10-CM

## 2021-04-21 PROCEDURE — 0001A PFIZER SARS-COV-2 VACCINE: CPT | Performed by: INTERNAL MEDICINE

## 2021-04-21 PROCEDURE — 91300 PFIZER SARS-COV-2 VACCINE: CPT | Performed by: INTERNAL MEDICINE

## 2021-05-14 ENCOUNTER — IMMUNIZATION (OUTPATIENT)
Dept: FAMILY PLANNING/WOMEN'S HEALTH CLINIC | Facility: IMMUNIZATION CENTER | Age: 66
End: 2021-05-14
Payer: COMMERCIAL

## 2021-05-14 DIAGNOSIS — Z23 ENCOUNTER FOR VACCINATION: Primary | ICD-10-CM

## 2021-05-14 PROCEDURE — 0002A PFIZER SARS-COV-2 VACCINE: CPT

## 2021-05-14 PROCEDURE — 91300 PFIZER SARS-COV-2 VACCINE: CPT

## 2021-07-08 ENCOUNTER — TELEPHONE (OUTPATIENT)
Dept: CARDIOLOGY | Facility: MEDICAL CENTER | Age: 66
End: 2021-07-08

## 2021-07-08 NOTE — TELEPHONE ENCOUNTER
"Tamie Choudhury, Med Ass't  SUSAN Alford   Chema is part of the Perspective study with [employer]rest[employer]/Placebo With Dr Castelan. I just called him and he mentioned to me that about 2-3 weeks ago on a Sunday when it was really hot, he was having a hard time with language, finding the right words to use, getting sentences out. He thought he may be dehydrated and drank some Gatorade and water. This seemed to help. Since then his words seem to get messed like yesterday had a hard time saying/remembering the word \"area\".   Not sure if this is something to worry about. I told Chema I would let Dr Castelan's RN know and that you may call him to further clarify.   Thank you,   Tamie, research assistant   -------------------------------------------------------------------------------------------------------------    LVM asking pt to call back to further discuss.   "

## 2021-07-09 ENCOUNTER — TELEPHONE (OUTPATIENT)
Dept: CARDIOLOGY | Facility: MEDICAL CENTER | Age: 66
End: 2021-07-09

## 2021-07-09 NOTE — TELEPHONE ENCOUNTER
Pt returned call. Tried reaching nurse, but unavailable. Please call Pt back at [phone].    Thank you

## 2021-07-09 NOTE — TELEPHONE ENCOUNTER
Talked to Chema on the phone 7/8/2021 for the Perspective Study V212. Con meds reviewed. No hospital or doctor visits done. IRT was completed. Will work on scheduling MRI & PET for EOS/V299.

## 2021-07-09 NOTE — TELEPHONE ENCOUNTER
Returned call. Pt states about 2-3 weeks ago he was having trouble finding the right words, he also experienced tingling in both hands at this time. He said that was around the time we had our first wave of hot weather and wonders if it had to do with that. Pt states he has felt better the last few days and finding words isn't as bad, but is still occurring here and there. He denies numbness, facial asymmetry, elevated HR/irrgeularity, and CP. With pt still having some trouble finding words, I recommended that he go to the ER to be evaluated, but the pt refused. He will call his PCP on Monday to inform her and see if she wants to order any testing. Pt was advised that if his symptoms worsen or any other FAST symptoms occur to go to the ER immediately.

## 2021-07-13 ENCOUNTER — OFFICE VISIT (OUTPATIENT)
Dept: MEDICAL GROUP | Age: 66
End: 2021-07-13
Payer: COMMERCIAL

## 2021-07-13 VITALS
BODY MASS INDEX: 35.36 KG/M2 | WEIGHT: 220 LBS | OXYGEN SATURATION: 94 % | DIASTOLIC BLOOD PRESSURE: 70 MMHG | TEMPERATURE: 98.7 F | HEIGHT: 66 IN | SYSTOLIC BLOOD PRESSURE: 122 MMHG | HEART RATE: 81 BPM

## 2021-07-13 DIAGNOSIS — E66.9 OBESITY (BMI 30-39.9): ICD-10-CM

## 2021-07-13 DIAGNOSIS — I51.89 DIASTOLIC DYSFUNCTION: ICD-10-CM

## 2021-07-13 DIAGNOSIS — E78.5 DYSLIPIDEMIA: ICD-10-CM

## 2021-07-13 DIAGNOSIS — R53.83 FATIGUE, UNSPECIFIED TYPE: ICD-10-CM

## 2021-07-13 DIAGNOSIS — I35.1 MODERATE AORTIC INSUFFICIENCY: ICD-10-CM

## 2021-07-13 DIAGNOSIS — K21.00 GASTROESOPHAGEAL REFLUX DISEASE WITH ESOPHAGITIS, UNSPECIFIED WHETHER HEMORRHAGE: ICD-10-CM

## 2021-07-13 DIAGNOSIS — Z00.6 RESEARCH STUDY PATIENT: ICD-10-CM

## 2021-07-13 DIAGNOSIS — Z00.00 ANNUAL PHYSICAL EXAM: ICD-10-CM

## 2021-07-13 DIAGNOSIS — Z11.59 NEED FOR HEPATITIS C SCREENING TEST: ICD-10-CM

## 2021-07-13 DIAGNOSIS — Z12.5 SCREENING FOR MALIGNANT NEOPLASM OF PROSTATE: ICD-10-CM

## 2021-07-13 DIAGNOSIS — I10 ESSENTIAL HYPERTENSION: ICD-10-CM

## 2021-07-13 DIAGNOSIS — I50.30 ACC/AHA STAGE C HEART FAILURE WITH PRESERVED EJECTION FRACTION (HCC): ICD-10-CM

## 2021-07-13 DIAGNOSIS — E55.9 HYPOVITAMINOSIS D: ICD-10-CM

## 2021-07-13 DIAGNOSIS — N18.30 STAGE 3 CHRONIC KIDNEY DISEASE, UNSPECIFIED WHETHER STAGE 3A OR 3B CKD: ICD-10-CM

## 2021-07-13 DIAGNOSIS — Z00.00 HEALTH CARE MAINTENANCE: ICD-10-CM

## 2021-07-13 DIAGNOSIS — N28.1 ACQUIRED RENAL CYST OF RIGHT KIDNEY: ICD-10-CM

## 2021-07-13 DIAGNOSIS — I11.9 MALIGNANT HYPERTENSIVE HEART DISEASE WITHOUT CONGESTIVE HEART FAILURE: ICD-10-CM

## 2021-07-13 PROBLEM — E66.811 OBESITY (BMI 30.0-34.9): Status: RESOLVED | Noted: 2018-02-14 | Resolved: 2021-07-13

## 2021-07-13 PROCEDURE — 99214 OFFICE O/P EST MOD 30 MIN: CPT | Performed by: INTERNAL MEDICINE

## 2021-07-13 ASSESSMENT — PATIENT HEALTH QUESTIONNAIRE - PHQ9: CLINICAL INTERPRETATION OF PHQ2 SCORE: 0

## 2021-07-13 NOTE — PROGRESS NOTES
CHIEF COMPLAINT  Annual  Hypertension, need labs orders    HPI  Chema Sarabia is a 65 y.o. male who presents today for the following     HCM  Colonoscopy/colon cancer screen:  UTD     Immunizations:  TdaP:   UTD  Pneumonia: UTD  Shingles:  DUE  Covid: UTD    Hypertension, hypertensive heart disease  Meds: Amlodipine 10 mg daily, labetalol 200 mg twice daily, spironolactone 25 mg daily, taking as prescribed.   Measuring BP at home: yes, no. It has been < 130/80  Denies: - headaches, vision problems, tinnitus.  - chest pain/pressure, palpitations, irregular heart beats, exertional, dyspnea, peripheral edema.  - medication side effect: unusual fatigue, slow heartbeat, foot/leg swelling, cough.  Low salt diet: Y  Diet: Improved  Exercise: improved  BMI: 35  FH of HTN: father renal panel showed CKD stage III, with the last GFR at 31.  He has been followed up by cardiology and nephrology 19 0 [facility].     Diastolic dysfunction with preserved ejection fraction, research study  Reviewed the last echocardiogram (2/15/18)  - showed relatively preserved LV function of 55%.  Denies:  -Palpitations, irregular heartbeat, chest pain or pressure  -Orthopnea, PND, lower extremity swelling.   He started [employer] experimental trial with 2 medications.  He has been followed up by cardiology     Moderate aortic insufficiency   by echocardiography  Denies chest pain, dizziness, lightheadedness, exertional dyspnea, syncope.  He has been followed up by cardiology.     Echocardiography from 2/15/18  No prior study is available for comparison.   Left ventricular systolic function is normal.  Left ventricular ejection fraction is visually estimated to be 55%.  Grade II diastolic dysfunction.  Mild mitral regurgitation.  Moderate aortic insufficiency.  Moderate tricuspid regurgitation.  Estimated right ventricular systolic pressure is 55 mmHg.  Mild pulmonic insufficiency.     CKD stage III  Stable.  The patient  has had stable CKD stage III, with abnormal creatinine and GFR, normal electrolytes.   He has not been on NSAIDs consistently, has had good fluid intake.  He has been followed up by nephrology.     Dyslipidemia  The patient had abnormal lipid panel, no medications.   Diet /Exercise/BMI: As above  FH: Father      Hypovitaminosis D, fatigue  The patient had low vitamin D level at 24.  C/o fatigue.  Vitamin D supplement: OTC.     OBESITY, Body mass index is 35.151  Diet: regular  Exercise: as above  No temperature intolerance. No change in hair/skin quality, BMs.   No HTN, buffalo hump, purple striae, flushing.  FH of obesity: yes    GERD  On omeprazole, 20 mg QD; takes medication as prescribed, that controls sx.   Denies:   - heartburn, epigastric pain.   -  nausea, vomiting, or diarrhea  - dysphagia  - abnormal cough  - blood in the stool or dark tarry stools.  - early satiety  - tobacco use.    Reviewed PMH, PSH, FH, SH, ALL, HCM/IMM, no changes  Reviewed MEDS, no changes    Patient Active Problem List    Diagnosis Date Noted   • Dyslipidemia 04/26/2019   • Gastroesophageal reflux disease 04/26/2019   • Acquired renal cyst of right kidney 10/19/2018   • Stage 3 chronic kidney disease (HCC) 04/20/2018   • Essential hypertension 04/20/2018   • [facility] Billing 04/19/2018   • Research study patient 04/09/2018   • Moderate aortic insufficiency 04/05/2018   • ACC/AHA stage C heart failure with preserved ejection fraction (HCC) 04/05/2018   • Health care maintenance 02/14/2018   • Obesity (BMI 30.0-34.9) 02/14/2018   • Heart disease, hypertensive, malignant 02/14/2018     CURRENT MEDICATIONS  Current Outpatient Medications   Medication Sig Dispense Refill   • spironolactone (ALDACTONE) 25 MG Tab Take 1 Tab by mouth every day. 90 Tab 3   • amLODIPine (NORVASC) 10 MG Tab Take 1 Tab by mouth every day. 90 Tab 3   • labetalol (NORMODYNE) 200 MG Tab Take 2 Tabs by mouth 2 times a day. (Patient not taking:  Reported on 3/31/2021) 360 Tab 3   • EEN255 or PLACEBO (STUDY DRUG) 200 mg tablet Take 1 Tab by mouth 2 Times a Day. For investigational use only. 420 Tab 2   • valsartan or PLACEBO (STUDY DRUG) 160 MG tablet Take 1 Tab by mouth 2 Times a Day. For investigational use only. 420 Tab 2   • vitamin D, Ergocalciferol, (DRISDOL) 89447 units Cap capsule Take 1 Cap by mouth every 7 days. (Patient not taking: Reported on 2021) 12 Cap 3   • vitamin D, Ergocalciferol, (DRISDOL) 51742 units Cap capsule Take 1 Cap by mouth every 7 days. 12 Cap 1   • omeprazole (PRILOSEC) 20 MG delayed-release capsule Take 1 Cap by mouth every day. (Patient not taking: Reported on 3/31/2021) 90 Cap 1   • multivitamin (THERAGRAN) Tab Take 1 Tab by mouth every day.       No current facility-administered medications for this visit.     ALLERGIES  Allergies: Patient has no known allergies.  PAST MEDICAL HISTORY  Past Medical History:   Diagnosis Date   • Acute kidney injury (HCC)    • Hypertensive urgency    • Nephrolithiasis      SURGICAL HISTORY  He  has no past surgical history on file.  SOCIAL HISTORY  Social History     Tobacco Use   • Smoking status: Never Smoker   • Smokeless tobacco: Never Used   Vaping Use   • Vaping Use: Never used   Substance Use Topics   • Alcohol use: Yes     Alcohol/week: 2.4 oz     Types: 4 Glasses of wine per week   • Drug use: No     Social History     Social History Narrative   • Not on file     FAMILY HISTORY  Family History   Problem Relation Age of Onset   • Cancer Mother         lungs, smoker   • Hypertension Father    • Hyperlipidemia Father    • Diabetes Neg Hx    • Heart Disease Neg Hx      Family Status   Relation Name Status   • Mo     • Fa     • Neg Hx  (Not Specified)     ROS   Constitutional: Negative for fever, chills.  HENT: Negative for congestion, sore throat.  Eyes: Negative for vision problems.   Respiratory: Negative for cough, shortness of breath.  Cardiovascular: Negative for  "chest pain, palpitations.   Gastrointestinal: Negative for heartburn, nausea, abdominal pain.   Genitourinary: Negative for dysuria.  Musculoskeletal: Negative for significant myalgia, back and joint pain.   Skin: Negative for rash.   Neuro: Negative for dizziness, weakness and headaches.   Endo/Heme/Allergies: Does not bruise/bleed easily.   Psychiatric/Behavioral: Negative for depression.    PHYSICAL EXAM   Blood Pressure 122/70 (BP Location: Right arm, Patient Position: Sitting, BP Cuff Size: Adult)   Pulse 81   Temperature 37.1 °C (98.7 °F) (Temporal)   Height 1.676 m (5' 6\")   Weight 99.8 kg (220 lb)   Oxygen Saturation 94%   Body Mass Index 35.51 kg/m²   General:  NAD, well appearing  HEENT:   NC/AT, PERRLA, EOMI.  Cardiovascular: unlabored breathing, no peripheral cyanosis or swelling.  Lungs:   no respiratory distress.  Abdomen: non- distended.  Extremities:  No LE swelling.  Skin:  Warm, dry.  No erythema. No rash.   Neurologic: Alert & oriented x 3. CN II-XII grossly intact. No focal deficits.  Psychiatric:  Affect normal, mood normal, judgment normal.    Labs     Labs are reviewed and discussed with a patient  Lab Results   Component Value Date/Time    CHOLSTRLTOT 186 12/15/2020 07:44 AM    LDL 96 12/15/2020 07:44 AM    HDL 59 12/15/2020 07:44 AM    TRIGLYCERIDE 157 (H) 12/15/2020 07:44 AM       Lab Results   Component Value Date/Time    SODIUM 135 12/15/2020 07:44 AM    POTASSIUM 4.6 12/15/2020 07:44 AM    CHLORIDE 105 12/15/2020 07:44 AM    CO2 23 12/15/2020 07:44 AM    GLUCOSE 101 (H) 12/15/2020 07:44 AM    BUN 25 (H) 12/15/2020 07:44 AM    CREATININE 1.61 (H) 12/15/2020 07:44 AM     Lab Results   Component Value Date/Time    ALKPHOSPHAT 65 12/15/2020 07:44 AM    ASTSGOT 22 12/15/2020 07:44 AM    ALTSGPT 26 12/15/2020 07:44 AM    TBILIRUBIN 0.9 12/15/2020 07:44 AM      No results found for: HBA1C  No results found for: TSH  No results found for: FREET4    Lab Results   Component Value Date/Time "    WBC 6.4 04/22/2019 10:26 AM    RBC 5.30 04/22/2019 10:26 AM    HEMOGLOBIN 16.2 04/22/2019 10:26 AM    HEMATOCRIT 49.8 04/22/2019 10:26 AM    MCV 94.0 04/22/2019 10:26 AM    MCH 30.6 04/22/2019 10:26 AM    MCHC 32.5 (L) 04/22/2019 10:26 AM    MPV 9.3 04/22/2019 10:26 AM    NEUTSPOLYS 68.40 04/22/2019 10:26 AM    LYMPHOCYTES 18.60 (L) 04/22/2019 10:26 AM    MONOCYTES 9.70 04/22/2019 10:26 AM    EOSINOPHILS 1.90 04/22/2019 10:26 AM    BASOPHILS 0.60 04/22/2019 10:26 AM      Imaging     Reviewed and discussed per HPI    Assessment and Plan     hCema Sarabia is a 65 y.o. male    1. Annual physical exam  Reviewed PMH, PSH, FH, SH, ALL, MEDS, HCM/IMM.   Advised healthy habits, diet, exercise.    2. Health care maintenance  Per HPI    3. Screening for malignant neoplasm of prostate  - PROSTATE SPECIFIC AG SCREENING; Future    4. Need for hepatitis C screening test  - HEP C VIRUS ANTIBODY; Future    5. Essential hypertension  - Controlled, continue with current management, cardiology follow-up  - Comp Metabolic Panel; Standing no known  6. 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 Diastolic dysfunction  8. ACC/AHA stage C heart failure with preserved ejection fraction (HCC)  9. Research study patient  10. [facility] Billing  11. Moderate aortic insufficiency  As above    12. Stage 3 chronic kidney disease, unspecified whether stage 3a or 3b CKD (HCC)  Stable, labs follow-up  - Comp Metabolic Panel; Standing    13. Dyslipidemia  Pending labs, continue current treatment  - Comp Metabolic Panel; Standing  - Lipid Profile; Standing    14. Hypovitaminosis D  Pending labs, on OTC supplement  - VITAMIN D,25 HYDROXY; Standing    15. Fatigue, unspecified type  Pending labs  - CBC WITH DIFFERENTIAL; Future    16. Obesity (BMI 30-39.9)  - OBESITY COUNSELING (No Charge): Patient identified as having weight management issue.  Appropriate orders and counseling given.    17.  Gastroesophageal reflux disease with esophagitis, unspecified whether hemorrhage  - Controlled, continue with current management.    18. Acquired renal cyst of right kidney  Reviewed imaging    Counseling:   - Smoking:  Nonsmoker    Followup: Within 1 month, labs     All questions are answered.    Please note that this dictation was created using voice recognition software, and that there might be errors of wing and possibly content.

## 2021-08-10 ENCOUNTER — HOSPITAL ENCOUNTER (OUTPATIENT)
Dept: LAB | Facility: MEDICAL CENTER | Age: 66
End: 2021-08-10
Attending: INTERNAL MEDICINE
Payer: COMMERCIAL

## 2021-08-10 DIAGNOSIS — E55.9 HYPOVITAMINOSIS D: ICD-10-CM

## 2021-08-10 DIAGNOSIS — E78.5 DYSLIPIDEMIA: ICD-10-CM

## 2021-08-10 DIAGNOSIS — Z11.59 NEED FOR HEPATITIS C SCREENING TEST: ICD-10-CM

## 2021-08-10 DIAGNOSIS — N18.30 STAGE 3 CHRONIC KIDNEY DISEASE, UNSPECIFIED WHETHER STAGE 3A OR 3B CKD: ICD-10-CM

## 2021-08-10 DIAGNOSIS — R53.83 FATIGUE, UNSPECIFIED TYPE: ICD-10-CM

## 2021-08-10 DIAGNOSIS — Z12.5 SCREENING FOR MALIGNANT NEOPLASM OF PROSTATE: ICD-10-CM

## 2021-08-10 DIAGNOSIS — I10 ESSENTIAL HYPERTENSION: ICD-10-CM

## 2021-08-10 LAB
25(OH)D3 SERPL-MCNC: 40 NG/ML (ref 30–100)
ALBUMIN SERPL BCP-MCNC: 4.2 G/DL (ref 3.2–4.9)
ALBUMIN/GLOB SERPL: 1.7 G/DL
ALP SERPL-CCNC: 65 U/L (ref 30–99)
ALT SERPL-CCNC: 19 U/L (ref 2–50)
ANION GAP SERPL CALC-SCNC: 15 MMOL/L (ref 7–16)
AST SERPL-CCNC: 18 U/L (ref 12–45)
BASOPHILS # BLD AUTO: 0.6 % (ref 0–1.8)
BASOPHILS # BLD: 0.04 K/UL (ref 0–0.12)
BILIRUB SERPL-MCNC: 0.6 MG/DL (ref 0.1–1.5)
BUN SERPL-MCNC: 24 MG/DL (ref 8–22)
CALCIUM SERPL-MCNC: 9.6 MG/DL (ref 8.5–10.5)
CHLORIDE SERPL-SCNC: 107 MMOL/L (ref 96–112)
CHOLEST SERPL-MCNC: 195 MG/DL (ref 100–199)
CO2 SERPL-SCNC: 19 MMOL/L (ref 20–33)
CREAT SERPL-MCNC: 1.43 MG/DL (ref 0.5–1.4)
EOSINOPHIL # BLD AUTO: 0.16 K/UL (ref 0–0.51)
EOSINOPHIL NFR BLD: 2.4 % (ref 0–6.9)
ERYTHROCYTE [DISTWIDTH] IN BLOOD BY AUTOMATED COUNT: 44.8 FL (ref 35.9–50)
FASTING STATUS PATIENT QL REPORTED: NORMAL
GLOBULIN SER CALC-MCNC: 2.5 G/DL (ref 1.9–3.5)
GLUCOSE SERPL-MCNC: 98 MG/DL (ref 65–99)
HCT VFR BLD AUTO: 53.1 % (ref 42–52)
HCV AB SER QL: NORMAL
HDLC SERPL-MCNC: 63 MG/DL
HGB BLD-MCNC: 17.5 G/DL (ref 14–18)
IMM GRANULOCYTES # BLD AUTO: 0.09 K/UL (ref 0–0.11)
IMM GRANULOCYTES NFR BLD AUTO: 1.4 % (ref 0–0.9)
LDLC SERPL CALC-MCNC: 98 MG/DL
LYMPHOCYTES # BLD AUTO: 1.48 K/UL (ref 1–4.8)
LYMPHOCYTES NFR BLD: 22.6 % (ref 22–41)
MCH RBC QN AUTO: 30.8 PG (ref 27–33)
MCHC RBC AUTO-ENTMCNC: 33 G/DL (ref 33.7–35.3)
MCV RBC AUTO: 93.5 FL (ref 81.4–97.8)
MONOCYTES # BLD AUTO: 0.65 K/UL (ref 0–0.85)
MONOCYTES NFR BLD AUTO: 9.9 % (ref 0–13.4)
NEUTROPHILS # BLD AUTO: 4.13 K/UL (ref 1.82–7.42)
NEUTROPHILS NFR BLD: 63.1 % (ref 44–72)
NRBC # BLD AUTO: 0 K/UL
NRBC BLD-RTO: 0 /100 WBC
PLATELET # BLD AUTO: 156 K/UL (ref 164–446)
PMV BLD AUTO: 9.5 FL (ref 9–12.9)
POTASSIUM SERPL-SCNC: 4.5 MMOL/L (ref 3.6–5.5)
PROT SERPL-MCNC: 6.7 G/DL (ref 6–8.2)
PSA SERPL-MCNC: 7.51 NG/ML (ref 0–4)
RBC # BLD AUTO: 5.68 M/UL (ref 4.7–6.1)
SODIUM SERPL-SCNC: 141 MMOL/L (ref 135–145)
TRIGL SERPL-MCNC: 169 MG/DL (ref 0–149)
WBC # BLD AUTO: 6.6 K/UL (ref 4.8–10.8)

## 2021-08-10 PROCEDURE — 85025 COMPLETE CBC W/AUTO DIFF WBC: CPT

## 2021-08-10 PROCEDURE — 80061 LIPID PANEL: CPT

## 2021-08-10 PROCEDURE — 82306 VITAMIN D 25 HYDROXY: CPT

## 2021-08-10 PROCEDURE — 86803 HEPATITIS C AB TEST: CPT

## 2021-08-10 PROCEDURE — 84153 ASSAY OF PSA TOTAL: CPT

## 2021-08-10 PROCEDURE — 80053 COMPREHEN METABOLIC PANEL: CPT

## 2021-08-10 PROCEDURE — 36415 COLL VENOUS BLD VENIPUNCTURE: CPT

## 2021-08-24 ENCOUNTER — OFFICE VISIT (OUTPATIENT)
Dept: MEDICAL GROUP | Age: 66
End: 2021-08-24
Payer: COMMERCIAL

## 2021-08-24 VITALS
HEART RATE: 76 BPM | BODY MASS INDEX: 37.99 KG/M2 | DIASTOLIC BLOOD PRESSURE: 84 MMHG | TEMPERATURE: 98.4 F | HEIGHT: 65 IN | OXYGEN SATURATION: 96 % | SYSTOLIC BLOOD PRESSURE: 132 MMHG | WEIGHT: 228 LBS

## 2021-08-24 DIAGNOSIS — I11.0 MALIGNANT HYPERTENSIVE HEART DISEASE WITH HEART FAILURE (HCC): ICD-10-CM

## 2021-08-24 DIAGNOSIS — I50.30 ACC/AHA STAGE C HEART FAILURE WITH PRESERVED EJECTION FRACTION (HCC): ICD-10-CM

## 2021-08-24 DIAGNOSIS — I10 ESSENTIAL HYPERTENSION: ICD-10-CM

## 2021-08-24 DIAGNOSIS — D69.6 THROMBOCYTOPENIA (HCC): ICD-10-CM

## 2021-08-24 DIAGNOSIS — R97.20 ELEVATED PSA: ICD-10-CM

## 2021-08-24 DIAGNOSIS — E78.5 DYSLIPIDEMIA: ICD-10-CM

## 2021-08-24 DIAGNOSIS — N18.30 STAGE 3 CHRONIC KIDNEY DISEASE, UNSPECIFIED WHETHER STAGE 3A OR 3B CKD: ICD-10-CM

## 2021-08-24 PROCEDURE — 99214 OFFICE O/P EST MOD 30 MIN: CPT | Performed by: INTERNAL MEDICINE

## 2021-08-24 ASSESSMENT — FIBROSIS 4 INDEX: FIB4 SCORE: 1.69

## 2021-08-24 NOTE — PROGRESS NOTES
CHIEF COMPLAINT  Hypertension, labs    HPI  Chema Sarabia is a 65 y.o. male who presents today for the following     Hypertension, hypertensive heart disease/CHF  Meds: Amlodipine 10 mg daily, labetalol 200 mg twice daily, spironolactone 25 mg daily, taking as prescribed.   Measuring BP at home: yes, no. It has been < 130/80  Denies: - headaches, vision problems, tinnitus.  - chest pain/pressure, palpitations, irregular heart beats, exertional, dyspnea, peripheral edema.  - medication side effect: unusual fatigue, slow heartbeat, foot/leg swelling, cough.  Low salt diet: Y  Diet: Improved  Exercise: improved  BMI: 37  FH of HTN: father renal panel showed CKD stage III, with the last GFR at 31.  He has been followed up by cardiology and nephrology 19 0 [facility].    CKD stage III  Stable.  The patient has had stable CKD stage III, with abnormal creatinine and GFR, normal electrolytes.   He has not been on NSAIDs consistently, has had good fluid intake.  He has been followed up by nephrology.     Dyslipidemia  The patient had abnormal lipid panel, no medications.   Diet /Exercise/BMI: As above  FH: Father     Elevated PSA  Sx:  · Nocturia: x 2  · Frequency - slight  · Urgency - slight  · Hesitancy - slight  · Weak stream/dribbling - intermittent    Denies:  · Bladder fullness / incomplete emptying    Thrombocytopenia  CBC showed thrombocytopenia.  Patient has not have easy bleeding or bruising.    Reviewed PMH, PSH, FH, SH, ALL, HCM/IMM, no changes  Reviewed MEDS, no changes    Patient Active Problem List    Diagnosis Date Noted   • Dyslipidemia 04/26/2019   • Gastroesophageal reflux disease 04/26/2019   • Acquired renal cyst of right kidney 10/19/2018   • Stage 3 chronic kidney disease (HCC) 04/20/2018   • Essential hypertension 04/20/2018   • [facility] Billing 04/19/2018   • Research study patient 04/09/2018   • Moderate aortic insufficiency 04/05/2018   • ACC/AHA stage C heart  failure with preserved ejection fraction (HCC) 2018   • Health care maintenance 2018   • Heart disease, hypertensive, malignant 2018     CURRENT MEDICATIONS  Current Outpatient Medications   Medication Sig Dispense Refill   • spironolactone (ALDACTONE) 25 MG Tab Take 1 Tab by mouth every day. 90 Tab 3   • amLODIPine (NORVASC) 10 MG Tab Take 1 Tab by mouth every day. 90 Tab 3   • GMK627 or PLACEBO (STUDY DRUG) 200 mg tablet Take 1 Tab by mouth 2 Times a Day. For investigational use only. 420 Tab 2   • valsartan or PLACEBO (STUDY DRUG) 160 MG tablet Take 1 Tab by mouth 2 Times a Day. For investigational use only. 420 Tab 2   • vitamin D, Ergocalciferol, (DRISDOL) 24885 units Cap capsule Take 1 Cap by mouth every 7 days. 12 Cap 1   • multivitamin (THERAGRAN) Tab Take 1 Tab by mouth every day.       No current facility-administered medications for this visit.     ALLERGIES  Allergies: Patient has no known allergies.  PAST MEDICAL HISTORY  Past Medical History:   Diagnosis Date   • Acute kidney injury (HCC)    • Hypertensive urgency    • Nephrolithiasis      SURGICAL HISTORY  He  has no past surgical history on file.  SOCIAL HISTORY  Social History     Tobacco Use   • Smoking status: Never Smoker   • Smokeless tobacco: Never Used   Vaping Use   • Vaping Use: Never used   Substance Use Topics   • Alcohol use: Yes     Alcohol/week: 2.4 oz     Types: 4 Glasses of wine per week   • Drug use: No     Social History     Social History Narrative   • Not on file     FAMILY HISTORY  Family History   Problem Relation Age of Onset   • Cancer Mother         lungs, smoker   • Hypertension Father    • Hyperlipidemia Father    • Diabetes Neg Hx    • Heart Disease Neg Hx      Family Status   Relation Name Status   • Mo     • Fa     • Neg Hx  (Not Specified)     ROS   Constitutional: Negative for fever, chills, fatigue.  HENT: Negative for congestion, sore throat.  Eyes: Negative for vision problems.  "  Respiratory: Negative for cough, shortness of breath.  Cardiovascular: Negative for chest pain, palpitations.   Gastrointestinal: Negative for heartburn, nausea, abdominal pain.   Genitourinary: Per HPI.  Musculoskeletal: Negative for significant myalgia, back and joint pain.   Skin: Negative for rash.   Neuro: Negative for dizziness, weakness and headaches.   Endo/Heme/Allergies: Does not bruise/bleed easily.   Psychiatric/Behavioral: Negative for depression.    PHYSICAL EXAM   Blood Pressure 132/84 (BP Location: Left arm, Patient Position: Sitting, BP Cuff Size: Adult)   Pulse 76   Temperature 36.9 °C (98.4 °F) (Temporal)   Height 1.655 m (5' 5.16\")   Weight 103 kg (228 lb)   Oxygen Saturation 96%   Body Mass Index 37.76 kg/m²   General:  NAD, well appearing  HEENT:   NC/AT, PERRLA, EOMI.  Cardiovascular: unlabored breathing, no peripheral cyanosis or swelling.  Lungs:   no respiratory distress.  Abdomen: non- distended.  Extremities:  No LE swelling.  Skin:  Warm, dry.  No erythema. No rash.   Neurologic: Alert & oriented x 3. CN II-XII grossly intact. No focal deficits.  Psychiatric:  Affect normal, mood normal, judgment normal.    Labs     Labs are reviewed and discussed with a patient  Lab Results   Component Value Date/Time    CHOLSTRLTOT 195 08/10/2021 07:10 AM    LDL 98 08/10/2021 07:10 AM    HDL 63 08/10/2021 07:10 AM    TRIGLYCERIDE 169 (H) 08/10/2021 07:10 AM       Lab Results   Component Value Date/Time    SODIUM 141 08/10/2021 07:10 AM    POTASSIUM 4.5 08/10/2021 07:10 AM    CHLORIDE 107 08/10/2021 07:10 AM    CO2 19 (L) 08/10/2021 07:10 AM    GLUCOSE 98 08/10/2021 07:10 AM    BUN 24 (H) 08/10/2021 07:10 AM    CREATININE 1.43 (H) 08/10/2021 07:10 AM     Lab Results   Component Value Date/Time    ALKPHOSPHAT 65 08/10/2021 07:10 AM    ASTSGOT 18 08/10/2021 07:10 AM    ALTSGPT 19 08/10/2021 07:10 AM    TBILIRUBIN 0.6 08/10/2021 07:10 AM      No results found for: HBA1C  No results found for: " TSH  No results found for: FREET4    Lab Results   Component Value Date/Time    WBC 6.6 08/10/2021 07:10 AM    RBC 5.68 08/10/2021 07:10 AM    HEMOGLOBIN 17.5 08/10/2021 07:10 AM    HEMATOCRIT 53.1 (H) 08/10/2021 07:10 AM    MCV 93.5 08/10/2021 07:10 AM    MCH 30.8 08/10/2021 07:10 AM    MCHC 33.0 (L) 08/10/2021 07:10 AM    MPV 9.5 08/10/2021 07:10 AM    NEUTSPOLYS 63.10 08/10/2021 07:10 AM    LYMPHOCYTES 22.60 08/10/2021 07:10 AM    MONOCYTES 9.90 08/10/2021 07:10 AM    EOSINOPHILS 2.40 08/10/2021 07:10 AM    BASOPHILS 0.60 08/10/2021 07:10 AM      Imaging     None    Assessment and Plan     Chema Sarabia is a 65 y.o. male    1. Essential hypertension  2. 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 ACC/AHA stage C heart failure with preserved ejection fraction (HCC)  - Controlled, continue with current management, cardiology follow-up    4. Stage 3 chronic kidney disease, unspecified whether stage 3a or 3b CKD (HCC)  Stable, advised to continue good fluid intake, avoid NSAIDs    5. Dyslipidemia  - Controlled, continue with current management.    6. Elevated PSA  PSA increased threefold, referral to urology  - REFERRAL TO UROLOGY    7. Thrombocytopenia (HCC)  Borderline, follow-up labs; history of borderline thrombocytopenia in 2018  - CBC WITH DIFFERENTIAL; Standing      Counseling:   - Smoking:  Nonsmoker    Followup: Return in 6 months, labs, earlier prn    All questions are answered.    Please note that this dictation was created using voice recognition software, and that there might be errors of wing and possibly content.

## 2021-09-27 ENCOUNTER — RESEARCH ENCOUNTER (OUTPATIENT)
Dept: CARDIOLOGY | Facility: MEDICAL CENTER | Age: 66
End: 2021-09-27

## 2021-09-27 ENCOUNTER — HOSPITAL ENCOUNTER (OUTPATIENT)
Dept: RADIOLOGY | Facility: MEDICAL CENTER | Age: 66
End: 2021-09-27
Attending: INTERNAL MEDICINE
Payer: COMMERCIAL

## 2021-09-27 DIAGNOSIS — Z00.6 EXAM FOR CLINICAL RESEARCH: ICD-10-CM

## 2021-09-27 PROCEDURE — 70551 MRI BRAIN STEM W/O DYE: CPT

## 2021-09-29 ENCOUNTER — OFFICE VISIT (OUTPATIENT)
Dept: CARDIOLOGY | Facility: MEDICAL CENTER | Age: 66
End: 2021-09-29
Payer: COMMERCIAL

## 2021-09-29 VITALS
RESPIRATION RATE: 14 BRPM | DIASTOLIC BLOOD PRESSURE: 84 MMHG | OXYGEN SATURATION: 96 % | HEIGHT: 66 IN | SYSTOLIC BLOOD PRESSURE: 120 MMHG | BODY MASS INDEX: 37.77 KG/M2 | HEART RATE: 84 BPM | WEIGHT: 235 LBS

## 2021-09-29 DIAGNOSIS — Z79.899 HIGH RISK MEDICATION USE: ICD-10-CM

## 2021-09-29 DIAGNOSIS — I50.30 ACC/AHA STAGE C HEART FAILURE WITH PRESERVED EJECTION FRACTION (HCC): ICD-10-CM

## 2021-09-29 DIAGNOSIS — I10 HTN (HYPERTENSION), MALIGNANT: ICD-10-CM

## 2021-09-29 DIAGNOSIS — I51.89 LEFT VENTRICULAR DIASTOLIC DYSFUNCTION, NYHA CLASS 1: ICD-10-CM

## 2021-09-29 DIAGNOSIS — I63.9 CEREBROVASCULAR ACCIDENT (CVA), UNSPECIFIED MECHANISM (HCC): ICD-10-CM

## 2021-09-29 DIAGNOSIS — N18.32 STAGE 3B CHRONIC KIDNEY DISEASE: ICD-10-CM

## 2021-09-29 DIAGNOSIS — E78.2 MIXED HYPERLIPIDEMIA: ICD-10-CM

## 2021-09-29 PROCEDURE — 99214 OFFICE O/P EST MOD 30 MIN: CPT | Performed by: INTERNAL MEDICINE

## 2021-09-29 RX ORDER — CLOPIDOGREL BISULFATE 75 MG/1
75 TABLET ORAL DAILY
Qty: 30 TABLET | Refills: 11 | Status: SHIPPED
Start: 2021-09-29 | End: 2022-01-07

## 2021-09-29 RX ORDER — ROSUVASTATIN CALCIUM 40 MG/1
40 TABLET, COATED ORAL DAILY
Qty: 30 TABLET | Refills: 3 | Status: SHIPPED | OUTPATIENT
Start: 2021-09-29 | End: 2022-04-20

## 2021-09-29 ASSESSMENT — ENCOUNTER SYMPTOMS
VOMITING: 0
NAUSEA: 0
EYE DISCHARGE: 0
DIZZINESS: 0
SENSORY CHANGE: 0
PALPITATIONS: 0
DOUBLE VISION: 0
EYE PAIN: 0
BLOOD IN STOOL: 0
DEPRESSION: 0
HEADACHES: 0
BRUISES/BLEEDS EASILY: 0
PND: 0
FEVER: 0
CLAUDICATION: 0
CHILLS: 0
SHORTNESS OF BREATH: 0
FALLS: 0
BLURRED VISION: 0
ORTHOPNEA: 0
SPEECH CHANGE: 0
HALLUCINATIONS: 0
WEIGHT LOSS: 0
MYALGIAS: 0
COUGH: 0
ABDOMINAL PAIN: 0
LOSS OF CONSCIOUSNESS: 0

## 2021-09-29 ASSESSMENT — FIBROSIS 4 INDEX: FIB4 SCORE: 1.69

## 2021-09-29 NOTE — PROGRESS NOTES
Chief Complaint   Patient presents with   • Congestive Heart Failure     F/V Dx: ACC/AHA stage C heart failure with preserved ejection fraction (HCC)   • Hypertension     F/V Dx: Essential hypertension   • Dyslipidemia       Subjective:   Chema Sarabia is a 65 y.o. male who presents today for cardiac care and evaluation in our heart failure clinic after his recent episode of hypertensive emergency for which he went into heart failure exacerbation as well. Patient does have documented transthoracic echocardiogram which showed relatively preserved LV function of 55%. No significant valvular disease.     Feeling very well. No dyspnea.     At prior visit, patient was able to complete 549 m during his 6 minute walk test. his O2 saturation at baseline was 94% and at the end of the test, the O2 saturation was 95%. he reported 0 level of dyspnea on Jim scale.     06/06/2019 Patient was able to complete 549 m during his 6 minute walk test. his O2 saturation at baseline was 95% and at the end of the test, the O2 saturation was 93%. he reported 1 level of dyspnea on Jim scale.    I have independently interpreted and reviewed blood tests results with patient in clinic which shows fine LDL level 98, with elevated triglycerides 169 but down from 267, stable renal (GFR of 50) and liver function.    In July of 2021, for 1 week, he had symptoms of difficult finding words and incoherence. MRI of the brain showed evidence of stroke. His symptoms resolved at this time.    Past Medical History:   Diagnosis Date   • Acute kidney injury (HCC)    • Hypertensive urgency    • Nephrolithiasis      History reviewed. No pertinent surgical history.  Family History   Problem Relation Age of Onset   • Cancer Mother         lungs, smoker   • Hypertension Father    • Hyperlipidemia Father    • Diabetes Neg Hx    • Heart Disease Neg Hx      Social History     Socioeconomic History   • Marital status: Single     Spouse name: Not on file   •  Number of children: Not on file   • Years of education: Not on file   • Highest education level: Not on file   Occupational History   • Not on file   Tobacco Use   • Smoking status: Never Smoker   • Smokeless tobacco: Never Used   Vaping Use   • Vaping Use: Never used   Substance and Sexual Activity   • Alcohol use: Yes     Alcohol/week: 6.0 oz     Types: 10 Glasses of wine per week   • Drug use: No   • Sexual activity: Not on file   Other Topics Concern   • Not on file   Social History Narrative   • Not on file     Social Determinants of Health     Financial Resource Strain:    • Difficulty of Paying Living Expenses:    Food Insecurity:    • Worried About Running Out of Food in the Last Year:    • Ran Out of Food in the Last Year:    Transportation Needs:    • Lack of Transportation (Medical):    • Lack of Transportation (Non-Medical):    Physical Activity:    • Days of Exercise per Week:    • Minutes of Exercise per Session:    Stress:    • Feeling of Stress :    Social Connections:    • Frequency of Communication with Friends and Family:    • Frequency of Social Gatherings with Friends and Family:    • Attends [employer]ism Services:    • Active Member of Clubs or Organizations:    • Attends Club or Organization Meetings:    • Marital Status:    Intimate Partner Violence:    • Fear of Current or Ex-Partner:    • Emotionally Abused:    • Physically Abused:    • Sexually Abused:      No Known Allergies  Outpatient Encounter Medications as of 9/29/2021   Medication Sig Dispense Refill   • clopidogrel (PLAVIX) 75 MG Tab Take 1 Tablet by mouth every day. 30 Tablet 11   • rosuvastatin (CRESTOR) 40 MG tablet Take 1 Tablet by mouth every day. 30 Tablet 3   • spironolactone (ALDACTONE) 25 MG Tab Take 1 Tab by mouth every day. 90 Tab 3   • amLODIPine (NORVASC) 10 MG Tab Take 1 Tab by mouth every day. 90 Tab 3   • NTF144 or PLACEBO (STUDY DRUG) 200 mg tablet Take 1 Tab by mouth 2 Times a Day. For investigational use only. 420  "Tab 2   • valsartan or PLACEBO (STUDY DRUG) 160 MG tablet Take 1 Tab by mouth 2 Times a Day. For investigational use only. 420 Tab 2   • vitamin D, Ergocalciferol, (DRISDOL) 11847 units Cap capsule Take 1 Cap by mouth every 7 days. 12 Cap 1   • multivitamin (THERAGRAN) Tab Take 1 Tab by mouth every day.       No facility-administered encounter medications on file as of 9/29/2021.     Review of Systems   Constitutional: Negative for chills, fever, malaise/fatigue and weight loss.   HENT: Negative for ear discharge, ear pain, hearing loss and nosebleeds.    Eyes: Negative for blurred vision, double vision, pain and discharge.   Respiratory: Negative for cough and shortness of breath.    Cardiovascular: Negative for chest pain, palpitations, orthopnea, claudication, leg swelling and PND.   Gastrointestinal: Negative for abdominal pain, blood in stool, melena, nausea and vomiting.   Genitourinary: Negative for dysuria and hematuria.   Musculoskeletal: Negative for falls, joint pain and myalgias.   Skin: Negative for itching and rash.   Neurological: Negative for dizziness, sensory change, speech change, loss of consciousness and headaches.   Endo/Heme/Allergies: Negative for environmental allergies. Does not bruise/bleed easily.   Psychiatric/Behavioral: Negative for depression, hallucinations and suicidal ideas.        Objective:   /84 (BP Location: Left arm, Patient Position: Sitting, BP Cuff Size: Adult)   Pulse 84   Resp 14   Ht 1.676 m (5' 6\")   Wt 107 kg (235 lb)   SpO2 96%   BMI 37.93 kg/m²     Physical Exam  Vitals and nursing note reviewed.   Constitutional:       General: He is not in acute distress.     Appearance: He is not diaphoretic.   HENT:      Head: Normocephalic and atraumatic.      Right Ear: External ear normal.      Left Ear: External ear normal.   Eyes:      General:         Right eye: No discharge.         Left eye: No discharge.   Neck:      Thyroid: No thyromegaly.      Vascular: No " JVD.   Cardiovascular:      Rate and Rhythm: Normal rate and regular rhythm.      Comments: Ausculation was not performed to minimize the risk of COVID spread during current pandemic. This complies with Medicare policies and guidelines.    Pulmonary:      Effort: No respiratory distress.   Abdominal:      General: There is no distension.      Tenderness: There is no abdominal tenderness.   Skin:     General: Skin is warm and dry.   Neurological:      Mental Status: He is alert and oriented to person, place, and time.      Cranial Nerves: No cranial nerve deficit.   Psychiatric:         Behavior: Behavior normal.         Assessment:     1. ACC/AHA stage C heart failure with preserved ejection fraction (HCC)     2. Left ventricular diastolic dysfunction, NYHA class 1     3. HTN (hypertension), malignant     4. Mixed hyperlipidemia     5. Stage 3b chronic kidney disease (HCC)     6. High risk medication use     7. Cerebrovascular accident (CVA), unspecified mechanism (HCC)  REFERRAL TO NEUROLOGY    clopidogrel (PLAVIX) 75 MG Tab    RIH ZIO PATCH MONITOR       Medical Decision Making:  Today's Assessment / Status / Plan:   At this time patient is clinically stable in terms of his cardiac standpoint.  Blood pressure is well controlled.  I will also obtain home long-term event monitoring with 30 days monitor to rule out atrial arrhythmias.  Will start Rosuvastatin 40 mg qhs.  Continue diet and exercise more.    Will also refer to neurology.    I will see patient back in clinic with lab tests and studies results in 3 months.    I thank you for referring patient to our Cardiology Clinic today.

## 2021-10-04 ENCOUNTER — NON-PROVIDER VISIT (OUTPATIENT)
Dept: CARDIOLOGY | Facility: MEDICAL CENTER | Age: 66
End: 2021-10-04
Payer: MEDICARE

## 2021-10-04 ENCOUNTER — TELEPHONE (OUTPATIENT)
Dept: CARDIOLOGY | Facility: MEDICAL CENTER | Age: 66
End: 2021-10-04

## 2021-10-04 DIAGNOSIS — I50.30 ACC/AHA STAGE C HEART FAILURE WITH PRESERVED EJECTION FRACTION (HCC): ICD-10-CM

## 2021-10-04 DIAGNOSIS — I63.9 CEREBROVASCULAR ACCIDENT (CVA), UNSPECIFIED MECHANISM (HCC): ICD-10-CM

## 2021-10-04 PROCEDURE — 99999 PR NO CHARGE: CPT | Performed by: INTERNAL MEDICINE

## 2021-10-04 NOTE — TELEPHONE ENCOUNTER
Home enrollment completed for the 30 day Bio[employer] MCT Heart monitoring program per Santosh Castelan MD.  >Monitor to be shipped to patient by [facility] [employer] [facility].  >Pending EOS.

## 2021-10-11 ENCOUNTER — TELEPHONE (OUTPATIENT)
Dept: SCHEDULING | Facility: IMAGING CENTER | Age: 66
End: 2021-10-11

## 2021-10-21 ENCOUNTER — TELEPHONE (OUTPATIENT)
Dept: SCHEDULING | Facility: IMAGING CENTER | Age: 66
End: 2021-10-21

## 2021-10-21 NOTE — TELEPHONE ENCOUNTER
TT    Loli from [employer] called stating Pts insurance has . Loli states she has reached out to Pt to see if Pt has another insurance but has been unsuccessful reaching Pt. Loli states she will try to reach Pt again but if she does not hear from Pt she will have to cancel the order for Pts heart monitor. Loli # [phone].    Thank you

## 2021-11-02 NOTE — RESEARCH NOTE
Name: Chema Sarabia   MRN: [id number]  Participant ID:  [id number]  : 1955  Visit Date/Time: 2021 13:30 PM  Who is present: Tamie Maldonado Dr. To    Study:    [id number] - [facility] / [id number]   Status Consented/Enrolled   Start Date 20   Coordinator  Erica Spence Bridget I   [facility] [id number]   NCT [id number]    Santosh Castelan M.D.     [id number] - [employer] [place] [facility]   Status Consented/Enrolled   Start Date 3/20/18   Coordinator Rj Silvestre; Yudi Sabillon; Teresita De Souza; Dimitris Ruggiero; Fabian Tomlinson; Julius Mackay; Nadya Diana; Lacey Roth; Debbie Gibbs; Adriana Villavicencio; Kayla Huggins; Zena Mittal; Carol Wells; Helen Perez; Angela Wagner; Gaurav Hernandez; Yolanda Elias; Helen Penn; Helen Davalos     Study Note:    Chema here for End of Study Visit 299.  MMSE, FAQ, CSSRS & GDS cognitive assessments performed. Medications were reviewed with no changes. He brought back study bottles with 222 pills remaining, compliance 85%. No bottles dispensed as this is the end of study. IVRS updated.     Adverse Events: Chema reports an episode in July of having a hard time finding words and incoherence. MRI today showed evidence of Cerebral Infarct. Images uploaded to study site on 21.    Heart Failure assessment performed by Dr. Castelan at 14:30 today.     PET has been rescheduled several times due to [facility] machine being down.    Chema re-consented with ICF modifications, V02.03 IRB approval date  per sponsor request.  ICF reviewed, questions answered, patient signed and given a copy of signed document.     Vitals:  BP right arm 120/84  HR 84  WT 235lbs    Study Labs:    Chemistry, Hematology, Plasma B-amyloid drawn at 15:09. Drawn and Shipped per protocol.             Meds:    Current Outpatient Medications   Medication Sig Dispense Refill   • clopidogrel  (PLAVIX) 75 MG Tab Take 1 Tablet by mouth every day. 30 Tablet 11   • rosuvastatin (CRESTOR) 40 MG tablet Take 1 Tablet by mouth every day. 30 Tablet 3   • spironolactone (ALDACTONE) 25 MG Tab Take 1 Tab by mouth every day. 90 Tab 3   • amLODIPine (NORVASC) 10 MG Tab Take 1 Tab by mouth every day. 90 Tab 3   • UFZ405 or PLACEBO (STUDY DRUG) 200 mg tablet Take 1 Tab by mouth 2 Times a Day. For investigational use only. 420 Tab 2   • valsartan or PLACEBO (STUDY DRUG) 160 MG tablet Take 1 Tab by mouth 2 Times a Day. For investigational use only. 420 Tab 2   • vitamin D, Ergocalciferol, (DRISDOL) 03998 units Cap capsule Take 1 Cap by mouth every 7 days. 12 Cap 1   • multivitamin (THERAGRAN) Tab Take 1 Tab by mouth every day.       No current facility-administered medications for this visit.    current meds

## 2021-11-06 ENCOUNTER — HOSPITAL ENCOUNTER (OUTPATIENT)
Dept: RADIOLOGY | Facility: MEDICAL CENTER | Age: 66
End: 2021-11-06
Attending: UROLOGY
Payer: MEDICARE

## 2021-11-06 DIAGNOSIS — R97.8 ABNORMAL TUMOR MARKERS: ICD-10-CM

## 2021-11-06 PROCEDURE — 700117 HCHG RX CONTRAST REV CODE 255: Performed by: UROLOGY

## 2021-11-06 PROCEDURE — 700111 HCHG RX REV CODE 636 W/ 250 OVERRIDE (IP): Mod: JG | Performed by: RADIOLOGY

## 2021-11-06 PROCEDURE — 72197 MRI PELVIS W/O & W/DYE: CPT | Mod: MH

## 2021-11-06 PROCEDURE — A9576 INJ PROHANCE MULTIPACK: HCPCS | Performed by: UROLOGY

## 2021-11-06 RX ADMIN — GADOTERIDOL 20 ML: 279.3 INJECTION, SOLUTION INTRAVENOUS at 09:39

## 2021-11-06 RX ADMIN — GLUCAGON 1 MG: 1 INJECTION, POWDER, LYOPHILIZED, FOR SOLUTION INTRAMUSCULAR; INTRAVENOUS at 08:30

## 2021-12-28 NOTE — PROGRESS NOTES
Talisma never sent the monitor due to a mix-up with insurance information. We will hook up and MCT monitor at his next visit with Dr. Castelan on 1/6/2022 if it is appropriate.

## 2021-12-29 ENCOUNTER — TELEPHONE (OUTPATIENT)
Dept: CARDIOLOGY | Facility: MEDICAL CENTER | Age: 66
End: 2021-12-29

## 2021-12-29 NOTE — TELEPHONE ENCOUNTER
TT    Patient needed to reschedule his monitor since he is snowed in. I did not have anything before 01/12/2022. Pt is asking for a call back to discuss this with him.    Thank You  Annie TAYLOR

## 2022-01-03 ENCOUNTER — NON-PROVIDER VISIT (OUTPATIENT)
Dept: CARDIOLOGY | Facility: MEDICAL CENTER | Age: 67
End: 2022-01-03
Payer: MEDICARE

## 2022-01-03 DIAGNOSIS — I48.91 ATRIAL FIBRILLATION, UNSPECIFIED TYPE (HCC): ICD-10-CM

## 2022-01-03 NOTE — PROGRESS NOTES
Patient enrolled in the 30 day Bio-Tel MCOT Heart monitoring program per Santosh Castelan MD.  >Office hook-up with [facility] transmitted.  >Pending EOS

## 2022-01-04 ENCOUNTER — HOSPITAL ENCOUNTER (OUTPATIENT)
Dept: LAB | Facility: MEDICAL CENTER | Age: 67
End: 2022-01-04
Attending: INTERNAL MEDICINE
Payer: MEDICARE

## 2022-01-04 ENCOUNTER — HOSPITAL ENCOUNTER (OUTPATIENT)
Dept: LAB | Facility: MEDICAL CENTER | Age: 67
End: 2022-01-04
Attending: UROLOGY
Payer: MEDICARE

## 2022-01-04 DIAGNOSIS — E78.2 MIXED HYPERLIPIDEMIA: ICD-10-CM

## 2022-01-04 LAB
ANION GAP SERPL CALC-SCNC: 15 MMOL/L (ref 7–16)
BUN SERPL-MCNC: 23 MG/DL (ref 8–22)
CALCIUM SERPL-MCNC: 9.7 MG/DL (ref 8.5–10.5)
CHLORIDE SERPL-SCNC: 101 MMOL/L (ref 96–112)
CHOLEST SERPL-MCNC: 167 MG/DL (ref 100–199)
CO2 SERPL-SCNC: 21 MMOL/L (ref 20–33)
CREAT SERPL-MCNC: 1.51 MG/DL (ref 0.5–1.4)
FASTING STATUS PATIENT QL REPORTED: NORMAL
GLUCOSE SERPL-MCNC: 92 MG/DL (ref 65–99)
HDLC SERPL-MCNC: 74 MG/DL
LDLC SERPL CALC-MCNC: 65 MG/DL
POTASSIUM SERPL-SCNC: 4.7 MMOL/L (ref 3.6–5.5)
PSA SERPL-MCNC: 4.06 NG/ML (ref 0–4)
SODIUM SERPL-SCNC: 137 MMOL/L (ref 135–145)
TRIGL SERPL-MCNC: 139 MG/DL (ref 0–149)

## 2022-01-04 PROCEDURE — 36415 COLL VENOUS BLD VENIPUNCTURE: CPT

## 2022-01-04 PROCEDURE — 80048 BASIC METABOLIC PNL TOTAL CA: CPT

## 2022-01-04 PROCEDURE — 80061 LIPID PANEL: CPT

## 2022-01-04 PROCEDURE — 84153 ASSAY OF PSA TOTAL: CPT | Mod: GA

## 2022-01-07 ENCOUNTER — TELEPHONE (OUTPATIENT)
Dept: CARDIOLOGY | Facility: MEDICAL CENTER | Age: 67
End: 2022-01-07

## 2022-01-07 NOTE — TELEPHONE ENCOUNTER
"S/W pt, given time of report, patient was at home and had just gotten something to drink. No major activity. Denies any chest pain or shortness of breath, dizziness or lightheadedness.     This morning, he just finished walking up a pretty good size hill. \"Feeling very good.\"    Educated to be sure to braeden any symptoms on his monitor        "

## 2022-01-07 NOTE — TELEPHONE ENCOUNTER
Merle Valadez from [employer] called to report an urgent EKG on this patient. Please call them back at [phone].      Thank you,    ANNIKA

## 2022-01-07 NOTE — TELEPHONE ENCOUNTER
Found to have atrial fibrillation on monitor.    Will stop Clopidogrel and will start Eliquis 5 mg twice a day.    Santosh Castelan M.D.

## 2022-01-07 NOTE — TELEPHONE ENCOUNTER
Santosh Castelan M.D.  You 12 minutes ago (10:01 AM)         Will need to stop Clopidogrel and ASA.     Will need to start Eliquis 5 mg twice a day due to atrial fibrillation and history of stroke.     Will need to see me in clinic ASAP with my next availability.     Santosh Castelan M.D.      Medication changes discussed with patient, he stated he had not been taking asa at all. Pt already to plavix today, advised to start eliquis tomorrow.     Appt made for 1/10/22 at 10:15AM

## 2022-01-10 ENCOUNTER — OFFICE VISIT (OUTPATIENT)
Dept: CARDIOLOGY | Facility: MEDICAL CENTER | Age: 67
End: 2022-01-10
Payer: MEDICARE

## 2022-01-10 VITALS
RESPIRATION RATE: 16 BRPM | WEIGHT: 218 LBS | SYSTOLIC BLOOD PRESSURE: 128 MMHG | HEIGHT: 66 IN | HEART RATE: 81 BPM | DIASTOLIC BLOOD PRESSURE: 84 MMHG | BODY MASS INDEX: 35.03 KG/M2 | OXYGEN SATURATION: 96 %

## 2022-01-10 DIAGNOSIS — Z79.899 HIGH RISK MEDICATION USE: ICD-10-CM

## 2022-01-10 DIAGNOSIS — E78.2 MIXED HYPERLIPIDEMIA: ICD-10-CM

## 2022-01-10 DIAGNOSIS — I51.89 LEFT VENTRICULAR DIASTOLIC DYSFUNCTION, NYHA CLASS 1: ICD-10-CM

## 2022-01-10 DIAGNOSIS — I50.30 ACC/AHA STAGE C HEART FAILURE WITH PRESERVED EJECTION FRACTION (HCC): ICD-10-CM

## 2022-01-10 DIAGNOSIS — I10 HTN (HYPERTENSION), MALIGNANT: ICD-10-CM

## 2022-01-10 DIAGNOSIS — I63.9 CEREBROVASCULAR ACCIDENT (CVA), UNSPECIFIED MECHANISM (HCC): ICD-10-CM

## 2022-01-10 DIAGNOSIS — N18.32 STAGE 3B CHRONIC KIDNEY DISEASE: ICD-10-CM

## 2022-01-10 DIAGNOSIS — Z91.89 OTHER SPECIFIED PERSONAL RISK FACTORS, NOT ELSEWHERE CLASSIFIED: ICD-10-CM

## 2022-01-10 DIAGNOSIS — I48.0 PAF (PAROXYSMAL ATRIAL FIBRILLATION) (HCC): ICD-10-CM

## 2022-01-10 PROCEDURE — 93000 ELECTROCARDIOGRAM COMPLETE: CPT | Performed by: INTERNAL MEDICINE

## 2022-01-10 PROCEDURE — 99215 OFFICE O/P EST HI 40 MIN: CPT | Performed by: INTERNAL MEDICINE

## 2022-01-10 ASSESSMENT — FIBROSIS 4 INDEX: FIB4 SCORE: 1.720618004029213244

## 2022-01-10 ASSESSMENT — ENCOUNTER SYMPTOMS
SHORTNESS OF BREATH: 0
FEVER: 0
DEPRESSION: 0
BRUISES/BLEEDS EASILY: 0
DOUBLE VISION: 0
COUGH: 0
ABDOMINAL PAIN: 0
BLOOD IN STOOL: 0
MYALGIAS: 0
EYE DISCHARGE: 0
HEADACHES: 0
ORTHOPNEA: 0
PALPITATIONS: 0
VOMITING: 0
PND: 0
SPEECH CHANGE: 0
SENSORY CHANGE: 0
LOSS OF CONSCIOUSNESS: 0
EYE PAIN: 0
BLURRED VISION: 0
NAUSEA: 0
CHILLS: 0
HALLUCINATIONS: 0
FALLS: 0
CLAUDICATION: 0
DIZZINESS: 0
WEIGHT LOSS: 0

## 2022-01-10 NOTE — PROGRESS NOTES
Chief Complaint   Patient presents with   • Congestive Heart Failure     F/V Dx: ACC/AHA stage C heart failure with preserved ejection fraction (HCC)   • Dyslipidemia   • Hypertension       Subjective:   Chema Sarabia is a 65 y.o. male who presents today for cardiac care and evaluation in our heart failure clinic after his recent episode of hypertensive emergency for which he went into heart failure exacerbation as well. Patient does have documented transthoracic echocardiogram which showed relatively preserved LV function of 55%. No significant valvular disease.     Feeling very well. No dyspnea.     At prior visit, patient was able to complete 549 m during his 6 minute walk test. his O2 saturation at baseline was 94% and at the end of the test, the O2 saturation was 95%. he reported 0 level of dyspnea on Jim scale.     06/06/2019 Patient was able to complete 549 m during his 6 minute walk test. his O2 saturation at baseline was 95% and at the end of the test, the O2 saturation was 93%. he reported 1 level of dyspnea on Jim scale.    I have independently interpreted and reviewed blood tests results with patient in clinic which shows fine LDL level 98, with elevated triglycerides 169 but down from 267, stable renal (GFR of 50) and liver function.    In July of 2021, for 1 week, he had symptoms of difficult finding words and incoherence. MRI of the brain showed evidence of stroke. His symptoms resolved at this time.    Chema was placed on cardiac event monitor and found to have paroxysmal atrial fibrillation.  He was started on Eliquis 5 mg p.o. twice a day.    Past Medical History:   Diagnosis Date   • Acute kidney injury (HCC)    • Hypertensive urgency    • Nephrolithiasis      History reviewed. No pertinent surgical history.  Family History   Problem Relation Age of Onset   • Cancer Mother         lungs, smoker   • Hypertension Father    • Hyperlipidemia Father    • Diabetes Neg Hx    • Heart Disease Neg Hx       Social History     Socioeconomic History   • Marital status: Single     Spouse name: Not on file   • Number of children: Not on file   • Years of education: Not on file   • Highest education level: Not on file   Occupational History   • Not on file   Tobacco Use   • Smoking status: Never Smoker   • Smokeless tobacco: Never Used   Vaping Use   • Vaping Use: Never used   Substance and Sexual Activity   • Alcohol use: Yes     Alcohol/week: 6.0 oz     Types: 10 Glasses of wine per week   • Drug use: No   • Sexual activity: Not on file   Other Topics Concern   • Not on file   Social History Narrative   • Not on file     Social Determinants of Health     Financial Resource Strain:    • Difficulty of Paying Living Expenses: Not on file   Food Insecurity:    • Worried About Running Out of Food in the Last Year: Not on file   • Ran Out of Food in the Last Year: Not on file   Transportation Needs:    • Lack of Transportation (Medical): Not on file   • Lack of Transportation (Non-Medical): Not on file   Physical Activity:    • Days of Exercise per Week: Not on file   • Minutes of Exercise per Session: Not on file   Stress:    • Feeling of Stress : Not on file   Social Connections:    • Frequency of Communication with Friends and Family: Not on file   • Frequency of Social Gatherings with Friends and Family: Not on file   • Attends Buddhist Services: Not on file   • Active Member of Clubs or Organizations: Not on file   • Attends Club or Organization Meetings: Not on file   • Marital Status: Not on file   Intimate Partner Violence:    • Fear of Current or Ex-Partner: Not on file   • Emotionally Abused: Not on file   • Physically Abused: Not on file   • Sexually Abused: Not on file   Housing Stability:    • Unable to Pay for Housing in the Last Year: Not on file   • Number of Places Lived in the Last Year: Not on file   • Unstable Housing in the Last Year: Not on file     No Known Allergies  Outpatient Encounter Medications  "as of 1/10/2022   Medication Sig Dispense Refill   • apixaban (ELIQUIS) 5mg Tab Take 1 Tablet by mouth 2 times a day. 60 Tablet 11   • rosuvastatin (CRESTOR) 40 MG tablet Take 1 Tablet by mouth every day. 30 Tablet 3   • spironolactone (ALDACTONE) 25 MG Tab Take 1 Tab by mouth every day. 90 Tab 3   • amLODIPine (NORVASC) 10 MG Tab Take 1 Tab by mouth every day. 90 Tab 3   • HUK009 or PLACEBO (STUDY DRUG) 200 mg tablet Take 1 Tab by mouth 2 Times a Day. For investigational use only. 420 Tab 2   • valsartan or PLACEBO (STUDY DRUG) 160 MG tablet Take 1 Tab by mouth 2 Times a Day. For investigational use only. 420 Tab 2   • vitamin D, Ergocalciferol, (DRISDOL) 95711 units Cap capsule Take 1 Cap by mouth every 7 days. 12 Cap 1   • multivitamin (THERAGRAN) Tab Take 1 Tab by mouth every day.       No facility-administered encounter medications on file as of 1/10/2022.     Review of Systems   Constitutional: Negative for chills, fever, malaise/fatigue and weight loss.   HENT: Negative for ear discharge, ear pain, hearing loss and nosebleeds.    Eyes: Negative for blurred vision, double vision, pain and discharge.   Respiratory: Negative for cough and shortness of breath.    Cardiovascular: Negative for chest pain, palpitations, orthopnea, claudication, leg swelling and PND.   Gastrointestinal: Negative for abdominal pain, blood in stool, melena, nausea and vomiting.   Genitourinary: Negative for dysuria and hematuria.   Musculoskeletal: Negative for falls, joint pain and myalgias.   Skin: Negative for itching and rash.   Neurological: Negative for dizziness, sensory change, speech change, loss of consciousness and headaches.   Endo/Heme/Allergies: Negative for environmental allergies. Does not bruise/bleed easily.   Psychiatric/Behavioral: Negative for depression, hallucinations and suicidal ideas.        Objective:   BP (!) 0/0 (BP Location: Left arm, Patient Position: Sitting, BP Cuff Size: Adult)   Ht 1.676 m (5' 6\")   " BMI 37.93 kg/m²     Physical Exam  Vitals and nursing note reviewed.   Constitutional:       General: He is not in acute distress.     Appearance: He is not diaphoretic.   HENT:      Head: Normocephalic and atraumatic.      Right Ear: External ear normal.      Left Ear: External ear normal.   Eyes:      General:         Right eye: No discharge.         Left eye: No discharge.   Neck:      Thyroid: No thyromegaly.      Vascular: No JVD.   Cardiovascular:      Rate and Rhythm: Normal rate and regular rhythm.      Comments: Ausculation was not performed to minimize the risk of COVID spread during current pandemic. This complies with Medicare policies and guidelines.    Pulmonary:      Effort: No respiratory distress.   Abdominal:      General: There is no distension.      Tenderness: There is no abdominal tenderness.   Skin:     General: Skin is warm and dry.   Neurological:      Mental Status: He is alert and oriented to person, place, and time.      Cranial Nerves: No cranial nerve deficit.   Psychiatric:         Behavior: Behavior normal.         Assessment:     1. ACC/AHA stage C heart failure with preserved ejection fraction (HCC)     2. Left ventricular diastolic dysfunction, NYHA class 1     3. Cerebrovascular accident (CVA), unspecified mechanism (HCC)     4. HTN (hypertension), malignant     5. PAF (paroxysmal atrial fibrillation) (HCC)     6. Mixed hyperlipidemia     7. Stage 3b chronic kidney disease (HCC)     8. High risk medication use         Medical Decision Making:  Today's Assessment / Status / Plan:   At this time patient is clinically stable in terms of his cardiac standpoint.  Blood pressure is well controlled.    Continue home long-term event monitoring with 30 days monitor.  Continue anticoagulation therapy with Eliquis 5 mg p.o. twice a day for stroke risk reduction is HF at 2 score is elevated due to prior history of stroke in July 2021.  In the meantime, we will also obtain sleep studies to  rule out obstructive sleep apnea.  At this time, to further risk stratify, I will order a transthoracic echocardiogram to assess cardiac and valvular functions. I will also order a myocardial nuclear scan stress test to assess for coronary ischemia.  Consider flecainide therapy in the future for maintenance of sinus rhythm if no structural heart disease and no coronary ischemia detected.    Will start Rosuvastatin 40 mg qhs.  Continue diet and exercise more.    I will see patient back in clinic with lab tests and studies results in 3 months.    I thank you for referring patient to our Cardiology Clinic today.

## 2022-01-11 LAB — EKG IMPRESSION: NORMAL

## 2022-01-25 ENCOUNTER — TELEPHONE (OUTPATIENT)
Dept: CARDIOLOGY | Facility: MEDICAL CENTER | Age: 67
End: 2022-01-25

## 2022-01-26 ENCOUNTER — TELEPHONE (OUTPATIENT)
Dept: CARDIOLOGY | Facility: MEDICAL CENTER | Age: 67
End: 2022-01-26

## 2022-01-26 NOTE — TELEPHONE ENCOUNTER
Moe call  Received: Yesterday  RJ Mercado R.N. Hi Kaitlin,   I got a call from Moe from [facility] juaquin Lloyd Chema Mojica,  1955 (ref #[id number]) that he had afib with 3s pause. When I looked him to send a message to his primary cardiologist, I couldn't find him. I had been on phone with [facility] for 15 mins and they cannot locate the patient. Could you do me a favor and follow up with [employer] for me?? Thank you so much!! I just want to make sure his primary cardiologist is aware of the findings, and I cannot find out without finding the patient's chart. Thank you again!!   -HK   _______________________________________________________________    Patient found in Epic. Message routed to TT as primary cardiologist.           To TT: FYI patient had afib with 3 second pause

## 2022-01-27 NOTE — TELEPHONE ENCOUNTER
RJ Ribeiro 14 hours ago (5:14 PM)     Will monitor     Message text      Chicho Castelan M.D. 15 hours ago (4:04 PM)       MARYAN Figueroa I sent you a results note for this. Pt was sleeping during the pause.     Message text      SUSAN Doan M.D. 16 hours ago (4:02 PM)     Rory STEINBERG    Routing comment

## 2022-02-03 ENCOUNTER — TELEPHONE (OUTPATIENT)
Dept: CARDIOLOGY | Facility: MEDICAL CENTER | Age: 67
End: 2022-02-03

## 2022-02-03 PROCEDURE — 93228 REMOTE 30 DAY ECG REV/REPORT: CPT | Performed by: INTERNAL MEDICINE

## 2022-03-08 ENCOUNTER — OFFICE VISIT (OUTPATIENT)
Dept: SLEEP MEDICINE | Facility: MEDICAL CENTER | Age: 67
End: 2022-03-08
Payer: MEDICARE

## 2022-03-08 VITALS
HEART RATE: 137 BPM | RESPIRATION RATE: 18 BRPM | HEIGHT: 66 IN | OXYGEN SATURATION: 94 % | BODY MASS INDEX: 35.2 KG/M2 | WEIGHT: 219 LBS | DIASTOLIC BLOOD PRESSURE: 100 MMHG | SYSTOLIC BLOOD PRESSURE: 140 MMHG

## 2022-03-08 DIAGNOSIS — K21.00 GASTROESOPHAGEAL REFLUX DISEASE WITH ESOPHAGITIS, UNSPECIFIED WHETHER HEMORRHAGE: ICD-10-CM

## 2022-03-08 DIAGNOSIS — I48.0 PAROXYSMAL ATRIAL FIBRILLATION (HCC): ICD-10-CM

## 2022-03-08 DIAGNOSIS — G47.30 SLEEP DISORDER BREATHING: Primary | ICD-10-CM

## 2022-03-08 PROCEDURE — 99204 OFFICE O/P NEW MOD 45 MIN: CPT | Performed by: STUDENT IN AN ORGANIZED HEALTH CARE EDUCATION/TRAINING PROGRAM

## 2022-03-08 ASSESSMENT — PATIENT HEALTH QUESTIONNAIRE - PHQ9: CLINICAL INTERPRETATION OF PHQ2 SCORE: 0

## 2022-03-08 ASSESSMENT — FIBROSIS 4 INDEX: FIB4 SCORE: 1.720618004029213244

## 2022-03-08 NOTE — PROGRESS NOTES
[facility] Consult Note     Date: 3/8/2022 / Time: 9:39 AM      Thank you for requesting a sleep medicine consultation on Chema Sarabia at the sleep center. Presents today with the chief complaints of recently diagnosed atrial fibrillation. He is referred by Santosh Castelan M.D.  [address]  [address]  [place] [address] for evaluation and treatment of sleep disorder breathing .     HISTORY OF PRESENT ILLNESS:     Chema Sarabia is a 66 y.o. male with hypertension, hyperlipidemia, paroxysmal atrial fibrillation, GERD, CKD stage III and history of CVA.  Presents to Sleep Clinic for evaluation of potential sleep apnea.     He reports in June 2021 he believes he had a mild CVA.  Reports he has been worked up with MRIs and PET scans and there have been some lesions noted potentially from past stroke.  This led him to be evaluated by cardiology who did a event monitor.  Reports that during monitoring he was noted to have atrial fibrillation at times.  These appeared to be coordinated with sleep.    Due to his new onset atrial fibrillation as well as potential CVA in the past it was recommended he be evaluated for potential sleep apnea.    He is currently working on diet and exercise and is walking 3 miles in the morning.  States ideally he would like to lose another 40 pounds.    As per supplemental questionnaire to be scanned or imported into chart:    Brutus Sleepiness Score: 4    Sleep Schedule  Bedtime: Weekday & Weekend 8pm  Wake time: Weekday & Weekend 4am  Sleep-onset latency: 10-15 min   Awakenings from sleep: 1-2 to urinate   Difficulty falling back asleep: none   Bedroom partner: No   Naps: No     DAYTIME SYMPTOMS:   Excessive daytime sleepiness: No   Daytime fatigue: No   Difficulty concentrating: No   Memory problems: No   Irritability:No   Work/school performance issues: No   Sleepiness with driving: No   Caffeine/stimulant use: Yes  Diet cokes a day   Alcohol  "use:Yes, How Many? Wine in evening      SLEEP RELATED SYMPTOMS  Snoring: Yes  Witnessed apnea or gasping/choking: No   Dry mouth or mouth breathing: Yes  Sweating: No   Teeth grinding/biting: Yes  Morning headaches: No   Refreshed Upon Awakening: Yes     SLEEP RELATED BEHAVIORS:  Parasomnias (walking, talking, eating, violence): No   Leg kicking: No   Restless legs - \"urge to move\": No   Nightmares: No  Recurrent: No   Dream enactment: No      NARCOLEPSY:  Cataplexy: No   Sleep paralysis: No   Sleep attacks: No   Hypnagogic/hypnopompic hallucinations: No     MEDICAL HISTORY  Past Medical History:   Diagnosis Date   • Acute kidney injury (HCC)    • Atrial fibrillation (HCC)    • Chickenpox    • Frequent urination    • Spanish measles    • Hypertension    • Hypertensive urgency    • Influenza    • Kidney stone    • Mumps    • Nephrolithiasis    • Obesity    • Snoring    • Stroke (HCC)    • Tonsillitis    • Typhoid fever    • Wears glasses         SURGICAL HISTORY  History reviewed. No pertinent surgical history.     FAMILY HISTORY  Family History   Problem Relation Age of Onset   • Cancer Mother         lungs, smoker   • Lung Cancer Mother    • Hypertension Father    • Hyperlipidemia Father    • Diabetes Neg Hx    • Heart Disease Neg Hx        SOCIAL HISTORY  Social History     Socioeconomic History   • Marital status: Single   Tobacco Use   • Smoking status: Never Smoker   • Smokeless tobacco: Never Used   Vaping Use   • Vaping Use: Never used   Substance and Sexual Activity   • Alcohol use: Yes     Alcohol/week: 6.0 oz     Types: 7 Glasses of wine, 3 Cans of beer per week     Comment: occ   • Drug use: No        Occupation: Retired, Worked in Oil field.     CURRENT MEDICATIONS  Current Outpatient Medications   Medication Sig Dispense Refill   • apixaban (ELIQUIS) 5mg Tab Take 1 Tablet by mouth 2 times a day. 60 Tablet 11   • rosuvastatin (CRESTOR) 40 MG tablet Take 1 Tablet by mouth every day. 30 Tablet 3   • " "spironolactone (ALDACTONE) 25 MG Tab Take 1 Tab by mouth every day. 90 Tab 3   • amLODIPine (NORVASC) 10 MG Tab Take 1 Tab by mouth every day. 90 Tab 3   • vitamin D, Ergocalciferol, (DRISDOL) 14199 units Cap capsule Take 1 Cap by mouth every 7 days. 12 Cap 1   • multivitamin (THERAGRAN) Tab Take 1 Tab by mouth every day.       No current facility-administered medications for this visit.       REVIEW OF SYSTEMS  Constitutional: Denies fevers, Denies weight changes  Ears/Nose/Throat/Mouth: Denies nasal congestion or sore throat   Cardiovascular: Denies chest pain  Respiratory: Denies shortness of breath, Denies cough  Gastrointestinal/Hepatic: Denies nausea, vomiting  Sleep: see HPI    Physical Examination:  Vitals/ General Appearance:   Weight/BMI: Body mass index is 35.35 kg/m².  /100 (BP Location: Left arm, Patient Position: Sitting, BP Cuff Size: Adult)   Pulse (!) 137   Resp 18   Ht 1.676 m (5' 6\")   Wt 99.3 kg (219 lb)   SpO2 94%   Vitals:    03/08/22 0927   BP: 140/100   BP Location: Left arm   Patient Position: Sitting   BP Cuff Size: Adult   Pulse: (!) 137   Resp: 18   SpO2: 94%   Weight: 99.3 kg (219 lb)   Height: 1.676 m (5' 6\")       Pt. is alert and oriented to time, place and person. Cooperative and in no apparent distress.     Constitutional: Alert, no distress, well-groomed.  Skin: No rashes in visible areas.  Eye: Round. Conjunctiva clear, lids normal. No icterus.   ENT EXAM  Nasal alae/valves collapsible: No   Nasal septum deviation: Yes  Nasal turbinate hypertrophy: Left: Grade 1   Right: Grade 1  Hard palate narrow: No   Hard palate high: No   Soft palate/uvula (Mallampati score): 4  Tongue Scalloping: Yes  Retrognathia: No   Micrognathia: No   Cardiovascular:no murmus/gallops/rubs, normal S1 and S2 heart sounds, irregularly irregular  Pulmonary:Clear to auscultation, No wheezes, No crackles.  Neurologic:Awake, alert and oriented x 3, Normal age appropriate gait, No involuntary " motions.  Extremities: No clubbing, cyanosis, or edema       ASSESSMENT AND PLAN   Chema Sarabia is a 66 y.o. male with hypertension, hyperlipidemia, paroxysmal atrial fibrillation, GERD, CKD stage III and history of CVA.  Presents to Sleep Clinic for evaluation of potential sleep apnea.     1. Chema Sarabia  has symptoms of Obstructive Sleep Apnea (ANAHY). Chema Sarabia has symptoms of snoring, teeth grinding, dry mouth.    Pt has risk factors for ANAHY include obesity, thick neck, age, and crowded oropharynx.     The pathophysiology of ANAHY and the increased risk of cardiovascular morbidity from untreated ANAHY is discussed in detail with the patient. He  also has HTN, GERD, heart disease, atrial fibrillation, and history of CVA which can be worsened by ANAHY.      We have discussed diagnostic options including in-laboratory, attended polysomnography and home sleep testing. We have also discussed treatment options including airway pressurization, reconstructive otolaryngologic surgery, dental appliances and weight management.     Subsequently,treatment options will be discussed based on the diagnostic study. Meanwhile, He is urged to avoid supine sleep, weight gain and alcoholic beverages since all of these can worsen ANAHY. He is cautioned against drowsy driving. If He feels sleepy while driving, advised must pull over for a break/nap, rather than persist on the road, in the interest of Pt's own safety and that of others on the road.    Plan  -  He  will be scheduled for an overnight PSG to assess sleep related breathing disorder.  - Follow up 1-2 weeks after sleep study to discuss results and treatment options moving forward   -Advised to reach out via MyChart or by phone with any questions or concerns.     2.  Regarding treatment of other past medical problems and general health maintenance,  Pt is urged to follow up with PCP.      Please note portions of this record was created using voice  recognition software. I have made every reasonable attempt to correct obvious errors, but I expect that there are errors of grammar and possibly content I did not discover before finalizing the note.

## 2022-03-11 ENCOUNTER — HOSPITAL ENCOUNTER (OUTPATIENT)
Dept: RADIOLOGY | Facility: MEDICAL CENTER | Age: 67
End: 2022-03-11
Attending: INTERNAL MEDICINE
Payer: MEDICARE

## 2022-03-11 ENCOUNTER — HOSPITAL ENCOUNTER (OUTPATIENT)
Dept: CARDIOLOGY | Facility: MEDICAL CENTER | Age: 67
End: 2022-03-11
Attending: INTERNAL MEDICINE
Payer: MEDICARE

## 2022-03-11 DIAGNOSIS — Z91.89 OTHER SPECIFIED PERSONAL RISK FACTORS, NOT ELSEWHERE CLASSIFIED: ICD-10-CM

## 2022-03-11 DIAGNOSIS — I48.0 PAF (PAROXYSMAL ATRIAL FIBRILLATION) (HCC): ICD-10-CM

## 2022-03-11 PROCEDURE — 700111 HCHG RX REV CODE 636 W/ 250 OVERRIDE (IP)

## 2022-03-11 PROCEDURE — A9502 TC99M TETROFOSMIN: HCPCS

## 2022-03-11 PROCEDURE — 93306 TTE W/DOPPLER COMPLETE: CPT

## 2022-03-11 RX ORDER — REGADENOSON 0.08 MG/ML
0.4 INJECTION, SOLUTION INTRAVENOUS ONCE
Status: COMPLETED | OUTPATIENT
Start: 2022-03-11 | End: 2022-03-11

## 2022-03-11 RX ORDER — REGADENOSON 0.08 MG/ML
INJECTION, SOLUTION INTRAVENOUS
Status: COMPLETED
Start: 2022-03-11 | End: 2022-03-11

## 2022-03-11 RX ADMIN — REGADENOSON 0.4 MG: 0.08 INJECTION, SOLUTION INTRAVENOUS at 15:47

## 2022-03-14 ENCOUNTER — TELEPHONE (OUTPATIENT)
Dept: CARDIOLOGY | Facility: MEDICAL CENTER | Age: 67
End: 2022-03-14

## 2022-03-14 DIAGNOSIS — I51.89 LEFT VENTRICULAR DIASTOLIC DYSFUNCTION, NYHA CLASS 1: ICD-10-CM

## 2022-03-14 DIAGNOSIS — I50.30 ACC/AHA STAGE C HEART FAILURE WITH PRESERVED EJECTION FRACTION (HCC): ICD-10-CM

## 2022-03-14 LAB
LV EJECT FRACT  99904: 50
LV EJECT FRACT MOD 2C 99903: 52.75
LV EJECT FRACT MOD 4C 99902: 54.69
LV EJECT FRACT MOD BP 99901: 53.58

## 2022-03-14 PROCEDURE — 78452 HT MUSCLE IMAGE SPECT MULT: CPT | Mod: 26 | Performed by: INTERNAL MEDICINE

## 2022-03-14 PROCEDURE — 93306 TTE W/DOPPLER COMPLETE: CPT | Mod: 26 | Performed by: INTERNAL MEDICINE

## 2022-03-14 PROCEDURE — 93018 CV STRESS TEST I&R ONLY: CPT | Performed by: INTERNAL MEDICINE

## 2022-03-14 NOTE — TELEPHONE ENCOUNTER
Due to abnormal echocardiogram, will send for serum light chains and pyrophosphate scan to rule out amyloidosis.

## 2022-03-14 NOTE — RESULT ENCOUNTER NOTE
Dear Mari,    Can you please let Chema Hauser Gabino Gibson know that result is not entirely normal and we will need to screen him for possible cardiac amyloidosis? I ordered the blood test and pyrophosphate scan.    Thank you,  Jorge A.

## 2022-03-15 ENCOUNTER — HOSPITAL ENCOUNTER (OUTPATIENT)
Dept: LAB | Facility: MEDICAL CENTER | Age: 67
End: 2022-03-15
Attending: INTERNAL MEDICINE
Payer: MEDICARE

## 2022-03-15 DIAGNOSIS — I51.89 LEFT VENTRICULAR DIASTOLIC DYSFUNCTION, NYHA CLASS 1: ICD-10-CM

## 2022-03-15 DIAGNOSIS — I50.30 ACC/AHA STAGE C HEART FAILURE WITH PRESERVED EJECTION FRACTION (HCC): ICD-10-CM

## 2022-03-15 PROCEDURE — 83521 IG LIGHT CHAINS FREE EACH: CPT

## 2022-03-15 PROCEDURE — 36415 COLL VENOUS BLD VENIPUNCTURE: CPT

## 2022-03-18 ENCOUNTER — OFFICE VISIT (OUTPATIENT)
Dept: CARDIOLOGY | Facility: MEDICAL CENTER | Age: 67
End: 2022-03-18
Payer: MEDICARE

## 2022-03-18 ENCOUNTER — TELEPHONE (OUTPATIENT)
Dept: CARDIOLOGY | Facility: MEDICAL CENTER | Age: 67
End: 2022-03-18

## 2022-03-18 VITALS
WEIGHT: 223 LBS | RESPIRATION RATE: 17 BRPM | HEART RATE: 96 BPM | SYSTOLIC BLOOD PRESSURE: 130 MMHG | BODY MASS INDEX: 35.84 KG/M2 | DIASTOLIC BLOOD PRESSURE: 90 MMHG | HEIGHT: 66 IN | OXYGEN SATURATION: 95 %

## 2022-03-18 DIAGNOSIS — I50.30 ACC/AHA STAGE C HEART FAILURE WITH PRESERVED EJECTION FRACTION (HCC): ICD-10-CM

## 2022-03-18 DIAGNOSIS — I48.11 LONGSTANDING PERSISTENT ATRIAL FIBRILLATION (HCC): ICD-10-CM

## 2022-03-18 DIAGNOSIS — I63.9 CEREBROVASCULAR ACCIDENT (CVA), UNSPECIFIED MECHANISM (HCC): ICD-10-CM

## 2022-03-18 DIAGNOSIS — Z79.899 HIGH RISK MEDICATION USE: ICD-10-CM

## 2022-03-18 DIAGNOSIS — I10 HTN (HYPERTENSION), MALIGNANT: ICD-10-CM

## 2022-03-18 DIAGNOSIS — E78.2 MIXED HYPERLIPIDEMIA: ICD-10-CM

## 2022-03-18 DIAGNOSIS — I51.89 LEFT VENTRICULAR DIASTOLIC DYSFUNCTION, NYHA CLASS 2: ICD-10-CM

## 2022-03-18 DIAGNOSIS — N18.32 STAGE 3B CHRONIC KIDNEY DISEASE: ICD-10-CM

## 2022-03-18 LAB
KAPPA LC FREE SER-MCNC: 14.99 MG/L (ref 3.3–19.4)
KAPPA LC FREE/LAMBDA FREE SER NEPH: 1.34 {RATIO} (ref 0.26–1.65)
LAMBDA LC FREE SERPL-MCNC: 11.17 MG/L (ref 5.71–26.3)

## 2022-03-18 PROCEDURE — 93000 ELECTROCARDIOGRAM COMPLETE: CPT | Performed by: INTERNAL MEDICINE

## 2022-03-18 PROCEDURE — 99215 OFFICE O/P EST HI 40 MIN: CPT | Mod: 25 | Performed by: INTERNAL MEDICINE

## 2022-03-18 ASSESSMENT — ENCOUNTER SYMPTOMS
CLAUDICATION: 0
LOSS OF CONSCIOUSNESS: 0
CHILLS: 0
SHORTNESS OF BREATH: 0
PND: 0
BLURRED VISION: 0
VOMITING: 0
BLOOD IN STOOL: 0
COUGH: 0
BRUISES/BLEEDS EASILY: 0
DIZZINESS: 0
HEADACHES: 0
WEIGHT LOSS: 0
DOUBLE VISION: 0
PALPITATIONS: 0
SENSORY CHANGE: 0
EYE DISCHARGE: 0
HALLUCINATIONS: 0
SPEECH CHANGE: 0
MYALGIAS: 0
NAUSEA: 0
ABDOMINAL PAIN: 0
DEPRESSION: 0
FEVER: 0
EYE PAIN: 0
FALLS: 0
ORTHOPNEA: 0

## 2022-03-18 ASSESSMENT — FIBROSIS 4 INDEX: FIB4 SCORE: 1.720618004029213244

## 2022-03-18 NOTE — PROGRESS NOTES
Chief Complaint   Patient presents with   • Congestive Heart Failure     F/V DX: ACC/AHA stage C heart failure with preserved ejection fraction (HCC)       Subjective:   Chema Sarabia is a 65 y.o. male who presents today for cardiac care and evaluation in our heart failure clinic after his recent episode of hypertensive emergency for which he went into heart failure exacerbation as well. Patient does have documented transthoracic echocardiogram which showed relatively preserved LV function of 55%. No significant valvular disease.     Feeling very well. No dyspnea.     At prior visit, patient was able to complete 549 m during his 6 minute walk test. his O2 saturation at baseline was 94% and at the end of the test, the O2 saturation was 95%. he reported 0 level of dyspnea on Jim scale.     06/06/2019 Patient was able to complete 549 m during his 6 minute walk test. his O2 saturation at baseline was 95% and at the end of the test, the O2 saturation was 93%. he reported 1 level of dyspnea on Jim scale.    I have independently interpreted and reviewed blood tests results with patient in clinic which shows fine LDL level 98, with elevated triglycerides 169 but down from 267, stable renal (GFR of 50) and liver function.    In July of 2021, for 1 week, he had symptoms of difficult finding words and incoherence. MRI of the brain showed evidence of stroke. His symptoms resolved at this time.    Chema was placed on cardiac event monitor and found to have paroxysmal atrial fibrillation.  He was started on Eliquis 5 mg p.o. twice a day.    Most recent transthoracic echocardiogram last week show evidence of slightly reduced left ventricular systolic function of 50%, concentric biventricular hypertrophy.  Patient did have blood tests which did not show increase free light change.  I personally interpreted images of his transthoracic echocardiogram and blood test results.    I personally interpreted EKG tracing today in clinic  as well which show evidence of atrial fibrillation with ventricular rate at 107.    Of note, patient has been taking his Eliquis for 3 months but it has some missed dosage.    Past Medical History:   Diagnosis Date   • Acute kidney injury (HCC)    • Atrial fibrillation (HCC)    • Chickenpox    • Frequent urination    • [place] measles    • Hypertension    • Hypertensive urgency    • Influenza    • Kidney stone    • Mumps    • Nephrolithiasis    • Obesity    • Snoring    • Stroke (HCC)    • Tonsillitis    • Typhoid fever    • Wears glasses      History reviewed. No pertinent surgical history.  Family History   Problem Relation Age of Onset   • Cancer Mother         lungs, smoker   • Lung Cancer Mother    • Hypertension Father    • Hyperlipidemia Father    • Diabetes Neg Hx    • Heart Disease Neg Hx      Social History     Socioeconomic History   • Marital status: Single     Spouse name: Not on file   • Number of children: Not on file   • Years of education: Not on file   • Highest education level: Not on file   Occupational History   • Not on file   Tobacco Use   • Smoking status: Never Smoker   • Smokeless tobacco: Never Used   Vaping Use   • Vaping Use: Never used   Substance and Sexual Activity   • Alcohol use: Yes     Alcohol/week: 6.0 oz     Types: 7 Glasses of wine, 3 Cans of beer per week     Comment: occ   • Drug use: No   • Sexual activity: Not on file   Other Topics Concern   • Not on file   Social History Narrative   • Not on file     Social Determinants of Health     Financial Resource Strain: Not on file   Food Insecurity: Not on file   Transportation Needs: Not on file   Physical Activity: Not on file   Stress: Not on file   Social Connections: Not on file   Intimate Partner Violence: Not on file   Housing Stability: Not on file     No Known Allergies  Outpatient Encounter Medications as of 3/18/2022   Medication Sig Dispense Refill   • apixaban (ELIQUIS) 5mg Tab Take 1 Tablet by mouth 2 times a day. 60  "Tablet 11   • rosuvastatin (CRESTOR) 40 MG tablet Take 1 Tablet by mouth every day. 30 Tablet 3   • spironolactone (ALDACTONE) 25 MG Tab Take 1 Tab by mouth every day. 90 Tab 3   • amLODIPine (NORVASC) 10 MG Tab Take 1 Tab by mouth every day. 90 Tab 3   • vitamin D, Ergocalciferol, (DRISDOL) 05954 units Cap capsule Take 1 Cap by mouth every 7 days. 12 Cap 1   • multivitamin (THERAGRAN) Tab Take 1 Tab by mouth every day.       No facility-administered encounter medications on file as of 3/18/2022.     Review of Systems   Constitutional: Negative for chills, fever, malaise/fatigue and weight loss.   HENT: Negative for ear discharge, ear pain, hearing loss and nosebleeds.    Eyes: Negative for blurred vision, double vision, pain and discharge.   Respiratory: Negative for cough and shortness of breath.    Cardiovascular: Negative for chest pain, palpitations, orthopnea, claudication, leg swelling and PND.   Gastrointestinal: Negative for abdominal pain, blood in stool, melena, nausea and vomiting.   Genitourinary: Negative for dysuria and hematuria.   Musculoskeletal: Negative for falls, joint pain and myalgias.   Skin: Negative for itching and rash.   Neurological: Negative for dizziness, sensory change, speech change, loss of consciousness and headaches.   Endo/Heme/Allergies: Negative for environmental allergies. Does not bruise/bleed easily.   Psychiatric/Behavioral: Negative for depression, hallucinations and suicidal ideas.        Objective:   /90 (BP Location: Left arm, Patient Position: Sitting, BP Cuff Size: Adult)   Pulse 96   Resp 17   Ht 1.676 m (5' 6\")   Wt 101 kg (223 lb)   SpO2 95%   BMI 35.99 kg/m²     Physical Exam  Vitals and nursing note reviewed.   Constitutional:       General: He is not in acute distress.     Appearance: He is not diaphoretic.   HENT:      Head: Normocephalic and atraumatic.      Right Ear: External ear normal.      Left Ear: External ear normal.   Eyes:      General:   "       Right eye: No discharge.         Left eye: No discharge.   Neck:      Thyroid: No thyromegaly.      Vascular: No JVD.   Cardiovascular:      Rate and Rhythm: Normal rate and regular rhythm.      Comments: Ausculation was not performed to minimize the risk of COVID spread during current pandemic. This complies with Medicare policies and guidelines.    Pulmonary:      Effort: No respiratory distress.   Abdominal:      General: There is no distension.      Tenderness: There is no abdominal tenderness.   Skin:     General: Skin is warm and dry.   Neurological:      Mental Status: He is alert and oriented to person, place, and time.      Cranial Nerves: No cranial nerve deficit.   Psychiatric:         Behavior: Behavior normal.         Assessment:     1. ACC/AHA stage C heart failure with preserved ejection fraction (HCC)  EKG    CL-CARDIOVERSION    EC-KIANA W/O CONT    Basic Metabolic Panel    proBrain Natriuretic Peptide, NT   2. Left ventricular diastolic dysfunction, NYHA class 2  EKG    CL-CARDIOVERSION    EC-KIANA W/O CONT    Basic Metabolic Panel    proBrain Natriuretic Peptide, NT   3. Longstanding persistent atrial fibrillation (HCC)  EKG    CL-CARDIOVERSION    EC-KIANA W/O CONT   4. Cerebrovascular accident (CVA), unspecified mechanism (HCC)     5. HTN (hypertension), malignant     6. Mixed hyperlipidemia     7. Stage 3b chronic kidney disease (HCC)  Basic Metabolic Panel    proBrain Natriuretic Peptide, NT   8. High risk medication use         Medical Decision Making:  Today's Assessment / Status / Plan:   Overall, I do think that patient would be benefited from sinus restoration.  We will perform KIANA and cardioversion.  Risks and benefits were explained to patient has agreed to proceed. In the meantime, continue anticoagulation therapy with Eliquis 5 mg p.o. twice a day for stroke risk reduction due to prior history of stroke in July 2021.    Blood pressure is well controlled.  Continue amlodipine 10 mg p.o.  once a day and spironolactone at 25 mg p.o. once a day.    In the meantime, await sleep studies to rule out obstructive sleep apnea.    Consider flecainide therapy in the future for maintenance of sinus rhythm after KIANA and cardioversion.  Patient will undergo pyrophosphate nuclear scan to make sure he does not have cardiac amyloidosis.    Will continue Rosuvastatin 40 mg qhs for LDL control.  Continue diet and exercise more.    I will see patient back in clinic with lab tests and studies results in 3 months.    I thank you for referring patient to our Cardiology Clinic today.

## 2022-03-18 NOTE — TELEPHONE ENCOUNTER
Rory Suarez,      Patient was informed you will be reaching out to schedule: CL-CARDIOVERSION [[id number]] & EC-KIANA W/O CONT [[id number]]. Ordered per TT, Thank you.

## 2022-03-19 LAB — EKG IMPRESSION: NORMAL

## 2022-03-21 ENCOUNTER — SLEEP STUDY (OUTPATIENT)
Dept: SLEEP MEDICINE | Facility: MEDICAL CENTER | Age: 67
End: 2022-03-21
Attending: STUDENT IN AN ORGANIZED HEALTH CARE EDUCATION/TRAINING PROGRAM
Payer: MEDICARE

## 2022-03-21 DIAGNOSIS — G47.30 SLEEP DISORDER BREATHING: ICD-10-CM

## 2022-03-21 DIAGNOSIS — I48.0 PAROXYSMAL ATRIAL FIBRILLATION (HCC): ICD-10-CM

## 2022-03-21 PROCEDURE — 95810 POLYSOM 6/> YRS 4/> PARAM: CPT | Performed by: STUDENT IN AN ORGANIZED HEALTH CARE EDUCATION/TRAINING PROGRAM

## 2022-03-28 NOTE — PROCEDURES
MONTAGE: Standard  STUDY TYPE: Diagnostic    RECORDING TECHNIQUE:   After the scalp was prepared, gold plated electrodes were applied to the scalp according to the International 10-20 System. EEG (electroencephalogram) was continuously monitored from the O1-M2, O2-M1, C3-M2, C4-M1, F3-M2, and F4-M1. EOGs (electrooculograms) were monitored by electrodes placed at the left and right outer canthi. Chin EMG (electromyogram) was monitored by electrodes placed on the mentalis and sub-mentalis muscles. Nasal and oral airflow were monitored using a triple port thermocouple as well as oronasal pressure transducer. Respiratory effort was measured by inductive plethysmography technology employing abdominal and thoracic belts. Blood oxygen saturation and pulse were monitored by pulse oximetry. Heart rhythm was monitored by surface electrocardiogram. Leg EMG was studied using surface electrodes placed on left and right anterior tibialis. A microphone was used to monitor tracheal sounds and snoring. Body position was monitored and documented by technician observation.     SCORING CRITERIA:   A modification of the AASM manual for scoring of sleep and associated events was used. Obstructive apneas were scored by cessation of airflow for at least 10 seconds with continuing respiratory effort. Central apneas were scored by cessation of airflow for at least 10 seconds with no respiratory effort. Hypopneas were scored by a 30% or more reduction in airflow for at least 10 seconds accompanied by arterial oxygen desaturation of 3% or an arousal. For CMS (Medicare) patients, per AASM rule 1B, hypopneas are scored by 30% with mild reduction in airflow for at least 10 seconds accompanied by arterial saturation decreased at 4%.    Study start time was 10:29:29 PM. Diagnostic recording time was 7h 23.5m with a total sleep time of 3h 0.5m resulting in a sleep efficiency of 40.70%%. Sleep latency from the start of the study was 05 minutes and  the latency from sleep to REM was 00 minutes. In total,157 arousals were scored for an arousal index of 52.2.    Respiratory:  There were a total of 196 apneas consisting of 44 obstructive apneas, 0 mixed apneas, and 152 central apneas. A total of 35 hypopneas were scored. The apnea index was 65.15 per hour and the hypopnea index was 11.63 per hour resulting in an overall AHI of 76.79. AHI during rem was 0.0 and AHI while supine was 0.00.  Central apneas accounted for 65.8% of respiratory events  Oximetry:  There was a mean oxygen saturation of 89.0%. The minimum oxygen saturation in NREM was 80.0 % and in REM was --. The patient spent 84.0 minutes of TST with SaO2 <88%.  Cardiac:  The highest heart rate seen while awake was 140 BPM while the highest heart rate during sleep was 100 BPM with an average sleeping heart rate of 78 BPM.  Limb Movements:  There were a total of 35 PLMs during sleep which resulted in a PLMS index of 11.6. Of these, 9 were associated with arousals which resulted in a PLMS arousal index of 3.0.    Impression:  1.  Central sleep apnea with overall AHI of 76.8  2.  Nocturnal hypoxia likely secondary to untreated sleep apnea minimum oxygen saturation 80% time below or at 88% saturation of 84 minutes  3.  Severe sleep fragmentation night of sleep study sleep efficiency 40%    Recommendations:  I recommend that the patient should return for a CPAP/BiPAP/ASV titration due to the severity of sleep disordered breathing and sleep-related hypoxia.  Echocardiogram in March 2022 showed a EF estimated at 50%.   In some cases alternative treatment options may be proven effective in resolving sleep apnea. These options include upper airway surgery, the use of a dental orthotic, weight loss orpositional therapy. Clinical correlation is required. In general patients with sleep apnea are advised to avoid alcohol, sedatives and not to operate a motor vehicle while drowsy.  Untreated sleep apnea increases the  risk for cardiovascular and neurovascular disease.

## 2022-03-28 NOTE — TELEPHONE ENCOUNTER
Per patients request due to being out of town, patient is scheduled on 5-3-22 for a KIANA/CV w/anesthesia with Dr. Castelan. Patient was told to hold spironolactone AM day of procedure and to check in at 7:00 for a 9:00 procedure. Updated H&P to be done on admit by NP. Pre admit to call patient.

## 2022-03-29 DIAGNOSIS — I50.30 ACC/AHA STAGE C HEART FAILURE WITH PRESERVED EJECTION FRACTION (HCC): ICD-10-CM

## 2022-03-29 DIAGNOSIS — I51.89 LEFT VENTRICULAR DIASTOLIC DYSFUNCTION, NYHA CLASS 2: ICD-10-CM

## 2022-03-29 DIAGNOSIS — I10 ESSENTIAL HYPERTENSION: ICD-10-CM

## 2022-03-30 RX ORDER — SPIRONOLACTONE 25 MG/1
TABLET ORAL
Qty: 90 TABLET | Refills: 3 | Status: SHIPPED | OUTPATIENT
Start: 2022-03-30 | End: 2022-08-24 | Stop reason: SDUPTHER

## 2022-03-30 RX ORDER — AMLODIPINE BESYLATE 10 MG/1
TABLET ORAL
Qty: 90 TABLET | Refills: 3 | Status: SHIPPED | OUTPATIENT
Start: 2022-03-30 | End: 2022-08-24 | Stop reason: SDUPTHER

## 2022-03-31 ENCOUNTER — TELEPHONE (OUTPATIENT)
Dept: CARDIOLOGY | Facility: MEDICAL CENTER | Age: 67
End: 2022-03-31
Payer: MEDICARE

## 2022-03-31 NOTE — TELEPHONE ENCOUNTER
Received walk in form from pt requesting for amlodipine and spironolactone for refill to [facility] pharmacy.      Upon chart review medications already refilled 3/30, see refill encounter 3/29/2022.  Noted findings.    Walk in form sent to scanning via [employer], completed status received.

## 2022-04-12 ENCOUNTER — PRE-ADMISSION TESTING (OUTPATIENT)
Dept: ADMISSIONS | Facility: MEDICAL CENTER | Age: 67
End: 2022-04-12
Attending: INTERNAL MEDICINE
Payer: MEDICARE

## 2022-04-12 ASSESSMENT — FIBROSIS 4 INDEX: FIB4 SCORE: 1.720618004029213244

## 2022-04-20 DIAGNOSIS — E78.5 DYSLIPIDEMIA: ICD-10-CM

## 2022-04-21 RX ORDER — ROSUVASTATIN CALCIUM 40 MG/1
TABLET, COATED ORAL
Qty: 90 TABLET | Refills: 3 | Status: SHIPPED
Start: 2022-04-21 | End: 2022-07-27

## 2022-04-27 ENCOUNTER — OFFICE VISIT (OUTPATIENT)
Dept: SLEEP MEDICINE | Facility: MEDICAL CENTER | Age: 67
End: 2022-04-27
Payer: MEDICARE

## 2022-04-27 VITALS
OXYGEN SATURATION: 95 % | HEART RATE: 115 BPM | DIASTOLIC BLOOD PRESSURE: 90 MMHG | BODY MASS INDEX: 35.52 KG/M2 | WEIGHT: 221 LBS | SYSTOLIC BLOOD PRESSURE: 132 MMHG | RESPIRATION RATE: 16 BRPM | HEIGHT: 66 IN

## 2022-04-27 DIAGNOSIS — I48.0 PAROXYSMAL ATRIAL FIBRILLATION (HCC): ICD-10-CM

## 2022-04-27 DIAGNOSIS — G47.31 CENTRAL SLEEP APNEA: Primary | ICD-10-CM

## 2022-04-27 PROCEDURE — 99213 OFFICE O/P EST LOW 20 MIN: CPT | Performed by: STUDENT IN AN ORGANIZED HEALTH CARE EDUCATION/TRAINING PROGRAM

## 2022-04-27 RX ORDER — ZOLPIDEM TARTRATE 5 MG/1
5 TABLET ORAL NIGHTLY PRN
Qty: 2 TABLET | Refills: 0 | Status: SHIPPED | OUTPATIENT
Start: 2022-04-27 | End: 2022-06-30

## 2022-04-27 ASSESSMENT — FIBROSIS 4 INDEX: FIB4 SCORE: 1.720618004029213244

## 2022-04-27 NOTE — PROGRESS NOTES
[facility] Follow-up Visit    CC: Follow-up to discuss sleep study results      HPI:  Chema Sarabia is a 66 y.o.male  with hypertension, hyperlipidemia, paroxysmal atrial fibrillation, GERD, CKD stage III, history of CVA and severe central sleep apnea.  Presents today to discuss recent sleep study results.    Overall he felt the sleep study went okay.  He did not have the best night sleep and had to wake up multiple times to use the restroom.  He was expecting a split-night study to occur however due to his fragmented sleep this did not occur.    He does continue to snore, have a dry mouth, and clenched teeth.    Patient Active Problem List    Diagnosis Date Noted   • Dyslipidemia 04/26/2019   • Gastroesophageal reflux disease 04/26/2019   • Acquired renal cyst of right kidney 10/19/2018   • Stage 3 chronic kidney disease (HCC) 04/20/2018   • Essential hypertension 04/20/2018   • [facility] 04/19/2018   • Research study patient 04/09/2018   • Moderate aortic insufficiency 04/05/2018   • ACC/AHA stage C heart failure with preserved ejection fraction (HCC) 04/05/2018   • Health care maintenance 02/14/2018   • Heart disease, hypertensive, malignant 02/14/2018       Past Medical History:   Diagnosis Date   • Acute kidney injury (HCC)     stage 3 renal failure   • Atrial fibrillation (HCC)    • Chickenpox    • Frequent urination    • English measles    • Heart valve disease     aortic insufficiency   • Hypertension    • Hypertensive urgency    • Influenza    • Kidney stone    • Mumps    • Nephrolithiasis    • Obesity    • Snoring    • Stroke (HCC)    • Tonsillitis    • Typhoid fever    • Wears glasses         History reviewed. No pertinent surgical history.    Family History   Problem Relation Age of Onset   • Cancer Mother         lungs, smoker   • Lung Cancer Mother    • Hypertension Father    • Hyperlipidemia Father    • Diabetes Neg Hx    • Heart Disease Neg Hx        Social  History     Socioeconomic History   • Marital status: Single     Spouse name: Not on file   • Number of children: Not on file   • Years of education: Not on file   • Highest education level: Not on file   Occupational History   • Not on file   Tobacco Use   • Smoking status: Never Smoker   • Smokeless tobacco: Never Used   Vaping Use   • Vaping Use: Never used   Substance and Sexual Activity   • Alcohol use: Yes     Alcohol/week: 6.0 oz     Types: 7 Glasses of wine, 3 Cans of beer per week     Comment: occ   • Drug use: No   • Sexual activity: Not on file   Other Topics Concern   • Not on file   Social History Narrative   • Not on file     Social Determinants of Health     Financial Resource Strain: Not on file   Food Insecurity: Not on file   Transportation Needs: Not on file   Physical Activity: Not on file   Stress: Not on file   Social Connections: Not on file   Intimate Partner Violence: Not on file   Housing Stability: Not on file       Current Outpatient Medications   Medication Sig Dispense Refill   • zolpidem (AMBIEN) 5 MG Tab Take 1 Tablet by mouth at bedtime as needed for Sleep (1 to 2 po qhs prn insomnia/sleep study. Bring to sleep study for one day supply) for up to 2 doses. 2 Tablet 0   • rosuvastatin (CRESTOR) 40 MG tablet TAKE ONE TABLET BY MOUTH DAILY 90 Tablet 3   • amLODIPine (NORVASC) 10 MG Tab TAKE ONE TABLET BY MOUTH DAILY 90 Tablet 3   • spironolactone (ALDACTONE) 25 MG Tab TAKE ONE TABLET BY MOUTH DAILY 90 Tablet 3   • apixaban (ELIQUIS) 5mg Tab Take 1 Tablet by mouth 2 times a day. 60 Tablet 11   • vitamin D, Ergocalciferol, (DRISDOL) 72097 units Cap capsule Take 1 Cap by mouth every 7 days. 12 Cap 1   • multivitamin (THERAGRAN) Tab Take 1 Tab by mouth every day.       No current facility-administered medications for this visit.        ALLERGIES: Patient has no known allergies.    ROS  Constitutional: Denies fevers, Denies weight changes  Ears/Nose/Throat/Mouth: Denies nasal congestion or  "sore throat   Cardiovascular: Denies chest pain  Respiratory: Denies shortness of breath, Denies cough  Gastrointestinal/Hepatic: Denies nausea, vomiting  Sleep: see HPI      PHYSICAL EXAM  /90 (BP Location: Left arm, Patient Position: Sitting, BP Cuff Size: Adult)   Pulse (!) 115   Resp 16   Ht 1.676 m (5' 6\")   Wt 100 kg (221 lb)   SpO2 95%   BMI 35.67 kg/m²   Appearance: Well-nourished, well-developed, no acute distress  Eyes:  No scleral icterus , EOMI  ENMT: masked  Musculoskeletal:  Grossly normal; gait and station normal; digits and nails normal  Skin:  No rashes, petechiae, cyanosis  Neurologic: without focal signs; oriented to person, time, place, and purpose; judgement intact      Medical Decision Making   Assessment and Plan  Chema Sarabia is a 66 y.o.male  with hypertension, hyperlipidemia, paroxysmal atrial fibrillation, GERD, CKD stage III, history of CVA and severe central sleep apnea.  Presents today to discuss recent sleep study results.    The medical record was reviewed.    Central sleep apnea  Reviewed recent PSG with patient showing an AHI of 76.8 with central apneas accounted for 65.8% of respiratory events.  It also showed minimal oxygen saturation of 80% and time at or below 88% saturation of 84 minutes.  Based on sleep study and symptoms meets criteria for severe central sleep apnea.   We discussed the pathophysiology of obstructive sleep apnea (ANAHY) and risk factors for the disease.  Along with central sleep apnea.  We also discussed possible consequences of untreated sleep apnea, including excessive daytime sleepiness and fatigue, cognitive dysfunction, cardiovascular complications such as elevated blood pressure, heart attacks, cardiac arrhythmias, and strokes. We discussed how sleep apnea typically gets worse with age. We discussed treatment options for sleep apnea, including the gold standard therapy (PAP)  and surgery including remede device. We will proceed in " lab PAP titration study    RECOMMENDATIONS  -Order placed for PSG titration study for CPAP, BiPAP, ASV  -Patient counseled to avoid driving when sleepy. Encouraged to anticipate sleepiness, consider taking a 10 min nap prior to driving, alternate with another , or pull over if sleepy while driving  -Advised to contact our office or myself with any questions via [employer]    Have advised the patient to follow up with the appropriate healthcare practitioners for all other medical problems and issues.    Return for After sleep study.      Please note portions of this record was created using voice recognition software. I have made every reasonable attempt to correct obvious errors, but I expect that there are errors of grammar and possibly content I did not discover before finalizing the note.

## 2022-05-03 ENCOUNTER — HOSPITAL ENCOUNTER (OUTPATIENT)
Facility: MEDICAL CENTER | Age: 67
End: 2022-05-03
Attending: INTERNAL MEDICINE | Admitting: INTERNAL MEDICINE
Payer: MEDICARE

## 2022-05-03 ENCOUNTER — ANESTHESIA EVENT (OUTPATIENT)
Dept: CARDIOLOGY | Facility: MEDICAL CENTER | Age: 67
End: 2022-05-03
Payer: MEDICARE

## 2022-05-03 ENCOUNTER — APPOINTMENT (OUTPATIENT)
Dept: CARDIOLOGY | Facility: MEDICAL CENTER | Age: 67
End: 2022-05-03
Attending: INTERNAL MEDICINE
Payer: MEDICARE

## 2022-05-03 ENCOUNTER — ANESTHESIA (OUTPATIENT)
Dept: CARDIOLOGY | Facility: MEDICAL CENTER | Age: 67
End: 2022-05-03
Payer: MEDICARE

## 2022-05-03 ENCOUNTER — TELEPHONE (OUTPATIENT)
Dept: CARDIOLOGY | Facility: MEDICAL CENTER | Age: 67
End: 2022-05-03

## 2022-05-03 VITALS
DIASTOLIC BLOOD PRESSURE: 100 MMHG | WEIGHT: 218.03 LBS | OXYGEN SATURATION: 92 % | TEMPERATURE: 96.9 F | HEART RATE: 106 BPM | HEIGHT: 66 IN | RESPIRATION RATE: 18 BRPM | SYSTOLIC BLOOD PRESSURE: 132 MMHG | BODY MASS INDEX: 35.04 KG/M2

## 2022-05-03 DIAGNOSIS — I51.89 LEFT VENTRICULAR DIASTOLIC DYSFUNCTION, NYHA CLASS 2: ICD-10-CM

## 2022-05-03 DIAGNOSIS — I48.11 LONGSTANDING PERSISTENT ATRIAL FIBRILLATION (HCC): ICD-10-CM

## 2022-05-03 DIAGNOSIS — I50.30 ACC/AHA STAGE C HEART FAILURE WITH PRESERVED EJECTION FRACTION (HCC): ICD-10-CM

## 2022-05-03 LAB
ANION GAP SERPL CALC-SCNC: 12 MMOL/L (ref 7–16)
BUN SERPL-MCNC: 27 MG/DL (ref 8–22)
CALCIUM SERPL-MCNC: 9.4 MG/DL (ref 8.5–10.5)
CHLORIDE SERPL-SCNC: 103 MMOL/L (ref 96–112)
CO2 SERPL-SCNC: 24 MMOL/L (ref 20–33)
CREAT SERPL-MCNC: 1.28 MG/DL (ref 0.5–1.4)
EKG IMPRESSION: NORMAL
EKG IMPRESSION: NORMAL
ERYTHROCYTE [DISTWIDTH] IN BLOOD BY AUTOMATED COUNT: 42.5 FL (ref 35.9–50)
GFR SERPLBLD CREATININE-BSD FMLA CKD-EPI: 62 ML/MIN/1.73 M 2
GLUCOSE SERPL-MCNC: 110 MG/DL (ref 65–99)
HCT VFR BLD AUTO: 52.8 % (ref 42–52)
HGB BLD-MCNC: 18.1 G/DL (ref 14–18)
INR PPP: 1.22 (ref 0.87–1.13)
MCH RBC QN AUTO: 30.2 PG (ref 27–33)
MCHC RBC AUTO-ENTMCNC: 34.3 G/DL (ref 33.7–35.3)
MCV RBC AUTO: 88 FL (ref 81.4–97.8)
PLATELET # BLD AUTO: 180 K/UL (ref 164–446)
PMV BLD AUTO: 8.7 FL (ref 9–12.9)
POTASSIUM SERPL-SCNC: 4.7 MMOL/L (ref 3.6–5.5)
PROTHROMBIN TIME: 15.1 SEC (ref 12–14.6)
RBC # BLD AUTO: 6 M/UL (ref 4.7–6.1)
SODIUM SERPL-SCNC: 139 MMOL/L (ref 135–145)
WBC # BLD AUTO: 7.2 K/UL (ref 4.8–10.8)

## 2022-05-03 PROCEDURE — 700111 HCHG RX REV CODE 636 W/ 250 OVERRIDE (IP): Performed by: ANESTHESIOLOGY

## 2022-05-03 PROCEDURE — 00410 ANES INTEG SYS CONV ARRHYT: CPT | Performed by: ANESTHESIOLOGY

## 2022-05-03 PROCEDURE — 93005 ELECTROCARDIOGRAM TRACING: CPT | Performed by: INTERNAL MEDICINE

## 2022-05-03 PROCEDURE — 85027 COMPLETE CBC AUTOMATED: CPT

## 2022-05-03 PROCEDURE — 304952 CL-CARDIOVERSION

## 2022-05-03 PROCEDURE — 160046 HCHG PACU - 1ST 60 MINS PHASE II

## 2022-05-03 PROCEDURE — 160035 HCHG PACU - 1ST 60 MINS PHASE I

## 2022-05-03 PROCEDURE — 92960 CARDIOVERSION ELECTRIC EXT: CPT | Performed by: INTERNAL MEDICINE

## 2022-05-03 PROCEDURE — 700101 HCHG RX REV CODE 250: Performed by: ANESTHESIOLOGY

## 2022-05-03 PROCEDURE — 160002 HCHG RECOVERY MINUTES (STAT)

## 2022-05-03 PROCEDURE — 85610 PROTHROMBIN TIME: CPT

## 2022-05-03 PROCEDURE — 93312 ECHO TRANSESOPHAGEAL: CPT

## 2022-05-03 PROCEDURE — 93005 ELECTROCARDIOGRAM TRACING: CPT | Mod: XE | Performed by: INTERNAL MEDICINE

## 2022-05-03 PROCEDURE — 36415 COLL VENOUS BLD VENIPUNCTURE: CPT

## 2022-05-03 PROCEDURE — 93010 ELECTROCARDIOGRAM REPORT: CPT | Mod: 59 | Performed by: INTERNAL MEDICINE

## 2022-05-03 PROCEDURE — 80048 BASIC METABOLIC PNL TOTAL CA: CPT

## 2022-05-03 PROCEDURE — 700105 HCHG RX REV CODE 258: Performed by: INTERNAL MEDICINE

## 2022-05-03 PROCEDURE — 93312 ECHO TRANSESOPHAGEAL: CPT | Mod: 26 | Performed by: INTERNAL MEDICINE

## 2022-05-03 RX ORDER — LIDOCAINE HYDROCHLORIDE 20 MG/ML
INJECTION, SOLUTION EPIDURAL; INFILTRATION; INTRACAUDAL; PERINEURAL PRN
Status: DISCONTINUED | OUTPATIENT
Start: 2022-05-03 | End: 2022-05-03 | Stop reason: SURG

## 2022-05-03 RX ORDER — AMIODARONE HYDROCHLORIDE 400 MG/1
400 TABLET ORAL 2 TIMES DAILY
Qty: 60 TABLET | Refills: 1 | Status: SHIPPED
Start: 2022-05-03 | End: 2022-05-19

## 2022-05-03 RX ORDER — SODIUM CHLORIDE, SODIUM LACTATE, POTASSIUM CHLORIDE, CALCIUM CHLORIDE 600; 310; 30; 20 MG/100ML; MG/100ML; MG/100ML; MG/100ML
INJECTION, SOLUTION INTRAVENOUS CONTINUOUS
Status: DISCONTINUED | OUTPATIENT
Start: 2022-05-03 | End: 2022-05-03 | Stop reason: HOSPADM

## 2022-05-03 RX ORDER — ONDANSETRON 2 MG/ML
4 INJECTION INTRAMUSCULAR; INTRAVENOUS
Status: DISCONTINUED | OUTPATIENT
Start: 2022-05-03 | End: 2022-05-03 | Stop reason: HOSPADM

## 2022-05-03 RX ORDER — DIPHENHYDRAMINE HYDROCHLORIDE 50 MG/ML
12.5 INJECTION INTRAMUSCULAR; INTRAVENOUS
Status: DISCONTINUED | OUTPATIENT
Start: 2022-05-03 | End: 2022-05-03 | Stop reason: HOSPADM

## 2022-05-03 RX ORDER — HALOPERIDOL 5 MG/ML
1 INJECTION INTRAMUSCULAR
Status: DISCONTINUED | OUTPATIENT
Start: 2022-05-03 | End: 2022-05-03 | Stop reason: HOSPADM

## 2022-05-03 RX ADMIN — LIDOCAINE HYDROCHLORIDE 100 MG: 20 INJECTION, SOLUTION EPIDURAL; INFILTRATION; INTRACAUDAL at 09:21

## 2022-05-03 RX ADMIN — SODIUM CHLORIDE, POTASSIUM CHLORIDE, SODIUM LACTATE AND CALCIUM CHLORIDE: 600; 310; 30; 20 INJECTION, SOLUTION INTRAVENOUS at 07:49

## 2022-05-03 RX ADMIN — PROPOFOL 40 MG: 10 INJECTION, EMULSION INTRAVENOUS at 09:32

## 2022-05-03 RX ADMIN — PROPOFOL 40 MG: 10 INJECTION, EMULSION INTRAVENOUS at 09:26

## 2022-05-03 RX ADMIN — PROPOFOL 130 MG: 10 INJECTION, EMULSION INTRAVENOUS at 09:21

## 2022-05-03 ASSESSMENT — FIBROSIS 4 INDEX: FIB4 SCORE: 1.720618004029213244

## 2022-05-03 ASSESSMENT — PAIN SCALES - GENERAL: PAIN_LEVEL: 0

## 2022-05-03 NOTE — OR NURSING
0942 Pt arrived to PACU with Anesthesiologist and Echo RN. AAOx4. Even, unlabored respirations. VSS. Denies pain. Denies nausea. Pt continues to be in atrial fibrillation.    1000 POC update given to susan Crandall, over the phone. All questions answered. Raz does not have a cell phone, he will meet us at the main roundabout in his car at 1100. Pt utilizing IS. EKG complete.    1030 Pt meets phase II criteria. Report called to NATALIIA Rogers. Pt transported to pre op 23 with RN. Chart and glasses with patient.

## 2022-05-03 NOTE — OR NURSING
Patient arrived to unit at 1031. Patient is AOx4. Transferred to chair appropriately. Patient's pain is 0/10. Declines pain medication at this time. Patient voiding appropriately. Tolerating liquids at this time. Discharge instructions discussed with the patient. Patient denies any further questions at this time. Patient discharged home to responsible adult at 1100.

## 2022-05-03 NOTE — PROCEDURES
Electrical Cardioversion    Date/Time: 5/3/2022 9:33 AM  Performed by: Santosh Castelan M.D.  Authorized by: Santosh Castelan M.D.     Consent:     Consent obtained:  Written and verbal    Consent given by:  Patient    Risks discussed:  Death, cutaneous burn, induced arrhythmia and pain    Alternatives discussed:  No treatment, rate-control medication, alternative treatment and anti-coagulation medication  Sedation:     Patient sedated: Yes      Sedation type:  Per anesthesia    Vital signs: Vital signs monitored during sedation    Pre-procedure details:     Cardioversion basis:  Elective    Rhythm:  Atrial fibrillation    Electrode placement:  Anterior-posterior    Anticoagulation status:  Apixaban  Attempt one:     Cardioversion mode:  Synchronous    Shock (Joules):  200    Shock outcome:  No change in rhythm  Post-procedure details:     Patient status:  Awake    Patient tolerance of procedure:  Tolerated well, no immediate complications  Comments:      Santosh Castelan M.D.

## 2022-05-03 NOTE — PROCEDURES
Patient was brought to cath lab holding.  Consent was obtained. Risks and benefits of procedures were explained.    Anesthesia was used for sedation process.    Indication: persistent atrial fibrillation. To restore sinus rhythm.    Complication: none    Diagnosis: no evidence of left atrial appendage thrombus.       Santosh Castelan MD.

## 2022-05-03 NOTE — H&P
Cardiology H P Note:    Santosh Castelan M.D.  Date & Time note created:    5/3/2022   9:01 AM       Patient ID:   Name:             Chema Sarabia   YOB: 1955  Age:                 66 y.o.  male   MRN:               [id number]                                                             Chief Complaint / Reason for consult:  Atrial fibrillation.    History of Present Illness:    Chema Sarabia is a 66 y.o. male who presents today for cardiac care and evaluation for treatment of atrial fibrillation. Here for KIANA and cardioversion.    I have personally interpreted EKG today with patient, there is no evidence of atrial fibrillation with rate of 109.      Review of Systems:      Constitutional: Denies fevers, Denies weight changes  Eyes: Denies changes in vision, no eye pain  Ears/Nose/Throat/Mouth: Denies nasal congestion or sore throat   Cardiovascular: no chest pain, yes palpitations   Respiratory: yes shortness of breath , Denies cough  Gastrointestinal/Hepatic: Denies abdominal pain, nausea, vomiting, diarrhea, constipation or GI bleeding   Genitourinary: Denies dysuria or frequency  Musculoskeletal/Rheum: Denies  joint pain and swelling   Skin: Denies rash  Neurological: Denies headache, confusion, memory loss or focal weakness/parasthesias  Psychiatric: denies mood disorder   Endocrine: Heather thyroid problems  Heme/Oncology/Lymph Nodes: Denies enlarged lymph nodes, denies brusing or known bleeding disorder  All other systems were reviewed and are negative (AMA/CMS criteria)                Past Medical History:   Past Medical History:   Diagnosis Date   • Acute kidney injury (HCC)     stage 3 renal failure   • Atrial fibrillation (HCC)    • Chickenpox    • Frequent urination    • Azeri measles    • Heart valve disease     aortic insufficiency   • Hypertension    • Hypertensive urgency    • Influenza    • Kidney stone    • Longstanding persistent atrial fibrillation (HCC) 5/3/2022    • Mumps    • Nephrolithiasis    • Obesity    • Snoring    • Stroke (HCC)    • Tonsillitis    • Typhoid fever    • Wears glasses      Active Hospital Problems    Diagnosis    • Longstanding persistent atrial fibrillation (HCC) [I48.11]    • ACC/AHA stage C heart failure with preserved ejection fraction (HCC) [I50.30]        Past Surgical History:  History reviewed. No pertinent surgical history.    Hospital Medications:    Current Facility-Administered Medications:   •  lidocaine (XYLOCAINE) 1 % injection 0.5 mL, 0.5 mL, Intradermal, Once PRN, Santosh Castelan M.D.  •  lactated ringers infusion, , Intravenous, Continuous, Santosh Castelan M.D., Last Rate: 10 mL/hr at 05/03/22 0749, New Bag at 05/03/22 0749    Current Outpatient Medications:  Medications Prior to Admission   Medication Sig Dispense Refill Last Dose   • zolpidem (AMBIEN) 5 MG Tab Take 1 Tablet by mouth at bedtime as needed for Sleep (1 to 2 po qhs prn insomnia/sleep study. Bring to sleep study for one day supply) for up to 2 doses. 2 Tablet 0 > 2 weeks at [facility]   • rosuvastatin (CRESTOR) 40 MG tablet TAKE ONE TABLET BY MOUTH DAILY 90 Tablet 3 5/3/2022 at 0530   • amLODIPine (NORVASC) 10 MG Tab TAKE ONE TABLET BY MOUTH DAILY 90 Tablet 3 5/3/2022 at 0530   • spironolactone (ALDACTONE) 25 MG Tab TAKE ONE TABLET BY MOUTH DAILY 90 Tablet 3 5/2/2022 at 0530   • apixaban (ELIQUIS) 5mg Tab Take 1 Tablet by mouth 2 times a day. 60 Tablet 11 5/3/2022 at 0530   • vitamin D, Ergocalciferol, (DRISDOL) 70549 units Cap capsule Take 1 Cap by mouth every 7 days. 12 Cap 1 5/2/2022 at 0530   • multivitamin (THERAGRAN) Tab Take 1 Tab by mouth every day.   5/2/2022 at 0530       Medication Allergy:  No Known Allergies    Family History:  Family History   Problem Relation Age of Onset   • Cancer Mother         lungs, smoker   • Lung Cancer Mother    • Hypertension Father    • Hyperlipidemia Father    • Diabetes Neg Hx    • Heart Disease Neg Hx        Social  "History:  Social History     Socioeconomic History   • Marital status: Single     Spouse name: Not on file   • Number of children: Not on file   • Years of education: Not on file   • Highest education level: Not on file   Occupational History   • Not on file   Tobacco Use   • Smoking status: Never Smoker   • Smokeless tobacco: Never Used   Vaping Use   • Vaping Use: Never used   Substance and Sexual Activity   • Alcohol use: Yes     Alcohol/week: 6.0 oz     Types: 7 Glasses of wine, 3 Cans of beer per week     Comment: occ   • Drug use: No   • Sexual activity: Not on file   Other Topics Concern   • Not on file   Social History Narrative   • Not on file     Social Determinants of Health     Financial Resource Strain: Not on file   Food Insecurity: Not on file   Transportation Needs: Not on file   Physical Activity: Not on file   Stress: Not on file   Social Connections: Not on file   Intimate Partner Violence: Not on file   Housing Stability: Not on file         Physical Exam:  Vitals/ General Appearance:   Weight/BMI: Body mass index is 35.19 kg/m².  /88   Pulse (!) 132   Temp 36.6 °C (97.8 °F) (Temporal)   Resp 20   Ht 1.676 m (5' 6\")   Wt 98.9 kg (218 lb 0.6 oz)   SpO2 95%   Vitals:    04/12/22 0713 05/03/22 0705   BP:  114/88   Pulse:  (!) 132   Resp:  20   Temp:  36.6 °C (97.8 °F)   TempSrc:  Temporal   SpO2:  95%   Weight: 100 kg (220 lb 10.9 oz) 98.9 kg (218 lb 0.6 oz)   Height: 1.676 m (5' 6\") 1.676 m (5' 6\")     Oxygen Therapy:  Pulse Oximetry: 95 %, O2 Delivery Device: None - Room Air    Constitutional:   No acute distress  HENMT:  Normocephalic, Atraumatic.  Eyes:  EOMI, No discharge.  Neck:  no JVD.  Cardiovascular:  Normal heart rate, Normal rhythm.  Extremitites with intact distal pulses, no cyanosis, or edema.  Lungs:  No respiratory distress.  Abdomen: Soft, No tenderness, No guarding, No rebound.  Skin: No significant rash.  Neurologic: Alert & oriented x 3, No focal deficits noted, " cranial nerves II through X are intact.  Psychiatric: Affect normal, Judgment normal, Mood normal.      MDM (Data Review):     Records reviewed and summarized in current documentation    Lab Data Review:  Recent Results (from the past 24 hour(s))   EKG    Collection Time: 22  7:17 AM   Result Value Ref Range    Report       [facility]    Test Date:  2022  Pt Name:    TIFFANY MARTINEZ             Department: [facility]  MRN:        [id number]                      Room:       [facility]  Gender:     Male                         Technician: [facility]  :        1955                   Requested By:RUBINA  TO  Order #:    [id number]                    Reading MD: Chetan Gusman MD    Measurements  Intervals                                Axis  Rate:       109                          P:  NC:                                      QRS:        -11  QRSD:       90                           T:          110  QT:         344  QTc:        464    Interpretive Statements  ATRIAL FIBRILLATION  BORDERLINE LOW VOLTAGE IN FRONTAL LEADS  LATE PRECORDIAL R/S TRANSITION  MINIMAL ST DEPRESSION, ANTEROLATERAL LEADS  Compared to ECG 2022 14:12:07  No significant changes  Electronically Signed On 5-3-2022 7:50:53 PDT by Chetan Gusman MD     CBC Without Differential    Collection Time: 22  7:45 AM   Result Value Ref Range    WBC 7.2 4.8 - 10.8 K/uL    RBC 6.00 4.70 - 6.10 M/uL    Hemoglobin 18.1 (H) 14.0 - 18.0 g/dL    Hematocrit 52.8 (H) 42.0 - 52.0 %    MCV 88.0 81.4 - 97.8 fL    MCH 30.2 27.0 - 33.0 pg    MCHC 34.3 33.7 - 35.3 g/dL    RDW 42.5 35.9 - 50.0 fL    Platelet Count 180 164 - 446 K/uL    MPV 8.7 (L) 9.0 - 12.9 fL   Basic Metabolic Panel    Collection Time: 22  7:45 AM   Result Value Ref Range    Sodium 139 135 - 145 mmol/L    Potassium 4.7 3.6 - 5.5 mmol/L    Chloride 103 96 - 112 mmol/L    Co2 24 20 - 33 mmol/L    Glucose 110 (H) 65 - 99 mg/dL    Bun 27 (H) 8 - 22 mg/dL    Creatinine 1.28  0.50 - 1.40 mg/dL    Calcium 9.4 8.5 - 10.5 mg/dL    Anion Gap 12.0 7.0 - 16.0   Prothrombin Time (INR) (plasma)    Collection Time: 05/03/22  7:45 AM   Result Value Ref Range    PT 15.1 (H) 12.0 - 14.6 sec    INR 1.22 (H) 0.87 - 1.13   ESTIMATED GFR    Collection Time: 05/03/22  7:45 AM   Result Value Ref Range    GFR (CKD-EPI) 62 >60 mL/min/1.73 m 2       Imaging/Procedures Review:    Chest Xray:  Reviewed    EKG:   As in HPI.     MDM (Assessment and Plan):     Active Hospital Problems    Diagnosis    • Longstanding persistent atrial fibrillation (HCC) [I48.11]    • ACC/AHA stage C heart failure with preserved ejection fraction (HCC) [I50.30]          Will proceed with KIANA and cardioversion. Risks and benefits were explained to patient and he has agreed to proceed.      Santosh Castelan MD.   Cardiology Inpatient Service.  [facility].  [phone].  [place].

## 2022-05-03 NOTE — ANESTHESIA POSTPROCEDURE EVALUATION
Patient: Chema Sarabia    Procedure Summary     Date: 05/03/22 Room / Location: [facility] - ECHOCARDIOLOGY [facility]    Anesthesia Start: 0908 Anesthesia Stop: 0945    Procedures:       CL-CARDIOVERSION      EC-KIANA W/O CONT Diagnosis:       ACC/AHA stage C heart failure with preserved ejection fraction (HCC)      Left ventricular diastolic dysfunction, NYHA class 2      Longstanding persistent atrial fibrillation (HCC)      Longstanding persistent atrial fibrillation      Unspecified diastolic (congestive) heart failure    Scheduled Providers: Santosh Castelan M.D.; Andres Michel M.D. Responsible Provider: Andres Michel M.D.    Anesthesia Type: other, general ASA Status: 3          Final Anesthesia Type: other, general  Last vitals  BP   Blood Pressure : 114/88    Temp   36.6 °C (97.8 °F)    Pulse   (!) 132   Resp   20    SpO2   95 %      Anesthesia Post Evaluation    Patient location during evaluation: PACU  Patient participation: complete - patient participated  Level of consciousness: awake and alert  Pain score: 0    Airway patency: patent  Anesthetic complications: no  Cardiovascular status: hemodynamically stable  Respiratory status: acceptable  Hydration status: euvolemic    PONV: none          There were no known complications for this encounter.     Nurse Pain Score: 0 (NPRS)

## 2022-05-03 NOTE — DISCHARGE INSTRUCTIONS
ACTIVITY: Rest and take it easy for the first 24 hours.  A responsible adult is recommended to remain with you during that time.  It is normal to feel sleepy.  We encourage you to not do anything that requires balance, judgment or coordination.    MILD FLU-LIKE SYMPTOMS ARE NORMAL. YOU MAY EXPERIENCE GENERALIZED MUSCLE ACHES, THROAT IRRITATION, HEADACHE AND/OR SOME NAUSEA.    FOR 24 HOURS DO NOT:  Drive, operate machinery or run household appliances.  Drink beer or alcoholic beverages.   Make important decisions or sign legal documents.    SPECIAL INSTRUCTIONS:   Electrical Cardioversion, Care After  This sheet gives you information about how to care for yourself after your procedure. Your health care provider may also give you more specific instructions. If you have problems or questions, contact your health care provider.  What can I expect after the procedure?  After the procedure, it is common to have:  · Some redness on the skin where the shocks were given.  Follow these instructions at home:  · Do not drive for 24 hours if you were given a medicine to help you relax (sedative).  · Take over-the-counter and prescription medicines only as told by your health care provider.  · Ask your health care provider how to check your pulse. Check it often.  · Rest for 48 hours after the procedure or as told by your health care provider.  · Avoid or limit your caffeine use as told by your health care provider.  Contact a health care provider if:  · You feel like your heart is beating too quickly or your pulse is not regular.  · You have a serious muscle cramp that does not go away.  Get help right away if:  · You have discomfort in your chest.  · You are dizzy or you feel faint.  · You have trouble breathing or you are short of breath.  · Your speech is slurred.  · You have trouble moving an arm or leg on one side of your body.  · Your fingers or toes turn cold or blue.    [facility] - TRANSESOPHAGEAL ECHOCARDIOGRAM  1. Don't  drive or drink alcohol for 24 hours. The medication you received will make you too drowsy.  2. If you begin to vomit bloody material, or develop black or bloody stools, call your doctor as soon as possible.  3. If you have any neck, chest, abdominal pain or temp of 100 degrees, call your doctor.      DIET: To avoid nausea, slowly advance diet as tolerated, avoiding spicy or greasy foods for the first day.  Add more substantial food to your diet according to your physician's instructions.  Babies can be fed formula or breast milk as soon as they are hungry.  INCREASE FLUIDS AND FIBER TO AVOID CONSTIPATION.    SURGICAL DRESSING/BATHING: Resume as normal    FOLLOW-UP APPOINTMENT:  A follow-up appointment should be arranged with your doctor in 1-2 weeks with Dr. Castelan; call to schedule.    You should CALL YOUR PHYSICIAN if you develop:  Fever greater than 101 degrees F.  Pain not relieved by medication, or persistent nausea or vomiting.  Excessive bleeding (blood soaking through dressing) or unexpected drainage from the wound.  Extreme redness or swelling around the incision site, drainage of pus or foul smelling drainage.  Inability to urinate or empty your bladder within 8 hours.  Problems with breathing or chest pain.    You should call 911 if you develop problems with breathing or chest pain.  If you are unable to contact your doctor or surgical center, you should go to the nearest emergency room or urgent care center.  Physician's telephone #: [phone]    If any questions arise, call your doctor.  If your doctor is not available, please feel free to call the Surgical Center at [phone].     A registered nurse may call you a few days after your surgery to see how you are doing after your procedure.    MEDICATIONS: Resume taking daily medication.  Take prescribed pain medication with food.  If no medication is prescribed, you may take non-aspirin pain medication if needed.  PAIN MEDICATION CAN BE VERY  CONSTIPATING.  Take a stool softener or laxative such as senokot, pericolace, or milk of magnesia if needed.    Prescription given for AMIODARONE,  at your pharmacy.    If your physician has prescribed pain medication that includes Acetaminophen (Tylenol), do not take additional Acetaminophen (Tylenol) while taking the prescribed medication.    Depression / Suicide Risk    As you are discharged from this [facility] facility, it is important to learn how to keep safe from harming yourself.    Recognize the warning signs:  · Abrupt changes in personality, positive or negative- including increase in energy   · Giving away possessions  · Change in eating patterns- significant weight changes-  positive or negative  · Change in sleeping patterns- unable to sleep or sleeping all the time   · Unwillingness or inability to communicate  · Depression  · Unusual sadness, discouragement and loneliness  · Talk of wanting to die  · Neglect of personal appearance   · Rebelliousness- reckless behavior  · Withdrawal from people/activities they love  · Confusion- inability to concentrate     If you or a loved one observes any of these behaviors or has concerns about self-harm, here's what you can do:  · Talk about it- your feelings and reasons for harming yourself  · Remove any means that you might use to hurt yourself (examples: pills, rope, extension cords, firearm)  · Get professional help from the community (Mental Health, Substance Abuse, psychological counseling)  · Do not be alone:Call your Safe Contact- someone whom you trust who will be there for you.  · Call your local CRISIS HOTLINE 897-0244 or 720-633-3587  · Call your local Children's Mobile Crisis Response Team [place] (681) 887-7328 or www.Sensopia  · Call the toll free National Suicide Prevention Hotlines   · National Suicide Prevention Lifeline 950-751-MION (8971)  · National Hope Line Network 800-SUICIDE (094-6632)

## 2022-05-03 NOTE — ANESTHESIA TIME REPORT
Anesthesia Start and Stop Event Times     Date Time Event    5/3/2022 0849 Ready for Procedure     0908 Anesthesia Start     0945 Anesthesia Stop        Responsible Staff  05/03/22    Name Role Begin End    Andres Michel M.D. Anesth 0908 0945        Overtime Reason:  no overtime (within assigned shift)    Comments:

## 2022-05-03 NOTE — ANESTHESIA PREPROCEDURE EVALUATION
Date/Time: 05/03/22 0900    Scheduled providers: Santosh Castelan M.D.; Andres Michel M.D.    Procedures:       CL-CARDIOVERSION      EC-[facility] W/O CONT    Diagnosis:       ACC/AHA stage C heart failure with preserved ejection fraction (HCC) [I50.30]      Left ventricular diastolic dysfunction, NYHA class 2 [I51.89]      Longstanding persistent atrial fibrillation (HCC) [I48.11]      Longstanding persistent atrial fibrillation [I48.11]      Unspecified diastolic (congestive) heart failure [I50.30]    Location: [facility]OCARDIOLOG[facility]          Relevant Problems   CARDIAC   (positive) Essential hypertension   (positive) Moderate aortic insufficiency      GI   (positive) Gastroesophageal reflux disease         (positive) Acquired renal cyst of right kidney   (positive) Stage 3 chronic kidney disease (HCC)       Physical Exam    Anesthesia Plan    ASA 3   ASA physical status 3 criteria: moderate reduction of ejection fraction    Plan - other and general         (Tiva)      Induction: intravenous      Pertinent diagnostic labs and testing reviewed    Informed Consent:    Anesthetic plan and risks discussed with patient.    Use of blood products discussed with: patient whom consented to blood products.

## 2022-05-03 NOTE — TELEPHONE ENCOUNTER
Failed cardioversion.  Will start Amiodarone 400 mg twice a day.  Continue anticoagulation with Eliquis 5 mg bid for treatment of stroke risk reduction. Will closely monitor side effect of systemic bleeding.  Sleep apnea treatment with sleep MD.    Santosh Castelan M.D.

## 2022-05-04 ENCOUNTER — TELEPHONE (OUTPATIENT)
Dept: CARDIOLOGY | Facility: MEDICAL CENTER | Age: 67
End: 2022-05-04
Payer: MEDICARE

## 2022-05-04 DIAGNOSIS — I48.11 LONGSTANDING PERSISTENT ATRIAL FIBRILLATION (HCC): ICD-10-CM

## 2022-05-05 ENCOUNTER — TELEPHONE (OUTPATIENT)
Dept: CARDIOLOGY | Facility: MEDICAL CENTER | Age: 67
End: 2022-05-05
Payer: MEDICARE

## 2022-05-05 NOTE — TELEPHONE ENCOUNTER
Patient is scheduled on 6-13-22 for a Cardioversion w/cons sed with Dr. Castelan. No meds to stop and patient to check in at 11:00 for a 1:00 procedure.Updated H&P to be done on admit by NP. Pre admit to call patient.

## 2022-05-05 NOTE — TELEPHONE ENCOUNTER
----- Message from Santosh Castelan M.D. sent at 5/4/2022 11:44 AM PDT -----  Can you schedule him for another cardioversion in 3 weeks?    Thanks.  Santosh Castelan M.D.

## 2022-05-06 ENCOUNTER — HOSPITAL ENCOUNTER (OUTPATIENT)
Facility: MEDICAL CENTER | Age: 67
End: 2022-05-06
Attending: INTERNAL MEDICINE | Admitting: INTERNAL MEDICINE
Payer: MEDICARE

## 2022-05-09 ENCOUNTER — TELEPHONE (OUTPATIENT)
Dept: SLEEP MEDICINE | Facility: MEDICAL CENTER | Age: 67
End: 2022-05-09

## 2022-05-09 NOTE — TELEPHONE ENCOUNTER
MEDICATION PRIOR AUTHORIZATION NEEDED:    1. Name of Medication: Zolpidem Tartrate 5 mg    2. Requested By (Name of Pharmacy): [facility]     3. Is insurance on file current? yes    4. What is the name & phone number of the 3rd party payor? [employer]

## 2022-05-12 ENCOUNTER — TELEPHONE (OUTPATIENT)
Dept: CARDIOLOGY | Facility: MEDICAL CENTER | Age: 67
End: 2022-05-12
Payer: MEDICARE

## 2022-05-12 NOTE — TELEPHONE ENCOUNTER
Ah that makes better sense! I called him and left a message to call me back at my direct line. =)  SLC

## 2022-05-12 NOTE — TELEPHONE ENCOUNTER
----- Message from Mari Vicente R.N. sent at 5/12/2022  1:20 PM PDT -----  If he can come in the week of 5/23/22 that would be about a month before his June appt which is what TT requested. If the pt chooses to not come in earlier and keep his June 20th appt then that is his choice.     If he can come in earlier, please still keep his June 20th appt on the books in case TT wants another close FV. Thank you!   ----- Message -----  From: Dana Angel  Sent: 5/12/2022   8:50 AM PDT  To: Mari Vicente R.N.    PT currently scheduled on June 20th with To. Scheduling him in three weeks will put him almost to that appointment- leave it as is?   Thanks!   SLC    ----- Message -----  From: Mari Vicente R.N.  Sent: 5/3/2022  12:05 PM PDT  To: [facility] Schedulers Pool    Please contact pt and see if he can come in on any day during the week of 5/23/22 at 8:30 am w/ TT. Thank you!   ----- Message -----  From: Santosh Castelan M.D.  Sent: 5/3/2022   9:36 AM PDT  To: Mari Vicente R.N.    Patient will need to see me in 3 weeks.    Santosh Castelan M.D.

## 2022-05-12 NOTE — TELEPHONE ENCOUNTER
----- Message from Mari Vicente R.N. sent at 5/12/2022  1:20 PM PDT -----  If he can come in the week of 5/23/22 that would be about a month before his June appt which is what TT requested. If the pt chooses to not come in earlier and keep his June 20th appt then that is his choice.     If he can come in earlier, please still keep his June 20th appt on the books in case TT wants another close FV. Thank you!   ----- Message -----  From: Dana Angel  Sent: 5/12/2022   8:50 AM PDT  To: Mari Vicente R.N.    PT currently scheduled on June 20th with To. Scheduling him in three weeks will put him almost to that appointment- leave it as is?   Thanks!   SLC    ----- Message -----  From: Mari Vicente R.N.  Sent: 5/3/2022  12:05 PM PDT  To: [facility] Schedulers Pool    Please contact pt and see if he can come in on any day during the week of 5/23/22 at 8:30 am w/ TT. Thank you!   ----- Message -----  From: Santosh Castelan M.D.  Sent: 5/3/2022   9:36 AM PDT  To: Mari Vicente R.N.    Patient will need to see me in 3 weeks.    Santosh Castelan M.D.

## 2022-05-19 ENCOUNTER — OFFICE VISIT (OUTPATIENT)
Dept: CARDIOLOGY | Facility: MEDICAL CENTER | Age: 67
End: 2022-05-19
Payer: MEDICARE

## 2022-05-19 VITALS
SYSTOLIC BLOOD PRESSURE: 130 MMHG | HEART RATE: 106 BPM | WEIGHT: 221 LBS | HEIGHT: 66 IN | OXYGEN SATURATION: 95 % | RESPIRATION RATE: 16 BRPM | DIASTOLIC BLOOD PRESSURE: 90 MMHG | BODY MASS INDEX: 35.52 KG/M2

## 2022-05-19 DIAGNOSIS — I48.11 LONGSTANDING PERSISTENT ATRIAL FIBRILLATION (HCC): ICD-10-CM

## 2022-05-19 DIAGNOSIS — Z79.899 HIGH RISK MEDICATION USE: ICD-10-CM

## 2022-05-19 DIAGNOSIS — I10 HTN (HYPERTENSION), MALIGNANT: ICD-10-CM

## 2022-05-19 DIAGNOSIS — I50.30 ACC/AHA STAGE C HEART FAILURE WITH PRESERVED EJECTION FRACTION (HCC): ICD-10-CM

## 2022-05-19 DIAGNOSIS — I51.89 LEFT VENTRICULAR DIASTOLIC DYSFUNCTION, NYHA CLASS 2: ICD-10-CM

## 2022-05-19 DIAGNOSIS — I63.9 CEREBROVASCULAR ACCIDENT (CVA), UNSPECIFIED MECHANISM (HCC): ICD-10-CM

## 2022-05-19 DIAGNOSIS — E78.2 MIXED HYPERLIPIDEMIA: ICD-10-CM

## 2022-05-19 DIAGNOSIS — I48.3 TYPICAL ATRIAL FLUTTER (HCC): ICD-10-CM

## 2022-05-19 DIAGNOSIS — N18.32 STAGE 3B CHRONIC KIDNEY DISEASE: ICD-10-CM

## 2022-05-19 LAB — EKG IMPRESSION: NORMAL

## 2022-05-19 PROCEDURE — 93000 ELECTROCARDIOGRAM COMPLETE: CPT | Performed by: INTERNAL MEDICINE

## 2022-05-19 PROCEDURE — 99215 OFFICE O/P EST HI 40 MIN: CPT | Performed by: INTERNAL MEDICINE

## 2022-05-19 RX ORDER — AMIODARONE HYDROCHLORIDE 200 MG/1
200 TABLET ORAL DAILY
Qty: 60 TABLET | Refills: 3 | Status: SHIPPED | OUTPATIENT
Start: 2022-05-19 | End: 2022-08-24 | Stop reason: SDUPTHER

## 2022-05-19 ASSESSMENT — ENCOUNTER SYMPTOMS
HALLUCINATIONS: 0
LOSS OF CONSCIOUSNESS: 0
EYE DISCHARGE: 0
MYALGIAS: 0
SENSORY CHANGE: 0
FALLS: 0
DOUBLE VISION: 0
PALPITATIONS: 0
DEPRESSION: 0
SPEECH CHANGE: 0
ORTHOPNEA: 0
NAUSEA: 0
CLAUDICATION: 0
PND: 0
SHORTNESS OF BREATH: 0
HEADACHES: 0
COUGH: 0
EYE PAIN: 0
ABDOMINAL PAIN: 0
BLURRED VISION: 0
CHILLS: 0
BLOOD IN STOOL: 0
WEIGHT LOSS: 0
VOMITING: 0
DIZZINESS: 0
FEVER: 0
BRUISES/BLEEDS EASILY: 0

## 2022-05-19 ASSESSMENT — FIBROSIS 4 INDEX: FIB4 SCORE: 1.491202270158651478

## 2022-05-19 NOTE — PROGRESS NOTES
Chief Complaint   Patient presents with   • Congestive Heart Failure     F/v dx: ACC/AHA stage C heart failure with preserved ejection fraction (HCC)   • Hypertension   • Atrial Fibrillation       Subjective:   Chema Sarabia is a 66 y.o. male who presents today for cardiac care and evaluation in our heart failure clinic after his recent episode of hypertensive emergency for which he went into heart failure exacerbation as well. Patient does have documented transthoracic echocardiogram which showed relatively preserved LV function of 55%. No significant valvular disease.     Feeling very well. No dyspnea.     At prior visit, patient was able to complete 549 m during his 6 minute walk test. his O2 saturation at baseline was 94% and at the end of the test, the O2 saturation was 95%. he reported 0 level of dyspnea on Jim scale.     06/06/2019 Patient was able to complete 549 m during his 6 minute walk test. his O2 saturation at baseline was 95% and at the end of the test, the O2 saturation was 93%. he reported 1 level of dyspnea on Jim scale.    In July of 2021, for 1 week, he had symptoms of difficult finding words and incoherence. MRI of the brain showed evidence of stroke. His symptoms resolved at this time.    Chema was placed on cardiac event monitor and found to have paroxysmal atrial fibrillation.  He was started on Eliquis 5 mg p.o. twice a day.    Most recent transthoracic echocardiogram last week show evidence of slightly reduced left ventricular systolic function of 50%, concentric biventricular hypertrophy.  Patient did have blood tests which did not show increase free light change.  I personally interpreted images of his transthoracic echocardiogram and blood test results.    On May 3, 2022, patient underwent unsuccessful cardioversion for atrial fibrillation at this time.  He was then placed on amiodarone p.o. therapy.  Today in clinic, patient has transition into atrial flutter with 3-1  conduction.    I personally interpreted EKG tracing today in clinic as well which show evidence of atrial flutter with ventricular rate at 108, suggestive of 3:1 conduction.    I have independently interpreted and reviewed blood tests results with patient in clinic which shows fine LDL level 65, normal triglycerides 139 but down from 267, stable renal (GFR of 62) and liver function.    Serum light chains are within range.    Of note, patient has been taking his Eliquis without missing dose.    Past Medical History:   Diagnosis Date   • Acute kidney injury (HCC)     stage 3 renal failure   • Atrial fibrillation (HCC)    • Chickenpox    • Frequent urination    • Malay measles    • Heart valve disease     aortic insufficiency   • Hypertension    • Hypertensive urgency    • Influenza    • Kidney stone    • Longstanding persistent atrial fibrillation (HCC) 5/3/2022   • Mumps    • Nephrolithiasis    • Obesity    • Snoring    • Stroke (HCC)    • Tonsillitis    • Typhoid fever    • Wears glasses      History reviewed. No pertinent surgical history.  Family History   Problem Relation Age of Onset   • Cancer Mother         lungs, smoker   • Lung Cancer Mother    • Hypertension Father    • Hyperlipidemia Father    • Diabetes Neg Hx    • Heart Disease Neg Hx      Social History     Socioeconomic History   • Marital status: Single     Spouse name: Not on file   • Number of children: Not on file   • Years of education: Not on file   • Highest education level: Not on file   Occupational History   • Not on file   Tobacco Use   • Smoking status: Never Smoker   • Smokeless tobacco: Never Used   Vaping Use   • Vaping Use: Never used   Substance and Sexual Activity   • Alcohol use: Yes     Alcohol/week: 6.0 oz     Types: 7 Glasses of wine, 3 Cans of beer per week     Comment: occ   • Drug use: No   • Sexual activity: Not on file   Other Topics Concern   • Not on file   Social History Narrative   • Not on file     Social Determinants  [employer] [facility]     Financial Resource Strain: Not on file   Food Insecurity: Not on file   Transportation Needs: Not on file   Physical Activity: Not on file   Stress: Not on file   Social Connections: Not on file   Intimate Partner Violence: Not on file   Housing Stability: Not on file     No Known Allergies  Outpatient Encounter Medications as of 5/19/2022   Medication Sig Dispense Refill   • amiodarone (CORDARONE) 200 MG Tab Take 1 Tablet by mouth every day. 60 Tablet 3   • zolpidem (AMBIEN) 5 MG Tab Take 1 Tablet by mouth at bedtime as needed for Sleep (1 to 2 po qhs prn insomnia/sleep study. Bring to sleep study for one day supply) for up to 2 doses. 2 Tablet 0   • rosuvastatin (CRESTOR) 40 MG tablet TAKE ONE TABLET BY MOUTH DAILY 90 Tablet 3   • amLODIPine (NORVASC) 10 MG Tab TAKE ONE TABLET BY MOUTH DAILY 90 Tablet 3   • spironolactone (ALDACTONE) 25 MG Tab TAKE ONE TABLET BY MOUTH DAILY 90 Tablet 3   • apixaban (ELIQUIS) 5mg Tab Take 1 Tablet by mouth 2 times a day. 60 Tablet 11   • vitamin D, Ergocalciferol, (DRISDOL) 29102 units Cap capsule Take 1 Cap by mouth every 7 days. 12 Cap 1   • multivitamin (THERAGRAN) Tab Take 1 Tab by mouth every day.     • [DISCONTINUED] amiodarone (PACERONE) 400 MG tablet Take 1 Tablet by mouth 2 times a day. 60 Tablet 1     No facility-administered encounter medications on file as of 5/19/2022.     Review of Systems   Constitutional: Negative for chills, fever, malaise/fatigue and weight loss.   HENT: Negative for ear discharge, ear pain, hearing loss and nosebleeds.    Eyes: Negative for blurred vision, double vision, pain and discharge.   Respiratory: Negative for cough and shortness of breath.    Cardiovascular: Negative for chest pain, palpitations, orthopnea, claudication, leg swelling and PND.   Gastrointestinal: Negative for abdominal pain, blood in stool, melena, nausea and vomiting.   Genitourinary: Negative for dysuria and hematuria.   Musculoskeletal: Negative for  "falls, joint pain and myalgias.   Skin: Negative for itching and rash.   Neurological: Negative for dizziness, sensory change, speech change, loss of consciousness and headaches.   Endo/Heme/Allergies: Negative for environmental allergies. Does not bruise/bleed easily.   Psychiatric/Behavioral: Negative for depression, hallucinations and suicidal ideas.        Objective:   BP (!) 130/90 (BP Location: Left arm, Patient Position: Sitting, BP Cuff Size: Adult)   Pulse (!) 106   Resp 16   Ht 1.676 m (5' 6\")   Wt 100 kg (221 lb)   SpO2 95%   BMI 35.67 kg/m²     Physical Exam  Vitals and nursing note reviewed.   Constitutional:       General: He is not in acute distress.     Appearance: He is not diaphoretic.   HENT:      Head: Normocephalic and atraumatic.      Right Ear: External ear normal.      Left Ear: External ear normal.   Eyes:      General:         Right eye: No discharge.         Left eye: No discharge.   Neck:      Thyroid: No thyromegaly.      Vascular: No JVD.   Cardiovascular:      Rate and Rhythm: Normal rate and regular rhythm.      Comments: Ausculation was not performed to minimize the risk of COVID spread during current pandemic. This complies with Medicare policies and guidelines.    Pulmonary:      Effort: No respiratory distress.   Abdominal:      General: There is no distension.      Tenderness: There is no abdominal tenderness.   Skin:     General: Skin is warm and dry.   Neurological:      Mental Status: He is alert and oriented to person, place, and time.      Cranial Nerves: No cranial nerve deficit.   Psychiatric:         Behavior: Behavior normal.         Assessment:     1. ACC/AHA stage C heart failure with preserved ejection fraction (HCC)  amiodarone (CORDARONE) 200 MG Tab    REFERRAL TO CARDIOLOGY   2. Left ventricular diastolic dysfunction, NYHA class 2  amiodarone (CORDARONE) 200 MG Tab    REFERRAL TO CARDIOLOGY   3. Longstanding persistent atrial fibrillation (HCC)  EKG    " amiodarone (CORDARONE) 200 MG Tab    REFERRAL TO CARDIOLOGY   4. HTN (hypertension), malignant     5. Cerebrovascular accident (CVA), unspecified mechanism (HCC)     6. Mixed hyperlipidemia     7. Stage 3b chronic kidney disease (HCC)     8. High risk medication use     9. Typical atrial flutter (HCC)  amiodarone (CORDARONE) 200 MG Tab    REFERRAL TO CARDIOLOGY       Medical Decision Making:  Today's Assessment / Status / Plan:   Overall, I do think that patient would be benefited from sinus restoration.  At this time, patient had transition into development of atrial flutter while on amiodarone therapy.  Overall, coupled with slight reduction in LVEF of 50%, I do think that patient would benefit from sinus restoration with ablation procedure for atrial flutter/atrial fibrillation.  I will refer patient to our electrophysiology service for further evaluation and care.  Risks and benefits were explained to patient has agreed to proceed. In the meantime, continue anticoagulation therapy with Eliquis 5 mg p.o. twice a day for stroke risk reduction due to prior history of stroke in July 2021. Will reduce Amiodarone to 200 mg bid.    Blood pressure is well controlled.  Continue amlodipine 10 mg p.o. once a day and spironolactone at 25 mg p.o. once a day.    In the meantime, await sleep studies to rule out obstructive sleep apnea.    Patient will undergo pyrophosphate nuclear scan to make sure he does not have cardiac amyloidosis.    Will continue Rosuvastatin 40 mg qhs for LDL control.  Continue diet and exercise more.    I will see patient back in clinic with lab tests and studies results in 3 months.    I thank you for referring patient to our Cardiology Clinic today.

## 2022-05-23 ENCOUNTER — OFFICE VISIT (OUTPATIENT)
Dept: CARDIOLOGY | Facility: MEDICAL CENTER | Age: 67
End: 2022-05-23
Attending: INTERNAL MEDICINE
Payer: MEDICARE

## 2022-05-23 ENCOUNTER — TELEPHONE (OUTPATIENT)
Dept: CARDIOLOGY | Facility: MEDICAL CENTER | Age: 67
End: 2022-05-23

## 2022-05-23 VITALS
OXYGEN SATURATION: 96 % | SYSTOLIC BLOOD PRESSURE: 124 MMHG | DIASTOLIC BLOOD PRESSURE: 82 MMHG | HEART RATE: 96 BPM | HEIGHT: 66 IN | WEIGHT: 223 LBS | BODY MASS INDEX: 35.84 KG/M2 | RESPIRATION RATE: 16 BRPM

## 2022-05-23 DIAGNOSIS — I10 ESSENTIAL HYPERTENSION: ICD-10-CM

## 2022-05-23 DIAGNOSIS — I48.3 TYPICAL ATRIAL FLUTTER (HCC): ICD-10-CM

## 2022-05-23 DIAGNOSIS — I50.30 ACC/AHA STAGE C HEART FAILURE WITH PRESERVED EJECTION FRACTION (HCC): ICD-10-CM

## 2022-05-23 DIAGNOSIS — I48.11 LONGSTANDING PERSISTENT ATRIAL FIBRILLATION (HCC): ICD-10-CM

## 2022-05-23 PROCEDURE — 99205 OFFICE O/P NEW HI 60 MIN: CPT | Mod: 25 | Performed by: INTERNAL MEDICINE

## 2022-05-23 PROCEDURE — 93000 ELECTROCARDIOGRAM COMPLETE: CPT | Performed by: INTERNAL MEDICINE

## 2022-05-23 ASSESSMENT — FIBROSIS 4 INDEX: FIB4 SCORE: 1.491202270158651478

## 2022-05-23 NOTE — TELEPHONE ENCOUNTER
----- Message from Rafal Flannery M.D. sent at 5/23/2022  2:06 PM PDT -----  Let's schedule next available fib/flutter ablation. Hold amiodarone x 2 weeks prior.

## 2022-05-23 NOTE — PROGRESS NOTES
Arrhythmia Clinic Note (New Patient)    DOS: 5/23/2022    Referring physician: Jorge A Castelan    Chief complaint/Reason for consult: Persistent AF    HPI:  Pt is a 67 yo M. He has a history of HTN, central sleep apnea, and persistent AF. I do not believe this is longstanding persistent. He is somewhat symptomatic, with occasional palpitations, but overall tolerates the arrhythmia well, and continues to walk about 3 miles for exercise every morning without issue. More recently however presented with volume overload and HF symptoms and borderline LV function (~50% or so). His cardiologist attempted cardioversion which he reverted shortly back to AF. Per cardiology started on oral amiodarone and referred for potential AF ablation.    ROS (+ highlighted in red):  Constitutional: Fevers/chills/fatigue/weightloss  HEENT: Blurry vision/eye pain/sore throat/hearing loss  Respiratory: Shortness of breath/cough  Cardiovascular: Chest pain/palpitations/edema/orthopnea/syncope  GI: Nausea/vomitting/diarrhea  MSK: Arthralgias/myagias/muscle weakness  Skin: Rash/sores  Neurological: Numbness/tremors/vertigo  Endocrine: Excessive thirst/polyuria/cold intolerance/heat intolerance  Psych: Depression/anxiety    Past Medical History:   Diagnosis Date   • Acute kidney injury (HCC)     stage 3 renal failure   • Atrial fibrillation (HCC)    • Chickenpox    • Frequent urination    • Kiswahili measles    • Heart valve disease     aortic insufficiency   • Hypertension    • Hypertensive urgency    • Influenza    • Kidney stone    • Longstanding persistent atrial fibrillation (HCC) 5/3/2022   • Mumps    • Nephrolithiasis    • Obesity    • Snoring    • Stroke (HCC)    • Tonsillitis    • Typhoid fever    • Wears glasses        History reviewed. No pertinent surgical history.    Social History     Socioeconomic History   • Marital status: Single     Spouse name: Not on file   • Number of children: Not on file   • Years of education: Not on file   •  Highest education level: Not on file   Occupational History   • Not on file   Tobacco Use   • Smoking status: Never Smoker   • Smokeless tobacco: Never Used   Vaping Use   • Vaping Use: Never used   Substance and Sexual Activity   • Alcohol use: Yes     Alcohol/week: 6.0 oz     Types: 7 Glasses of wine, 3 Cans of beer per week     Comment: occ   • Drug use: No   • Sexual activity: Not on file   Other Topics Concern   • Not on file   Social History Narrative   • Not on file     Social Determinants of Health     Financial Resource Strain: Not on file   Food Insecurity: Not on file   Transportation Needs: Not on file   Physical Activity: Not on file   Stress: Not on file   Social Connections: Not on file   Intimate Partner Violence: Not on file   Housing Stability: Not on file       Family History   Problem Relation Age of Onset   • Cancer Mother         lungs, smoker   • Lung Cancer Mother    • Hypertension Father    • Hyperlipidemia Father    • Diabetes Neg Hx    • Heart Disease Neg Hx        No Known Allergies    Current Outpatient Medications   Medication Sig Dispense Refill   • amiodarone (CORDARONE) 200 MG Tab Take 1 Tablet by mouth every day. 60 Tablet 3   • zolpidem (AMBIEN) 5 MG Tab Take 1 Tablet by mouth at bedtime as needed for Sleep (1 to 2 po qhs prn insomnia/sleep study. Bring to sleep study for one day supply) for up to 2 doses. 2 Tablet 0   • rosuvastatin (CRESTOR) 40 MG tablet TAKE ONE TABLET BY MOUTH DAILY 90 Tablet 3   • amLODIPine (NORVASC) 10 MG Tab TAKE ONE TABLET BY MOUTH DAILY 90 Tablet 3   • spironolactone (ALDACTONE) 25 MG Tab TAKE ONE TABLET BY MOUTH DAILY 90 Tablet 3   • apixaban (ELIQUIS) 5mg Tab Take 1 Tablet by mouth 2 times a day. 60 Tablet 11   • vitamin D, Ergocalciferol, (DRISDOL) 24514 units Cap capsule Take 1 Cap by mouth every 7 days. 12 Cap 1   • multivitamin (THERAGRAN) Tab Take 1 Tab by mouth every day.       No current facility-administered medications for this visit.  "      Physical Exam:  Vitals:    05/23/22 1250   BP: 124/82   BP Location: Left arm   Patient Position: Sitting   BP Cuff Size: Adult   Pulse: 96   Resp: 16   SpO2: 96%   Weight: 101 kg (223 lb)   Height: 1.676 m (5' 6\")     General appearance: NAD, conversant  Eyes: anicteric sclerae, no lid-lag; PERRLA  HENT: Atraumatic; moist mucous membranes, no ulcerations  Neck: Trachea midline; FROM, supple, no thyromegaly  Lungs: CTA, with normal respiratory effort and no intercostal retractions  CV: irregularly irregular, no MRGs, no JVD  Abdomen: Soft, non-tender; normal bowel sounds, no HSM  Extremities: No peripheral edema. No clubbing or cyanosis.  Skin: Normal temperature, turgor and texture; no rash or ulcers  Psych: Appropriate affect, alert and oriented to person, place and time      Data:  Labs reviewed    Prior echo/stress reviewed:  LVEF 50%    EKG interpreted by me:  AF    Prior EKG showing typical AFL    Impression/Plan:  1. Longstanding persistent atrial fibrillation (HCC)  EKG    CL-EP ABLATION AFIB-AFLUTTER    EC-[facility] W/ CONT   2. ACC/AHA stage C heart failure with preserved ejection fraction (HCC)     3. Typical atrial flutter (HCC)     4. Essential hypertension       -I do not think given relatively younger age, beginning stages of LV function compromise, we should aggressively aim for rhythm control  -I do not think he is necessarily long standing AF from his history, and persistent stage is probably ~ 6 mo or so it seems  -Risks/benefits of ablation discussed, all questions answered and he is agreeable to proceed, plan on outpatient sotalol load post, stop amiodarone 2 weeks prior to procedure    Rafal Flannery MD        "

## 2022-05-24 NOTE — TELEPHONE ENCOUNTER
Patient scheduled for afib/flutter ablation w/[facility] on 8-15-22 with Dr. Flannery. Patient has been instructed to check in at 6:00 for 7:30 case time. Hold Amio 2 weeks before. Message sent to authorizations. Emailed Carto.

## 2022-05-25 ENCOUNTER — HOSPITAL ENCOUNTER (OUTPATIENT)
Facility: MEDICAL CENTER | Age: 67
End: 2022-05-25
Attending: INTERNAL MEDICINE | Admitting: INTERNAL MEDICINE
Payer: MEDICARE

## 2022-06-06 ENCOUNTER — SLEEP STUDY (OUTPATIENT)
Dept: SLEEP MEDICINE | Facility: MEDICAL CENTER | Age: 67
End: 2022-06-06
Attending: STUDENT IN AN ORGANIZED HEALTH CARE EDUCATION/TRAINING PROGRAM
Payer: MEDICARE

## 2022-06-06 DIAGNOSIS — I48.0 PAROXYSMAL ATRIAL FIBRILLATION (HCC): ICD-10-CM

## 2022-06-06 DIAGNOSIS — G47.31 CENTRAL SLEEP APNEA: ICD-10-CM

## 2022-06-06 PROCEDURE — 95811 POLYSOM 6/>YRS CPAP 4/> PARM: CPT | Performed by: STUDENT IN AN ORGANIZED HEALTH CARE EDUCATION/TRAINING PROGRAM

## 2022-06-07 NOTE — PROCEDURES
MONTAGE: Standard  STUDY TYPE: Treatment    RECORDING TECHNIQUE:   After the scalp was prepared, gold plated electrodes were applied to the scalp according to the International 10-20 System. EEG (electroencephalogram) was continuously monitored from the O1-M2, O2-M1, C3-M2, C4-M1, F3-M2, and F4-M1. EOGs (electrooculograms) were monitored by electrodes placed at the left and right outer canthi. Chin EMG (electromyogram) was monitored by electrodes placed on the mentalis and sub-mentalis muscles. Nasal and oral airflow were monitored using a triple port thermocouple as well as oronasal pressure transducer. Respiratory effort was measured by inductive plethysmography technology employing abdominal and thoracic belts. Blood oxygen saturation and pulse were monitored by pulse oximetry. Heart rhythm was monitored by surface electrocardiogram. Leg EMG was studied using surface electrodes placed on left and right anterior tibialis. A microphone was used to monitor tracheal sounds and snoring. Body position was monitored and documented by technician observation.   SCORING CRITERIA:   A modification of the AASM manual for scoring of sleep and associated events was used. Obstructive apneas were scored by cessation of airflow for at least 10 seconds with continuing respiratory effort. Central apneas were scored by cessation of airflow for at least 10 seconds with no respiratory effort. Hypopneas were scored by a 30% or more reduction in airflow for at least 10 seconds accompanied by arterial oxygen desaturation of 3% or an arousal. For CMS (Medicare) patients, per AASM rule 1B, hypopneas are scored by 30% with mild reduction in airflow for at least 10 seconds accompanied by arterial saturation decreased at 4%.    Study start time was 08:20:46 PM. Diagnostic recording time was 9h 8.0m with a total sleep time of 8h 46.0m resulting in a sleep efficiency of 95.99%%. Sleep latency from the start of the study was 00 minutes and the  latency from sleep to REM was 58 minutes. In total,95 arousals were scored for an arousal index of 10.8.  Respiratory:  There were a total of 40 apneas consisting of 0 obstructive apneas, 0 mixed apneas, and 40 central apneas. A total of 55 hypopneas were scored. The apnea index was 4.56 per hour and the hypopnea index was 6.27 per hour resulting in an overall AHI of 10.84. AHI during rem was 4.5 and AHI while supine was 0.00.  Oximetry:  There was a mean oxygen saturation of 90.0%. The minimum oxygen saturation in NREM was 82.0 % and in REM was 83.0%. The patient spent 223.6 minutes of TST with SaO2 <88%.  Cardiac:  The highest heart rate seen while awake was 114 BPM while the highest heart rate during sleep was 107 BPM with an average sleeping heart rate of 83 BPM.  Limb Movements:  There were a total of 472 PLMs during sleep which resulted in a PLMS index of 53.8. Of these, 26 were associated with arousals which resulted in a PLMS arousal index of 3.0.  Titration: CPAP was tried from 5cm H2O to 7cm H2O. Bipap was tried from 9/5cm H2O to 10/5cm H2O. ASV was tried with EPAP 5cm, Min PS 3cm, Max PS 15cm to final setting of EPAP 9cm, Mins PS 4cm, Max PS 15cm.  This was a fully attended sleep study. This test was technically adequate. This patient was titrated on CPAP starting at 5 cm of water pressure. Patient was titrated up to ASV EPAP 9 pressure support 4-15. Patient did best at ASV EPAP 9 pressure support 4-15 cm of water pressure. Patient spent 211 minutes at that pressure and their AHI was 2.0 which is considered treated obstructive sleep apnea.     Impression:  1.  Severe central sleep apnea seen on previous sleep study  2.  Patient responded well to ASV therapy  3.  Supplemental oxygen 2 L/min was added during the study due to persistent nocturnal hypoxia despite adequate control of respiratory events  4.  Atrial fibrillation was seen during study    Recommendations:  I recommend ASV EPAP 9 pressure support  4-15 cm with medium DreamWear fullface mask.  Patient does require 2 L supplemental oxygen bleed in with ASV therapy.     I also recommend 30 day compliance download to assess the efficacy to the recommended pressure, measure leak, apnea hypopnea index and compliance for further outpatient monitoring and management of PAP therapy. In some cases alternative treatment options may be proven effective in resolving sleep apnea. These options include upper airway surgery, the use of a dental orthotic, weight loss orpositional therapy. Clinical correlation is required. In general patients with sleep apnea are advised to avoid alcohol, sedatives and not to operate a motor vehicle while drowsy.  Untreated sleep apnea increases the risk for cardiovascular and neurovascular disease.

## 2022-06-13 ENCOUNTER — APPOINTMENT (OUTPATIENT)
Dept: CARDIOLOGY | Facility: MEDICAL CENTER | Age: 67
End: 2022-06-13
Attending: INTERNAL MEDICINE
Payer: MEDICARE

## 2022-06-17 LAB — EKG IMPRESSION: NORMAL

## 2022-06-27 ENCOUNTER — OFFICE VISIT (OUTPATIENT)
Dept: SLEEP MEDICINE | Facility: MEDICAL CENTER | Age: 67
End: 2022-06-27
Payer: MEDICARE

## 2022-06-27 VITALS
SYSTOLIC BLOOD PRESSURE: 140 MMHG | DIASTOLIC BLOOD PRESSURE: 80 MMHG | BODY MASS INDEX: 35.52 KG/M2 | OXYGEN SATURATION: 94 % | HEART RATE: 83 BPM | WEIGHT: 221 LBS | HEIGHT: 66 IN | RESPIRATION RATE: 16 BRPM

## 2022-06-27 DIAGNOSIS — G47.31 CENTRAL SLEEP APNEA: Primary | ICD-10-CM

## 2022-06-27 DIAGNOSIS — G47.34 NOCTURNAL HYPOXIA: ICD-10-CM

## 2022-06-27 PROCEDURE — 99213 OFFICE O/P EST LOW 20 MIN: CPT | Performed by: STUDENT IN AN ORGANIZED HEALTH CARE EDUCATION/TRAINING PROGRAM

## 2022-06-27 ASSESSMENT — FIBROSIS 4 INDEX: FIB4 SCORE: 1.491202270158651478

## 2022-06-27 NOTE — PROGRESS NOTES
[facility] Follow-up Visit    CC: Follow-up to discuss sleep study results      HPI:  Chema Sarabia is a 66 y.o.male  with hypertension, hyperlipidemia, paroxysmal atrial fibrillation, GERD, CKD stage III, and severe central sleep apnea.  Presents today to discuss sleep study results.    He underwent a titration study at the beginning of this month.  He found that during the study he was surprised to learn that he did not wake to use the restroom as he does at home.  Overall he did not find the Pap machine to be too uncomfortable.  Placida he had a decent night sleep the night of the study.    He continues to snore, have a dry mouth and clenched teeth.    Sleep History  3/21/2022 PSG showed severe central sleep apnea with overall AHI of 76.8, central apneas accounted for 66% of respiratory events.  6/6/2022 PSG titration study showed improvement in respiratory events with ASV therapy.  Despite control of his respiratory events patient continued to have low oxygen levels at night.  Supplemental oxygen was started during night of the study.  Recommended settings EPAP 9 pressure support 4-15 with 2 L supplemental oxygen bleed in.    Patient Active Problem List    Diagnosis Date Noted   • Longstanding persistent atrial fibrillation (HCC) 05/03/2022   • Dyslipidemia 04/26/2019   • Gastroesophageal reflux disease 04/26/2019   • Acquired renal cyst of right kidney 10/19/2018   • Stage 3 chronic kidney disease (HCC) 04/20/2018   • Essential hypertension 04/20/2018   • [facility] Billing 04/19/2018   • Research study patient 04/09/2018   • Moderate aortic insufficiency 04/05/2018   • ACC/AHA stage C heart failure with preserved ejection fraction (HCC) 04/05/2018   • Health care maintenance 02/14/2018   • Heart disease, hypertensive, malignant 02/14/2018       Past Medical History:   Diagnosis Date   • Acute kidney injury (HCC)     stage 3 renal failure   • Atrial fibrillation (HCC)    •  Chickenpox    • Frequent urination    • Bahamian measles    • Heart valve disease     aortic insufficiency   • Hypertension    • Hypertensive urgency    • Influenza    • Kidney stone    • Longstanding persistent atrial fibrillation (HCC) 5/3/2022   • Mumps    • Nephrolithiasis    • Obesity    • Snoring    • Stroke (HCC)    • Tonsillitis    • Typhoid fever    • Wears glasses         History reviewed. No pertinent surgical history.    Family History   Problem Relation Age of Onset   • Cancer Mother         lungs, smoker   • Lung Cancer Mother    • Hypertension Father    • Hyperlipidemia Father    • Diabetes Neg Hx    • Heart Disease Neg Hx        Social History     Socioeconomic History   • Marital status: Single     Spouse name: Not on file   • Number of children: Not on file   • Years of education: Not on file   • Highest education level: Not on file   Occupational History   • Not on file   Tobacco Use   • Smoking status: Never Smoker   • Smokeless tobacco: Never Used   Vaping Use   • Vaping Use: Never used   Substance and Sexual Activity   • Alcohol use: Yes     Alcohol/week: 6.0 oz     Types: 7 Glasses of wine, 3 Cans of beer per week     Comment: occ   • Drug use: No   • Sexual activity: Not on file   Other Topics Concern   • Not on file   Social History Narrative   • Not on file     Social Determinants of Health     Financial Resource Strain: Not on file   Food Insecurity: Not on file   Transportation Needs: Not on file   Physical Activity: Not on file   Stress: Not on file   Social Connections: Not on file   Intimate Partner Violence: Not on file   Housing Stability: Not on file       Current Outpatient Medications   Medication Sig Dispense Refill   • amiodarone (CORDARONE) 200 MG Tab Take 1 Tablet by mouth every day. 60 Tablet 3   • zolpidem (AMBIEN) 5 MG Tab Take 1 Tablet by mouth at bedtime as needed for Sleep (1 to 2 po qhs prn insomnia/sleep study. Bring to sleep study for one day supply) for up to 2 doses.  "2 Tablet 0   • rosuvastatin (CRESTOR) 40 MG tablet TAKE ONE TABLET BY MOUTH DAILY 90 Tablet 3   • amLODIPine (NORVASC) 10 MG Tab TAKE ONE TABLET BY MOUTH DAILY 90 Tablet 3   • spironolactone (ALDACTONE) 25 MG Tab TAKE ONE TABLET BY MOUTH DAILY 90 Tablet 3   • apixaban (ELIQUIS) 5mg Tab Take 1 Tablet by mouth 2 times a day. 60 Tablet 11   • vitamin D, Ergocalciferol, (DRISDOL) 55102 units Cap capsule Take 1 Cap by mouth every 7 days. 12 Cap 1   • multivitamin (THERAGRAN) Tab Take 1 Tab by mouth every day.       No current facility-administered medications for this visit.        ALLERGIES: Patient has no known allergies.    ROS  Constitutional: Denies fevers, Denies weight changes  Ears/Nose/Throat/Mouth: Denies nasal congestion or sore throat   Cardiovascular: Denies chest pain  Respiratory: Denies shortness of breath, Denies cough  Gastrointestinal/Hepatic: Denies nausea, vomiting  Sleep: see HPI      PHYSICAL EXAM  BP (!) 140/80 (BP Location: Left arm, Patient Position: Sitting, BP Cuff Size: Adult)   Pulse 83   Resp 16   Ht 1.676 m (5' 6\")   Wt 100 kg (221 lb)   SpO2 94%   BMI 35.67 kg/m²   Appearance: Well-nourished, well-developed, no acute distress  Eyes:  No scleral icterus , EOMI  ENMT: masked  Musculoskeletal:  Grossly normal; gait and station normal; digits and nails normal  Skin:  No rashes, petechiae, cyanosis  Neurologic: without focal signs; oriented to person, time, place, and purpose; judgement intact      Medical Decision Making   Assessment and Plan  Chema Sarabia is a 66 y.o.male  with hypertension, hyperlipidemia, paroxysmal atrial fibrillation, GERD, CKD stage III, and severe central sleep apnea.  Presents today to discuss sleep study results.    The medical record was reviewed.    Central sleep apnea   Reviewed recent PSG titration study with patient showing improvement in respiratory events with ASV therapy.  Discussed need for supplemental oxygen with ASV therapy based off of " sleep study.  Based on sleep study and symptoms meets criteria for severe central sleep apnea.   We discussed the pathophysiology of central sleep apnea and risk factors for the disease. We also discussed possible consequences of untreated sleep apnea, including excessive daytime sleepiness and fatigue, cognitive dysfunction, cardiovascular complications such as elevated blood pressure, heart attacks, cardiac arrhythmias, and strokes.     We will proceed with ASV therapy with supplemental oxygen.    RECOMMENDATIONS  -Start ASV EPAP 9 pressure support 4-15 with 2 L/min bleed in supplemental oxygen  -Discussed importance of adherence/compliance   -Prescription generated for supplies   -Patient counseled to avoid driving when sleepy. Encouraged to anticipate sleepiness, consider taking a 10 min nap prior to driving, alternate with another , or pull over if sleepy while driving  -Advised to contact our office or myself with any questions via [employer]      Have advised the patient to follow up with the appropriate healthcare practitioners for all other medical problems and issues.    Return for 45 days after starting ASV .      Please note portions of this record was created using voice recognition software. I have made every reasonable attempt to correct obvious errors, but I expect that there are errors of grammar and possibly content I did not discover before finalizing the note.

## 2022-07-18 ENCOUNTER — TELEPHONE (OUTPATIENT)
Dept: CARDIOLOGY | Facility: MEDICAL CENTER | Age: 67
End: 2022-07-18
Payer: MEDICARE

## 2022-07-18 NOTE — TELEPHONE ENCOUNTER
TT     Hello, this patient stopped by the office today wanting Dr. Castelan's recommendation if he should still continue with his procedure for an Ablation. Per patient its been about a month that he as not been in afib. Patient will like a call back at [phone].    Thank you!

## 2022-07-21 ENCOUNTER — TELEPHONE (OUTPATIENT)
Dept: MEDICAL GROUP | Age: 67
End: 2022-07-21
Payer: MEDICARE

## 2022-07-27 ENCOUNTER — OFFICE VISIT (OUTPATIENT)
Dept: MEDICAL GROUP | Age: 67
End: 2022-07-27
Payer: MEDICARE

## 2022-07-27 VITALS
WEIGHT: 227.6 LBS | HEIGHT: 66 IN | BODY MASS INDEX: 36.58 KG/M2 | TEMPERATURE: 98.2 F | OXYGEN SATURATION: 96 % | DIASTOLIC BLOOD PRESSURE: 88 MMHG | HEART RATE: 67 BPM | SYSTOLIC BLOOD PRESSURE: 138 MMHG

## 2022-07-27 DIAGNOSIS — I50.30 ACC/AHA STAGE C HEART FAILURE WITH PRESERVED EJECTION FRACTION (HCC): ICD-10-CM

## 2022-07-27 DIAGNOSIS — I35.1 MODERATE AORTIC INSUFFICIENCY: ICD-10-CM

## 2022-07-27 DIAGNOSIS — G47.31 CENTRAL SLEEP APNEA: Primary | ICD-10-CM

## 2022-07-27 DIAGNOSIS — K21.9 GASTROESOPHAGEAL REFLUX DISEASE, UNSPECIFIED WHETHER ESOPHAGITIS PRESENT: ICD-10-CM

## 2022-07-27 DIAGNOSIS — Z00.6 RESEARCH STUDY PATIENT: ICD-10-CM

## 2022-07-27 DIAGNOSIS — N28.1 ACQUIRED RENAL CYST OF RIGHT KIDNEY: ICD-10-CM

## 2022-07-27 DIAGNOSIS — I11.0 MALIGNANT HYPERTENSIVE HEART DISEASE WITH HEART FAILURE (HCC): ICD-10-CM

## 2022-07-27 DIAGNOSIS — Z00.00 MEDICARE ANNUAL WELLNESS VISIT, INITIAL: ICD-10-CM

## 2022-07-27 DIAGNOSIS — Z00.00 HEALTH CARE MAINTENANCE: ICD-10-CM

## 2022-07-27 DIAGNOSIS — E78.5 DYSLIPIDEMIA: ICD-10-CM

## 2022-07-27 DIAGNOSIS — I10 ESSENTIAL HYPERTENSION: ICD-10-CM

## 2022-07-27 DIAGNOSIS — I48.11 LONGSTANDING PERSISTENT ATRIAL FIBRILLATION (HCC): ICD-10-CM

## 2022-07-27 DIAGNOSIS — N18.30 STAGE 3 CHRONIC KIDNEY DISEASE, UNSPECIFIED WHETHER STAGE 3A OR 3B CKD: ICD-10-CM

## 2022-07-27 PROCEDURE — G0402 INITIAL PREVENTIVE EXAM: HCPCS | Performed by: INTERNAL MEDICINE

## 2022-07-27 ASSESSMENT — PATIENT HEALTH QUESTIONNAIRE - PHQ9: CLINICAL INTERPRETATION OF PHQ2 SCORE: 0

## 2022-07-27 ASSESSMENT — ACTIVITIES OF DAILY LIVING (ADL): BATHING_REQUIRES_ASSISTANCE: 0

## 2022-07-27 ASSESSMENT — FIBROSIS 4 INDEX: FIB4 SCORE: 1.491202270158651478

## 2022-07-27 ASSESSMENT — ENCOUNTER SYMPTOMS: GENERAL WELL-BEING: GOOD

## 2022-07-27 NOTE — PROGRESS NOTES
Chief Complaint   Patient presents with   • Annual Wellness Visit     HPI:  Chema is a 66 y.o. here for Medicare Annual Wellness Visit    Patient Active Problem List    Diagnosis Date Noted   • Longstanding persistent atrial fibrillation (HCC) 05/03/2022   • Dyslipidemia 04/26/2019   • Gastroesophageal reflux disease 04/26/2019   • Acquired renal cyst of right kidney 10/19/2018   • Stage 3 chronic kidney disease (HCC) 04/20/2018   • Essential hypertension 04/20/2018   • [facility]   • Research study patient 04/09/2018   • Moderate aortic insufficiency 04/05/2018   • ACC/AHA stage C heart failure with preserved ejection fraction (HCC) 04/05/2018   • Health care maintenance 02/14/2018   • Heart disease, hypertensive, malignant 02/14/2018     Current Outpatient Medications   Medication Sig Dispense Refill   • amiodarone (CORDARONE) 200 MG Tab Take 1 Tablet by mouth every day. 60 Tablet 3   • amLODIPine (NORVASC) 10 MG Tab TAKE ONE TABLET BY MOUTH DAILY 90 Tablet 3   • spironolactone (ALDACTONE) 25 MG Tab TAKE ONE TABLET BY MOUTH DAILY 90 Tablet 3   • apixaban (ELIQUIS) 5mg Tab Take 1 Tablet by mouth 2 times a day. 60 Tablet 11   • vitamin D, Ergocalciferol, (DRISDOL) 52475 units Cap capsule Take 1 Cap by mouth every 7 days. 12 Cap 1   • multivitamin (THERAGRAN) Tab Take 1 Tab by mouth every day.     • rosuvastatin (CRESTOR) 40 MG tablet TAKE ONE TABLET BY MOUTH DAILY (Patient not taking: Reported on 7/27/2022) 90 Tablet 3     No current facility-administered medications for this visit.        Patient is taking medications as noted in medication list.  Current supplements as per medication list.     Allergies: Patient has no known allergies.    Current social contact/activities: He visits his friends and attends small parties     Is patient current with immunizations? Yes.    He  reports that he has never smoked. He has never used smokeless tobacco. He reports current alcohol use of  about 6.0 oz of alcohol per week. He reports that he does not use drugs.  Counseling given: Not Answered    DPA/Advanced directive: Patient does not have an Advanced Directive.  A packet and workshop information was given on Advanced Directives.    ROS:    Gait: Uses no assistive device   Ostomy: No   Other tubes: No   Amputations: No   Chronic oxygen use No   Last eye exam 4 years ago   Wears hearing aids: No   : Denies any urinary leakage during the last 6 months    Screening:    Depression Screening  Little interest or pleasure in doing things?  0 - not at all  Feeling down, depressed, or hopeless? 0 - not at all  Patient Health Questionnaire Score: 0    Screening for Cognitive Impairment  Three Minute Recall (daughter, heaven, mountain)  3/3    Nick clock face with all 12 numbers and set the hands to show 10 past 11.  Yes    If cognitive concerns identified, deferred for follow up unless specifically addressed in assessment and plan.    Fall Risk Assessment  Has the patient had two or more falls in the last year or any fall with injury in the last year?  No  If fall risk identified, deferred for follow up unless specifically addressed in assessment and plan.    Safety Assessment  Throw rugs on floor.  Yes  Handrails on all stairs.  Yes  Good lighting in all hallways.  Yes  Difficulty hearing.  No  Patient counseled about all safety risks that were identified.    Functional Assessment ADLs  Are there any barriers preventing you from cooking for yourself or meeting nutritional needs?  No.    Are there any barriers preventing you from driving safely or obtaining transportation?  No.    Are there any barriers preventing you from using a telephone or calling for help?  No.    Are there any barriers preventing you from shopping?  No.    Are there any barriers preventing you from taking care of your own finances?  No.    Are there any barriers preventing you from managing your medications?  No.    Are there any  barriers preventing you from showering, bathing or dressing yourself?  No.    Are you currently engaging in any exercise or physical activity?  Yes.  Walks 2 miles everyday  What is your perception of your health?  Good.    Health Maintenance Summary          Overdue - Annual Wellness Visit (Once) Overdue - never done    No completion history exists for this topic.          Overdue - IMM ZOSTER VACCINES (1 of 2) Overdue - never done    No completion history exists for this topic.          Overdue - IMM PNEUMOCOCCAL VACCINE: 65+ Years (1 - PCV) Overdue - never done    No completion history exists for this topic.          Overdue - COVID-19 Vaccine (4 - Booster for Pfizer series) Overdue since 4/2/2022 12/02/2021  Imm Admin: PFIZER PURPLE CAP SARS-COV-2 VACCINATION (12+)    05/14/2021  Imm Admin: PFIZER PURPLE CAP SARS-COV-2 VACCINATION (12+)    04/21/2021  Imm Admin: PFIZER PURPLE CAP SARS-COV-2 VACCINATION (12+)          IMM INFLUENZA (1) Next due on 9/1/2022 09/21/2020  Imm Admin: Influenza Vaccine Quad Inj (Pf)    10/29/2019  Imm Admin: Influenza Vaccine Quad Inj (Pf)    09/10/2018  Imm Admin: Influenza Vaccine Quad Inj (Pf)          COLORECTAL CANCER SCREENING (COLOGUARD STOOL DNA - Every 3 Years) Next due on 12/4/2022 12/04/2019  COLOGUARD COLON CANCER SCREENING    10/04/2018  OCCULT BLOOD FECES IMMUNOASSAY          IMM DTaP/Tdap/Td Vaccine (2 - Td or Tdap) Next due on 6/5/2028 06/05/2018  Imm Admin: Tdap Vaccine          HEPATITIS C SCREENING  Completed    08/10/2021  HEP C VIRUS ANTIBODY          IMM HEP B VACCINE (Series Information) Aged Out    No completion history exists for this topic.          IMM MENINGOCOCCAL ACWY VACCINE (Series Information) Aged Out    No completion history exists for this topic.              Patient Care Team:  Albert Orellana M.D. as PCP - General (Family Medicine)  [facility] (DME Supplier)    Social History     Tobacco Use   • Smoking status: Never Smoker   •  "Smokeless tobacco: Never Used   Vaping Use   • Vaping Use: Never used   Substance Use Topics   • Alcohol use: Yes     Alcohol/week: 6.0 oz     Types: 7 Glasses of wine, 3 Cans of beer per week     Comment: occ   • Drug use: No     Family History   Problem Relation Age of Onset   • Cancer Mother         lungs, smoker   • Lung Cancer Mother    • Hypertension Father    • Hyperlipidemia Father    • Diabetes Neg Hx    • Heart Disease Neg Hx      He  has a past medical history of Acute kidney injury (HCC), Atrial fibrillation (HCC), Chickenpox, Frequent urination, Hungarian measles, Heart valve disease, Hypertension, Hypertensive urgency, Influenza, Kidney stone, Longstanding persistent atrial fibrillation (HCC) (5/3/2022), Mumps, Nephrolithiasis, Obesity, Snoring, Stroke (HCC), Tonsillitis, Typhoid fever, and Wears glasses.   No past surgical history on file.    Exam:   /88 (BP Location: Left arm, Patient Position: Sitting, BP Cuff Size: Adult long)   Pulse 67   Temp 36.8 °C (98.2 °F) (Temporal)   Ht 1.676 m (5' 6\")   Wt 103 kg (227 lb 9.6 oz)   SpO2 96%  Body mass index is 36.74 kg/m².  Hearing good.    Dentition good  Alert, oriented in no acute distress.  Eye contact is good, speech goal directed, affect calm    Labs  Reviewed and discussed  Lab Results   Component Value Date/Time    CHOLSTRLTOT 167 01/04/2022 12:47 PM    LDL 65 01/04/2022 12:47 PM    HDL 74 01/04/2022 12:47 PM    TRIGLYCERIDE 139 01/04/2022 12:47 PM       Lab Results   Component Value Date/Time    SODIUM 139 05/03/2022 07:45 AM    POTASSIUM 4.7 05/03/2022 07:45 AM    CHLORIDE 103 05/03/2022 07:45 AM    CO2 24 05/03/2022 07:45 AM    GLUCOSE 110 (H) 05/03/2022 07:45 AM    BUN 27 (H) 05/03/2022 07:45 AM    CREATININE 1.28 05/03/2022 07:45 AM     Lab Results   Component Value Date/Time    ALKPHOSPHAT 65 08/10/2021 07:10 AM    ASTSGOT 18 08/10/2021 07:10 AM    ALTSGPT 19 08/10/2021 07:10 AM    TBILIRUBIN 0.6 08/10/2021 07:10 AM      No results " found for: HBA1C  No results found for: TSH  No results found for: FREET4  Lab Results   Component Value Date/Time    WBC 7.2 05/03/2022 07:45 AM    RBC 6.00 05/03/2022 07:45 AM    HEMOGLOBIN 18.1 (H) 05/03/2022 07:45 AM    HEMATOCRIT 52.8 (H) 05/03/2022 07:45 AM    MCV 88.0 05/03/2022 07:45 AM    MCH 30.2 05/03/2022 07:45 AM    MCHC 34.3 05/03/2022 07:45 AM    MPV 8.7 (L) 05/03/2022 07:45 AM    NEUTSPOLYS 63.10 08/10/2021 07:10 AM    LYMPHOCYTES 22.60 08/10/2021 07:10 AM    MONOCYTES 9.90 08/10/2021 07:10 AM    EOSINOPHILS 2.40 08/10/2021 07:10 AM    BASOPHILS 0.60 08/10/2021 07:10 AM      Assessment and Plan    1. Medicare annual wellness visit, initial  Reviewed PMH, PSH, FH, SH, ALL, MEDS, HCM/IMM.   - Initial Annual Wellness Visit - Includes PPPS ()    2. Health care maintenance  Per HPI  - Initial Annual Wellness Visit - Includes PPPS ()    3. Essential hypertension  - Controlled, continue with current management, cardiology f/u  - Initial Annual Wellness Visit - Includes PPPS ()  4. ACC/AHA stage C heart failure with preserved ejection fraction (HCC)  - Initial Annual Wellness Visit - Includes PPPS ()  5. 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 Longstanding persistent atrial fibrillation (HCC)  - Initial Annual Wellness Visit - Includes PPPS ()  7. Moderate aortic insufficiency  - Initial Annual Wellness Visit - Includes PPPS ()  8. [facility] Research-Cardiology Billing  - Initial Annual Wellness Visit - Includes PPPS ()  9. Research study patient  - Initial Annual Wellness Visit - Includes PPPS ()    10. Stage 3 chronic kidney disease, unspecified whether stage 3a or 3b CKD (HCC)   - Stable, continue good fluid intake, avoid NSAIDs  - Initial Annual Wellness Visit - Includes PPPS ()    11. Dyslipidemia  - Controlled; stopped crestor, as he developed afib when started medicine  -  Initial Annual Wellness Visit - Includes PPPS ()    12. Gastroesophageal reflux disease, unspecified whether esophagitis present  - Controlled, continue with current management.  - Initial Annual Wellness Visit - Includes PPPS ()    13. Acquired renal cyst of right kidney   - benign  - Initial Annual Wellness Visit - Includes PPPS ()    Services suggested: No services needed at this time  Health Care Screening recommendations as per orders if indicated.  Referrals offered: PT/OT/Nutrition counseling/Behavioral Health/Smoking cessation as per orders if indicated.    Discussion today about general wellness and lifestyle habits:    · Prevent falls and reduce trip hazards; Cautioned about securing or removing rugs.  · Have a working fire alarm and carbon monoxide detector;   · Engage in regular physical activity and social activities       Follow-up: No follow-ups on file.

## 2022-07-28 NOTE — TELEPHONE ENCOUNTER
Rafal Flannery M.D.  You 4 hours ago (4:14 AM)     Yes, still recommend ablation. Should probably check EKG to see if he is still in AF if he thinks he is out.      Phone Number Called: [phone]    Call outcome: Spoke to patient regarding message below.    Message: Called to inform patient of SS recommendations. Patient states that yesterday he cancelled his ablation. He has a follow up with Dr Castelan in August and he wants to wait until after that to re-consider his ablation. Pt was told by sister that taking statins can cause atrial fibrillation because this is what happened to her. He discontinued his statin and states his atrial fibrillation has resolved after coming off of the statin. Patient HR is 62-65 and stable per pt. Patient would like to get a repeat heart monitor from Dr Castelan. Patient does not want to come in for an EKG. He would like to wait until his appointment with Dr Castelan.

## 2022-08-15 ENCOUNTER — APPOINTMENT (OUTPATIENT)
Dept: CARDIOLOGY | Facility: MEDICAL CENTER | Age: 67
End: 2022-08-15
Attending: INTERNAL MEDICINE
Payer: MEDICARE

## 2022-08-24 ENCOUNTER — OFFICE VISIT (OUTPATIENT)
Dept: CARDIOLOGY | Facility: MEDICAL CENTER | Age: 67
End: 2022-08-24
Payer: MEDICARE

## 2022-08-24 VITALS
DIASTOLIC BLOOD PRESSURE: 90 MMHG | SYSTOLIC BLOOD PRESSURE: 150 MMHG | BODY MASS INDEX: 37.28 KG/M2 | RESPIRATION RATE: 16 BRPM | HEIGHT: 66 IN | OXYGEN SATURATION: 96 % | HEART RATE: 77 BPM | WEIGHT: 232 LBS

## 2022-08-24 DIAGNOSIS — I50.30 ACC/AHA STAGE C HEART FAILURE WITH PRESERVED EJECTION FRACTION (HCC): ICD-10-CM

## 2022-08-24 DIAGNOSIS — I48.0 PAROXYSMAL ATRIAL FIBRILLATION (HCC): ICD-10-CM

## 2022-08-24 DIAGNOSIS — E78.2 MIXED HYPERLIPIDEMIA: ICD-10-CM

## 2022-08-24 DIAGNOSIS — E78.5 DYSLIPIDEMIA: ICD-10-CM

## 2022-08-24 DIAGNOSIS — I48.11 LONGSTANDING PERSISTENT ATRIAL FIBRILLATION (HCC): ICD-10-CM

## 2022-08-24 DIAGNOSIS — I48.3 TYPICAL ATRIAL FLUTTER (HCC): ICD-10-CM

## 2022-08-24 DIAGNOSIS — I51.89 LEFT VENTRICULAR DIASTOLIC DYSFUNCTION, NYHA CLASS 2: ICD-10-CM

## 2022-08-24 DIAGNOSIS — I10 HTN (HYPERTENSION), MALIGNANT: ICD-10-CM

## 2022-08-24 DIAGNOSIS — I10 ESSENTIAL HYPERTENSION: ICD-10-CM

## 2022-08-24 DIAGNOSIS — N18.32 STAGE 3B CHRONIC KIDNEY DISEASE: ICD-10-CM

## 2022-08-24 DIAGNOSIS — Z79.899 HIGH RISK MEDICATION USE: ICD-10-CM

## 2022-08-24 PROCEDURE — 93000 ELECTROCARDIOGRAM COMPLETE: CPT | Performed by: INTERNAL MEDICINE

## 2022-08-24 PROCEDURE — 99214 OFFICE O/P EST MOD 30 MIN: CPT | Performed by: INTERNAL MEDICINE

## 2022-08-24 RX ORDER — ATORVASTATIN CALCIUM 40 MG/1
40 TABLET, FILM COATED ORAL NIGHTLY
Qty: 90 TABLET | Refills: 3 | Status: SHIPPED
Start: 2022-08-24 | End: 2023-01-31

## 2022-08-24 RX ORDER — AMIODARONE HYDROCHLORIDE 200 MG/1
200 TABLET ORAL DAILY
Qty: 90 TABLET | Refills: 3 | Status: SHIPPED | OUTPATIENT
Start: 2022-08-24 | End: 2023-01-31 | Stop reason: SDUPTHER

## 2022-08-24 RX ORDER — SPIRONOLACTONE 25 MG/1
25 TABLET ORAL DAILY
Qty: 90 TABLET | Refills: 3 | Status: SHIPPED | OUTPATIENT
Start: 2022-08-24 | End: 2023-01-31 | Stop reason: SDUPTHER

## 2022-08-24 RX ORDER — ATORVASTATIN CALCIUM 40 MG/1
40 TABLET, FILM COATED ORAL NIGHTLY
Qty: 90 TABLET | Refills: 3 | Status: SHIPPED | OUTPATIENT
Start: 2022-08-24 | End: 2022-08-24 | Stop reason: SDUPTHER

## 2022-08-24 RX ORDER — AMLODIPINE BESYLATE 10 MG/1
10 TABLET ORAL DAILY
Qty: 90 TABLET | Refills: 3 | Status: SHIPPED | OUTPATIENT
Start: 2022-08-24 | End: 2023-01-31 | Stop reason: SDUPTHER

## 2022-08-24 ASSESSMENT — ENCOUNTER SYMPTOMS
DOUBLE VISION: 0
COUGH: 0
PND: 0
LOSS OF CONSCIOUSNESS: 0
PALPITATIONS: 0
SPEECH CHANGE: 0
NAUSEA: 0
DEPRESSION: 0
SENSORY CHANGE: 0
ABDOMINAL PAIN: 0
SHORTNESS OF BREATH: 0
BLURRED VISION: 0
EYE PAIN: 0
FEVER: 0
WEIGHT LOSS: 0
HALLUCINATIONS: 0
DIZZINESS: 0
BRUISES/BLEEDS EASILY: 0
EYE DISCHARGE: 0
CHILLS: 0
ORTHOPNEA: 0
FALLS: 0
CLAUDICATION: 0
BLOOD IN STOOL: 0
HEADACHES: 0
MYALGIAS: 0
VOMITING: 0

## 2022-08-24 ASSESSMENT — FIBROSIS 4 INDEX: FIB4 SCORE: 1.491202270158651478

## 2022-08-24 NOTE — PROGRESS NOTES
Chief Complaint   Patient presents with    Congestive Heart Failure     F/v dx: ACC/AHA stage C heart failure with preserved ejection fraction (HCC)    Hypertension    Atrial Fibrillation       Subjective:   Chema Sarabia is a 66 y.o. male who presents today for cardiac care and evaluation in our heart failure clinic after his recent episode of hypertensive emergency for which he went into heart failure exacerbation as well. Patient does have documented transthoracic echocardiogram which showed relatively preserved LV function of 55%. No significant valvular disease.     Feeling very well. No dyspnea.     At prior visit, patient was able to complete 549 m during his 6 minute walk test. his O2 saturation at baseline was 94% and at the end of the test, the O2 saturation was 95%. he reported 0 level of dyspnea on Jim scale.     06/06/2019 Patient was able to complete 549 m during his 6 minute walk test. his O2 saturation at baseline was 95% and at the end of the test, the O2 saturation was 93%. he reported 1 level of dyspnea on Jim scale.    In July of 2021, for 1 week, he had symptoms of difficult finding words and incoherence. MRI of the brain showed evidence of stroke. His symptoms resolved at this time.    Chema was placed on cardiac event monitor and found to have paroxysmal atrial fibrillation.  He was started on Eliquis 5 mg p.o. twice a day.    Most recent transthoracic echocardiogram last week show evidence of slightly reduced left ventricular systolic function of 50%, concentric biventricular hypertrophy.  Patient did have blood tests which did not show increase free light change.  I personally interpreted images of his transthoracic echocardiogram and blood test results.    On May 3, 2022, patient underwent unsuccessful cardioversion for atrial fibrillation at this time.  He was then placed on amiodarone p.o. therapy.      I have independently interpreted and reviewed blood tests results with patient in  clinic which shows fine LDL level 65, normal triglycerides 139 but down from 267, stable renal (GFR of 62) and liver function.    Serum light chains are within range.    Of note, patient has been taking his Eliquis without missing dose.    He is on CPAP for ANAHY treatment now.    I have personally interpreted EKG today with patient, there is no evidence of acute coronary syndrome, no evidence of prior infarct, normal IA and QT interval, no significant conduction disease. Sinus rhythm.    Patient saw electrophysiology service and was plan for ablation procedure.  However, patient came out of atrial fibrillation and that has been on hold.      Past Medical History:   Diagnosis Date    Acute kidney injury (HCC)     stage 3 renal failure    Atrial fibrillation (HCC)     Chickenpox     Frequent urination     Trinidadian measles     Heart valve disease     aortic insufficiency    Hypertension     Hypertensive urgency     Influenza     Kidney stone     Longstanding persistent atrial fibrillation (HCC) 5/3/2022    Mumps     Nephrolithiasis     Obesity     Snoring     Stroke (HCC)     Tonsillitis     Typhoid fever     Wears glasses      History reviewed. No pertinent surgical history.  Family History   Problem Relation Age of Onset    Cancer Mother         lungs, smoker    Lung Cancer Mother     Hypertension Father     Hyperlipidemia Father     Diabetes Neg Hx     Heart Disease Neg Hx      Social History     Socioeconomic History    Marital status: Single     Spouse name: Not on file    Number of children: Not on file    Years of education: Not on file    Highest education level: Not on file   Occupational History    Not on file   Tobacco Use    Smoking status: Never    Smokeless tobacco: Never   Vaping Use    Vaping Use: Never used   Substance and Sexual Activity    Alcohol use: Yes     Alcohol/week: 6.0 oz     Types: 7 Glasses of wine, 3 Cans of beer per week     Comment: occ    Drug use: No    Sexual activity: Not on file    Other Topics Concern    Not on file   Social History Narrative    Not on file     Social Determinants of Health     Financial Resource Strain: Not on file   Food Insecurity: Not on file   Transportation Needs: Not on file   Physical Activity: Not on file   Stress: Not on file   Social Connections: Not on file   Intimate Partner Violence: Not on file   Housing Stability: Not on file     No Known Allergies  Outpatient Encounter Medications as of 8/24/2022   Medication Sig Dispense Refill    amiodarone (CORDARONE) 200 MG Tab Take 1 Tablet by mouth every day. 60 Tablet 3    amLODIPine (NORVASC) 10 MG Tab TAKE ONE TABLET BY MOUTH DAILY 90 Tablet 3    spironolactone (ALDACTONE) 25 MG Tab TAKE ONE TABLET BY MOUTH DAILY 90 Tablet 3    apixaban (ELIQUIS) 5mg Tab Take 1 Tablet by mouth 2 times a day. 60 Tablet 11    vitamin D, Ergocalciferol, (DRISDOL) 37858 units Cap capsule Take 1 Cap by mouth every 7 days. 12 Cap 1    multivitamin (THERAGRAN) Tab Take 1 Tab by mouth every day.       No facility-administered encounter medications on file as of 8/24/2022.     Review of Systems   Constitutional:  Negative for chills, fever, malaise/fatigue and weight loss.   HENT:  Negative for ear discharge, ear pain, hearing loss and nosebleeds.    Eyes:  Negative for blurred vision, double vision, pain and discharge.   Respiratory:  Negative for cough and shortness of breath.    Cardiovascular:  Negative for chest pain, palpitations, orthopnea, claudication, leg swelling and PND.   Gastrointestinal:  Negative for abdominal pain, blood in stool, melena, nausea and vomiting.   Genitourinary:  Negative for dysuria and hematuria.   Musculoskeletal:  Negative for falls, joint pain and myalgias.   Skin:  Negative for itching and rash.   Neurological:  Negative for dizziness, sensory change, speech change, loss of consciousness and headaches.   Endo/Heme/Allergies:  Negative for environmental allergies. Does not bruise/bleed easily.  "  Psychiatric/Behavioral:  Negative for depression, hallucinations and suicidal ideas.       Objective:   BP (!) 150/90 (BP Location: Left arm, Patient Position: Sitting, BP Cuff Size: Adult)   Pulse 77   Resp 16   Ht 1.676 m (5' 6\")   Wt 105 kg (232 lb)   SpO2 96%   BMI 37.45 kg/m²     Physical Exam  Vitals and nursing note reviewed.   Constitutional:       General: He is not in acute distress.     Appearance: He is not diaphoretic.   HENT:      Head: Normocephalic and atraumatic.      Right Ear: External ear normal.      Left Ear: External ear normal.   Eyes:      General:         Right eye: No discharge.         Left eye: No discharge.   Neck:      Thyroid: No thyromegaly.      Vascular: No JVD.   Cardiovascular:      Rate and Rhythm: Normal rate and regular rhythm.      Comments: Ausculation was not performed to minimize the risk of COVID spread during current pandemic. This complies with Medicare policies and guidelines.    Pulmonary:      Effort: No respiratory distress.   Abdominal:      General: There is no distension.      Tenderness: There is no abdominal tenderness.   Skin:     General: Skin is warm and dry.   Neurological:      Mental Status: He is alert and oriented to person, place, and time.      Cranial Nerves: No cranial nerve deficit.   Psychiatric:         Behavior: Behavior normal.       Assessment:     1. ACC/AHA stage C heart failure with preserved ejection fraction (HCC)        2. Left ventricular diastolic dysfunction, NYHA class 2        3. Paroxysmal atrial fibrillation (HCC)  EKG      4. HTN (hypertension), malignant        5. Mixed hyperlipidemia        6. Stage 3b chronic kidney disease (HCC)        7. Dyslipidemia        8. High risk medication use            Medical Decision Making:  Today's Assessment / Status / Plan:   Overall, I do think that patient would be benefited from sinus restoration.  He is in sinus rhythm. Continue anticoagulation therapy with Eliquis 5 mg p.o. twice " a day for stroke risk reduction due to prior history of stroke in July 2021. Will continue Amiodarone at 200 mg daily.    At this time, I spent a significant amount of time during this visit to inform patient that the reason why he is out of atrial fibrillation might be related to amiodarone therapy along with his CPAP machine therapy not so much because he stopped taking rosuvastatin.  I stressed the importance of continue on with his amiodarone therapy as this has been very successful of treating his atrial fibrillation.  It is a high risk medication, therefore, I will closely monitor for thyroid issue along with pulmonary fibrosis as side effects.    In the meantime, to further appease patient's and his concern, I will change his rosuvastatin to a atorvastatin 40 mg p.o. once a day.  However, I did stress the importance that this might not be the reason why he is out of atrial fibrillation.    Blood pressure is not well controlled.  Patient prefers not to have his medications changed at this time. Continue amlodipine 10 mg p.o. once a day and spironolactone at 25 mg p.o. once a day.    In the meantime, continue CPAP for obstructive sleep apnea.    Patient will undergo pyrophosphate nuclear scan to make sure he does not have cardiac amyloidosis.    Continue diet and exercise more.    I will see patient back in clinic with lab tests and studies results in 6 months.    I thank you for referring patient to our Cardiology Clinic today.

## 2022-08-25 LAB — EKG IMPRESSION: NORMAL

## 2022-09-12 ENCOUNTER — OFFICE VISIT (OUTPATIENT)
Dept: SLEEP MEDICINE | Facility: MEDICAL CENTER | Age: 67
End: 2022-09-12
Payer: MEDICARE

## 2022-09-12 VITALS
DIASTOLIC BLOOD PRESSURE: 100 MMHG | WEIGHT: 222 LBS | OXYGEN SATURATION: 94 % | HEART RATE: 87 BPM | SYSTOLIC BLOOD PRESSURE: 154 MMHG | RESPIRATION RATE: 16 BRPM | BODY MASS INDEX: 35.68 KG/M2 | HEIGHT: 66 IN

## 2022-09-12 DIAGNOSIS — G47.31 CENTRAL SLEEP APNEA: Primary | ICD-10-CM

## 2022-09-12 PROCEDURE — 99213 OFFICE O/P EST LOW 20 MIN: CPT | Performed by: STUDENT IN AN ORGANIZED HEALTH CARE EDUCATION/TRAINING PROGRAM

## 2022-09-12 ASSESSMENT — FIBROSIS 4 INDEX: FIB4 SCORE: 1.491202270158651478

## 2022-09-12 NOTE — PATIENT INSTRUCTIONS
- Talked to Darrell about changing masks   - May try biotene or xylimelts for dry mouth    needs device/independent

## 2022-09-12 NOTE — PROGRESS NOTES
[facility] Follow-up Visit    CC: Follow up for management of central sleep apnea and nocturnal hypoxia      HPI:  Chema Sarabia is a 66 y.o.male  with hypertension, hyperlipidemia, paroxysmal atrial fibrillation, GERD, CKD stage III, and severe central sleep apnea on ASV therapy with supplemental oxygen 2 L/min.  Presents today for first compliance after receiving ASV machine.    He is using his ASV machine regularly.  He is using it with supplemental oxygen when at home.  He is finding that with ASV therapy he has having more vivid dreams.  However he has noted difficulty with dry mouth.  He has adjusted the humidity level all the way up to 8 at times.  This has not seemed to help.  In addition he is finding that his mask is irritating his lower teeth.  Due to this he will wake up and drink water.  He feels with this awakening he is sometimes awake for an hour.  In addition to the dry mouth there are times at night where he feels the pressure becomes too high.  This will wake him up as well.    DME provider: Darrell  Device: ASV  Mask: Hybrid full face   Aerophagia: No   Snoring: No   Dry mouth: Yes  Leak: No   Skin irritation: No   Chin strap: No       Sleep History  3/21/2022 PSG showed severe central sleep apnea with overall AHI of 76.8, central apneas accounted for 66% of respiratory events.  6/6/2022 PSG titration study showed improvement in respiratory events with ASV therapy.  Despite control of his respiratory events patient continued to have low oxygen levels at night.  Supplemental oxygen was started during night of the study.  Recommended settings EPAP 9 pressure support 4-15 with 2 L supplemental oxygen bleed in.    Patient Active Problem List    Diagnosis Date Noted    Longstanding persistent atrial fibrillation (HCC) 05/03/2022    Dyslipidemia 04/26/2019    Gastroesophageal reflux disease 04/26/2019    Acquired renal cyst of right kidney 10/19/2018    Stage 3 chronic kidney disease  ([facility]) 04/20/2018    Essential hypertension 04/20/2018    [facility]    Research study patient 04/09/2018    Moderate aortic insufficiency 04/05/2018    ACC/AHA stage C heart failure with preserved ejection fraction (HCC) 04/05/2018    Health care maintenance 02/14/2018    Heart disease, hypertensive, malignant 02/14/2018       Past Medical History:   Diagnosis Date    Acute kidney injury ([facility])     stage 3 renal failure    Atrial fibrillation (HCC)     Chickenpox     Frequent urination     Faroese measles     Heart valve disease     aortic insufficiency    Hypertension     Hypertensive urgency     Influenza     Kidney stone     Longstanding persistent atrial fibrillation (HCC) 5/3/2022    Mumps     Nephrolithiasis     Obesity     Snoring     Stroke ([facility])     Tonsillitis     Typhoid fever     Wears glasses         History reviewed. No pertinent surgical history.    Family History   Problem Relation Age of Onset    Cancer Mother         lungs, smoker    Lung Cancer Mother     Hypertension Father     Hyperlipidemia Father     Diabetes Neg Hx     Heart Disease Neg Hx        Social History     Socioeconomic History    Marital status: Single     Spouse name: Not on file    Number of children: Not on file    Years of education: Not on file    Highest education level: Not on file   Occupational History    Not on file   Tobacco Use    Smoking status: Never    Smokeless tobacco: Never   Vaping Use    Vaping Use: Never used   Substance and Sexual Activity    Alcohol use: Yes     Alcohol/week: 6.0 oz     Types: 7 Glasses of wine, 3 Cans of beer per week     Comment: occ    Drug use: No    Sexual activity: Not on file   Other Topics Concern    Not on file   Social History Narrative    Not on file     Social Determinants of Health     Financial Resource Strain: Not on file   Food Insecurity: Not on file   Transportation Needs: Not on file   Physical Activity: Not on file   Stress: Not on file  "  Social Connections: Not on file   Intimate Partner Violence: Not on file   Housing Stability: Not on file       Current Outpatient Medications   Medication Sig Dispense Refill    spironolactone (ALDACTONE) 25 MG Tab Take 1 Tablet by mouth every day. 90 Tablet 3    amiodarone (CORDARONE) 200 MG Tab Take 1 Tablet by mouth every day. 90 Tablet 3    amLODIPine (NORVASC) 10 MG Tab Take 1 Tablet by mouth every day. 90 Tablet 3    atorvastatin (LIPITOR) 40 MG Tab Take 1 Tablet by mouth every evening. 90 Tablet 3    apixaban (ELIQUIS) 5mg Tab Take 1 Tablet by mouth 2 times a day. 60 Tablet 11    vitamin D, Ergocalciferol, (DRISDOL) 46727 units Cap capsule Take 1 Cap by mouth every 7 days. 12 Cap 1    multivitamin (THERAGRAN) Tab Take 1 Tab by mouth every day.       No current facility-administered medications for this visit.        ALLERGIES: Patient has no known allergies.    ROS  Constitutional: Denies fevers, Denies weight changes  Ears/Nose/Throat/Mouth: Denies nasal congestion or sore throat   Cardiovascular: Denies chest pain  Respiratory: Denies shortness of breath, Denies cough  Gastrointestinal/Hepatic: Denies nausea, vomiting  Sleep: see HPI      PHYSICAL EXAM  BP (!) 154/100 (BP Location: Left arm, Patient Position: Sitting, BP Cuff Size: Large adult)   Pulse 87   Resp 16   Ht 1.676 m (5' 6\")   Wt 101 kg (222 lb)   SpO2 94%   BMI 35.83 kg/m²   Appearance: Well-nourished, well-developed, no acute distress  Eyes:  No scleral icterus , EOMI  ENMT: masked  Musculoskeletal:  Grossly normal; gait and station normal; digits and nails normal  Skin:  No rashes, petechiae, cyanosis  Neurologic: without focal signs; oriented to person, time, place, and purpose; judgement intact      Medical Decision Making   Assessment and Plan  Chema Sarabia is a 66 y.o.male  with hypertension, hyperlipidemia, paroxysmal atrial fibrillation, GERD, CKD stage III, and severe central sleep apnea on ASV therapy with " supplemental oxygen 2 L/min.  Presents today for first compliance after receiving ASV machine.    Central sleep apnea    Compliance data reviewed showing 87% usage > 4hours in last 30  days. Average AHI 2.0 events/hour.  Pt continues to use and benefit from machine.  With supplemental oxygen 2 L/min.  Current settings to ASV machine are EPAP 9 pressure support 4-15.  Due to him having sensation of increased pressure at times in the night will decrease maximum pressure support to 10 cmH2O.    Encouraged to discuss with his DME Darrell regarding changing masks.  Patient would benefit from different size headgear versus different size mask.      PLAN:   -Order placed for change in pressure support maximum of 10  -Advised to discuss with Darrell regarding mask change  -Recommended considering starting Biotene or XyliMelts for dry mouth  -Advised to reach out via MyChart with questions     Has been advised to continue the current ASV with supplemental oxygen 2 L/min, clean equipment frequently, and get new mask and supplies as allowed by insurance and DME. Recommend an earlier appointment, if significant treatment barriers develop.    Patients with CSA are at increased risk of cardiovascular disease including coronary artery disease, systemic arterial hypertension, pulmonary arterial hypertension, cardiac arrythmias, and stroke. The patient was advised to avoid driving a motor vehicle when drowsy.      Have advised the patient to follow up with the appropriate healthcare practitioners for all other medical problems and issues.    Return in about 2 months (around 11/12/2022).      Please note portions of this record was created using voice recognition software. I have made every reasonable attempt to correct obvious errors, but I expect that there are errors of grammar and possibly content I did not discover before finalizing the note.

## 2022-10-18 ENCOUNTER — TELEPHONE (OUTPATIENT)
Dept: CARDIOLOGY | Facility: MEDICAL CENTER | Age: 67
End: 2022-10-18
Payer: MEDICARE

## 2022-10-18 NOTE — TELEPHONE ENCOUNTER
TT    Caller:  -   [facility] - [place] - Clinical Trial Billing    Topic/issue: Tong would like a call back, she has questions regarding Chema being in a clinical trail     Callback Number: - [phone]    Thank you,   Elin FOSTER

## 2022-11-10 ENCOUNTER — PATIENT MESSAGE (OUTPATIENT)
Dept: HEALTH INFORMATION MANAGEMENT | Facility: OTHER | Age: 67
End: 2022-11-10

## 2023-01-25 ENCOUNTER — HOSPITAL ENCOUNTER (OUTPATIENT)
Dept: LAB | Facility: MEDICAL CENTER | Age: 68
End: 2023-01-25
Attending: INTERNAL MEDICINE
Payer: MEDICARE

## 2023-01-25 DIAGNOSIS — I51.89 LEFT VENTRICULAR DIASTOLIC DYSFUNCTION, NYHA CLASS 2: ICD-10-CM

## 2023-01-25 DIAGNOSIS — I50.30 ACC/AHA STAGE C HEART FAILURE WITH PRESERVED EJECTION FRACTION (HCC): ICD-10-CM

## 2023-01-25 DIAGNOSIS — N18.32 STAGE 3B CHRONIC KIDNEY DISEASE: ICD-10-CM

## 2023-01-25 DIAGNOSIS — E78.2 MIXED HYPERLIPIDEMIA: ICD-10-CM

## 2023-01-25 LAB
ANION GAP SERPL CALC-SCNC: 11 MMOL/L (ref 7–16)
BUN SERPL-MCNC: 25 MG/DL (ref 8–22)
CALCIUM SERPL-MCNC: 9.5 MG/DL (ref 8.5–10.5)
CHLORIDE SERPL-SCNC: 105 MMOL/L (ref 96–112)
CHOLEST SERPL-MCNC: 201 MG/DL (ref 100–199)
CO2 SERPL-SCNC: 24 MMOL/L (ref 20–33)
CREAT SERPL-MCNC: 1.67 MG/DL (ref 0.5–1.4)
GFR SERPLBLD CREATININE-BSD FMLA CKD-EPI: 45 ML/MIN/1.73 M 2
GLUCOSE SERPL-MCNC: 90 MG/DL (ref 65–99)
HDLC SERPL-MCNC: 62 MG/DL
LDLC SERPL CALC-MCNC: 115 MG/DL
NT-PROBNP SERPL IA-MCNC: 51 PG/ML (ref 0–125)
POTASSIUM SERPL-SCNC: 4.7 MMOL/L (ref 3.6–5.5)
SODIUM SERPL-SCNC: 140 MMOL/L (ref 135–145)
TRIGL SERPL-MCNC: 122 MG/DL (ref 0–149)

## 2023-01-25 PROCEDURE — 80061 LIPID PANEL: CPT

## 2023-01-25 PROCEDURE — 36415 COLL VENOUS BLD VENIPUNCTURE: CPT

## 2023-01-25 PROCEDURE — 83880 ASSAY OF NATRIURETIC PEPTIDE: CPT | Mod: GA

## 2023-01-25 PROCEDURE — 80048 BASIC METABOLIC PNL TOTAL CA: CPT

## 2023-01-30 ENCOUNTER — OFFICE VISIT (OUTPATIENT)
Dept: SLEEP MEDICINE | Facility: MEDICAL CENTER | Age: 68
End: 2023-01-30
Payer: MEDICARE

## 2023-01-30 VITALS
WEIGHT: 212 LBS | RESPIRATION RATE: 16 BRPM | HEART RATE: 90 BPM | OXYGEN SATURATION: 94 % | DIASTOLIC BLOOD PRESSURE: 80 MMHG | HEIGHT: 66 IN | SYSTOLIC BLOOD PRESSURE: 130 MMHG | BODY MASS INDEX: 34.07 KG/M2

## 2023-01-30 DIAGNOSIS — G47.31 CENTRAL SLEEP APNEA: Primary | ICD-10-CM

## 2023-01-30 DIAGNOSIS — I48.0 PAROXYSMAL ATRIAL FIBRILLATION (HCC): ICD-10-CM

## 2023-01-30 DIAGNOSIS — G47.34 NOCTURNAL HYPOXIA: ICD-10-CM

## 2023-01-30 PROCEDURE — 99213 OFFICE O/P EST LOW 20 MIN: CPT | Performed by: STUDENT IN AN ORGANIZED HEALTH CARE EDUCATION/TRAINING PROGRAM

## 2023-01-30 ASSESSMENT — FIBROSIS 4 INDEX: FIB4 SCORE: 1.514143843545707655

## 2023-01-30 ASSESSMENT — PATIENT HEALTH QUESTIONNAIRE - PHQ9: CLINICAL INTERPRETATION OF PHQ2 SCORE: 0

## 2023-01-30 NOTE — PROGRESS NOTES
[facility] Follow-up Visit    CC: Follow-up regarding management of central sleep apnea      HPI:  Chema Sarabia is a 67 y.o.male  with hyperlipidemia, hypertension, paroxysmal atrial fibrillation, GERD, CKD stage III, and severe central sleep apnea on ASV with supplemental oxygen 2 L/min.  Presents today to follow-up regarding management of central sleep apnea.    He continues to do well with using the machine.  Since last visit he has worked on mask fit and is tightening the mask more which he finds gets a better seal.  He continues to use his ASV machine with supplemental oxygen 2 L/min.  He has stopped using his humidity function on his machine.    He states he has not noticed much difference in terms of energy or quality of sleep.  However he has continue to follow with his cardiologist and feels that his A. fib is doing well.  Overall he feels that ASV does not impact his sleep negatively and he knows that overall it does benefit his health.  States he has upcoming trip to do hunting in [place] for [place].  States he is unsure if he will be able to use his machine due to maybe not having power while hunting.      DME provider: Darrell  Device: ASV with oxygen   Mask: Hybrid full face   Aerophagia: No   Snoring: No   Dry mouth: Yes  Leak: No   Skin irritation: No   Chin strap: No     Sleep History  3/21/2022 PSG showed severe central sleep apnea with overall AHI of 76.8, central apneas accounted for 66% of respiratory events.  6/6/2022 PSG titration study showed improvement in respiratory events with ASV therapy.  Despite control of his respiratory events patient continued to have low oxygen levels at night.  Supplemental oxygen was started during night of the study.  Recommended settings EPAP 9 pressure support 4-15 with 2 L supplemental oxygen bleed in.    Patient Active Problem List    Diagnosis Date Noted    Longstanding persistent atrial fibrillation (HCC) 05/03/2022     Dyslipidemia 04/26/2019    Gastroesophageal reflux disease 04/26/2019    Acquired renal cyst of right kidney 10/19/2018    Stage 3 chronic kidney disease (HCC) 04/20/2018    Essential hypertension 04/20/2018    [facility]    Research study patient 04/09/2018    Moderate aortic insufficiency 04/05/2018    ACC/AHA stage C heart failure with preserved ejection fraction (HCC) 04/05/2018    Health care maintenance 02/14/2018    Heart disease, hypertensive, malignant 02/14/2018       Past Medical History:   Diagnosis Date    Acute kidney injury (HCC)     stage 3 renal failure    Atrial fibrillation (HCC)     Chickenpox     Frequent urination     Malagasy measles     Heart valve disease     aortic insufficiency    Hypertension     Hypertensive urgency     Influenza     Kidney stone     Longstanding persistent atrial fibrillation (HCC) 5/3/2022    Mumps     Nephrolithiasis     Obesity     Snoring     Stroke (HCC)     Tonsillitis     Typhoid fever     Wears glasses         History reviewed. No pertinent surgical history.    Family History   Problem Relation Age of Onset    Cancer Mother         lungs, smoker    Lung Cancer Mother     Hypertension Father     Hyperlipidemia Father     Diabetes Neg Hx     Heart Disease Neg Hx        Social History     Socioeconomic History    Marital status: Single     Spouse name: Not on file    Number of children: Not on file    Years of education: Not on file    Highest education level: Not on file   Occupational History    Not on file   Tobacco Use    Smoking status: Never    Smokeless tobacco: Never   Vaping Use    Vaping Use: Never used   Substance and Sexual Activity    Alcohol use: Yes     Alcohol/week: 6.0 oz     Types: 7 Glasses of wine, 3 Cans of beer per week     Comment: occ    Drug use: No    Sexual activity: Not on file   Other Topics Concern    Not on file   Social History Narrative    Not on file     Social Determinants of Health     Financial  "Resource Strain: Not on file   Food Insecurity: Not on file   Transportation Needs: Not on file   Physical Activity: Not on file   Stress: Not on file   Social Connections: Not on file   Intimate Partner Violence: Not on file   Housing Stability: Not on file       Current Outpatient Medications   Medication Sig Dispense Refill    spironolactone (ALDACTONE) 25 MG Tab Take 1 Tablet by mouth every day. 90 Tablet 3    amiodarone (CORDARONE) 200 MG Tab Take 1 Tablet by mouth every day. 90 Tablet 3    amLODIPine (NORVASC) 10 MG Tab Take 1 Tablet by mouth every day. 90 Tablet 3    atorvastatin (LIPITOR) 40 MG Tab Take 1 Tablet by mouth every evening. 90 Tablet 3    apixaban (ELIQUIS) 5mg Tab Take 1 Tablet by mouth 2 times a day. 60 Tablet 11    vitamin D, Ergocalciferol, (DRISDOL) 02006 units Cap capsule Take 1 Cap by mouth every 7 days. 12 Cap 1    multivitamin (THERAGRAN) Tab Take 1 Tab by mouth every day.       No current facility-administered medications for this visit.        ALLERGIES: Patient has no known allergies.    ROS  Constitutional: Denies fevers, Denies weight changes  Ears/Nose/Throat/Mouth: Denies nasal congestion or sore throat   Cardiovascular: Denies chest pain  Respiratory: Denies shortness of breath, Denies cough  Gastrointestinal/Hepatic: Denies nausea, vomiting  Sleep: see HPI      PHYSICAL EXAM  /80 (BP Location: Left arm, Patient Position: Sitting, BP Cuff Size: Large adult)   Pulse 90   Resp 16   Ht 1.676 m (5' 6\")   Wt 96.2 kg (212 lb)   SpO2 94%   BMI 34.22 kg/m²   Appearance: Well-nourished, well-developed, no acute distress  Eyes:  No scleral icterus , EOMI  ENMT: masked  Musculoskeletal:  Grossly normal; gait and station normal; digits and nails normal  Skin:  No rashes, petechiae, cyanosis  Neurologic: without focal signs; oriented to person, time, place, and purpose; judgement intact      Medical Decision Making   Assessment and Plan  Chema Hauser Gabino Gibson is a 67 y.o.male  " with hyperlipidemia, hypertension, paroxysmal atrial fibrillation, GERD, CKD stage III, and severe central sleep apnea on ASV with supplemental oxygen 2 L/min.  Presents today to follow-up regarding management of central sleep apnea.    The medical record was reviewed.    Central sleep apnea  Compliance data reviewed showing 100% usage > 4hours in last 30  days. Average AHI 1.1 events/hour. Pt continues to use and benefit from machine.     Currently using ASV EPAP 9 pressure support 4-10 with supplemental oxygen 2 L/min.      PLAN:   -Order placed for mask and supplies   -Advised to reach out via MyChart with questions     Has been advised to continue the current ASV with supplemental oxygen, clean equipment frequently, and get new mask and supplies as allowed by insurance and DME. Recommend an earlier appointment, if significant treatment barriers develop.    Patients with sleep apnea are at increased risk of cardiovascular disease including coronary artery disease, systemic arterial hypertension, pulmonary arterial hypertension, cardiac arrythmias, and stroke. The patient was advised to avoid driving a motor vehicle when drowsy.    Positive airway pressure will favorably impact many of the adverse conditions and effects provoked by sleep apnea    Have advised the patient to follow up with the appropriate healthcare practitioners for all other medical problems and issues.    Return in about 1 year (around 1/30/2024).      Please note portions of this record was created using voice recognition software. I have made every reasonable attempt to correct obvious errors, but I expect that there are errors of grammar and possibly content I did not discover before finalizing the note.

## 2023-01-31 ENCOUNTER — APPOINTMENT (OUTPATIENT)
Dept: CARDIOLOGY | Facility: MEDICAL CENTER | Age: 68
End: 2023-01-31
Payer: MEDICARE

## 2023-01-31 ENCOUNTER — OFFICE VISIT (OUTPATIENT)
Dept: CARDIOLOGY | Facility: MEDICAL CENTER | Age: 68
End: 2023-01-31
Payer: MEDICARE

## 2023-01-31 VITALS
SYSTOLIC BLOOD PRESSURE: 132 MMHG | WEIGHT: 215 LBS | OXYGEN SATURATION: 95 % | BODY MASS INDEX: 34.55 KG/M2 | HEIGHT: 66 IN | HEART RATE: 70 BPM | DIASTOLIC BLOOD PRESSURE: 80 MMHG | RESPIRATION RATE: 18 BRPM

## 2023-01-31 DIAGNOSIS — I48.3 TYPICAL ATRIAL FLUTTER (HCC): ICD-10-CM

## 2023-01-31 DIAGNOSIS — I10 ESSENTIAL HYPERTENSION: ICD-10-CM

## 2023-01-31 DIAGNOSIS — N18.32 STAGE 3B CHRONIC KIDNEY DISEASE: ICD-10-CM

## 2023-01-31 DIAGNOSIS — I10 HTN (HYPERTENSION), MALIGNANT: ICD-10-CM

## 2023-01-31 DIAGNOSIS — I48.0 PAROXYSMAL ATRIAL FIBRILLATION (HCC): ICD-10-CM

## 2023-01-31 DIAGNOSIS — I50.30 ACC/AHA STAGE C HEART FAILURE WITH PRESERVED EJECTION FRACTION (HCC): ICD-10-CM

## 2023-01-31 DIAGNOSIS — E78.2 MIXED HYPERLIPIDEMIA: ICD-10-CM

## 2023-01-31 DIAGNOSIS — E78.5 DYSLIPIDEMIA: ICD-10-CM

## 2023-01-31 DIAGNOSIS — I51.89 LEFT VENTRICULAR DIASTOLIC DYSFUNCTION, NYHA CLASS 2: ICD-10-CM

## 2023-01-31 DIAGNOSIS — Z79.899 HIGH RISK MEDICATION USE: ICD-10-CM

## 2023-01-31 LAB — EKG IMPRESSION: NORMAL

## 2023-01-31 PROCEDURE — 99214 OFFICE O/P EST MOD 30 MIN: CPT | Performed by: INTERNAL MEDICINE

## 2023-01-31 PROCEDURE — 93000 ELECTROCARDIOGRAM COMPLETE: CPT | Performed by: INTERNAL MEDICINE

## 2023-01-31 RX ORDER — SPIRONOLACTONE 25 MG/1
25 TABLET ORAL DAILY
Qty: 90 TABLET | Refills: 3 | Status: SHIPPED | OUTPATIENT
Start: 2023-01-31 | End: 2024-03-13 | Stop reason: SDUPTHER

## 2023-01-31 RX ORDER — AMIODARONE HYDROCHLORIDE 200 MG/1
200 TABLET ORAL DAILY
Qty: 90 TABLET | Refills: 3 | Status: SHIPPED | OUTPATIENT
Start: 2023-01-31 | End: 2024-03-13 | Stop reason: SDUPTHER

## 2023-01-31 RX ORDER — ATORVASTATIN CALCIUM 80 MG/1
80 TABLET, FILM COATED ORAL EVERY EVENING
Qty: 90 TABLET | Refills: 3 | Status: SHIPPED | OUTPATIENT
Start: 2023-01-31 | End: 2024-02-07

## 2023-01-31 RX ORDER — AMLODIPINE BESYLATE 10 MG/1
10 TABLET ORAL DAILY
Qty: 90 TABLET | Refills: 3 | Status: SHIPPED | OUTPATIENT
Start: 2023-01-31 | End: 2024-03-13 | Stop reason: SDUPTHER

## 2023-01-31 ASSESSMENT — ENCOUNTER SYMPTOMS
CLAUDICATION: 0
BLOOD IN STOOL: 0
MYALGIAS: 0
PALPITATIONS: 0
FEVER: 0
HEADACHES: 0
ABDOMINAL PAIN: 0
SHORTNESS OF BREATH: 0
LOSS OF CONSCIOUSNESS: 0
WEIGHT LOSS: 0
EYE PAIN: 0
DOUBLE VISION: 0
BLURRED VISION: 0
ORTHOPNEA: 0
FALLS: 0
SENSORY CHANGE: 0
BRUISES/BLEEDS EASILY: 0
DIZZINESS: 0
HALLUCINATIONS: 0
SPEECH CHANGE: 0
CHILLS: 0
VOMITING: 0
NAUSEA: 0
DEPRESSION: 0
PND: 0
EYE DISCHARGE: 0
COUGH: 0

## 2023-01-31 ASSESSMENT — FIBROSIS 4 INDEX: FIB4 SCORE: 1.514143843545707655

## 2023-01-31 NOTE — PROGRESS NOTES
Chief Complaint   Patient presents with    Atrial Fibrillation     F/V Dx: Longstanding persistent atrial fibrillation (HCC)      Follow-Up     Dx: ACC/AHA stage C heart failure with preserved ejection fraction (HCC)      Hypertension       Subjective:   Chema Sarabia is a 67 y.o. male who presents today for cardiac care and evaluation in our heart failure clinic after his recent episode of hypertensive emergency for which he went into heart failure exacerbation as well. Patient does have documented transthoracic echocardiogram which showed relatively preserved LV function of 55%. No significant valvular disease.     Feeling very well. No dyspnea.     At prior visit, patient was able to complete 549 m during his 6 minute walk test. his O2 saturation at baseline was 94% and at the end of the test, the O2 saturation was 95%. he reported 0 level of dyspnea on Jim scale.     06/06/2019 Patient was able to complete 549 m during his 6 minute walk test. his O2 saturation at baseline was 95% and at the end of the test, the O2 saturation was 93%. he reported 1 level of dyspnea on Jim scale.    In July of 2021, for 1 week, he had symptoms of difficult finding words and incoherence. MRI of the brain showed evidence of stroke. His symptoms resolved at this time.    Chema was placed on cardiac event monitor and found to have paroxysmal atrial fibrillation.  He was started on Eliquis 5 mg p.o. twice a day.    Most recent transthoracic echocardiogram last week show evidence of slightly reduced left ventricular systolic function of 50%, concentric biventricular hypertrophy.  Patient did have blood tests which did not show increase free light change.  I personally interpreted images of his transthoracic echocardiogram and blood test results.    On May 3, 2022, patient underwent unsuccessful cardioversion for atrial fibrillation at this time.  He was then placed on amiodarone p.o. therapy.      I have independently interpreted  and reviewed blood tests results with patient in clinic which shows elevated LDL level 115, normal triglycerides 122 down from 267, reduced GFR of 45, K of 4.7. NT pro BNP of 51.    Serum light chains are within range.    Of note, patient has been taking his Eliquis without missing dose.    He is on CPAP for ANAHY treatment now.    I have personally interpreted EKG today with patient, there is no evidence of acute coronary syndrome, no evidence of prior infarct, normal DC and QT interval, no significant conduction disease. Sinus rhythm.    Patient saw electrophysiology service and was plan for ablation procedure.  However, patient came out of atrial fibrillation and that has been on hold.      Past Medical History:   Diagnosis Date    Acute kidney injury (HCC)     stage 3 renal failure    Atrial fibrillation (HCC)     Chickenpox     Frequent urination     [place] measles     Heart valve disease     aortic insufficiency    Hypertension     Hypertensive urgency     Influenza     Kidney stone     Longstanding persistent atrial fibrillation (HCC) 5/3/2022    Mumps     Nephrolithiasis     Obesity     Snoring     Stroke (HCC)     Tonsillitis     Typhoid fever     Wears glasses      History reviewed. No pertinent surgical history.  Family History   Problem Relation Age of Onset    Cancer Mother         lungs, smoker    Lung Cancer Mother     Hypertension Father     Hyperlipidemia Father     Diabetes Neg Hx     Heart Disease Neg Hx      Social History     Socioeconomic History    Marital status: Single     Spouse name: Not on file    Number of children: Not on file    Years of education: Not on file    Highest education level: Not on file   Occupational History    Not on file   Tobacco Use    Smoking status: Never    Smokeless tobacco: Never   Vaping Use    Vaping Use: Never used   Substance and Sexual Activity    Alcohol use: Yes     Alcohol/week: 6.0 oz     Types: 7 Glasses of wine, 3 Cans of beer per week     Comment: occ     Drug use: No    Sexual activity: Not on file   Other Topics Concern    Not on file   Social History Narrative    Not on file     Social Determinants of Health     Financial Resource Strain: Not on file   Food Insecurity: Not on file   Transportation Needs: Not on file   Physical Activity: Not on file   Stress: Not on file   Social Connections: Not on file   Intimate Partner Violence: Not on file   Housing Stability: Not on file     No Known Allergies  Outpatient Encounter Medications as of 1/31/2023   Medication Sig Dispense Refill    atorvastatin (LIPITOR) 80 MG tablet Take 1 Tablet by mouth every evening. 90 Tablet 3    spironolactone (ALDACTONE) 25 MG Tab Take 1 Tablet by mouth every day. 90 Tablet 3    amiodarone (CORDARONE) 200 MG Tab Take 1 Tablet by mouth every day. 90 Tablet 3    amLODIPine (NORVASC) 10 MG Tab Take 1 Tablet by mouth every day. 90 Tablet 3    apixaban (ELIQUIS) 5mg Tab Take 1 Tablet by mouth 2 times a day. 180 Tablet 3    vitamin D, Ergocalciferol, (DRISDOL) 24132 units Cap capsule Take 1 Cap by mouth every 7 days. 12 Cap 1    multivitamin (THERAGRAN) Tab Take 1 Tab by mouth every day.      [DISCONTINUED] spironolactone (ALDACTONE) 25 MG Tab Take 1 Tablet by mouth every day. 90 Tablet 3    [DISCONTINUED] amiodarone (CORDARONE) 200 MG Tab Take 1 Tablet by mouth every day. 90 Tablet 3    [DISCONTINUED] amLODIPine (NORVASC) 10 MG Tab Take 1 Tablet by mouth every day. 90 Tablet 3    [DISCONTINUED] atorvastatin (LIPITOR) 40 MG Tab Take 1 Tablet by mouth every evening. 90 Tablet 3    [DISCONTINUED] apixaban (ELIQUIS) 5mg Tab Take 1 Tablet by mouth 2 times a day. 60 Tablet 11     No facility-administered encounter medications on file as of 1/31/2023.     Review of Systems   Constitutional:  Negative for chills, fever, malaise/fatigue and weight loss.   HENT:  Negative for ear discharge, ear pain, hearing loss and nosebleeds.    Eyes:  Negative for blurred vision, double vision, pain and  "discharge.   Respiratory:  Negative for cough and shortness of breath.    Cardiovascular:  Negative for chest pain, palpitations, orthopnea, claudication, leg swelling and PND.   Gastrointestinal:  Negative for abdominal pain, blood in stool, melena, nausea and vomiting.   Genitourinary:  Negative for dysuria and hematuria.   Musculoskeletal:  Negative for falls, joint pain and myalgias.   Skin:  Negative for itching and rash.   Neurological:  Negative for dizziness, sensory change, speech change, loss of consciousness and headaches.   Endo/Heme/Allergies:  Negative for environmental allergies. Does not bruise/bleed easily.   Psychiatric/Behavioral:  Negative for depression, hallucinations and suicidal ideas.       Objective:   /80 (BP Location: Left arm, Patient Position: Sitting, BP Cuff Size: Adult)   Pulse 70   Resp 18   Ht 1.676 m (5' 6\")   Wt 97.5 kg (215 lb)   SpO2 95%   BMI 34.70 kg/m²     Physical Exam  Vitals and nursing note reviewed.   Constitutional:       General: He is not in acute distress.     Appearance: He is not diaphoretic.   HENT:      Head: Normocephalic and atraumatic.      Right Ear: External ear normal.      Left Ear: External ear normal.   Eyes:      General:         Right eye: No discharge.         Left eye: No discharge.   Neck:      Thyroid: No thyromegaly.      Vascular: No JVD.   Cardiovascular:      Rate and Rhythm: Normal rate and regular rhythm.      Comments: Ausculation was not performed to minimize the risk of COVID spread during current pandemic. This complies with Medicare policies and guidelines.    Pulmonary:      Effort: No respiratory distress.   Abdominal:      General: There is no distension.      Tenderness: There is no abdominal tenderness.   Skin:     General: Skin is warm and dry.   Neurological:      Mental Status: He is alert and oriented to person, place, and time.      Cranial Nerves: No cranial nerve deficit.   Psychiatric:         Behavior: Behavior " normal.       Assessment:     1. ACC/AHA stage C heart failure with preserved ejection fraction (HCC)  NM-CARDIAC AMYLOIDOSIS PYP SCAN    spironolactone (ALDACTONE) 25 MG Tab    amiodarone (CORDARONE) 200 MG Tab      2. Left ventricular diastolic dysfunction, NYHA class 2  NM-CARDIAC AMYLOIDOSIS PYP SCAN    spironolactone (ALDACTONE) 25 MG Tab    amiodarone (CORDARONE) 200 MG Tab      3. Paroxysmal atrial fibrillation (HCC)  EKG    amiodarone (CORDARONE) 200 MG Tab      4. HTN (hypertension), malignant        5. Mixed hyperlipidemia  LIPID PANEL      6. Stage 3b chronic kidney disease (HCC)  NM-CARDIAC AMYLOIDOSIS PYP SCAN      7. Dyslipidemia        8. High risk medication use  TSH+FREE T4    ZZZ CHEST X-RAY 2 VW    Basic Metabolic Panel      9. Typical atrial flutter (HCC)  amiodarone (CORDARONE) 200 MG Tab      10. Essential hypertension  amLODIPine (NORVASC) 10 MG Tab          Medical Decision Making:  Today's Assessment / Status / Plan:   Today, based on physical examination findings, patient is euvolemic. No JVD, lungs are clear to auscultation, no pitting edema in bilateral lower extremities, no ascites.    Dry weight is 215 lbs.    Overall, I do think that patient would be benefited from sinus maintenance. He is in sinus rhythm. Continue anticoagulation therapy with Eliquis 5 mg p.o. twice a day for stroke risk reduction due to prior history of stroke in July 2021. Will continue Amiodarone at 200 mg daily.    At this time, I spent a significant amount of time during this visit to inform patient that the reason why he is out of atrial fibrillation might be related to amiodarone therapy along with his CPAP machine therapy not so much because he stopped taking rosuvastatin.  I stressed the importance of continue on with his amiodarone therapy as this has been very successful of treating his atrial fibrillation.  It is a high risk medication, therefore, I will closely monitor for thyroid issue along with  pulmonary fibrosis as side effects.    Blood pressure is not well controlled. Continue amlodipine 10 mg p.o. once a day and spironolactone at 25 mg p.o. once a day.    In the meantime, continue CPAP for obstructive sleep apnea.    Will increase Atorvastatin to 80 mg daily for better LDL.    Patient will undergo pyrophosphate nuclear scan to make sure he does not have cardiac amyloidosis.    Continue diet and exercise more. Already lost 15 lbs.    I will see patient back in clinic with lab tests and studies results in 12 months.    I thank you for referring patient to our Cardiology Clinic today.

## 2023-03-16 ENCOUNTER — HOSPITAL ENCOUNTER (OUTPATIENT)
Dept: RADIOLOGY | Facility: MEDICAL CENTER | Age: 68
End: 2023-03-16
Attending: INTERNAL MEDICINE
Payer: MEDICARE

## 2023-03-16 DIAGNOSIS — I51.89 LEFT VENTRICULAR DIASTOLIC DYSFUNCTION, NYHA CLASS 2: ICD-10-CM

## 2023-03-16 DIAGNOSIS — N18.32 STAGE 3B CHRONIC KIDNEY DISEASE: ICD-10-CM

## 2023-03-16 DIAGNOSIS — I50.30 ACC/AHA STAGE C HEART FAILURE WITH PRESERVED EJECTION FRACTION (HCC): ICD-10-CM

## 2023-03-16 PROCEDURE — A9538 TC99M PYROPHOSPHATE: HCPCS

## 2023-05-09 ENCOUNTER — HOSPITAL ENCOUNTER (OUTPATIENT)
Dept: LAB | Facility: MEDICAL CENTER | Age: 68
End: 2023-05-09
Attending: UROLOGY
Payer: MEDICARE

## 2023-05-09 LAB — PSA SERPL-MCNC: 3.33 NG/ML (ref 0–4)

## 2023-05-09 PROCEDURE — 36415 COLL VENOUS BLD VENIPUNCTURE: CPT | Mod: GA

## 2023-05-09 PROCEDURE — 84153 ASSAY OF PSA TOTAL: CPT | Mod: GA

## 2023-06-14 ENCOUNTER — OFFICE VISIT (OUTPATIENT)
Dept: MEDICAL GROUP | Facility: MEDICAL CENTER | Age: 68
End: 2023-06-14
Payer: MEDICARE

## 2023-06-14 VITALS
OXYGEN SATURATION: 96 % | HEART RATE: 79 BPM | DIASTOLIC BLOOD PRESSURE: 80 MMHG | TEMPERATURE: 98.3 F | WEIGHT: 216.93 LBS | RESPIRATION RATE: 16 BRPM | BODY MASS INDEX: 34.86 KG/M2 | SYSTOLIC BLOOD PRESSURE: 130 MMHG | HEIGHT: 66 IN

## 2023-06-14 DIAGNOSIS — N18.30 STAGE 3 CHRONIC KIDNEY DISEASE, UNSPECIFIED WHETHER STAGE 3A OR 3B CKD: ICD-10-CM

## 2023-06-14 DIAGNOSIS — I48.11 LONGSTANDING PERSISTENT ATRIAL FIBRILLATION (HCC): ICD-10-CM

## 2023-06-14 DIAGNOSIS — G47.30 SLEEP APNEA, UNSPECIFIED TYPE: ICD-10-CM

## 2023-06-14 DIAGNOSIS — Z12.5 SCREENING FOR PROSTATE CANCER: ICD-10-CM

## 2023-06-14 DIAGNOSIS — H53.9 VISION CHANGES: ICD-10-CM

## 2023-06-14 DIAGNOSIS — E78.5 HYPERLIPIDEMIA, UNSPECIFIED HYPERLIPIDEMIA TYPE: ICD-10-CM

## 2023-06-14 DIAGNOSIS — I11.0 MALIGNANT HYPERTENSIVE HEART DISEASE WITH HEART FAILURE (HCC): ICD-10-CM

## 2023-06-14 DIAGNOSIS — Z12.11 SCREEN FOR COLON CANCER: ICD-10-CM

## 2023-06-14 DIAGNOSIS — Z23 NEED FOR VACCINATION: ICD-10-CM

## 2023-06-14 DIAGNOSIS — R73.01 ELEVATED FASTING BLOOD SUGAR: ICD-10-CM

## 2023-06-14 DIAGNOSIS — E78.5 DYSLIPIDEMIA: ICD-10-CM

## 2023-06-14 DIAGNOSIS — K42.9 UMBILICAL HERNIA WITHOUT OBSTRUCTION AND WITHOUT GANGRENE: ICD-10-CM

## 2023-06-14 PROCEDURE — 3079F DIAST BP 80-89 MM HG: CPT | Performed by: NURSE PRACTITIONER

## 2023-06-14 PROCEDURE — 99214 OFFICE O/P EST MOD 30 MIN: CPT | Mod: 25 | Performed by: NURSE PRACTITIONER

## 2023-06-14 PROCEDURE — G0009 ADMIN PNEUMOCOCCAL VACCINE: HCPCS | Performed by: NURSE PRACTITIONER

## 2023-06-14 PROCEDURE — 3075F SYST BP GE 130 - 139MM HG: CPT | Performed by: NURSE PRACTITIONER

## 2023-06-14 PROCEDURE — 90677 PCV20 VACCINE IM: CPT | Performed by: NURSE PRACTITIONER

## 2023-06-14 ASSESSMENT — FIBROSIS 4 INDEX: FIB4 SCORE: 1.514143843545707655

## 2023-06-14 NOTE — ASSESSMENT & PLAN NOTE
Chronic problem. On AMlodipine 10 mg, spironolactone 25 mg. Denies CP, SONB, peripheral edema. Following with [facility].

## 2023-06-14 NOTE — ASSESSMENT & PLAN NOTE
Latest Reference Range & Units 01/25/23 11:45   Bun 8 - 22 mg/dL 25 (H)   Creatinine 0.50 - 1.40 mg/dL 1.67 (H)   GFR (CKD-EPI) >60 mL/min/1.73 m 2 45 !   (H): Data is abnormally high  !: Data is abnormal

## 2023-06-14 NOTE — PROGRESS NOTES
Chief Complaint   Patient presents with    Establish Care     Blood pressure        HISTORY OF PRESENT ILLNESS: Patient is a 67 y.o. male, established patient who presents today to discuss medical problems as listed below:    Health Maintenance:  COMPLETED       Allergies: Patient has no known allergies.    Current Outpatient Medications   Medication Sig Dispense Refill    atorvastatin (LIPITOR) 80 MG tablet Take 1 Tablet by mouth every evening. 90 Tablet 3    spironolactone (ALDACTONE) 25 MG Tab Take 1 Tablet by mouth every day. 90 Tablet 3    amiodarone (CORDARONE) 200 MG Tab Take 1 Tablet by mouth every day. 90 Tablet 3    amLODIPine (NORVASC) 10 MG Tab Take 1 Tablet by mouth every day. 90 Tablet 3    apixaban (ELIQUIS) 5mg Tab Take 1 Tablet by mouth 2 times a day. 180 Tablet 3    vitamin D, Ergocalciferol, (DRISDOL) 99175 units Cap capsule Take 1 Cap by mouth every 7 days. 12 Cap 1    multivitamin (THERAGRAN) Tab Take 1 Tab by mouth every day.       No current facility-administered medications for this visit.       Allergies, past medical history, past surgical history, family history, social history reviewed and updated.    Review of Systems:     - Constitutional: Negative for fever, chills, unexpected weight change, and fatigue/generalized weakness.     - HEENT: Negative for headaches, vision changes, hearing changes, ear pain, ear discharge, rhinorrhea, sinus congestion, sore throat, and neck pain.      - Respiratory: Negative for cough, sputum production, chest congestion, dyspnea, wheezing, and crackles.      - Cardiovascular: Negative for chest pain, palpitations, orthopnea, and bilateral lower extremity edema.     - Gastrointestinal: Negative for heartburn, nausea, vomiting, abdominal pain, hematochezia, melena, diarrhea, constipation, and greasy/foul-smelling stools.     - Genitourinary: Negative for dysuria, polyuria, hematuria, pyuria, urinary urgency, and urinary incontinence.    - Musculoskeletal:  "Negative for myalgias, back pain, and joint pain.     - Skin: Negative for rash, itching, cyanotic skin color change.     - Neurological: Negative for dizziness, tingling, tremors, focal sensory deficit, focal weakness and headaches.     - Endo/Heme/Allergies: Does not bruise/bleed easily.     - Psychiatric/Behavioral: Negative for depression, suicidal/homicidal ideation and memory loss.      All other systems reviewed and are negative    Exam:    /80   Pulse 79   Temp 36.8 °C (98.3 °F) (Temporal)   Resp 16   Ht 1.676 m (5' 6\")   Wt 98.4 kg (216 lb 14.9 oz)   SpO2 96%   BMI 35.01 kg/m²  Body mass index is 35.01 kg/m².    Physical Exam:  Constitutional: Well-developed and well-nourished. Not diaphoretic. No distress.   Skin: Skin is warm and dry. No rash noted.  Head: Atraumatic without lesions.  Eyes: Conjunctivae and extraocular motions are normal. Pupils are equal, round, and reactive to light. No scleral icterus.   Ears:  External ears unremarkable. Tympanic membranes clear and intact.  Nose: Nares patent. Septum midline. Turbinates without erythema nor edema. No discharge.   Mouth/Throat: Dentition is normal. Tongue normal. Oropharynx is clear and moist. Posterior pharynx without erythema or exudates.  Neck: Supple, trachea midline. Normal range of motion. No thyromegaly present. No lymphadenopathy--cervical or supraclavicular.  Cardiovascular: Regular rate and rhythm, S1 and S2 without murmur, rubs, or gallops.    Chest: Effort normal. Clear to auscultation throughout. No adventitious sounds. No CVA tenderness.  Abdomen: Soft, non tender, and without distention. Active bowel sounds in all four quadrants. No rebound, guarding, masses or HSM.  : Negative for dysuria, polyuria, hematuria, pyuria, urinary urgency, and urinary incontinence.  Extremities: No cyanosis, clubbing, erythema, nor edema. Distal pulses intact and symmetric.   Musculoskeletal: All major joints AROM full in all directions " without pain.  Neurological: Alert and oriented x 3. DTRs 2+/3 and symmetric. No cranial nerve deficit. 5/5 myotomes. Sensation intact. Negative Rhomberg.  Psychiatric:  Behavior, mood, and affect are appropriate.  MA/nursing note and vitals reviewed.    LABS: 2023  results reviewed and discussed with the patient, questions answered.    Assessment/Plan:  Stage 3 chronic kidney disease (HCC)   Latest Reference Range & Units 01/25/23 11:45   Bun 8 - 22 mg/dL 25 (H)   Creatinine 0.50 - 1.40 mg/dL 1.67 (H)   GFR (CKD-EPI) >60 mL/min/1.73 m 2 45 !   (H): Data is abnormally high  !: Data is abnormal      Heart disease, hypertensive, malignant  Chronic problem. On AMlodipine 10 mg, spironolactone 25 mg. Denies CP, SONB, peripheral edema. Following with [facility].    Longstanding persistent atrial fibrillation (HCC)  The morning.  History of paroxysmal A-fib.  On Eliquis.  Followed by [facility].    Vision changes  New problem. Pt started having episodes of nocturnal glares. Otherwise no issues during the day.    Sleep apnea  On C-pap  1. Stage 3 chronic kidney disease, unspecified whether stage 3a or 3b CKD (HCC)  - Comp Metabolic Panel; Future    2. 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 Longstanding persistent atrial fibrillation (HCC)  Stable, followed by [facility]    4. Need for vaccination  - Pneumococcal Conjugate Vaccine 20-Valent (19 yrs+)    5. Screen for colon cancer  - Referral to GI for Colonoscopy    6. Hyperlipidemia, unspecified hyperlipidemia type  - Lipid Profile; Future    7. Screening for prostate cancer  - PSA TOTAL + %FREE; Future    8. Elevated fasting blood sugar  - HEMOGLOBIN A1C; Future      Discussed with patient possible alternative diagnoses, patient is to take all medications as prescribed.      If symptoms persist FU w/PCP, if symptoms worsen go to emergency room.      If  experiencing any side effects from prescribed medications report to the office immediately or go to emergency room.     Reviewed indication, dosage, usage and potential adverse effects of prescribed medications.      Reviewed risks and benefits of treatment plan. Patient verbalizes understanding of all instruction and verbally agrees to plan.     Discussed plan with the patient, and patient agrees to the above.      I personally reviewed prior external notes and test results pertinent to today's visit.      No follow-ups on file.  6 mos

## 2023-06-30 ENCOUNTER — TELEPHONE (OUTPATIENT)
Dept: HEALTH INFORMATION MANAGEMENT | Facility: OTHER | Age: 68
End: 2023-06-30

## 2023-07-15 ENCOUNTER — HOSPITAL ENCOUNTER (OUTPATIENT)
Dept: LAB | Facility: MEDICAL CENTER | Age: 68
End: 2023-07-15
Attending: NURSE PRACTITIONER
Payer: MEDICARE

## 2023-07-15 DIAGNOSIS — I11.0 MALIGNANT HYPERTENSIVE HEART DISEASE WITH HEART FAILURE (HCC): ICD-10-CM

## 2023-07-15 DIAGNOSIS — E78.5 HYPERLIPIDEMIA, UNSPECIFIED HYPERLIPIDEMIA TYPE: ICD-10-CM

## 2023-07-15 DIAGNOSIS — R73.01 ELEVATED FASTING BLOOD SUGAR: ICD-10-CM

## 2023-07-15 DIAGNOSIS — N18.30 STAGE 3 CHRONIC KIDNEY DISEASE, UNSPECIFIED WHETHER STAGE 3A OR 3B CKD: ICD-10-CM

## 2023-07-15 LAB
ALBUMIN SERPL BCP-MCNC: 4.6 G/DL (ref 3.2–4.9)
ALBUMIN/GLOB SERPL: 2.2 G/DL
ALP SERPL-CCNC: 63 U/L (ref 30–99)
ALT SERPL-CCNC: 25 U/L (ref 2–50)
ANION GAP SERPL CALC-SCNC: 11 MMOL/L (ref 7–16)
AST SERPL-CCNC: 21 U/L (ref 12–45)
BILIRUB SERPL-MCNC: 2 MG/DL (ref 0.1–1.5)
BUN SERPL-MCNC: 32 MG/DL (ref 8–22)
CALCIUM ALBUM COR SERPL-MCNC: 9.2 MG/DL (ref 8.5–10.5)
CALCIUM SERPL-MCNC: 9.7 MG/DL (ref 8.5–10.5)
CHLORIDE SERPL-SCNC: 102 MMOL/L (ref 96–112)
CHOLEST SERPL-MCNC: 194 MG/DL (ref 100–199)
CO2 SERPL-SCNC: 24 MMOL/L (ref 20–33)
CREAT SERPL-MCNC: 1.59 MG/DL (ref 0.5–1.4)
ERYTHROCYTE [DISTWIDTH] IN BLOOD BY AUTOMATED COUNT: 43.4 FL (ref 35.9–50)
EST. AVERAGE GLUCOSE BLD GHB EST-MCNC: 117 MG/DL
FASTING STATUS PATIENT QL REPORTED: NORMAL
GFR SERPLBLD CREATININE-BSD FMLA CKD-EPI: 47 ML/MIN/1.73 M 2
GLOBULIN SER CALC-MCNC: 2.1 G/DL (ref 1.9–3.5)
GLUCOSE SERPL-MCNC: 99 MG/DL (ref 65–99)
HBA1C MFR BLD: 5.7 % (ref 4–5.6)
HCT VFR BLD AUTO: 55.4 % (ref 42–52)
HDLC SERPL-MCNC: 62 MG/DL
HGB BLD-MCNC: 18.4 G/DL (ref 14–18)
LDLC SERPL CALC-MCNC: 101 MG/DL
MCH RBC QN AUTO: 30.1 PG (ref 27–33)
MCHC RBC AUTO-ENTMCNC: 33.2 G/DL (ref 32.3–36.5)
MCV RBC AUTO: 90.5 FL (ref 81.4–97.8)
PLATELET # BLD AUTO: 188 K/UL (ref 164–446)
PMV BLD AUTO: 9.6 FL (ref 9–12.9)
POTASSIUM SERPL-SCNC: 4.8 MMOL/L (ref 3.6–5.5)
PROT SERPL-MCNC: 6.7 G/DL (ref 6–8.2)
RBC # BLD AUTO: 6.12 M/UL (ref 4.7–6.1)
SODIUM SERPL-SCNC: 137 MMOL/L (ref 135–145)
TRIGL SERPL-MCNC: 156 MG/DL (ref 0–149)
WBC # BLD AUTO: 5.7 K/UL (ref 4.8–10.8)

## 2023-07-15 PROCEDURE — 80061 LIPID PANEL: CPT

## 2023-07-15 PROCEDURE — 36415 COLL VENOUS BLD VENIPUNCTURE: CPT

## 2023-07-15 PROCEDURE — 83036 HEMOGLOBIN GLYCOSYLATED A1C: CPT | Mod: GA

## 2023-07-15 PROCEDURE — 80053 COMPREHEN METABOLIC PANEL: CPT

## 2023-07-15 PROCEDURE — 85027 COMPLETE CBC AUTOMATED: CPT

## 2023-07-18 ENCOUNTER — APPOINTMENT (OUTPATIENT)
Dept: MEDICAL GROUP | Facility: MEDICAL CENTER | Age: 68
End: 2023-07-18
Payer: MEDICARE

## 2023-07-19 ENCOUNTER — APPOINTMENT (OUTPATIENT)
Dept: RADIOLOGY | Facility: MEDICAL CENTER | Age: 68
End: 2023-07-19
Attending: NURSE PRACTITIONER
Payer: MEDICARE

## 2023-07-19 DIAGNOSIS — K42.9 UMBILICAL HERNIA WITHOUT OBSTRUCTION AND WITHOUT GANGRENE: ICD-10-CM

## 2023-07-19 PROCEDURE — 76705 ECHO EXAM OF ABDOMEN: CPT

## 2023-07-20 ENCOUNTER — OFFICE VISIT (OUTPATIENT)
Dept: MEDICAL GROUP | Facility: MEDICAL CENTER | Age: 68
End: 2023-07-20
Payer: MEDICARE

## 2023-07-20 VITALS
WEIGHT: 216.05 LBS | HEART RATE: 50 BPM | RESPIRATION RATE: 16 BRPM | HEIGHT: 66 IN | BODY MASS INDEX: 34.72 KG/M2 | DIASTOLIC BLOOD PRESSURE: 90 MMHG | SYSTOLIC BLOOD PRESSURE: 130 MMHG | TEMPERATURE: 98 F | OXYGEN SATURATION: 94 %

## 2023-07-20 DIAGNOSIS — J96.11 CHRONIC RESPIRATORY FAILURE WITH HYPOXIA (HCC): ICD-10-CM

## 2023-07-20 DIAGNOSIS — T70.29XA: ICD-10-CM

## 2023-07-20 DIAGNOSIS — D75.1: ICD-10-CM

## 2023-07-20 DIAGNOSIS — D68.69 OTHER THROMBOPHILIA (HCC): ICD-10-CM

## 2023-07-20 DIAGNOSIS — E26.9 HYPERALDOSTERONISM (HCC): ICD-10-CM

## 2023-07-20 DIAGNOSIS — E78.5 DYSLIPIDEMIA: ICD-10-CM

## 2023-07-20 DIAGNOSIS — K42.9 UMBILICAL HERNIA WITHOUT OBSTRUCTION AND WITHOUT GANGRENE: ICD-10-CM

## 2023-07-20 DIAGNOSIS — N18.30 STAGE 3 CHRONIC KIDNEY DISEASE, UNSPECIFIED WHETHER STAGE 3A OR 3B CKD: ICD-10-CM

## 2023-07-20 PROCEDURE — 3075F SYST BP GE 130 - 139MM HG: CPT | Performed by: NURSE PRACTITIONER

## 2023-07-20 PROCEDURE — 99214 OFFICE O/P EST MOD 30 MIN: CPT | Performed by: NURSE PRACTITIONER

## 2023-07-20 PROCEDURE — 3080F DIAST BP >= 90 MM HG: CPT | Performed by: NURSE PRACTITIONER

## 2023-07-20 ASSESSMENT — FIBROSIS 4 INDEX: FIB4 SCORE: 1.47

## 2023-07-20 NOTE — ASSESSMENT & PLAN NOTE
Latest Reference Range & Units 07/15/23 07:59   RBC 4.70 - 6.10 M/uL 6.12 (H)   Hemoglobin 14.0 - 18.0 g/dL 18.4 (H)   Hematocrit 42.0 - 52.0 % 55.4 (H)   (H): Data is abnormally high  [place] problem. [place] Non smoker.

## 2023-07-20 NOTE — PROGRESS NOTES
"Chief Complaint   Patient presents with    Follow-Up     1 month       HISTORY OF PRESENT ILLNESS: Patient is a 67 y.o. male, established patient who presents today to discuss medical problems as listed below:    Health Maintenance:  COMPLETED       Allergies: Patient has no known allergies.    Current Outpatient Medications   Medication Sig Dispense Refill    atorvastatin (LIPITOR) 80 MG tablet Take 1 Tablet by mouth every evening. 90 Tablet 3    spironolactone (ALDACTONE) 25 MG Tab Take 1 Tablet by mouth every day. 90 Tablet 3    amiodarone (CORDARONE) 200 MG Tab Take 1 Tablet by mouth every day. 90 Tablet 3    amLODIPine (NORVASC) 10 MG Tab Take 1 Tablet by mouth every day. 90 Tablet 3    apixaban (ELIQUIS) 5mg Tab Take 1 Tablet by mouth 2 times a day. 180 Tablet 3    vitamin D, Ergocalciferol, (DRISDOL) 91487 units Cap capsule Take 1 Cap by mouth every 7 days. 12 Cap 1    multivitamin (THERAGRAN) Tab Take 1 Tab by mouth every day.       No current facility-administered medications for this visit.       Allergies, past medical history, past surgical history, family history, social history reviewed and updated.    Review of Systems:     - Constitutional: Negative for fever, chills, unexpected weight change, and fatigue/generalized weakness.       - Respiratory: Negative for cough, sputum production, chest congestion, dyspnea, wheezing, and crackles.      - Cardiovascular: Negative for chest pain, palpitations, orthopnea, and bilateral lower extremity edema.     - Psychiatric/Behavioral: Negative for depression, suicidal/homicidal ideation and memory loss.      All other systems reviewed and are negative    Exam:    BP (!) 130/90   Pulse (!) 50   Temp 36.7 °C (98 °F) (Temporal)   Resp 16   Ht 1.676 m (5' 6\")   Wt 98 kg (216 lb 0.8 oz)   SpO2 94%   BMI 34.87 kg/m²  Body mass index is 34.87 kg/m².    Physical Exam:  Constitutional: Well-developed and well-nourished. Not diaphoretic. No distress. "   Cardiovascular: Regular rate and rhythm, S1 and S2 without murmur, rubs, or gallops.    Chest: Effort normal. Clear to auscultation throughout. No adventitious sounds. Neurological: Alert and oriented x 3.   Psychiatric:  Behavior, mood, and affect are appropriate.  MA/nursing note and vitals reviewed.    LABS: 7/2023  results reviewed and discussed with the patient, questions answered.    Assessment/Plan:  Dyslipidemia   Latest Reference Range & Units 01/25/23 11:45 07/15/23 07:59   Cholesterol,Tot 100 - 199 mg/dL 201 (H) 194   Triglycerides 0 - 149 mg/dL 122 156 (H)   HDL >=40 mg/dL 62 62   LDL <100 mg/dL 115 (H) 101 (H)   (H): Data is abnormally high  Chronic problem. On amiodarone, amlodipine, spironolactone.  Tolerating regimen well.  Denies CP, dyspnea, dizziness.    Altitude erythrocytosis   Latest Reference Range & Units 07/15/23 07:59   RBC 4.70 - 6.10 M/uL 6.12 (H)   Hemoglobin 14.0 - 18.0 g/dL 18.4 (H)   Hematocrit 42.0 - 52.0 % 55.4 (H)   (H): Data is abnormally high  [place] problem. [place] Non smoker.     Stage 3 chronic kidney disease (HCC)   Latest Reference Range & Units 01/25/23 11:45 07/15/23 07:59   Bun 8 - 22 mg/dL 25 (H) 32 (H)   Creatinine 0.50 - 1.40 mg/dL 1.67 (H) 1.59 (H)   GFR (CKD-EPI) >60 mL/min/1.73 m 2 45 ! 47 !   (H): Data is abnormally high  !: Data is abnormal  Chronic problem.  Being monitored by nephrology.  Needing a new referral today.    1. Stage 3 chronic kidney disease, unspecified whether stage 3a or 3b CKD (HCC)  Stable on current regimen.  Continue.  Refills given   - Referral to Nephrology  - Comp Metabolic Panel; Future    2. Dyslipidemia  Stable on current regimen.  Continue.  Refills given   - Lipid Profile; Future    3. Umbilical hernia without obstruction and without gangrene  - Referral to General Surgery    4. Altitude erythrocytosis  Uncontrolled, stable.  Patient will start therapeutic phlebotomy.  We will donate blood.  We will recheck labs in  6 months  - CBC WITHOUT DIFFERENTIAL; Future    Discussed with patient possible alternative diagnoses, patient is to take all medications as prescribed.      If symptoms persist FU w/PCP, if symptoms worsen go to emergency room.      If experiencing any side effects from prescribed medications report to the office immediately or go to emergency room.     Reviewed indication, dosage, usage and potential adverse effects of prescribed medications.      Reviewed risks and benefits of treatment plan. Patient verbalizes understanding of all instruction and verbally agrees to plan.     Discussed plan with the patient, and patient agrees to the above.      I personally reviewed prior external notes and test results pertinent to today's visit.      No follow-ups on file. 6 mos

## 2023-07-20 NOTE — ASSESSMENT & PLAN NOTE
Latest Reference Range & Units 01/25/23 11:45 07/15/23 07:59   Cholesterol,Tot 100 - 199 mg/dL 201 (H) 194   Triglycerides 0 - 149 mg/dL 122 156 (H)   HDL >=40 mg/dL 62 62   LDL <100 mg/dL 115 (H) 101 (H)   (H): Data is abnormally high  Chronic problem. On amiodarone, amlodipine, spironolactone.  Tolerating regimen well.  Denies CP, dyspnea, dizziness.

## 2023-07-20 NOTE — ASSESSMENT & PLAN NOTE
Latest Reference Range & Units 01/25/23 11:45 07/15/23 07:59   Bun 8 - 22 mg/dL 25 (H) 32 (H)   Creatinine 0.50 - 1.40 mg/dL 1.67 (H) 1.59 (H)   GFR (CKD-EPI) >60 mL/min/1.73 m 2 45 ! 47 !   (H): Data is abnormally high  !: Data is abnormal  Chronic problem.  Being monitored by nephrology.  Needing a new referral today.

## 2023-07-25 ENCOUNTER — OFFICE VISIT (OUTPATIENT)
Dept: NEPHROLOGY | Facility: MEDICAL CENTER | Age: 68
End: 2023-07-25
Payer: MEDICARE

## 2023-07-25 VITALS
DIASTOLIC BLOOD PRESSURE: 82 MMHG | SYSTOLIC BLOOD PRESSURE: 124 MMHG | HEIGHT: 66 IN | HEART RATE: 98 BPM | TEMPERATURE: 98.9 F | WEIGHT: 221 LBS | OXYGEN SATURATION: 93 % | BODY MASS INDEX: 35.52 KG/M2 | RESPIRATION RATE: 18 BRPM

## 2023-07-25 DIAGNOSIS — N18.31 STAGE 3A CHRONIC KIDNEY DISEASE: ICD-10-CM

## 2023-07-25 PROCEDURE — 3074F SYST BP LT 130 MM HG: CPT | Performed by: INTERNAL MEDICINE

## 2023-07-25 PROCEDURE — 3079F DIAST BP 80-89 MM HG: CPT | Performed by: INTERNAL MEDICINE

## 2023-07-25 PROCEDURE — 99204 OFFICE O/P NEW MOD 45 MIN: CPT | Performed by: INTERNAL MEDICINE

## 2023-07-25 ASSESSMENT — FIBROSIS 4 INDEX: FIB4 SCORE: 1.47

## 2023-07-25 NOTE — PROGRESS NOTES
"NEPHROLOGY HISTORY AND PHYSICAL CONSULT NOTE            HPI:  Chema Sarabia is a 67 y.o. male who presents today for evaluation of  Chronic kidney disease stage III.    He is otherwise in his normal state of health.  Denies chest pain or shortness of breath.  Denies nausea vomiting and loose stools.  Most recent creatinine has been 1.59 to 1.67 mg/dL with a corresponding EGFR 45-47.    Patient previously seen by nephrology by [facility] under the care of Dr. Pang.   Denies NSAID use.     PAST MEDICAL HISTORY  CKD III  Atrial Fibrillation  Aortic Regurgitation  HTN  Atrial Fibrillation  Nephrolithiasis  Obesity         SOCIAL HISTORY  Denies smoking. Drinks occasional alcohol. Denies illicit drug use    FAMILY HISTORY  Denies family history of renal disease.   Outpatient Encounter Medications as of 7/25/2023   Medication Sig Dispense Refill    atorvastatin (LIPITOR) 80 MG tablet Take 1 Tablet by mouth every evening. 90 Tablet 3    spironolactone (ALDACTONE) 25 MG Tab Take 1 Tablet by mouth every day. 90 Tablet 3    amiodarone (CORDARONE) 200 MG Tab Take 1 Tablet by mouth every day. 90 Tablet 3    amLODIPine (NORVASC) 10 MG Tab Take 1 Tablet by mouth every day. 90 Tablet 3    apixaban (ELIQUIS) 5mg Tab Take 1 Tablet by mouth 2 times a day. 180 Tablet 3    vitamin D, Ergocalciferol, (DRISDOL) 37784 units Cap capsule Take 1 Cap by mouth every 7 days. 12 Cap 1    multivitamin (THERAGRAN) Tab Take 1 Tab by mouth every day.       No facility-administered encounter medications on file as of 7/25/2023.        No Known Allergies    ROS    /82 (BP Location: Right arm, Patient Position: Sitting, BP Cuff Size: Adult)   Pulse 98   Temp 37.2 °C (98.9 °F) (Temporal)   Resp 18   Ht 1.676 m (5' 6\")   Wt 100 kg (221 lb)   SpO2 93%   BMI 35.67 kg/m²     Physical Exam  GEN: alert and oriented. In no acute distress.   HEENT: moist oropharyngeal mucous membranes  CV:RRR  PULM: clear to auscultation bilaterally  ABD: " soft non tender non distended  EXT: warm well perfused, no lower extremity edema.    Labs reviewed.  Recent Labs     01/25/23  1145 07/15/23  0759   ALBUMIN  --  4.6   HDL 62 62   TRIGLYCERIDE 122 156*   SODIUM 140 137   POTASSIUM 4.7 4.8   CHLORIDE 105 102   CO2 24 24   BUN 25* 32*   CREATININE 1.67* 1.59*       Lab Results   Component Value Date/Time    WBC 5.7 07/15/2023 07:59 AM    RBC 6.12 (H) 07/15/2023 07:59 AM    HEMOGLOBIN 18.4 (H) 07/15/2023 07:59 AM    HEMATOCRIT 55.4 (H) 07/15/2023 07:59 AM    MCV 90.5 07/15/2023 07:59 AM    MCH 30.1 07/15/2023 07:59 AM    MCHC 33.2 07/15/2023 07:59 AM    MPV 9.6 07/15/2023 07:59 AM              URINALYSIS:  No results found for: COLORURINE, CLARITY, SPECGRAVITY, PHURINE, KETONES, PROTEINURIN, BILIRUBINUR, UROBILU, NITRITE, LEUKESTERAS, OCCULTBLOOD  UPC  No results found for: TOTPROTUR No results found for: CREATININEU    Imaging report(s) reviewed  No orders to display     Hemoglobin A1c 5.7%  T Bilirubin 2.0      Assessment:  Chema Sarabia is a 67 y.o. male who presents today for evaluation of         Chronic Kidney Disease.  Likely due to age related decline and hypertension.   -Obtain urinalysis    -Obtain renal ultrasound    -Routine management of hypertension, atrial fibrillation.   - Dose all medications for patient level of renal function  - Avoid nephrotoxic agents including contrast and NSAIDs  - Encourage adequate hydration.  - Continue to monitor renal function.  Obtain BMP, urine studies including urine protein quantification.      2. HTN. Spironolactone. Amlodipine 10 mg. Low sodium diet.      3. Atrial Fibrillation - continue amiodarone. Apixaban. Appreciate cardiology recommendations under care of Dr. Castlean    4. Vitamin D Deficiency - Continue vitamin D deficiency.    5. Obesity- Caloric restriction.  Encourage exercise 2-3 times weekly.    6. Elevated PSA - Appreciate urology recommendations with consideration of repeat imaging. Renal  US.      Cheryl Almazan MD  Nephrology  [facility]

## 2023-08-15 ENCOUNTER — APPOINTMENT (OUTPATIENT)
Dept: ADMISSIONS | Facility: MEDICAL CENTER | Age: 68
End: 2023-08-15
Attending: SURGERY
Payer: MEDICARE

## 2023-09-21 ENCOUNTER — PRE-ADMISSION TESTING (OUTPATIENT)
Dept: ADMISSIONS | Facility: MEDICAL CENTER | Age: 68
End: 2023-09-21
Attending: SURGERY
Payer: MEDICARE

## 2023-09-21 DIAGNOSIS — Z01.812 PRE-OPERATIVE LABORATORY EXAMINATION: ICD-10-CM

## 2023-09-21 DIAGNOSIS — Z01.810 PRE-OPERATIVE CARDIOVASCULAR EXAMINATION: ICD-10-CM

## 2023-09-21 LAB
ANION GAP SERPL CALC-SCNC: 12 MMOL/L (ref 7–16)
BUN SERPL-MCNC: 23 MG/DL (ref 8–22)
CALCIUM SERPL-MCNC: 9.6 MG/DL (ref 8.5–10.5)
CHLORIDE SERPL-SCNC: 105 MMOL/L (ref 96–112)
CO2 SERPL-SCNC: 22 MMOL/L (ref 20–33)
CREAT SERPL-MCNC: 1.6 MG/DL (ref 0.5–1.4)
EKG IMPRESSION: NORMAL
ERYTHROCYTE [DISTWIDTH] IN BLOOD BY AUTOMATED COUNT: 44.6 FL (ref 35.9–50)
GFR SERPLBLD CREATININE-BSD FMLA CKD-EPI: 47 ML/MIN/1.73 M 2
GLUCOSE SERPL-MCNC: 98 MG/DL (ref 65–99)
HCT VFR BLD AUTO: 53.4 % (ref 42–52)
HGB BLD-MCNC: 17.9 G/DL (ref 14–18)
MCH RBC QN AUTO: 30.6 PG (ref 27–33)
MCHC RBC AUTO-ENTMCNC: 33.5 G/DL (ref 32.3–36.5)
MCV RBC AUTO: 91.3 FL (ref 81.4–97.8)
PLATELET # BLD AUTO: 183 K/UL (ref 164–446)
PMV BLD AUTO: 9.2 FL (ref 9–12.9)
POTASSIUM SERPL-SCNC: 4.5 MMOL/L (ref 3.6–5.5)
RBC # BLD AUTO: 5.85 M/UL (ref 4.7–6.1)
SODIUM SERPL-SCNC: 139 MMOL/L (ref 135–145)
WBC # BLD AUTO: 7.1 K/UL (ref 4.8–10.8)

## 2023-09-21 PROCEDURE — 93010 ELECTROCARDIOGRAM REPORT: CPT | Performed by: INTERNAL MEDICINE

## 2023-09-21 PROCEDURE — 85027 COMPLETE CBC AUTOMATED: CPT

## 2023-09-21 PROCEDURE — 93005 ELECTROCARDIOGRAM TRACING: CPT

## 2023-09-21 PROCEDURE — 80048 BASIC METABOLIC PNL TOTAL CA: CPT

## 2023-09-21 PROCEDURE — 36415 COLL VENOUS BLD VENIPUNCTURE: CPT

## 2023-10-08 ENCOUNTER — HOSPITAL ENCOUNTER (OUTPATIENT)
Dept: RADIOLOGY | Facility: MEDICAL CENTER | Age: 68
End: 2023-10-08
Attending: INTERNAL MEDICINE
Payer: MEDICARE

## 2023-10-08 DIAGNOSIS — N18.31 STAGE 3A CHRONIC KIDNEY DISEASE: ICD-10-CM

## 2023-10-08 PROCEDURE — 76775 US EXAM ABDO BACK WALL LIM: CPT

## 2023-10-10 ENCOUNTER — ANESTHESIA (OUTPATIENT)
Dept: SURGERY | Facility: MEDICAL CENTER | Age: 68
End: 2023-10-10
Payer: MEDICARE

## 2023-10-10 ENCOUNTER — HOSPITAL ENCOUNTER (OUTPATIENT)
Facility: MEDICAL CENTER | Age: 68
End: 2023-10-10
Attending: SURGERY | Admitting: SURGERY
Payer: MEDICARE

## 2023-10-10 ENCOUNTER — ANESTHESIA EVENT (OUTPATIENT)
Dept: SURGERY | Facility: MEDICAL CENTER | Age: 68
End: 2023-10-10
Payer: MEDICARE

## 2023-10-10 VITALS
RESPIRATION RATE: 17 BRPM | OXYGEN SATURATION: 93 % | HEIGHT: 66 IN | DIASTOLIC BLOOD PRESSURE: 90 MMHG | WEIGHT: 215.83 LBS | BODY MASS INDEX: 34.69 KG/M2 | TEMPERATURE: 96.8 F | HEART RATE: 84 BPM | SYSTOLIC BLOOD PRESSURE: 121 MMHG

## 2023-10-10 DIAGNOSIS — G89.18 POSTOPERATIVE PAIN: ICD-10-CM

## 2023-10-10 PROCEDURE — 700111 HCHG RX REV CODE 636 W/ 250 OVERRIDE (IP): Performed by: ANESTHESIOLOGY

## 2023-10-10 PROCEDURE — 700101 HCHG RX REV CODE 250: Performed by: SURGERY

## 2023-10-10 PROCEDURE — 700105 HCHG RX REV CODE 258: Performed by: SURGERY

## 2023-10-10 PROCEDURE — 502714 HCHG ROBOTIC SURGERY SERVICES: Performed by: SURGERY

## 2023-10-10 PROCEDURE — 160046 HCHG PACU - 1ST 60 MINS PHASE II: Performed by: SURGERY

## 2023-10-10 PROCEDURE — 160035 HCHG PACU - 1ST 60 MINS PHASE I: Performed by: SURGERY

## 2023-10-10 PROCEDURE — 160009 HCHG ANES TIME/MIN: Performed by: SURGERY

## 2023-10-10 PROCEDURE — 160031 HCHG SURGERY MINUTES - 1ST 30 MINS LEVEL 5: Performed by: SURGERY

## 2023-10-10 PROCEDURE — 160025 RECOVERY II MINUTES (STATS): Performed by: SURGERY

## 2023-10-10 PROCEDURE — 160042 HCHG SURGERY MINUTES - EA ADDL 1 MIN LEVEL 5: Performed by: SURGERY

## 2023-10-10 PROCEDURE — 160002 HCHG RECOVERY MINUTES (STAT): Performed by: SURGERY

## 2023-10-10 PROCEDURE — 160048 HCHG OR STATISTICAL LEVEL 1-5: Performed by: SURGERY

## 2023-10-10 PROCEDURE — 700101 HCHG RX REV CODE 250: Performed by: ANESTHESIOLOGY

## 2023-10-10 PROCEDURE — C1781 MESH (IMPLANTABLE): HCPCS | Performed by: SURGERY

## 2023-10-10 DEVICE — MESH PROGRIP LAPROSCOPIC SELF FIXATING (1/CA): Type: IMPLANTABLE DEVICE | Site: UMBILICAL | Status: FUNCTIONAL

## 2023-10-10 RX ORDER — BUPIVACAINE HYDROCHLORIDE AND EPINEPHRINE 5; 5 MG/ML; UG/ML
INJECTION, SOLUTION EPIDURAL; INTRACAUDAL; PERINEURAL
Status: DISCONTINUED | OUTPATIENT
Start: 2023-10-10 | End: 2023-10-10 | Stop reason: HOSPADM

## 2023-10-10 RX ORDER — HYDROMORPHONE HYDROCHLORIDE 1 MG/ML
0.1 INJECTION, SOLUTION INTRAMUSCULAR; INTRAVENOUS; SUBCUTANEOUS
Status: CANCELLED | OUTPATIENT
Start: 2023-10-10

## 2023-10-10 RX ORDER — PHENYLEPHRINE HCL IN 0.9% NACL 0.5 MG/5ML
SYRINGE (ML) INTRAVENOUS PRN
Status: DISCONTINUED | OUTPATIENT
Start: 2023-10-10 | End: 2023-10-10 | Stop reason: SURG

## 2023-10-10 RX ORDER — DEXAMETHASONE SODIUM PHOSPHATE 4 MG/ML
INJECTION, SOLUTION INTRA-ARTICULAR; INTRALESIONAL; INTRAMUSCULAR; INTRAVENOUS; SOFT TISSUE PRN
Status: DISCONTINUED | OUTPATIENT
Start: 2023-10-10 | End: 2023-10-10 | Stop reason: SURG

## 2023-10-10 RX ORDER — ONDANSETRON 2 MG/ML
INJECTION INTRAMUSCULAR; INTRAVENOUS PRN
Status: DISCONTINUED | OUTPATIENT
Start: 2023-10-10 | End: 2023-10-10 | Stop reason: SURG

## 2023-10-10 RX ORDER — OXYCODONE HCL 5 MG/5 ML
10 SOLUTION, ORAL ORAL
Status: CANCELLED | OUTPATIENT
Start: 2023-10-10

## 2023-10-10 RX ORDER — OXYCODONE HCL 5 MG/5 ML
5 SOLUTION, ORAL ORAL
Status: CANCELLED | OUTPATIENT
Start: 2023-10-10

## 2023-10-10 RX ORDER — ONDANSETRON 2 MG/ML
4 INJECTION INTRAMUSCULAR; INTRAVENOUS
Status: CANCELLED | OUTPATIENT
Start: 2023-10-10

## 2023-10-10 RX ORDER — LABETALOL HYDROCHLORIDE 5 MG/ML
5 INJECTION, SOLUTION INTRAVENOUS
Status: CANCELLED | OUTPATIENT
Start: 2023-10-10

## 2023-10-10 RX ORDER — EPHEDRINE SULFATE 50 MG/ML
5 INJECTION, SOLUTION INTRAVENOUS
Status: CANCELLED | OUTPATIENT
Start: 2023-10-10

## 2023-10-10 RX ORDER — KETOROLAC TROMETHAMINE 30 MG/ML
INJECTION, SOLUTION INTRAMUSCULAR; INTRAVENOUS PRN
Status: DISCONTINUED | OUTPATIENT
Start: 2023-10-10 | End: 2023-10-10 | Stop reason: SURG

## 2023-10-10 RX ORDER — DIPHENHYDRAMINE HYDROCHLORIDE 50 MG/ML
12.5 INJECTION INTRAMUSCULAR; INTRAVENOUS
Status: CANCELLED | OUTPATIENT
Start: 2023-10-10

## 2023-10-10 RX ORDER — HYDROMORPHONE HYDROCHLORIDE 1 MG/ML
0.2 INJECTION, SOLUTION INTRAMUSCULAR; INTRAVENOUS; SUBCUTANEOUS
Status: CANCELLED | OUTPATIENT
Start: 2023-10-10

## 2023-10-10 RX ORDER — MEPERIDINE HYDROCHLORIDE 25 MG/ML
6.25 INJECTION INTRAMUSCULAR; INTRAVENOUS; SUBCUTANEOUS
Status: CANCELLED | OUTPATIENT
Start: 2023-10-10

## 2023-10-10 RX ORDER — OXYCODONE HYDROCHLORIDE AND ACETAMINOPHEN 5; 325 MG/1; MG/1
1-2 TABLET ORAL EVERY 6 HOURS PRN
Qty: 12 TABLET | Refills: 0 | Status: SHIPPED | OUTPATIENT
Start: 2023-10-10 | End: 2023-10-13

## 2023-10-10 RX ORDER — SODIUM CHLORIDE, SODIUM LACTATE, POTASSIUM CHLORIDE, CALCIUM CHLORIDE 600; 310; 30; 20 MG/100ML; MG/100ML; MG/100ML; MG/100ML
INJECTION, SOLUTION INTRAVENOUS CONTINUOUS
Status: ACTIVE | OUTPATIENT
Start: 2023-10-10 | End: 2023-10-10

## 2023-10-10 RX ORDER — ROCURONIUM BROMIDE 10 MG/ML
INJECTION, SOLUTION INTRAVENOUS PRN
Status: DISCONTINUED | OUTPATIENT
Start: 2023-10-10 | End: 2023-10-10 | Stop reason: SURG

## 2023-10-10 RX ORDER — HALOPERIDOL 5 MG/ML
1 INJECTION INTRAMUSCULAR
Status: CANCELLED | OUTPATIENT
Start: 2023-10-10

## 2023-10-10 RX ORDER — SODIUM CHLORIDE, SODIUM LACTATE, POTASSIUM CHLORIDE, CALCIUM CHLORIDE 600; 310; 30; 20 MG/100ML; MG/100ML; MG/100ML; MG/100ML
INJECTION, SOLUTION INTRAVENOUS CONTINUOUS
Status: CANCELLED | OUTPATIENT
Start: 2023-10-10

## 2023-10-10 RX ORDER — LIDOCAINE HYDROCHLORIDE 20 MG/ML
INJECTION, SOLUTION EPIDURAL; INFILTRATION; INTRACAUDAL; PERINEURAL PRN
Status: DISCONTINUED | OUTPATIENT
Start: 2023-10-10 | End: 2023-10-10 | Stop reason: SURG

## 2023-10-10 RX ORDER — CEFAZOLIN SODIUM 1 G/3ML
INJECTION, POWDER, FOR SOLUTION INTRAMUSCULAR; INTRAVENOUS PRN
Status: DISCONTINUED | OUTPATIENT
Start: 2023-10-10 | End: 2023-10-10 | Stop reason: SURG

## 2023-10-10 RX ORDER — HYDROMORPHONE HYDROCHLORIDE 1 MG/ML
0.4 INJECTION, SOLUTION INTRAMUSCULAR; INTRAVENOUS; SUBCUTANEOUS
Status: CANCELLED | OUTPATIENT
Start: 2023-10-10

## 2023-10-10 RX ADMIN — ONDANSETRON 4 MG: 2 INJECTION INTRAMUSCULAR; INTRAVENOUS at 07:52

## 2023-10-10 RX ADMIN — ROCURONIUM BROMIDE 50 MG: 50 INJECTION, SOLUTION INTRAVENOUS at 07:52

## 2023-10-10 RX ADMIN — FENTANYL CITRATE 50 MCG: 50 INJECTION, SOLUTION INTRAMUSCULAR; INTRAVENOUS at 09:04

## 2023-10-10 RX ADMIN — FENTANYL CITRATE 50 MCG: 50 INJECTION, SOLUTION INTRAMUSCULAR; INTRAVENOUS at 08:28

## 2023-10-10 RX ADMIN — SODIUM CHLORIDE, POTASSIUM CHLORIDE, SODIUM LACTATE AND CALCIUM CHLORIDE: 600; 310; 30; 20 INJECTION, SOLUTION INTRAVENOUS at 06:47

## 2023-10-10 RX ADMIN — SODIUM CHLORIDE, POTASSIUM CHLORIDE, SODIUM LACTATE AND CALCIUM CHLORIDE: 600; 310; 30; 20 INJECTION, SOLUTION INTRAVENOUS at 07:47

## 2023-10-10 RX ADMIN — FENTANYL CITRATE 50 MCG: 50 INJECTION, SOLUTION INTRAMUSCULAR; INTRAVENOUS at 07:52

## 2023-10-10 RX ADMIN — ROCURONIUM BROMIDE 20 MG: 50 INJECTION, SOLUTION INTRAVENOUS at 08:42

## 2023-10-10 RX ADMIN — SUGAMMADEX 200 MG: 100 INJECTION, SOLUTION INTRAVENOUS at 09:04

## 2023-10-10 RX ADMIN — DEXAMETHASONE SODIUM PHOSPHATE 8 MG: 4 INJECTION INTRA-ARTICULAR; INTRALESIONAL; INTRAMUSCULAR; INTRAVENOUS; SOFT TISSUE at 07:52

## 2023-10-10 RX ADMIN — CEFAZOLIN 2 G: 1 INJECTION, POWDER, FOR SOLUTION INTRAMUSCULAR; INTRAVENOUS at 07:55

## 2023-10-10 RX ADMIN — Medication 100 MCG: at 08:09

## 2023-10-10 RX ADMIN — LIDOCAINE HYDROCHLORIDE 100 MG: 20 INJECTION, SOLUTION EPIDURAL; INFILTRATION; INTRACAUDAL at 07:52

## 2023-10-10 RX ADMIN — KETOROLAC TROMETHAMINE 30 MG: 30 INJECTION, SOLUTION INTRAMUSCULAR; INTRAVENOUS at 09:05

## 2023-10-10 RX ADMIN — PROPOFOL 120 MG: 10 INJECTION, EMULSION INTRAVENOUS at 07:52

## 2023-10-10 ASSESSMENT — FIBROSIS 4 INDEX: FIB4 SCORE: 1.51

## 2023-10-10 ASSESSMENT — PAIN SCALES - GENERAL: PAIN_LEVEL: 0

## 2023-10-10 ASSESSMENT — PAIN DESCRIPTION - PAIN TYPE
TYPE: SURGICAL PAIN

## 2023-10-10 NOTE — OP REPORT
DATE OF SERVICE:  10/10/2023     PREOPERATIVE DIAGNOSIS:  Anterior abdominal wall hernia.     POSTOPERATIVE DIAGNOSIS:  Anterior abdominal wall hernia, 5 cm defect.     SURGERY:  Robotic anterior abdominal wall hernia repair with underlay mesh, 5   cm defect.     SURGEON:  Michael Saavedra MD     ASSISTANT:  Theodora Santamaria PA-C     ANESTHESIA:  General endotracheal anesthesia.     ESTIMATED BLOOD LOSS:  5 mL.     SPECIMENS:  None.     COMPLICATIONS:  None.     CONDITION:  Stable.     INDICATIONS FOR PROCEDURE:  This is a 67-year-old male who presents with   umbilical hernia, which is large and partially reducible.  Risks, benefits and   alternatives of elective repair were explained to him before proceeding.     OPERATIVE FINDINGS:  Umbilical hernia reduced and repaired, anterior abdominal   wall reapproximated with underlay mesh in place.     OPERATIVE TECHNIQUE:  After informed consent was obtained, the patient was   taken to the operating room and placed in supine position.  After adequate   endotracheal anesthesia was achieved, the abdomen was prepped and draped in   sterile fashion.  Operation was begun by making a right upper quadrant   incision through which an 8 mm robotic port was introduced into the abdomen   using Optiview technique.  After pneumoperitoneum was achieved, 2 additional 8   mm robotic ports were placed in the abdomen using Optiview technique.  After   pneumoperitoneum was achieved, the patient was positioned and the da Judy Xi   robotic system was docked.  We began by opening the preperitoneal retrorectus   space along the right side. We dissected behind the rectus muscles toward the   midline.  We then dissected in the similar space on the left side.  Once this   was completed, we were able to separate the hernia neck from its surrounding   fascial and subcu adhesions and reduced all of the hernia sac and contents   into the preperitoneal space.  There was a small amount of omentum  within the   hernia, which was reduced and .  Once this was completed, we   reapproximated the abdominal wall fascia with a running 0 V-Loc suture   incorporating some of the subcu tissues to reapproximate the umbilicus to the   abdominal wall. With this done, we placed a 10x15 cm ProGrip mesh in the space   created in an underlay fashion overlapping the repair.  We then closed the   preperitoneal space with a running 2-0 Stratafix suture and needles were all   retrieved.     Pneumoperitoneum was reduced and all trocars were removed.  Skin incisions   were closed with 4-0 Monocryl and Dermabond.  The patient was returned to the   PACU in stable condition, all instrument counts were correct at the end of the   procedure.  Case required an assistant due to complexity of the surgery.  The   assistant helped with trocar placement, camera operation, robotic operation,   mesh placement, wound closures, and operative decision making.        ______________________________  MD VINH Victor/CAM/KISHA    DD:  10/10/2023 09:25  DT:  10/10/2023 10:29    Job#:  [id number]

## 2023-10-10 NOTE — DISCHARGE INSTRUCTIONS
HOME CARE INSTRUCTIONS    ACTIVITY: Rest and take it easy for the first 24 hours.  A responsible adult is recommended to remain with you during that time.  It is normal to feel sleepy.  We encourage you to not do anything that requires balance, judgment or coordination.    FOR 24 HOURS DO NOT:  Drive, operate machinery or run household appliances.  Drink beer or alcoholic beverages.  Make important decisions or sign legal documents.    SPECIAL INSTRUCTIONS:     Umbilical Hernia, Adult  A hernia is a bulge of tissue that pushes through an opening between muscles. An umbilical hernia happens in the abdomen, near the belly button (umbilicus). The hernia may contain tissues from the small intestine, large intestine, or fatty tissue covering the intestines. Umbilical hernias in adults tend to get worse over time, and they require surgical treatment.  There are different types of umbilical hernias, including:  Indirect hernia. This type is located just above or below the umbilicus. It is the most common type of umbilical hernia in adults.  Direct hernia. This type forms through an opening formed by the umbilicus.  Reducible hernia. This type of hernia comes and goes. It may be visible only when you strain, lift something heavy, or cough. This type of hernia can be pushed back into the abdomen (reduced).  Incarcerated hernia. This type traps abdominal tissue inside the hernia. This type of hernia cannot be reduced.  Strangulated hernia. This type of hernia cuts off blood flow to the tissues inside the hernia. The tissues can start to die if this happens. This type of hernia requires emergency treatment.  What are the causes?  An umbilical hernia happens when tissue inside the abdomen presses on a weak area of the abdominal muscles.  What increases the risk?  You may have a greater risk of this condition if you:  Are obese.  Have had several pregnancies.  Have a buildup of fluid inside your abdomen.  Have had surgery that  weakens the abdominal muscles.  What are the signs or symptoms?  The main symptom of this condition is a painless bulge at or near the belly button.  A reducible hernia may be visible only when you strain, lift something heavy, or cough. Other symptoms may include:  Dull pain.  A feeling of pressure.  Symptoms of a strangulated hernia may include:  Pain that gets increasingly worse.  Nausea and vomiting.  Pain when pressing on the hernia.  Skin over the hernia becoming red or purple.  Constipation.  Blood in the stool.  How is this diagnosed?  This condition may be diagnosed based on:  A physical exam. You may be asked to cough or strain while standing. These actions increase the pressure inside your abdomen and can force the hernia through the opening in your muscles. Your health care provider may try to reduce the hernia by pressing on it.  Your symptoms and medical history.  How is this treated?  Surgery is the only treatment for an umbilical hernia. Surgery for a strangulated hernia is done as soon as possible. If you have a small hernia that is not incarcerated, you may need to lose weight before having surgery.  Follow these instructions at home:  Lose weight, if told by your health care provider.  Do not try to push the hernia back in.  Watch your hernia for any changes in color or size. Tell your health care provider if any changes occur.  You may need to avoid activities that increase pressure on your hernia.  Do not lift anything that is heavier than 10 lb (4.5 kg), or the limit that you are told, until your health care provider says that it is safe.  Take over-the-counter and prescription medicines only as told by your health care provider.  Keep all follow-up visits. This is important.  Contact a health care provider if:  Your hernia gets larger.  Your hernia becomes painful.  Get help right away if:  You develop sudden, severe pain near the area of your hernia.  You have pain as well as nausea or  vomiting.  You have pain and the skin over your hernia changes color.  You develop a fever or chills.  Summary  A hernia is a bulge of tissue that pushes through an opening between muscles. An umbilical hernia happens near the belly button.  Surgery is the only treatment for an umbilical hernia.  Do not try to push your hernia back in.  Keep all follow-up visits. This is important.      DIET: To avoid nausea, slowly advance diet as tolerated, avoiding spicy or greasy foods for the first day.  Add more substantial food to your diet according to your physician's instructions.  Babies can be fed formula or breast milk as soon as they are hungry.  INCREASE FLUIDS AND FIBER TO AVOID CONSTIPATION.    SURGICAL DRESSING/BATHING: Ok to shower. No hot tubs, bath tubs or pools x1 week.    MEDICATIONS: Resume taking daily medication.  Take prescribed pain medication with food.  If no medication is prescribed, you may take non-aspirin pain medication if needed.  PAIN MEDICATION CAN BE VERY CONSTIPATING.  Take a stool softener or laxative such as senokot, pericolace, or milk of magnesia if needed.    Prescription given for [employer]. sent to pharmacy.  Last pain medication given at 09:05am, next dose at 1:05pm.    A follow-up appointment should be arranged with your doctor in 1-2 weeks; call to schedule.    You should CALL YOUR PHYSICIAN if you develop:  Fever greater than 101 degrees F.  Pain not relieved by medication, or persistent nausea or vomiting.  Excessive bleeding (blood soaking through dressing) or unexpected drainage from the wound.  Extreme redness or swelling around the incision site, drainage of pus or foul smelling drainage.  Inability to urinate or empty your bladder within 8 hours.  Problems with breathing or chest pain.    You should call 911 if you develop problems with breathing or chest pain.  If you are unable to contact your doctor or surgical center, you should go to the nearest emergency room or urgent care  center.  Physician's telephone #: Dr. Saavedra ([phone])    MILD FLU-LIKE SYMPTOMS ARE NORMAL.  YOU MAY EXPERIENCE GENERALIZED MUSCLE ACHES, THROAT IRRITATION, HEADACHE AND/OR SOME NAUSEA.    If any questions arise, call your doctor.  If your doctor is not available, please feel free to call the Surgical Center at [phone].  The Center is open Monday through Friday from 7AM to 7PM.      A registered nurse may call you a few days after your surgery to see how you are doing after your procedure.    You may also receive a survey in the mail within the next two weeks and we ask that you take a few moments to complete the survey and return it to us.  Our goal is to provide you with very good care and we value your comments.     Depression / Suicide Risk    As you are discharged from this [facility] facility, it is important to learn how to keep safe from harming yourself.    Recognize the warning signs:  Abrupt changes in personality, positive or negative- including increase in energy   Giving away possessions  Change in eating patterns- significant weight changes-  positive or negative  Change in sleeping patterns- unable to sleep or sleeping all the time   Unwillingness or inability to communicate  Depression  Unusual sadness, discouragement and loneliness  Talk of wanting to die  Neglect of personal appearance   Rebelliousness- reckless behavior  Withdrawal from people/activities they love  Confusion- inability to concentrate     If you or a loved one observes any of these behaviors or has concerns about self-harm, here's what you can do:  Talk about it- your feelings and reasons for harming yourself  Remove any means that you might use to hurt yourself (examples: pills, rope, extension cords, firearm)  Get professional help from the community (Mental Health, Substance Abuse, psychological counseling)  Do not be alone:Call your Safe Contact- someone whom you trust who will be there for you.  Call your  local CRISIS HOTLINE 733-5842 or 801-355-9244  Call your local Children's Mobile Crisis Response Team [place] (978) 527-5920 or www.[employer]  Call the toll free National Suicide Prevention Hotlines   National Suicide Prevention Lifeline 624-555-VRIT (8019)  [facility] Line Network 800-SUICIDE (382-1292)    I acknowledge receipt and understanding of these Home Care instructions.

## 2023-10-10 NOTE — OR NURSING
0905 Patient arrived to PACU from OR. Report received. Patient attached to monitoring. VSS. Patient oxygenating well on 9 L via mask.     0907 Oral airway Dc'd    0940 Patient declines pain or nausea at this time. Patient using incentive spirometer.     1000 Patient meets phase two criteria. Tolerating PO liquids without complication.     1002 RN updated patients friend, Brodie. Brodie is on the way to hospital for patient , ETA-20 minutes.    1005 Report given to phase two RN. Patient transferred to phase two via [place]. VSS.

## 2023-10-10 NOTE — OR NURSING
Assume care for pt in pre-op. Patient allergies and NPO status verified. Belongings secured. Patient verbalizes understanding of pain scale, expected course of stay and plan of care. Surgical procedure verified with patient. IV access established. Call light within reach. No further needs at this time. Hourly rounding in place.

## 2023-10-10 NOTE — ANESTHESIA PROCEDURE NOTES
Airway    Date/Time: 10/10/2023 7:53 AM    Performed by: Olivia Conrad M.D.  Authorized by: Olivia Conrad M.D.    Location:  OR  Urgency:  Elective  Indications for Airway Management:  Anesthesia      Spontaneous Ventilation: absent    Sedation Level:  Deep  Preoxygenated: Yes    Patient Position:  Sniffing  MILS Maintained Throughout: Yes    Mask Difficulty Assessment:  1 - vent by mask  Final Airway Type:  Endotracheal airway  Final Endotracheal Airway:  ETT  Cuffed: Yes    Technique Used for Successful ETT Placement:  Direct laryngoscopy  Devices/Methods Used in Placement:  Intubating stylet    Insertion Site:  Oral  Blade Type:  Cedeño  Laryngoscope Blade/Videolaryngoscope Blade Size:  2  ETT Size (mm):  7.5  Measured from:  Lips  ETT to Lips (cm):  22  Placement Verified by: capnometry    Cormack-Lehane Classification:  Grade I - full view of glottis  Number of Attempts at Approach:  1

## 2023-10-10 NOTE — OR NURSING
Pt arrived to PACU II at 1005. Pt aa/ox4, VSS. Discharge criteria met. Pain is 4/10 which patient states is tolerable. Abdominal lap stabs x3 with dermabond KURT, abdominal binder in place/ Pt changed into clothing with assistance. Discharge instructions given; pt and family verbalized understanding and questions answered.  IV removed, tip intact. Will follow-up with Dr. Saavedra in 1-2 weeks. Prescriptions sent to pt's pharmacy. Pt discharged home and escorted via w/c with RN in stable condition.

## 2023-10-10 NOTE — ANESTHESIA TIME REPORT
Anesthesia Start and Stop Event Times     Date Time Event    10/10/2023 0736 Ready for Procedure     0747 Anesthesia Start     0910 Anesthesia Stop        Responsible Staff  10/10/23    Name Role Begin End    Olivia Conrad M.D. Anesth 0747 0910        Overtime Reason:  no overtime (within assigned shift)    Comments:

## 2023-10-10 NOTE — ANESTHESIA PREPROCEDURE EVALUATION
Case: [id number] Date/Time: 10/10/23 0715    Procedure: ROBOT UMBILICAL HERNIA REPAIR (Abdomen)    Anesthesia type: General    Pre-op diagnosis: UMBILICAL HERNIA    Location: [facility] OR 15 / SURGERY [facility]    Surgeons: Michael Saavedra M.D.      67yoM with PMHx of HTN, Afib, mod AR, Stage III, GERD, CVA - no residual deficits, HLD,     NKDA  NPO  No AC    ECHO 02/15/2018 LVEF 50%, Grade II diastolic dysfct, mild MR, mod AR, Mod TR  Cardiac stress stress 03/2022- LVEF 44%      Relevant Problems   ANESTHESIA   (positive) Sleep apnea      CARDIAC   (positive) Essential hypertension   (positive) Longstanding persistent atrial fibrillation (HCC)   (positive) Moderate aortic insufficiency      GI   (positive) Gastroesophageal reflux disease         (positive) Acquired renal cyst of right kidney   (positive) Stage 3 chronic kidney disease (HCC)       Physical Exam    Airway   Mallampati: II       Cardiovascular - normal exam     Dental - normal exam           Pulmonary - normal exam     Abdominal - normal exam     Neurological - normal exam                 Anesthesia Plan    ASA 3   ASA physical status 3 criteria: CVA or TIA - history (> 3 months)    Plan - general       Airway plan will be ETT          Induction: intravenous    Postoperative Plan: Postoperative administration of opioids is intended.    Pertinent diagnostic labs and testing reviewed    Informed Consent:    Anesthetic plan and risks discussed with patient.

## 2023-10-10 NOTE — ANESTHESIA POSTPROCEDURE EVALUATION
Patient: Chema Cuenca    Procedure Summary     Date: 10/10/23 Room / Location: [place] [address] / SURGERY [facility]    Anesthesia Start: 0747 Anesthesia Stop: 0910    Procedure: ROBOT UMBILICAL HERNIA REPAIR (Abdomen) Diagnosis: (UMBILICAL HERNIA)    Surgeons: Michael Saavedra M.D. Responsible Provider: Olivia Conrad M.D.    Anesthesia Type: general ASA Status: 3          Final Anesthesia Type: general  Last vitals  BP   Blood Pressure : (!) 140/99    Temp   36.6 °C (97.8 °F)    Pulse   (!) 122   Resp   (!) 24    SpO2   93 %      Anesthesia Post Evaluation    Patient location during evaluation: PACU  Patient participation: complete - patient participated  Level of consciousness: awake and alert  Pain score: 0    Anesthetic complications: no  Cardiovascular status: adequate and hemodynamically stable  Respiratory status: acceptable  Hydration status: acceptable    PONV: none          No notable events documented.     Nurse Pain Score: 0 (NPRS)

## 2023-11-17 ENCOUNTER — HOSPITAL ENCOUNTER (OUTPATIENT)
Dept: LAB | Facility: MEDICAL CENTER | Age: 68
End: 2023-11-17
Attending: INTERNAL MEDICINE
Payer: MEDICARE

## 2023-11-17 DIAGNOSIS — N18.31 STAGE 3A CHRONIC KIDNEY DISEASE: ICD-10-CM

## 2023-11-17 LAB
APPEARANCE UR: CLEAR
BACTERIA #/AREA URNS HPF: NEGATIVE /HPF
BILIRUB UR QL STRIP.AUTO: NEGATIVE
COLOR UR: YELLOW
CREAT UR-MCNC: 84.03 MG/DL
EPI CELLS #/AREA URNS HPF: NEGATIVE /HPF
GLUCOSE UR STRIP.AUTO-MCNC: NEGATIVE MG/DL
HYALINE CASTS #/AREA URNS LPF: ABNORMAL /LPF
KETONES UR STRIP.AUTO-MCNC: NEGATIVE MG/DL
LEUKOCYTE ESTERASE UR QL STRIP.AUTO: ABNORMAL
MICRO URNS: ABNORMAL
MICROALBUMIN UR-MCNC: 3 MG/DL
MICROALBUMIN/CREAT UR: 36 MG/G (ref 0–30)
NITRITE UR QL STRIP.AUTO: NEGATIVE
PH UR STRIP.AUTO: 7 [PH] (ref 5–8)
PROT UR QL STRIP: NEGATIVE MG/DL
RBC # URNS HPF: ABNORMAL /HPF
RBC UR QL AUTO: NEGATIVE
SP GR UR STRIP.AUTO: 1.02
UROBILINOGEN UR STRIP.AUTO-MCNC: 0.2 MG/DL
WBC #/AREA URNS HPF: ABNORMAL /HPF

## 2023-11-17 PROCEDURE — 82570 ASSAY OF URINE CREATININE: CPT

## 2023-11-17 PROCEDURE — 83970 ASSAY OF PARATHORMONE: CPT

## 2023-11-17 PROCEDURE — 82043 UR ALBUMIN QUANTITATIVE: CPT

## 2023-11-17 PROCEDURE — 36415 COLL VENOUS BLD VENIPUNCTURE: CPT

## 2023-11-17 PROCEDURE — 85027 COMPLETE CBC AUTOMATED: CPT

## 2023-11-17 PROCEDURE — 80048 BASIC METABOLIC PNL TOTAL CA: CPT

## 2023-11-17 PROCEDURE — 81001 URINALYSIS AUTO W/SCOPE: CPT

## 2023-11-18 LAB
ANION GAP SERPL CALC-SCNC: 10 MMOL/L (ref 7–16)
BUN SERPL-MCNC: 25 MG/DL (ref 8–22)
CALCIUM SERPL-MCNC: 9 MG/DL (ref 8.5–10.5)
CHLORIDE SERPL-SCNC: 108 MMOL/L (ref 96–112)
CO2 SERPL-SCNC: 24 MMOL/L (ref 20–33)
CREAT SERPL-MCNC: 1.51 MG/DL (ref 0.5–1.4)
ERYTHROCYTE [DISTWIDTH] IN BLOOD BY AUTOMATED COUNT: 43.8 FL (ref 35.9–50)
GFR SERPLBLD CREATININE-BSD FMLA CKD-EPI: 50 ML/MIN/1.73 M 2
GLUCOSE SERPL-MCNC: 102 MG/DL (ref 65–99)
HCT VFR BLD AUTO: 52.8 % (ref 42–52)
HGB BLD-MCNC: 17 G/DL (ref 14–18)
MCH RBC QN AUTO: 30.2 PG (ref 27–33)
MCHC RBC AUTO-ENTMCNC: 32.2 G/DL (ref 32.3–36.5)
MCV RBC AUTO: 93.8 FL (ref 81.4–97.8)
PLATELET # BLD AUTO: 207 K/UL (ref 164–446)
PMV BLD AUTO: 9.8 FL (ref 9–12.9)
POTASSIUM SERPL-SCNC: 4.6 MMOL/L (ref 3.6–5.5)
PTH-INTACT SERPL-MCNC: 63 PG/ML (ref 14–72)
RBC # BLD AUTO: 5.63 M/UL (ref 4.7–6.1)
SODIUM SERPL-SCNC: 142 MMOL/L (ref 135–145)
WBC # BLD AUTO: 7.3 K/UL (ref 4.8–10.8)

## 2023-11-21 ENCOUNTER — OFFICE VISIT (OUTPATIENT)
Dept: NEPHROLOGY | Facility: MEDICAL CENTER | Age: 68
End: 2023-11-21
Payer: MEDICARE

## 2023-11-21 VITALS
DIASTOLIC BLOOD PRESSURE: 78 MMHG | OXYGEN SATURATION: 99 % | BODY MASS INDEX: 35.52 KG/M2 | TEMPERATURE: 99 F | HEIGHT: 66 IN | SYSTOLIC BLOOD PRESSURE: 134 MMHG | HEART RATE: 74 BPM | WEIGHT: 221 LBS

## 2023-11-21 DIAGNOSIS — N18.31 STAGE 3A CHRONIC KIDNEY DISEASE: ICD-10-CM

## 2023-11-21 PROCEDURE — 99214 OFFICE O/P EST MOD 30 MIN: CPT | Performed by: INTERNAL MEDICINE

## 2023-11-21 PROCEDURE — 3078F DIAST BP <80 MM HG: CPT | Performed by: INTERNAL MEDICINE

## 2023-11-21 PROCEDURE — 3075F SYST BP GE 130 - 139MM HG: CPT | Performed by: INTERNAL MEDICINE

## 2023-11-21 ASSESSMENT — FIBROSIS 4 INDEX: FIB4 SCORE: 1.36

## 2023-11-21 NOTE — PROGRESS NOTES
"NEPHROLOGY HISTORY AND PHYSICAL CONSULT NOTE       HPI:  Chema Cuenca is a 68 y.o. male with atrial fibrillation, hypertension, aortic insufficiency, stroke who presents for evaluation of chronic kidney disease.    Patient baseline creatinine has been between 1.4-1.6In 2020 1/2/2023.    Denies chest pain, shortness of breath.  Denies nausea vomiting and loose stools.    Patient underwent umbilical hernia repair on 10/10/23. He did take two doses of alleve perioperatively.    Outpatient Encounter Medications as of 11/21/2023   Medication Sig Dispense Refill    atorvastatin (LIPITOR) 80 MG tablet Take 1 Tablet by mouth every evening. 90 Tablet 3    spironolactone (ALDACTONE) 25 MG Tab Take 1 Tablet by mouth every day. 90 Tablet 3    amiodarone (CORDARONE) 200 MG Tab Take 1 Tablet by mouth every day. 90 Tablet 3    amLODIPine (NORVASC) 10 MG Tab Take 1 Tablet by mouth every day. 90 Tablet 3    apixaban (ELIQUIS) 5mg Tab Take 1 Tablet by mouth 2 times a day. 180 Tablet 3    vitamin D, Ergocalciferol, (DRISDOL) 81137 units Cap capsule Take 1 Cap by mouth every 7 days. (Patient taking differently: Take 50,000 Units by mouth every 7 days. Wednesday's usually) 12 Cap 1    multivitamin (THERAGRAN) Tab Take 1 Tab by mouth every day.       No facility-administered encounter medications on file as of 11/21/2023.        No Known Allergies    ROS    /78 (BP Location: Right arm, Patient Position: Sitting, BP Cuff Size: Adult)   Pulse 74   Temp 37.2 °C (99 °F) (Temporal)   Ht 1.676 m (5' 6\")   Wt 100 kg (221 lb)   SpO2 99%   BMI 35.67 kg/m²     Physical Exam  GEN: alert and oriented. In no acute distress.   HEENT: moist oropharyngeal mucous membranes  CV:RRR  PULM: clear to auscultation bilaterally  ABD: soft non tender non distended  EXT: warm well perfused, no lower extremity edema.    Labs reviewed.  Recent Labs     01/25/23  1145 07/15/23  0759 09/21/23  1335 11/17/23  1419   ALBUMIN  --  4.6  --   --  " "  HDL 62 62  --   --    TRIGLYCERIDE 122 156*  --   --    SODIUM 140 137 139 142   POTASSIUM 4.7 4.8 4.5 4.6   CHLORIDE 105 102 105 108   CO2 24 24 22 24   BUN 25* 32* 23* 25*   CREATININE 1.67* 1.59* 1.60* 1.51*       Lab Results   Component Value Date/Time    WBC 7.3 11/17/2023 02:19 PM    RBC 5.63 11/17/2023 02:19 PM    HEMOGLOBIN 17.0 11/17/2023 02:19 PM    HEMATOCRIT 52.8 (H) 11/17/2023 02:19 PM    MCV 93.8 11/17/2023 02:19 PM    MCH 30.2 11/17/2023 02:19 PM    MCHC 32.2 (L) 11/17/2023 02:19 PM    MPV 9.8 11/17/2023 02:19 PM              URINALYSIS:  Lab Results   Component Value Date/Time    COLORURINE Yellow 11/17/2023 1418    CLARITY Clear 11/17/2023 1418    SPECGRAVITY 1.019 11/17/2023 1418    PHURINE 7.0 11/17/2023 1418    KETONES Negative 11/17/2023 1418    PROTEINURIN Negative 11/17/2023 1418    BILIRUBINUR Negative 11/17/2023 1418    UROBILU 0.2 11/17/2023 1418    NITRITE Negative 11/17/2023 1418    LEUKESTERAS Trace (A) 11/17/2023 1418    OCCULTBLOOD Negative 11/17/2023 1418     UPC  No results found for: \"TOTPROTUR\" No results found for: \"CREATININEU\"  /  RENAL ULTRASOUND 10/08/3  The right kidney measures 9.97 cm.  There is a 15 mm simple appearing cyst involving the upper pole. No follow-up recommended. The right renal collecting system is not dilated. No hydronephrosis. There are no renal calculi.     The left kidney measures 10.53 cm. The left kidney appears normal in contour and parenchymal echotexture. The corticomedullary differentiation is preserved. The left renal collecting system is not dilated. No hydronephrosis. There are no renal calculi.     The bladder demonstrates no focal wall abnormality.           IMPRESSION:     No evidence of solid renal mass or hydronephrosis.    Assessment:  Chema Sarabia is a 67 y.o. male who presents today for evaluation of chronic kidney disease.            Chronic Kidney Disease.  Likely due to age related decline and hypertension.   - UA " quinten.  - Ultrasound unrevealing.  -Microalbumin to creatinine ratio of 36 mg/g. Obtain repeat urinary labs with consideration of losartan for antiproteinuric effects.  -Routine management of hypertension, atrial fibrillation.   - Dose all medications for patient level of renal function  - Avoid nephrotoxic agents including contrast and NSAIDs  -Consider addition of antiproteinuric medications.  - Encourage adequate hydration.  - Continue to monitor renal function.  Obtain BMP, urine studies including urine protein quantification.     2. HTN. Spironolactone. Amlodipine 10 mg. Low sodium diet.     3. Atrial Fibrillation - continue amiodarone. Apixaban. Appreciate cardiology recommendations under care of Dr. Castelan.     4. Vitamin D Deficiency - Continue vitamin D deficiency.     5. Obesity- Caloric restriction.  Encourage exercise 2-3 times weekly. Declining ozempic at this time.      6. Elevated PSA - Appreciate urology recommendations with consideration of repeat imaging. Renal US.    Return to clinic in 3 -4 months.    Cheryl Almazan MD  Nephrology  [facility]

## 2023-12-21 ENCOUNTER — TELEPHONE (OUTPATIENT)
Dept: CARDIOLOGY | Facility: MEDICAL CENTER | Age: 68
End: 2023-12-21
Payer: MEDICARE

## 2023-12-21 NOTE — LETTER
PROCEDURE/SURGERY CLEARANCE FORM    Date: 12/21/2023   Patient Name: Chema Cuenca    Dear Surgeon or Proceduralist,      Thank you for your request for cardiac stratification of our mutual patient Chema Cuenca 1955. We have reviewed their [facility] records; and to the best of our understanding this patient has not had stenting, ablation, cardiothoracic surgery or hospitalization for cardiovascular reasons in the past 6 months.  Chema Cuenca has been seen within the past 18 months and is considered to have non-modifiable cardiac risk for this low-risk procedure/surgery. They may proceed from a cardiovascular standpoint and may hold their antiplatelet/anticoagulation as briefly as possible. Please have patient resume this medication when hemodynamically stable to do so.     Aspirin or Prasugrel   - hold 7 days prior to procedure/surgery, resume when hemodynamically stable      Clopidrogrel or Ticagrelor  - hold 7 days for all neurological procedures, hold 5 days prior to all other procedure/surgery,  resume when hemodynamically stable     Warfarin - hold 7 days for all neurological procedures, hold 5 days prior to all other procedure/surgery and coordinate with [facility] ([phone]) INR testing and dose management.      Pradaxa/Xarelto/Eliquis/Savesya - hold 1 day prior to procedure for low bleeding risk procedure, 2 days for high bleeding risk procedure, or consider holding 3 days or longer for patients with reduced kidney function (CrCl <30mL/min) or spinal/cranial surgeries/procedures.      If they have a mechanical heart valve, please coordinate with [facility] ([phone]) the proper management of their anticoagulant in the periprocedural or perioperative period.      Some patients have higher risk for cardiovascular complications or holding medication. If our patient has had prior complications of holding antiplatelet or  anticoagulants in the past and we have seen them after these events, we have addressed these concerns with the patient. They are at an unknown degree of increased risk for recurrent complication.  You may hold anticoagulation/antiplatelets for the procedure or surgery if the benefits of the procedure or surgery outweigh this nonmodifiable risk.      If Chema Cuenca 1955 has new symptoms of heart failure decompensation, unstable arrythmia, or angina please reach out and we will assess the patient.      If you have other patient-specific concerns, please feel free to reach out to the patient's cardiologist directly at [phone].     Thank you,       [facility]

## 2023-12-21 NOTE — TELEPHONE ENCOUNTER
Last OV: 1/31/23  Proposed Surgery: Colonoscopy with Deep Sedation  Surgery Date: 1/8/2024  Requesting Office Name: [facility]  Fax Number: [phone]  Preference of Location (default is surgery center unless specified by Cardiologist or [facility]SHLEY)  Prior Clearance Addressed: No      Anticoags/Antiplatelets: Apixaban   Anticoags/Antiplatelet managed by Cardiology? YES    Outstanding Cardiac Imaging : No  Stent, Cardiac Devices, or Catheterization: No  Ablation, TAVR/Valve (including open heart), Cardioversion: No  Recent Cardiac Hospitalization: No            When: N/A  History (cardiac history):   Past Medical History:   Diagnosis Date    Acute kidney injury (HCC)     stage 3 renal failure    Atrial fibrillation (HCC)     takes amiodarone and eliquis    Chickenpox     history of    Congestive heart failure ([facility]) 2019    Frequent urination     [place] measles     history of    Heart valve disease     aortic insufficiency    High cholesterol 2016    medicated    Hypertension     medicated    Hypertensive urgency     Influenza     history of    Kidney stone     history of    Longstanding persistent atrial fibrillation ([facility]) 05/03/2022    Mumps     history of    Nephrolithiasis     Obesity     Sleep apnea 2021    uses cpap    Snoring     sleep study done    Stroke ([facility]) 2021    pt told he had a stroke, but never realized he had one    Tonsillitis     history of    Typhoid fever     history of    Wears glasses              Surgical Clearance Letter Sent: YES   **Scan clearance request letter into [facility].**

## 2023-12-27 ENCOUNTER — HOSPITAL ENCOUNTER (OUTPATIENT)
Dept: LAB | Facility: MEDICAL CENTER | Age: 68
End: 2023-12-27
Attending: STUDENT IN AN ORGANIZED HEALTH CARE EDUCATION/TRAINING PROGRAM
Payer: MEDICARE

## 2023-12-27 LAB — PSA SERPL-MCNC: 3.34 NG/ML (ref 0–4)

## 2023-12-27 PROCEDURE — 36415 COLL VENOUS BLD VENIPUNCTURE: CPT | Mod: GA

## 2023-12-27 PROCEDURE — 84153 ASSAY OF PSA TOTAL: CPT | Mod: GA

## 2024-01-12 ENCOUNTER — HOSPITAL ENCOUNTER (OUTPATIENT)
Dept: LAB | Facility: MEDICAL CENTER | Age: 69
End: 2024-01-12
Attending: INTERNAL MEDICINE
Payer: MEDICARE

## 2024-01-12 ENCOUNTER — HOSPITAL ENCOUNTER (OUTPATIENT)
Dept: LAB | Facility: MEDICAL CENTER | Age: 69
End: 2024-01-12
Attending: NURSE PRACTITIONER
Payer: MEDICARE

## 2024-01-12 DIAGNOSIS — T70.29XA: ICD-10-CM

## 2024-01-12 DIAGNOSIS — N18.31 STAGE 3A CHRONIC KIDNEY DISEASE: ICD-10-CM

## 2024-01-12 DIAGNOSIS — E78.5 DYSLIPIDEMIA: ICD-10-CM

## 2024-01-12 DIAGNOSIS — N18.30 STAGE 3 CHRONIC KIDNEY DISEASE, UNSPECIFIED WHETHER STAGE 3A OR 3B CKD: ICD-10-CM

## 2024-01-12 DIAGNOSIS — Z79.899 HIGH RISK MEDICATION USE: ICD-10-CM

## 2024-01-12 DIAGNOSIS — D75.1: ICD-10-CM

## 2024-01-12 LAB
ANION GAP SERPL CALC-SCNC: 14 MMOL/L (ref 7–16)
APPEARANCE UR: CLEAR
BACTERIA #/AREA URNS HPF: NEGATIVE /HPF
BILIRUB UR QL STRIP.AUTO: NEGATIVE
BUN SERPL-MCNC: 21 MG/DL (ref 8–22)
CALCIUM SERPL-MCNC: 9.2 MG/DL (ref 8.5–10.5)
CHLORIDE SERPL-SCNC: 100 MMOL/L (ref 96–112)
CHOLEST SERPL-MCNC: 193 MG/DL (ref 100–199)
CO2 SERPL-SCNC: 21 MMOL/L (ref 20–33)
COLOR UR: YELLOW
CREAT SERPL-MCNC: 1.36 MG/DL (ref 0.5–1.4)
EPI CELLS #/AREA URNS HPF: NEGATIVE /HPF
ERYTHROCYTE [DISTWIDTH] IN BLOOD BY AUTOMATED COUNT: 41.2 FL (ref 35.9–50)
ERYTHROCYTE [DISTWIDTH] IN BLOOD BY AUTOMATED COUNT: 42 FL (ref 35.9–50)
FASTING STATUS PATIENT QL REPORTED: NORMAL
GFR SERPLBLD CREATININE-BSD FMLA CKD-EPI: 57 ML/MIN/1.73 M 2
GLUCOSE SERPL-MCNC: 93 MG/DL (ref 65–99)
GLUCOSE UR STRIP.AUTO-MCNC: NEGATIVE MG/DL
HCT VFR BLD AUTO: 54.2 % (ref 42–52)
HCT VFR BLD AUTO: 55.2 % (ref 42–52)
HDLC SERPL-MCNC: 80 MG/DL
HGB BLD-MCNC: 19.1 G/DL (ref 14–18)
HGB BLD-MCNC: 19.4 G/DL (ref 14–18)
HYALINE CASTS #/AREA URNS LPF: ABNORMAL /LPF
KETONES UR STRIP.AUTO-MCNC: ABNORMAL MG/DL
LDLC SERPL CALC-MCNC: 86 MG/DL
LEUKOCYTE ESTERASE UR QL STRIP.AUTO: NEGATIVE
MCH RBC QN AUTO: 31.5 PG (ref 27–33)
MCH RBC QN AUTO: 31.5 PG (ref 27–33)
MCHC RBC AUTO-ENTMCNC: 35.1 G/DL (ref 32.3–36.5)
MCHC RBC AUTO-ENTMCNC: 35.2 G/DL (ref 32.3–36.5)
MCV RBC AUTO: 89.3 FL (ref 81.4–97.8)
MCV RBC AUTO: 89.6 FL (ref 81.4–97.8)
MICRO URNS: ABNORMAL
NITRITE UR QL STRIP.AUTO: NEGATIVE
PH UR STRIP.AUTO: 5.5 [PH] (ref 5–8)
PLATELET # BLD AUTO: 180 K/UL (ref 164–446)
PLATELET # BLD AUTO: 183 K/UL (ref 164–446)
PMV BLD AUTO: 9.4 FL (ref 9–12.9)
PMV BLD AUTO: 9.4 FL (ref 9–12.9)
POTASSIUM SERPL-SCNC: 4.1 MMOL/L (ref 3.6–5.5)
PROT UR QL STRIP: 100 MG/DL
PTH-INTACT SERPL-MCNC: 47.1 PG/ML (ref 14–72)
RBC # BLD AUTO: 6.07 M/UL (ref 4.7–6.1)
RBC # BLD AUTO: 6.16 M/UL (ref 4.7–6.1)
RBC # URNS HPF: ABNORMAL /HPF
RBC UR QL AUTO: NEGATIVE
SODIUM SERPL-SCNC: 135 MMOL/L (ref 135–145)
SP GR UR STRIP.AUTO: 1.01
T4 FREE SERPL-MCNC: 1.4 NG/DL (ref 0.93–1.7)
TRIGL SERPL-MCNC: 137 MG/DL (ref 0–149)
TSH SERPL DL<=0.005 MIU/L-ACNC: 3.18 UIU/ML (ref 0.38–5.33)
UROBILINOGEN UR STRIP.AUTO-MCNC: 0.2 MG/DL
WBC # BLD AUTO: 6.9 K/UL (ref 4.8–10.8)
WBC # BLD AUTO: 7 K/UL (ref 4.8–10.8)
WBC #/AREA URNS HPF: ABNORMAL /HPF

## 2024-01-12 PROCEDURE — 84439 ASSAY OF FREE THYROXINE: CPT

## 2024-01-12 PROCEDURE — 82570 ASSAY OF URINE CREATININE: CPT

## 2024-01-12 PROCEDURE — 80053 COMPREHEN METABOLIC PANEL: CPT

## 2024-01-12 PROCEDURE — 36415 COLL VENOUS BLD VENIPUNCTURE: CPT

## 2024-01-12 PROCEDURE — 80061 LIPID PANEL: CPT | Mod: 91

## 2024-01-12 PROCEDURE — 85027 COMPLETE CBC AUTOMATED: CPT

## 2024-01-12 PROCEDURE — 82043 UR ALBUMIN QUANTITATIVE: CPT

## 2024-01-12 PROCEDURE — 80048 BASIC METABOLIC PNL TOTAL CA: CPT | Mod: 91

## 2024-01-12 PROCEDURE — 85027 COMPLETE CBC AUTOMATED: CPT | Mod: 91

## 2024-01-12 PROCEDURE — 83970 ASSAY OF PARATHORMONE: CPT

## 2024-01-12 PROCEDURE — 80048 BASIC METABOLIC PNL TOTAL CA: CPT

## 2024-01-12 PROCEDURE — 80061 LIPID PANEL: CPT

## 2024-01-12 PROCEDURE — 81001 URINALYSIS AUTO W/SCOPE: CPT

## 2024-01-12 PROCEDURE — 84443 ASSAY THYROID STIM HORMONE: CPT

## 2024-01-13 LAB
ALBUMIN SERPL BCP-MCNC: 4.5 G/DL (ref 3.2–4.9)
ALBUMIN/GLOB SERPL: 2 G/DL
ALP SERPL-CCNC: 64 U/L (ref 30–99)
ALT SERPL-CCNC: 27 U/L (ref 2–50)
ANION GAP SERPL CALC-SCNC: 12 MMOL/L (ref 7–16)
ANION GAP SERPL CALC-SCNC: 13 MMOL/L (ref 7–16)
AST SERPL-CCNC: 28 U/L (ref 12–45)
BILIRUB SERPL-MCNC: 1.5 MG/DL (ref 0.1–1.5)
BUN SERPL-MCNC: 20 MG/DL (ref 8–22)
BUN SERPL-MCNC: 20 MG/DL (ref 8–22)
CALCIUM ALBUM COR SERPL-MCNC: 8.8 MG/DL (ref 8.5–10.5)
CALCIUM SERPL-MCNC: 9.2 MG/DL (ref 8.5–10.5)
CALCIUM SERPL-MCNC: 9.2 MG/DL (ref 8.5–10.5)
CHLORIDE SERPL-SCNC: 102 MMOL/L (ref 96–112)
CHLORIDE SERPL-SCNC: 102 MMOL/L (ref 96–112)
CHOLEST SERPL-MCNC: 192 MG/DL (ref 100–199)
CO2 SERPL-SCNC: 22 MMOL/L (ref 20–33)
CO2 SERPL-SCNC: 22 MMOL/L (ref 20–33)
CREAT SERPL-MCNC: 1.29 MG/DL (ref 0.5–1.4)
CREAT SERPL-MCNC: 1.33 MG/DL (ref 0.5–1.4)
CREAT UR-MCNC: 78.16 MG/DL
FASTING STATUS PATIENT QL REPORTED: NORMAL
GFR SERPLBLD CREATININE-BSD FMLA CKD-EPI: 59 ML/MIN/1.73 M 2
GFR SERPLBLD CREATININE-BSD FMLA CKD-EPI: 61 ML/MIN/1.73 M 2
GLOBULIN SER CALC-MCNC: 2.2 G/DL (ref 1.9–3.5)
GLUCOSE SERPL-MCNC: 92 MG/DL (ref 65–99)
GLUCOSE SERPL-MCNC: 94 MG/DL (ref 65–99)
HDLC SERPL-MCNC: 79 MG/DL
LDLC SERPL CALC-MCNC: 85 MG/DL
MICROALBUMIN UR-MCNC: 43 MG/DL
MICROALBUMIN/CREAT UR: 550 MG/G (ref 0–30)
POTASSIUM SERPL-SCNC: 4.2 MMOL/L (ref 3.6–5.5)
POTASSIUM SERPL-SCNC: 4.2 MMOL/L (ref 3.6–5.5)
PROT SERPL-MCNC: 6.7 G/DL (ref 6–8.2)
SODIUM SERPL-SCNC: 136 MMOL/L (ref 135–145)
SODIUM SERPL-SCNC: 137 MMOL/L (ref 135–145)
TRIGL SERPL-MCNC: 138 MG/DL (ref 0–149)

## 2024-01-23 ENCOUNTER — OFFICE VISIT (OUTPATIENT)
Dept: MEDICAL GROUP | Facility: MEDICAL CENTER | Age: 69
End: 2024-01-23
Payer: MEDICARE

## 2024-01-23 VITALS
BODY MASS INDEX: 35.43 KG/M2 | WEIGHT: 220.46 LBS | SYSTOLIC BLOOD PRESSURE: 144 MMHG | DIASTOLIC BLOOD PRESSURE: 96 MMHG | RESPIRATION RATE: 18 BRPM | OXYGEN SATURATION: 94 % | HEIGHT: 66 IN | TEMPERATURE: 98.1 F | HEART RATE: 120 BPM

## 2024-01-23 DIAGNOSIS — G25.0 BENIGN ESSENTIAL TREMOR: ICD-10-CM

## 2024-01-23 DIAGNOSIS — D75.1 POLYCYTHEMIA: ICD-10-CM

## 2024-01-23 DIAGNOSIS — N18.30 STAGE 3 CHRONIC KIDNEY DISEASE, UNSPECIFIED WHETHER STAGE 3A OR 3B CKD: ICD-10-CM

## 2024-01-23 DIAGNOSIS — I10 ESSENTIAL HYPERTENSION: ICD-10-CM

## 2024-01-23 DIAGNOSIS — D68.69 OTHER THROMBOPHILIA (HCC): ICD-10-CM

## 2024-01-23 DIAGNOSIS — G47.30 SLEEP APNEA, UNSPECIFIED TYPE: ICD-10-CM

## 2024-01-23 DIAGNOSIS — T70.29XA: ICD-10-CM

## 2024-01-23 DIAGNOSIS — E66.01 MORBID (SEVERE) OBESITY DUE TO EXCESS CALORIES (HCC): ICD-10-CM

## 2024-01-23 DIAGNOSIS — I48.11 LONGSTANDING PERSISTENT ATRIAL FIBRILLATION (HCC): ICD-10-CM

## 2024-01-23 DIAGNOSIS — D75.1: ICD-10-CM

## 2024-01-23 DIAGNOSIS — I50.30 ACC/AHA STAGE C HEART FAILURE WITH PRESERVED EJECTION FRACTION (HCC): ICD-10-CM

## 2024-01-23 DIAGNOSIS — E26.1 SECONDARY HYPERALDOSTERONISM (HCC): ICD-10-CM

## 2024-01-23 PROBLEM — K64.0 FIRST DEGREE HEMORRHOIDS: Status: ACTIVE | Noted: 2024-01-08

## 2024-01-23 PROBLEM — K63.5 POLYP OF COLON: Status: ACTIVE | Noted: 2024-01-08

## 2024-01-23 PROBLEM — Z00.00 HEALTH CARE MAINTENANCE: Status: RESOLVED | Noted: 2018-02-14 | Resolved: 2024-01-23

## 2024-01-23 PROBLEM — I48.91 AFIB (HCC): Status: ACTIVE | Noted: 2024-01-23

## 2024-01-23 PROBLEM — K57.30 DIVERTICULOSIS OF LARGE INTESTINE WITHOUT PERFORATION OR ABSCESS WITHOUT BLEEDING: Status: ACTIVE | Noted: 2024-01-08

## 2024-01-23 PROBLEM — R97.20 ABNORMAL PROSTATE SPECIFIC ANTIGEN (PSA): Status: ACTIVE | Noted: 2021-10-07

## 2024-01-23 PROBLEM — I63.40 CEREBRAL INFARCTION DUE TO EMBOLISM OF UNSPECIFIED CEREBRAL ARTERY (HCC): Status: ACTIVE | Noted: 2024-01-23

## 2024-01-23 PROCEDURE — 3080F DIAST BP >= 90 MM HG: CPT | Performed by: NURSE PRACTITIONER

## 2024-01-23 PROCEDURE — 3077F SYST BP >= 140 MM HG: CPT | Performed by: NURSE PRACTITIONER

## 2024-01-23 PROCEDURE — 99214 OFFICE O/P EST MOD 30 MIN: CPT | Performed by: NURSE PRACTITIONER

## 2024-01-23 RX ORDER — OXYCODONE HYDROCHLORIDE AND ACETAMINOPHEN 5; 325 MG/1; MG/1
TABLET ORAL
COMMUNITY
End: 2024-01-23

## 2024-01-23 RX ORDER — TAMSULOSIN HYDROCHLORIDE 0.4 MG/1
0.4 CAPSULE ORAL DAILY
COMMUNITY
Start: 2024-01-03

## 2024-01-23 RX ORDER — POLYETHYLENE GLYCOL-3350 AND ELECTROLYTES 236; 6.74; 5.86; 2.97; 22.74 G/274.31G; G/274.31G; G/274.31G; G/274.31G; G/274.31G
POWDER, FOR SOLUTION ORAL
COMMUNITY
Start: 2023-12-12 | End: 2024-01-23

## 2024-01-23 RX ORDER — PROPRANOLOL HYDROCHLORIDE 10 MG/1
10 TABLET ORAL 3 TIMES DAILY
Qty: 90 TABLET | Refills: 0 | Status: SHIPPED | OUTPATIENT
Start: 2024-01-23

## 2024-01-23 ASSESSMENT — PATIENT HEALTH QUESTIONNAIRE - PHQ9: CLINICAL INTERPRETATION OF PHQ2 SCORE: 0

## 2024-01-23 ASSESSMENT — FIBROSIS 4 INDEX: FIB4 SCORE: 2.01

## 2024-01-23 NOTE — ASSESSMENT & PLAN NOTE
Latest Reference Range & Units 11/17/23 14:19 01/12/24 12:42 01/12/24 12:44   Hemoglobin 14.0 - 18.0 g/dL 17.0 19.1 (H) 19.4 (H)   Hematocrit 42.0 - 52.0 % 52.8 (H) 54.2 (H) 55.2 (H)   (H): Data is abnormally high.  Adequate daily water intake. Not a smoker.

## 2024-01-23 NOTE — ASSESSMENT & PLAN NOTE
Chronic problem. BP today 144/96 with pulse of 120. Hx of AF (on Eliquis, and amiodarone). Denies CP, SOB, palpitations.  Home log with BPs 120-140/ 70-90s.  Following with cardiology Dr. Castelan and also seeing nephrologist.  Discussed low dose of beta clocker, pt declines, would like to see his cardiologist first.

## 2024-01-23 NOTE — ASSESSMENT & PLAN NOTE
New problem today. Chronic intermittent problem for pt.  The tremor usually occurs in the fingers of the right hand and right leg.

## 2024-01-23 NOTE — ASSESSMENT & PLAN NOTE
Latest Reference Range & Units 01/12/24 12:42 01/12/24 12:44   GFR (CKD-EPI) >60 mL/min/1.73 m 2 59 ! 61   !: Data is abnormal

## 2024-01-23 NOTE — PROGRESS NOTES
Chief Complaint   Patient presents with    Follow-Up    Lab Results    Shaking     Right hand/side       HISTORY OF PRESENT ILLNESS: Patient is a 68 y.o. male, established patient who presents today to discuss medical problems as listed below:    Health Maintenance:  COMPLETED       Allergies: Patient has no known allergies.    Current Outpatient Medications   Medication Sig Dispense Refill    tamsulosin (FLOMAX) 0.4 MG capsule Take 1 capsule every day by oral route at bedtime for 30 days.      propranolol (INDERAL) 10 MG Tab Take 1 Tablet by mouth 3 times a day. 90 Tablet 0    spironolactone (ALDACTONE) 25 MG Tab Take 1 Tablet by mouth every day. 90 Tablet 3    amiodarone (CORDARONE) 200 MG Tab Take 1 Tablet by mouth every day. 90 Tablet 3    amLODIPine (NORVASC) 10 MG Tab Take 1 Tablet by mouth every day. 90 Tablet 3    apixaban (ELIQUIS) 5mg Tab Take 1 Tablet by mouth 2 times a day. 180 Tablet 3    multivitamin (THERAGRAN) Tab Take 1 Tab by mouth every day.      atorvastatin (LIPITOR) 80 MG tablet Take 1 Tablet by mouth every evening. (Patient not taking: Reported on 1/23/2024) 90 Tablet 3     No current facility-administered medications for this visit.       Allergies, past medical history, past surgical history, family history, social history reviewed and updated.    Review of Systems:     - Constitutional: Negative for fever, chills, unexpected weight change, and fatigue/generalized weakness.       - Respiratory: Negative for cough, sputum production, chest congestion, dyspnea, wheezing, and crackles.      - Cardiovascular: Negative for chest pain, palpitations, orthopnea, and bilateral lower extremity edema.     - Psychiatric/Behavioral: Negative for depression, suicidal/homicidal ideation and memory loss.      All other systems reviewed and are negative    Exam:    BP (!) 144/96 (BP Location: Left arm, Patient Position: Sitting, BP Cuff Size: Adult)   Pulse (!) 120   Temp 36.7 °C (98.1 °F) (Temporal)    "Resp 18   Ht 1.676 m (5' 6\")   Wt 100 kg (220 lb 7.4 oz)   SpO2 94%   BMI 35.58 kg/m²  Body mass index is 35.58 kg/m².    Physical Exam:  Constitutional: Well-developed and well-nourished. Not diaphoretic. No distress.   Cardiovascular: Regular rate and rhythm, S1 and S2 without murmur, rubs, or gallops.    Chest: Effort normal. Clear to auscultation throughout. No adventitious sounds. Neurological: Alert and oriented x 3.   Psychiatric:  Behavior, mood, and affect are appropriate.  MA/nursing note and vitals reviewed.    LABS: 1/24  results reviewed and discussed with the patient, questions answered.    Assessment/Plan:  Altitude erythrocytosis   Latest Reference Range & Units 11/17/23 14:19 01/12/24 12:42 01/12/24 12:44   Hemoglobin 14.0 - 18.0 g/dL 17.0 19.1 (H) 19.4 (H)   Hematocrit 42.0 - 52.0 % 52.8 (H) 54.2 (H) 55.2 (H)   (H): Data is abnormally high.  Adequate daily water intake. Not a smoker.     Essential hypertension  Chronic problem. BP today 144/96 with pulse of 120. Hx of AF (on Eliquis, and amiodarone). Denies CP, SOB, palpitations.  Home log with BPs 120-140/ 70-90s.  Following with cardiology Dr. Castelan and also seeing nephrologist.  Discussed low dose of beta clocker, pt declines, would like to see his cardiologist first.       Stage 3 chronic kidney disease (HCC)   Latest Reference Range & Units 01/12/24 12:42 01/12/24 12:44   GFR (CKD-EPI) >60 mL/min/1.73 m 2 59 ! 61   !: Data is abnormal    Benign essential tremor  New problem today. Chronic intermittent problem for pt.  The tremor usually occurs in the fingers of the right hand and right leg.    1. Altitude erythrocytosis  Uncontrolled, stable.  Patient needs therapeutic phlebotomy.  Was not able to donate blood recently secondary to higher blood pressure.  Patient to follow-up with hematology.  - Referral to Hematology Oncology  - CBC WITH DIFFERENTIAL; Future  - RETICULOCYTES COUNT; Future  - IRON/TOTAL IRON BIND; Future  - HAPTOGLOBIN; " Future  - FERRITIN; Future    2. Essential hypertension  Stable on current regimen.  Patient will be following up with cardiology.  Continue to monitor home blood pressure log.    3. Stage 3 chronic kidney disease, unspecified whether stage 3a or 3b CKD (HCC)  Stable on current regimen.  Continue.  Following with nephrology.    4. Benign essential tremor  Uncontrolled, stable.  Trial of propranolol.  - propranolol (INDERAL) 10 MG Tab; Take 1 Tablet by mouth 3 times a day.  Dispense: 90 Tablet; Refill: 0    5. Polycythemia  Uncontrolled.  Patient be sent to therapeutic phlebotomy and hematology for monitoring.  - RETICULOCYTES COUNT; Future  - IRON/TOTAL IRON BIND; Future  - FERRITIN; Future    6. Morbid (severe) obesity due to excess calories (HCC)  Uncontrolled, stable.  Continue healthy lifestyle including nutrition and daily exercise to preference and tolerance, 45 to 60 minutes daily.    7. Body mass index (BMI) 35.0-35.9, adult    8. Sleep apnea, unspecified type    9. ACC/AHA stage C heart failure with preserved ejection fraction (HCC)    10. Longstanding persistent atrial fibrillation (HCC)    11. Secondary hyperaldosteronism (HCC)    12. Other thrombophilia (HCC)      Discussed with patient possible alternative diagnoses, patient is to take all medications as prescribed.      If symptoms persist FU w/PCP, if symptoms worsen go to emergency room.      If experiencing any side effects from prescribed medications report to the office immediately or go to emergency room.     Reviewed indication, dosage, usage and potential adverse effects of prescribed medications.      Reviewed risks and benefits of treatment plan. Patient verbalizes understanding of all instruction and verbally agrees to plan.     Discussed plan with the patient, and patient agrees to the above.      I personally reviewed prior external notes and test results pertinent to today's visit.      No follow-ups on file. 3 mos

## 2024-02-07 ENCOUNTER — TELEMEDICINE (OUTPATIENT)
Dept: MEDICAL GROUP | Facility: MEDICAL CENTER | Age: 69
End: 2024-02-07
Payer: MEDICARE

## 2024-02-07 DIAGNOSIS — I48.91 ATRIAL FIBRILLATION, UNSPECIFIED TYPE (HCC): ICD-10-CM

## 2024-02-07 DIAGNOSIS — T70.29XA: ICD-10-CM

## 2024-02-07 DIAGNOSIS — I48.11 LONGSTANDING PERSISTENT ATRIAL FIBRILLATION (HCC): ICD-10-CM

## 2024-02-07 DIAGNOSIS — D75.1: ICD-10-CM

## 2024-02-07 PROBLEM — K64.0 FIRST DEGREE HEMORRHOIDS: Status: RESOLVED | Noted: 2024-01-08 | Resolved: 2024-02-07

## 2024-02-07 PROBLEM — I35.1 MODERATE AORTIC INSUFFICIENCY: Status: RESOLVED | Noted: 2018-04-05 | Resolved: 2024-02-07

## 2024-02-07 PROBLEM — E26.1 SECONDARY HYPERALDOSTERONISM (HCC): Status: RESOLVED | Noted: 2023-07-20 | Resolved: 2024-02-07

## 2024-02-07 PROBLEM — Z00.6 RESEARCH STUDY PATIENT: Status: RESOLVED | Noted: 2018-04-09 | Resolved: 2024-02-07

## 2024-02-07 PROBLEM — I10 HYPERTENSIVE DISORDER: Status: ACTIVE | Noted: 2023-08-09

## 2024-02-07 PROCEDURE — 99214 OFFICE O/P EST MOD 30 MIN: CPT | Mod: 95 | Performed by: NURSE PRACTITIONER

## 2024-02-07 NOTE — ASSESSMENT & PLAN NOTE
Chronic and stable.  Paroxysmal A-fib, anticoagulated, on Eliquis 5 mg twice daily, denies bleeding.  Follow-up with cardiology, Dr. Castelan.

## 2024-02-07 NOTE — PROGRESS NOTES
Virtual Visit: Established Patient   This visit was conducted via Zoom using secure and encrypted videoconferencing technology.   The patient was in their home in the [facility].    The patient's identity was confirmed and verbal consent was obtained for this virtual visit.   This evaluation was conducted via Zoom using secure and encrypted videoconferencing technology. The patient was in their home in the [facility].    The patient's identity was confirmed and verbal consent was obtained for this virtual visit.     Subjective:   CC: No chief complaint on file.      Chema Cuenca is a 68 y.o. male presenting for evaluation and management of:    Altitude erythrocytosis   Latest Reference Range & Units 01/12/24 12:42 01/12/24 12:44   RBC 4.70 - 6.10 M/uL 6.07 6.16 (H)   Hemoglobin 14.0 - 18.0 g/dL 19.1 (H) 19.4 (H)   Hematocrit 42.0 - 52.0 % 54.2 (H) 55.2 (H)   (H): Data is abnormally high  Noted trajectory of increasing hemoglobin and hematocrit.  Patient with history of embolic stroke.  He is non-smoker and well-hydrated.  [place]  No history of hemochromatosis.  Last normal hemoglobin reading in November 2023.  He is scheduled with hematology and needs paperwork to donate his blood.  He is on Eliquis for A-fib.    Longstanding persistent atrial fibrillation (HCC)  Chronic and stable.  Paroxysmal A-fib, anticoagulated, on Eliquis 5 mg twice daily, denies bleeding.  Follow-up with cardiology, Dr. Ragsdale    Afib (HCC)  Chronic and stable.  Paroxysmal A-fib, anticoagulated, on Eliquis 5 mg twice daily, denies bleeding.  Follow-up with cardiology, Dr. Jn JUSTIN     Current medicines (including changes today)  Current Outpatient Medications   Medication Sig Dispense Refill    tamsulosin (FLOMAX) 0.4 MG capsule Take 1 capsule every day by oral route at bedtime for 30 days.      propranolol (INDERAL) 10 MG Tab Take 1 Tablet by mouth 3 times a day. 90 Tablet 0    spironolactone  (ALDACTONE) 25 MG Tab Take 1 Tablet by mouth every day. 90 Tablet 3    amiodarone (CORDARONE) 200 MG Tab Take 1 Tablet by mouth every day. 90 Tablet 3    amLODIPine (NORVASC) 10 MG Tab Take 1 Tablet by mouth every day. 90 Tablet 3    apixaban (ELIQUIS) 5mg Tab Take 1 Tablet by mouth 2 times a day. 180 Tablet 3     No current facility-administered medications for this visit.       Patient Active Problem List    Diagnosis Date Noted    Afib ([facility]) 01/23/2024    Cerebral infarction due to embolism of unspecified cerebral artery (HCC) 01/23/2024    Benign essential tremor 01/23/2024    Morbid (severe) obesity due to excess calories ([facility]) 01/23/2024    Diverticulosis of large intestine without perforation or abscess without bleeding 01/08/2024    Polyp of colon 01/08/2024    Hypertensive disorder 08/09/2023    Other thrombophilia ([facility]) 07/20/2023    Altitude erythrocytosis 07/20/2023    Vision changes 06/14/2023    Sleep apnea 06/14/2023    Abnormal prostate specific antigen (PSA) 10/07/2021    Dyslipidemia 04/26/2019    Gastroesophageal reflux disease 04/26/2019    Acquired renal cyst of right kidney 10/19/2018    Stage 3 chronic kidney disease (HCC) 04/20/2018    Essential hypertension 04/20/2018    ACC/AHA stage C heart failure with preserved ejection fraction (HCC) 04/05/2018    Heart disease, hypertensive, malignant 02/14/2018        Objective:   There were no vitals taken for this visit.    Physical Exam:  Constitutional: Alert, no distress, well-groomed.  Skin: No rashes in visible areas.  Eye: Round. Conjunctiva clear, lids normal. No icterus.   ENMT: Lips pink without lesions, good dentition, moist mucous membranes. Phonation normal.  Neck: No masses, no thyromegaly. Moves freely without pain.  Respiratory: Unlabored respiratory effort, no cough or audible wheeze  Psych: Alert and oriented x3, normal affect and mood.     Assessment and Plan:   The following treatment plan was discussed:   1. Altitude  erythrocytosis  Uncontrolled, stable.  Patient to follow-up with hematology, he will be scheduling for therapeutic phlebotomy with [facility].  Paperwork for vital and filled out, scanned into media, faxed at the number indicated and a copy will be sent to patient via [employer].    2. Longstanding persistent atrial fibrillation (HCC)  Stable on current regimen.  Continue.  Follow-up with cardiology.    3. Atrial fibrillation, unspecified type (HCC)  Stable on current regimen.  Continue.  Follow-up with cardiology.       Discussed with patient possible alternative diagnoses, patient is to take all medications as prescribed.      If symptoms persist FU w/PCP, if symptoms worsen go to emergency room.      If experiencing any side effects from prescribed medications report to the office immediately or go to emergency room.     Reviewed indication, dosage, usage and potential adverse effects of prescribed medications.      Reviewed risks and benefits of treatment plan. Patient verbalizes understanding of all instruction and verbally agrees to plan.     Discussed plan with the patient, and patient agrees to the above.      I personally reviewed prior external notes and test results pertinent to today's visit.     Follow-up: 3 mos

## 2024-02-07 NOTE — ASSESSMENT & PLAN NOTE
Latest Reference Range & Units 01/12/24 12:42 01/12/24 12:44   RBC 4.70 - 6.10 M/uL 6.07 6.16 (H)   Hemoglobin 14.0 - 18.0 g/dL 19.1 (H) 19.4 (H)   Hematocrit 42.0 - 52.0 % 54.2 (H) 55.2 (H)   (H): Data is abnormally high  Noted trajectory of increasing hemoglobin and hematocrit.  Patient with history of embolic stroke.  He is non-smoker and well-hydrated.  He lives at the [facility].  No history of hemochromatosis.  Last normal hemoglobin reading in November 2023.  He is scheduled with hematology and needs paperwork to donate his blood.  He is on Eliquis for A-fib.

## 2024-02-15 ENCOUNTER — HOSPITAL ENCOUNTER (OUTPATIENT)
Facility: MEDICAL CENTER | Age: 69
End: 2024-02-15
Attending: INTERNAL MEDICINE
Payer: MEDICARE

## 2024-02-15 PROCEDURE — 82668 ASSAY OF ERYTHROPOIETIN: CPT

## 2024-02-16 ENCOUNTER — PATIENT MESSAGE (OUTPATIENT)
Dept: HEALTH INFORMATION MANAGEMENT | Facility: OTHER | Age: 69
End: 2024-02-16

## 2024-02-17 LAB — EPO SERPL-ACNC: 10 MU/ML (ref 4–27)

## 2024-03-06 ENCOUNTER — OFFICE VISIT (OUTPATIENT)
Dept: NEPHROLOGY | Facility: MEDICAL CENTER | Age: 69
End: 2024-03-06
Payer: MEDICARE

## 2024-03-06 VITALS
BODY MASS INDEX: 35.36 KG/M2 | TEMPERATURE: 97.8 F | SYSTOLIC BLOOD PRESSURE: 122 MMHG | HEART RATE: 50 BPM | DIASTOLIC BLOOD PRESSURE: 70 MMHG | HEIGHT: 66 IN | OXYGEN SATURATION: 91 % | WEIGHT: 220 LBS | RESPIRATION RATE: 18 BRPM

## 2024-03-06 DIAGNOSIS — N18.31 STAGE 3A CHRONIC KIDNEY DISEASE: ICD-10-CM

## 2024-03-06 PROCEDURE — 3078F DIAST BP <80 MM HG: CPT | Performed by: INTERNAL MEDICINE

## 2024-03-06 PROCEDURE — 99214 OFFICE O/P EST MOD 30 MIN: CPT | Performed by: INTERNAL MEDICINE

## 2024-03-06 PROCEDURE — 3074F SYST BP LT 130 MM HG: CPT | Performed by: INTERNAL MEDICINE

## 2024-03-06 ASSESSMENT — FIBROSIS 4 INDEX: FIB4 SCORE: 2.01

## 2024-03-06 NOTE — PROGRESS NOTES
"NEPHROLOGY PROGRESS NOTE        HPI:  Chema Cuenca is a 68 y.o. male who presents today for chronic kidney disease.    Denies chest pain or shortness of breath.  Denies nausea vomiting and loose stools.  Patient seen 4 months ago at which time patient maintained on antihypertensive agents.    Patient reports leg swelling throughout to be attributed to long travel.    He continues to have urinary complaints while on tamsulosin.        Outpatient Encounter Medications as of 3/6/2024   Medication Sig Dispense Refill    tamsulosin (FLOMAX) 0.4 MG capsule Take 1 capsule every day by oral route at bedtime for 30 days.      propranolol (INDERAL) 10 MG Tab Take 1 Tablet by mouth 3 times a day. 90 Tablet 0    spironolactone (ALDACTONE) 25 MG Tab Take 1 Tablet by mouth every day. 90 Tablet 3    amiodarone (CORDARONE) 200 MG Tab Take 1 Tablet by mouth every day. 90 Tablet 3    amLODIPine (NORVASC) 10 MG Tab Take 1 Tablet by mouth every day. 90 Tablet 3    apixaban (ELIQUIS) 5mg Tab Take 1 Tablet by mouth 2 times a day. 180 Tablet 3     No facility-administered encounter medications on file as of 3/6/2024.        No Known Allergies    ROS    /70 (BP Location: Right arm, Patient Position: Sitting, BP Cuff Size: Adult)   Pulse (!) 50   Temp 36.6 °C (97.8 °F) (Temporal)   Resp 18   Ht 1.676 m (5' 6\")   Wt 99.8 kg (220 lb)   SpO2 91%   BMI 35.51 kg/m²     Physical Exam  GEN: alert and oriented. In no acute distress.   HEENT: moist oropharyngeal mucous membranes  CV:RRR  PULM: clear to auscultation bilaterally  ABD: soft non tender non distended  EXT: warm well perfused, no lower extremity edema.    Labs reviewed.  Recent Labs     07/15/23  0759 09/21/23  1335 01/12/24  1239 01/12/24  1242 01/12/24  1244   ALBUMIN 4.6  --   --  4.5  --    HDL 62  --  80 79  --    TRIGLYCERIDE 156*  --  137 138  --    SODIUM 137   < > 135 137 136   POTASSIUM 4.8   < > 4.1 4.2 4.2   CHLORIDE 102   < > 100 102 102   CO2 24   < > " "21 22 22   BUN 32*   < > 21 20 20   CREATININE 1.59*   < > 1.36 1.33 1.29    < > = values in this interval not displayed.       Lab Results   Component Value Date/Time    WBC 6.9 01/12/2024 12:44 PM    RBC 6.16 (H) 01/12/2024 12:44 PM    HEMOGLOBIN 19.4 (H) 01/12/2024 12:44 PM    HEMATOCRIT 55.2 (H) 01/12/2024 12:44 PM    MCV 89.6 01/12/2024 12:44 PM    MCH 31.5 01/12/2024 12:44 PM    MCHC 35.1 01/12/2024 12:44 PM    MPV 9.4 01/12/2024 12:44 PM              URINALYSIS:  Lab Results   Component Value Date/Time    COLORURINE Yellow 01/12/2024 1244    CLARITY Clear 01/12/2024 1244    SPECGRAVITY 1.014 01/12/2024 1244    PHURINE 5.5 01/12/2024 1244    KETONES Trace (A) 01/12/2024 1244    PROTEINURIN 100 (A) 01/12/2024 1244    BILIRUBINUR Negative 01/12/2024 1244    UROBILU 0.2 01/12/2024 1244    NITRITE Negative 01/12/2024 1244    LEUKESTERAS Negative 01/12/2024 1244    OCCULTBLOOD Negative 01/12/2024 1244     UPC  No results found for: \"TOTPROTUR\" No results found for: \"CREATININEU\"    Imaging report(s) reviewed  No orders to display     Assessment:  Chronic Kidney Disease. Much improved renal function with essentially normal renal function at this time.  Fortunately he has mild proteinuria microalbumin to creatinine ratio 550 mg/g.  No plan for intervention at this time recommend continued observation per patient preference.  - UA remains bland.  - Ultrasound unrevealing.  -Microalbumin to creatinine ratio of 550 mg/g.   -Routine management of hypertension, atrial fibrillation.   - Dose all medications for patient level of renal function  - Avoid nephrotoxic agents including contrast and NSAIDs  -Consider addition of antiproteinuric medications.  - Encourage adequate hydration.  - Continue to monitor renal function.  Obtain BMP, urine studies including urine protein quantification.     2. HTN. Spironolactone. Amlodipine 10 mg. Low sodium diet.     3. Paroxysmal Atrial Fibrillation - continue amiodarone. Apixaban. " Appreciate cardiology recommendations under care of Dr. Castelan.     4. Vitamin D Deficiency - Continue vitamin D deficiency.     5. Obesity-continue caloric restriction. Continue routine encourage exercise 2-3 times weekly. Declining ozempic at this time.      6. Elevated PSA - Appreciate urology recommendations.    7. Altitude Erythocytosis - stable. Likely due to altitude.     Return to clinic in 1 year, following proteinuria.        Cheryl Almazan MD  Nephrology  [facility]

## 2024-03-08 ENCOUNTER — HOSPITAL ENCOUNTER (OUTPATIENT)
Dept: RADIOLOGY | Facility: MEDICAL CENTER | Age: 69
End: 2024-03-08
Attending: INTERNAL MEDICINE
Payer: MEDICARE

## 2024-03-08 DIAGNOSIS — D75.1 POLYCYTHEMIA, SECONDARY: ICD-10-CM

## 2024-03-08 PROCEDURE — 76700 US EXAM ABDOM COMPLETE: CPT

## 2024-03-11 ENCOUNTER — HOSPITAL ENCOUNTER (OUTPATIENT)
Facility: MEDICAL CENTER | Age: 69
End: 2024-03-11
Attending: INTERNAL MEDICINE
Payer: MEDICARE

## 2024-03-11 PROCEDURE — 83550 IRON BINDING TEST: CPT

## 2024-03-11 PROCEDURE — 83540 ASSAY OF IRON: CPT

## 2024-03-11 PROCEDURE — 82728 ASSAY OF FERRITIN: CPT

## 2024-03-12 LAB
FERRITIN SERPL-MCNC: 4734 NG/ML (ref 22–322)
IRON SATN MFR SERPL: 42 % (ref 15–55)
IRON SERPL-MCNC: 143 UG/DL (ref 50–180)
TIBC SERPL-MCNC: 338 UG/DL (ref 250–450)
UIBC SERPL-MCNC: 195 UG/DL (ref 110–370)

## 2024-03-13 ENCOUNTER — TELEPHONE (OUTPATIENT)
Dept: CARDIOLOGY | Facility: MEDICAL CENTER | Age: 69
End: 2024-03-13

## 2024-03-13 ENCOUNTER — OFFICE VISIT (OUTPATIENT)
Dept: CARDIOLOGY | Facility: MEDICAL CENTER | Age: 69
End: 2024-03-13
Attending: INTERNAL MEDICINE
Payer: MEDICARE

## 2024-03-13 ENCOUNTER — TELEPHONE (OUTPATIENT)
Dept: CARDIOLOGY | Facility: MEDICAL CENTER | Age: 69
End: 2024-03-13
Payer: MEDICARE

## 2024-03-13 VITALS
SYSTOLIC BLOOD PRESSURE: 124 MMHG | DIASTOLIC BLOOD PRESSURE: 86 MMHG | WEIGHT: 222.8 LBS | RESPIRATION RATE: 16 BRPM | BODY MASS INDEX: 35.81 KG/M2 | OXYGEN SATURATION: 92 % | HEART RATE: 85 BPM | HEIGHT: 66 IN

## 2024-03-13 DIAGNOSIS — I10 ESSENTIAL HYPERTENSION: ICD-10-CM

## 2024-03-13 DIAGNOSIS — I10 HTN (HYPERTENSION), MALIGNANT: ICD-10-CM

## 2024-03-13 DIAGNOSIS — N18.32 STAGE 3B CHRONIC KIDNEY DISEASE: ICD-10-CM

## 2024-03-13 DIAGNOSIS — I48.0 PAROXYSMAL ATRIAL FIBRILLATION (HCC): ICD-10-CM

## 2024-03-13 DIAGNOSIS — E78.2 MIXED HYPERLIPIDEMIA: ICD-10-CM

## 2024-03-13 DIAGNOSIS — I50.30 ACC/AHA STAGE C HEART FAILURE WITH PRESERVED EJECTION FRACTION (HCC): ICD-10-CM

## 2024-03-13 DIAGNOSIS — I51.89 LEFT VENTRICULAR DIASTOLIC DYSFUNCTION, NYHA CLASS 2: ICD-10-CM

## 2024-03-13 DIAGNOSIS — I48.3 TYPICAL ATRIAL FLUTTER (HCC): ICD-10-CM

## 2024-03-13 DIAGNOSIS — D68.69 HYPERCOAGULABLE STATE DUE TO PERSISTENT ATRIAL FIBRILLATION (HCC): ICD-10-CM

## 2024-03-13 DIAGNOSIS — I48.19 PERSISTENT ATRIAL FIBRILLATION (HCC): ICD-10-CM

## 2024-03-13 DIAGNOSIS — I63.9 CEREBROVASCULAR ACCIDENT (CVA), UNSPECIFIED MECHANISM (HCC): ICD-10-CM

## 2024-03-13 DIAGNOSIS — R06.09 DYSPNEA ON EXERTION: ICD-10-CM

## 2024-03-13 DIAGNOSIS — I48.19 HYPERCOAGULABLE STATE DUE TO PERSISTENT ATRIAL FIBRILLATION (HCC): ICD-10-CM

## 2024-03-13 DIAGNOSIS — Z79.899 HIGH RISK MEDICATION USE: ICD-10-CM

## 2024-03-13 DIAGNOSIS — G47.33 OSA ON CPAP: ICD-10-CM

## 2024-03-13 LAB — EKG IMPRESSION: NORMAL

## 2024-03-13 PROCEDURE — 93005 ELECTROCARDIOGRAM TRACING: CPT | Performed by: INTERNAL MEDICINE

## 2024-03-13 PROCEDURE — 99212 OFFICE O/P EST SF 10 MIN: CPT | Performed by: INTERNAL MEDICINE

## 2024-03-13 PROCEDURE — 3079F DIAST BP 80-89 MM HG: CPT | Performed by: INTERNAL MEDICINE

## 2024-03-13 PROCEDURE — 99214 OFFICE O/P EST MOD 30 MIN: CPT | Performed by: INTERNAL MEDICINE

## 2024-03-13 PROCEDURE — 93010 ELECTROCARDIOGRAM REPORT: CPT | Performed by: INTERNAL MEDICINE

## 2024-03-13 PROCEDURE — 3074F SYST BP LT 130 MM HG: CPT | Performed by: INTERNAL MEDICINE

## 2024-03-13 RX ORDER — AMLODIPINE BESYLATE 10 MG/1
10 TABLET ORAL DAILY
Qty: 90 TABLET | Refills: 3 | Status: SHIPPED | OUTPATIENT
Start: 2024-03-13

## 2024-03-13 RX ORDER — SPIRONOLACTONE 25 MG/1
25 TABLET ORAL DAILY
Qty: 90 TABLET | Refills: 3 | Status: SHIPPED | OUTPATIENT
Start: 2024-03-13

## 2024-03-13 RX ORDER — AMIODARONE HYDROCHLORIDE 200 MG/1
200 TABLET ORAL DAILY
Qty: 90 TABLET | Refills: 3 | Status: SHIPPED | OUTPATIENT
Start: 2024-03-13

## 2024-03-13 ASSESSMENT — ENCOUNTER SYMPTOMS
DIZZINESS: 0
EYE DISCHARGE: 0
COUGH: 0
HEADACHES: 0
BLOOD IN STOOL: 0
VOMITING: 0
EYE PAIN: 0
SENSORY CHANGE: 0
CLAUDICATION: 0
LOSS OF CONSCIOUSNESS: 0
WEIGHT LOSS: 0
PND: 0
DEPRESSION: 0
CHILLS: 0
FEVER: 0
NAUSEA: 0
PALPITATIONS: 0
BLURRED VISION: 0
HALLUCINATIONS: 0
ORTHOPNEA: 0
BRUISES/BLEEDS EASILY: 0
SPEECH CHANGE: 0
DOUBLE VISION: 0
ABDOMINAL PAIN: 0
FALLS: 0
SHORTNESS OF BREATH: 0
MYALGIAS: 0

## 2024-03-13 ASSESSMENT — FIBROSIS 4 INDEX: FIB4 SCORE: 2.01

## 2024-03-13 NOTE — TELEPHONE ENCOUNTER
Rory Rios,      Patient was informed you will be reaching out to schedule: CL-CARDIOVERSION [[id number]] . Ordered per TT, Thank you.

## 2024-03-13 NOTE — PROGRESS NOTES
Chief Complaint   Patient presents with    Follow-Up     Dx: ACC/AHA stage C heart failure with preserved ejection fraction (HCC)    Atrial Fibrillation    Hypertension       Subjective:   Chema Sarabia is a 68 y.o. male who presents today for cardiac care and evaluation in our heart failure clinic after his recent episode of hypertensive emergency for which he went into heart failure exacerbation as well. Patient does have documented transthoracic echocardiogram which showed relatively preserved LV function of 55%. No significant valvular disease.     Feeling very well. No dyspnea.     At prior visit, patient was able to complete 549 m during his 6 minute walk test. his O2 saturation at baseline was 94% and at the end of the test, the O2 saturation was 95%. he reported 0 level of dyspnea on Jim scale.     06/06/2019 Patient was able to complete 549 m during his 6 minute walk test. his O2 saturation at baseline was 95% and at the end of the test, the O2 saturation was 93%. he reported 1 level of dyspnea on Jim scale.    In July of 2021, for 1 week, he had symptoms of difficult finding words and incoherence. MRI of the brain showed evidence of stroke. His symptoms resolved at this time.    Chema was placed on cardiac event monitor and found to have paroxysmal atrial fibrillation.  He was started on Eliquis 5 mg p.o. twice a day.    Most recent transthoracic echocardiogram last week show evidence of slightly reduced left ventricular systolic function of 50%, concentric biventricular hypertrophy.  Patient did have blood tests which did not show increase free light change.  I personally interpreted images of his transthoracic echocardiogram and blood test results.    On May 3, 2022, patient underwent unsuccessful cardioversion for atrial fibrillation at this time.  He was then placed on amiodarone p.o. therapy.      I have independently interpreted and reviewed blood tests results with patient in clinic which shows  elevated LDL level 85, normal triglycerides 138 down from 267, reduced GFR of 61, K of 4.2. NT pro BNP of 51. Hgba1c of 5.7. high Ferritin level. + microalbumin in urine.    Serum light chains are within range.    Of note, patient has been taking his Eliquis without missing dose.    He is on CPAP for ANAHY treatment now.    I have personally interpreted EKG today with patient, there is no evidence of acute coronary syndrome, no evidence of prior infarct, normal TN and QT interval, no significant conduction disease. AF with rate of 84.    Patient saw electrophysiology service and was plan for ablation procedure.  However, patient came out of atrial fibrillation and that has been on hold.    Is being worked up for hemochromatosis.    Past Medical History:   Diagnosis Date    Acute kidney injury (HCC)     stage 3 renal failure    Atrial fibrillation (HCC)     takes amiodarone and eliquis    Chickenpox     history of    Congestive heart failure ([facility]) 2019    Frequent urination     [place] measles     history of    Heart valve disease     aortic insufficiency    High cholesterol 2016    medicated    Hypertension     medicated    Hypertensive urgency     Influenza     history of    Kidney stone     history of    Longstanding persistent atrial fibrillation (HCC) 05/03/2022    Mumps     history of    Nephrolithiasis     Obesity     Sleep apnea 2021    uses cpap    Snoring     sleep study done    Stroke ([facility]) 2021    pt told he had a stroke, but never realized he had one    Tonsillitis     history of    Typhoid fever     history of    Wears glasses      Past Surgical History:   Procedure Laterality Date    REPAIR, HERNIA, UMBILICAL, ROBOT-ASSISTED, USING [employer]  10/10/2023    Procedure: ROBOT UMBILICAL HERNIA REPAIR;  Surgeon: Michael Saavedra M.D.;  Location: SURGERY [facility];  Service: Gen Robotic    OTHER  2022    cardioversion     Family History   Problem Relation Age of Onset    Cancer Mother         lungs, smoker     Lung Cancer Mother     Hypertension Father     Hyperlipidemia Father     Diabetes Neg Hx     Heart Disease Neg Hx      Social History     Socioeconomic History    Marital status: Single     Spouse name: Not on file    Number of children: Not on file    Years of education: Not on file    Highest education level: Not on file   Occupational History    Not on file   Tobacco Use    Smoking status: Never     Passive exposure: Never    Smokeless tobacco: Never   Vaping Use    Vaping Use: Never used   Substance and Sexual Activity    Alcohol use: Yes     Alcohol/week: 6.0 oz     Types: 7 Glasses of wine, 3 Cans of beer per week     Comment: 2 glasses of drinks a week    Drug use: No    Sexual activity: Not on file   Other Topics Concern    Not on file   Social History Narrative    Not on file     Social Determinants of Health     Financial Resource Strain: Not on file   Food Insecurity: Not on file   Transportation Needs: Not on file   Physical Activity: Not on file   Stress: Not on file   Social Connections: Not on file   Intimate Partner Violence: Not on file   Housing Stability: Not on file     No Known Allergies  Outpatient Encounter Medications as of 3/13/2024   Medication Sig Dispense Refill    tamsulosin (FLOMAX) 0.4 MG capsule Take 0.4 mg by mouth every day.      propranolol (INDERAL) 10 MG Tab Take 1 Tablet by mouth 3 times a day. 90 Tablet 0    spironolactone (ALDACTONE) 25 MG Tab Take 1 Tablet by mouth every day. 90 Tablet 3    amiodarone (CORDARONE) 200 MG Tab Take 1 Tablet by mouth every day. 90 Tablet 3    amLODIPine (NORVASC) 10 MG Tab Take 1 Tablet by mouth every day. 90 Tablet 3    apixaban (ELIQUIS) 5mg Tab Take 1 Tablet by mouth 2 times a day. 180 Tablet 3     No facility-administered encounter medications on file as of 3/13/2024.     Review of Systems   Constitutional:  Negative for chills, fever, malaise/fatigue and weight loss.   HENT:  Negative for ear discharge, ear pain, hearing loss and nosebleeds.  "   Eyes:  Negative for blurred vision, double vision, pain and discharge.   Respiratory:  Negative for cough and shortness of breath.    Cardiovascular:  Negative for chest pain, palpitations, orthopnea, claudication, leg swelling and PND.   Gastrointestinal:  Negative for abdominal pain, blood in stool, melena, nausea and vomiting.   Genitourinary:  Negative for dysuria and hematuria.   Musculoskeletal:  Negative for falls, joint pain and myalgias.   Skin:  Negative for itching and rash.   Neurological:  Negative for dizziness, sensory change, speech change, loss of consciousness and headaches.   Endo/Heme/Allergies:  Negative for environmental allergies. Does not bruise/bleed easily.   Psychiatric/Behavioral:  Negative for depression, hallucinations and suicidal ideas.         Objective:   /86 (BP Location: Left arm, Patient Position: Sitting, BP Cuff Size: Adult)   Pulse 85   Resp 16   Ht 1.676 m (5' 6\")   Wt 101 kg (222 lb 12.8 oz)   SpO2 92%   BMI 35.96 kg/m²     Physical Exam  Vitals and nursing note reviewed.   Constitutional:       General: He is not in acute distress.     Appearance: He is not diaphoretic.   HENT:      Head: Normocephalic and atraumatic.      Right Ear: External ear normal.      Left Ear: External ear normal.   Eyes:      General:         Right eye: No discharge.         Left eye: No discharge.   Neck:      Thyroid: No thyromegaly.      Vascular: No JVD.   Cardiovascular:      Rate and Rhythm: Normal rate. Rhythm irregular.      Comments: Ausculation was not performed to minimize the risk of COVID spread during current pandemic. This complies with Medicare policies and guidelines.    Pulmonary:      Effort: No respiratory distress.   Abdominal:      General: There is no distension.      Tenderness: There is no abdominal tenderness.   Skin:     General: Skin is warm and dry.   Neurological:      Mental Status: He is alert and oriented to person, place, and time.      Cranial " Nerves: No cranial nerve deficit.   Psychiatric:         Behavior: Behavior normal.         Assessment:     1. ACC/AHA stage C heart failure with preserved ejection fraction (HCC)        2. Left ventricular diastolic dysfunction, NYHA class 2        3. ANAHY on CPAP        4. Persistent atrial fibrillation (HCC)  EKG    CL-CARDIOVERSION      5. Cerebrovascular accident (CVA), unspecified mechanism (HCC)        6. HTN (hypertension), malignant        7. Stage 3b chronic kidney disease (HCC)        8. High risk medication use        9. Mixed hyperlipidemia        10. Hypercoagulable state due to persistent atrial fibrillation (HCC)        11. Dyspnea on exertion  proBrain Natriuretic Peptide, NT          Medical Decision Making:  Today's Assessment / Status / Plan:   Today, based on physical examination findings, patient is euvolemic. No JVD, lungs are clear to auscultation, no pitting edema in bilateral lower extremities, no ascites.    Dry weight is 222 lbs.    Overall, I do think that patient would be benefited from sinus restoration and maintenance. He is in sinus rhythm. Continue anticoagulation therapy with Eliquis 5 mg p.o. twice a day for stroke risk reduction due to prior history of stroke in July 2021. Will continue Amiodarone at 200 mg daily. Will plan for cardioversion.    At this time, I spent a significant amount of time during this visit to inform patient that the reason why he is out of atrial fibrillation might be related to amiodarone therapy along with his CPAP machine therapy not so much because he stopped taking rosuvastatin.  I stressed the importance of continue on with his amiodarone therapy as this has been very successful of treating his atrial fibrillation.  It is a high risk medication, therefore, I will closely monitor for thyroid issue along with pulmonary fibrosis as side effects.    Blood pressure is not well controlled. Continue amlodipine 10 mg p.o. once a day and spironolactone at 25  mg p.o. once a day.    In the meantime, continue CPAP for obstructive sleep apnea.    Will increase Atorvastatin to 80 mg daily for better LDL.    Is being worked up for hemochromatosis.     He would like to hold off of Jardiance.    Continue diet and exercise more.    I will see patient back in clinic with lab tests and studies results in 6 months.    I thank you for referring patient to our Cardiology Clinic today.

## 2024-03-15 NOTE — TELEPHONE ENCOUNTER
Spoke with patient about getting procedure scheduled, he has an appointment with his oncologist on 04-14 and would like to have that done first. Will call back to schedule when he is ready.

## 2024-03-19 ENCOUNTER — HOSPITAL ENCOUNTER (OUTPATIENT)
Dept: LAB | Facility: MEDICAL CENTER | Age: 69
End: 2024-03-19
Attending: INTERNAL MEDICINE
Payer: MEDICARE

## 2024-03-19 LAB
ALBUMIN SERPL BCP-MCNC: 4.4 G/DL (ref 3.2–4.9)
ALBUMIN/GLOB SERPL: 2.1 G/DL
ALP SERPL-CCNC: 107 U/L (ref 30–99)
ALT SERPL-CCNC: 115 U/L (ref 2–50)
ANION GAP SERPL CALC-SCNC: 13 MMOL/L (ref 7–16)
AST SERPL-CCNC: 31 U/L (ref 12–45)
BILIRUB CONJ SERPL-MCNC: 0.3 MG/DL (ref 0.1–0.5)
BILIRUB SERPL-MCNC: 1.3 MG/DL (ref 0.1–1.5)
BUN SERPL-MCNC: 25 MG/DL (ref 8–22)
CALCIUM ALBUM COR SERPL-MCNC: 8.8 MG/DL (ref 8.5–10.5)
CALCIUM SERPL-MCNC: 9.1 MG/DL (ref 8.5–10.5)
CHLORIDE SERPL-SCNC: 101 MMOL/L (ref 96–112)
CO2 SERPL-SCNC: 23 MMOL/L (ref 20–33)
CREAT SERPL-MCNC: 1.35 MG/DL (ref 0.5–1.4)
ERYTHROCYTE [SEDIMENTATION RATE] IN BLOOD BY WESTERGREN METHOD: 3 MM/HOUR (ref 0–20)
GFR SERPLBLD CREATININE-BSD FMLA CKD-EPI: 57 ML/MIN/1.73 M 2
GLOBULIN SER CALC-MCNC: 2.1 G/DL (ref 1.9–3.5)
GLUCOSE SERPL-MCNC: 110 MG/DL (ref 65–99)
POTASSIUM SERPL-SCNC: 4.3 MMOL/L (ref 3.6–5.5)
PROT SERPL-MCNC: 6.5 G/DL (ref 6–8.2)
SODIUM SERPL-SCNC: 137 MMOL/L (ref 135–145)

## 2024-03-19 PROCEDURE — 82248 BILIRUBIN DIRECT: CPT

## 2024-03-19 PROCEDURE — 80053 COMPREHEN METABOLIC PANEL: CPT

## 2024-03-19 PROCEDURE — 85652 RBC SED RATE AUTOMATED: CPT

## 2024-03-19 PROCEDURE — 81256 HFE GENE: CPT

## 2024-03-19 PROCEDURE — 36415 COLL VENOUS BLD VENIPUNCTURE: CPT

## 2024-03-21 ENCOUNTER — OFFICE VISIT (OUTPATIENT)
Dept: SLEEP MEDICINE | Facility: MEDICAL CENTER | Age: 69
End: 2024-03-21
Attending: STUDENT IN AN ORGANIZED HEALTH CARE EDUCATION/TRAINING PROGRAM
Payer: MEDICARE

## 2024-03-21 VITALS
OXYGEN SATURATION: 95 % | HEIGHT: 66 IN | RESPIRATION RATE: 16 BRPM | WEIGHT: 221.2 LBS | SYSTOLIC BLOOD PRESSURE: 118 MMHG | HEART RATE: 78 BPM | BODY MASS INDEX: 35.55 KG/M2 | DIASTOLIC BLOOD PRESSURE: 78 MMHG

## 2024-03-21 DIAGNOSIS — G47.34 NOCTURNAL HYPOXIA: ICD-10-CM

## 2024-03-21 DIAGNOSIS — G47.31 CENTRAL SLEEP APNEA: ICD-10-CM

## 2024-03-21 DIAGNOSIS — I48.0 PAROXYSMAL ATRIAL FIBRILLATION (HCC): ICD-10-CM

## 2024-03-21 PROCEDURE — 99212 OFFICE O/P EST SF 10 MIN: CPT | Performed by: STUDENT IN AN ORGANIZED HEALTH CARE EDUCATION/TRAINING PROGRAM

## 2024-03-21 PROCEDURE — 3074F SYST BP LT 130 MM HG: CPT | Performed by: STUDENT IN AN ORGANIZED HEALTH CARE EDUCATION/TRAINING PROGRAM

## 2024-03-21 PROCEDURE — 3078F DIAST BP <80 MM HG: CPT | Performed by: STUDENT IN AN ORGANIZED HEALTH CARE EDUCATION/TRAINING PROGRAM

## 2024-03-21 PROCEDURE — 99213 OFFICE O/P EST LOW 20 MIN: CPT | Performed by: STUDENT IN AN ORGANIZED HEALTH CARE EDUCATION/TRAINING PROGRAM

## 2024-03-21 ASSESSMENT — FIBROSIS 4 INDEX: FIB4 SCORE: 1.08

## 2024-03-21 NOTE — PATIENT INSTRUCTIONS
Equipment replacement schedule:  Mask cushion every month  Nasal pillows 2 times per month  Mask every 6 months  Head gear every 6 months  Tubing every 3 months  Disposable filters 2 times per month  Reusable filter every 6 months  Humidifier chamber every 6 months  Chin strap every 6 months

## 2024-03-21 NOTE — PROGRESS NOTES
[facility] Follow-up Visit    CC: Follow-up regarding management of sleep apnea with nocturnal hypoxia      HPI:  Chema Cuenca is a 68 y.o.male  with GERD, paroxysmal atrial fibrillation, hypertension, hyperlipidemia, CKD stage III, severe central sleep apnea on ASV with supplemental oxygen 2 L/min.  Presents today to follow-up regarding management of central sleep apnea.    He continues to use his ASV machine with supplemental oxygen.  He states when he is traveling he will sometimes not take his ASV machine.  He reports he is concerned regarding his oxygen level at night.  His labs have continued to show elevated hemoglobin hematocrit and red blood cells.  He is also following with hematology oncology who is continue to workup his polycythemia and elevated ferritin.    Overall finds his machine and pressure comfortable.  Denies any difficulty with discomfort from the mask or pressure.    DME provider: Darrell  Device: ASV with oxygen   Mask: Hybrid full face   Aerophagia: No   Snoring: No   Dry mouth: Yes  Leak: No   Skin irritation: No   Chin strap: No      Sleep History  3/21/2022 PSG showed severe central sleep apnea with overall AHI of 76.8, central apneas accounted for 66% of respiratory events.  6/6/2022 PSG titration study showed improvement in respiratory events with ASV therapy.  Despite control of his respiratory events patient continued to have low oxygen levels at night.  Supplemental oxygen was started during night of the study.  Recommended settings EPAP 9 pressure support 4-15 with 2 L supplemental oxygen bleed in.    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    Vision changes 06/14/2023    Sleep apnea 06/14/2023    Abnormal prostate specific antigen (PSA) 10/07/2021    Dyslipidemia 04/26/2019    Gastroesophageal reflux disease 04/26/2019    Acquired renal cyst of right kidney 10/19/2018    Stage 3 chronic kidney disease (HCC) 04/20/2018    Essential hypertension 04/20/2018    ACC/AHA stage C heart failure with preserved ejection fraction (HCC) 04/05/2018    Heart disease, hypertensive, malignant 02/14/2018       Past Medical History:   Diagnosis Date    Acute kidney injury ([facility])     stage 3 renal failure    Atrial fibrillation ([facility])     takes amiodarone and eliquis    Chickenpox     history of    Congestive heart failure ([facility]) 2019    Frequent urination     [place] measles     history of    Heart valve disease     aortic insufficiency    High cholesterol 2016    medicated    Hypertension     medicated    Hypertensive urgency     Influenza     history of    Kidney stone     history of    Longstanding persistent atrial fibrillation ([facility]) 05/03/2022    Mumps     history of    Nephrolithiasis     Obesity     Sleep apnea 2021    uses cpap    Snoring     sleep study done    Stroke ([facility]) 2021    pt told he had a stroke, but never realized he had one    Tonsillitis     history of    Typhoid fever     history of    Wears glasses         Past Surgical History:   Procedure Laterality Date    REPAIR, HERNIA, UMBILICAL, ROBOT-ASSISTED, USING [employer]  10/10/2023    Procedure: ROBOT UMBILICAL HERNIA REPAIR;  Surgeon: Michael Saavedra M.D.;  Location: SURGERY [facility];  Service: Gen Robotic    OTHER  2022    cardioversion       Family History   Problem Relation Age of Onset    Cancer Mother         lungs, smoker    Lung Cancer Mother     Hypertension Father     Hyperlipidemia Father     Diabetes Neg Hx     Heart Disease Neg Hx        Social  History     Socioeconomic History    Marital status: Single     Spouse name: Not on file    Number of children: Not on file    Years of education: Not on file    Highest education level: Not on file   Occupational History    Not on file   Tobacco Use    Smoking status: Never     Passive exposure: Never    Smokeless tobacco: Never   Vaping Use    Vaping Use: Never used   Substance and Sexual Activity    Alcohol use: Yes     Alcohol/week: 6.0 oz     Types: 7 Glasses of wine, 3 Cans of beer per week     Comment: 2 glasses of drinks a week    Drug use: No    Sexual activity: Not on file   Other Topics Concern    Not on file   Social History Narrative    Not on file     Social Determinants of Health     Financial Resource Strain: Not on file   Food Insecurity: Not on file   Transportation Needs: Not on file   Physical Activity: Not on file   Stress: Not on file   Social Connections: Not on file   Intimate Partner Violence: Not on file   Housing Stability: Not on file       Current Outpatient Medications   Medication Sig Dispense Refill    amiodarone (CORDARONE) 200 MG Tab Take 1 Tablet by mouth every day. 90 Tablet 3    amLODIPine (NORVASC) 10 MG Tab Take 1 Tablet by mouth every day. 90 Tablet 3    apixaban (ELIQUIS) 5mg Tab Take 1 Tablet by mouth 2 times a day. 180 Tablet 3    spironolactone (ALDACTONE) 25 MG Tab Take 1 Tablet by mouth every day. 90 Tablet 3    tamsulosin (FLOMAX) 0.4 MG capsule Take 0.4 mg by mouth every day.      propranolol (INDERAL) 10 MG Tab Take 1 Tablet by mouth 3 times a day. 90 Tablet 0     No current facility-administered medications for this visit.        ALLERGIES: Patient has no known allergies.    ROS  Constitutional: Denies fevers, Denies weight changes  Ears/Nose/Throat/Mouth: Denies nasal congestion or sore throat   Cardiovascular: Denies chest pain  Respiratory: Denies shortness of breath, Denies cough  Gastrointestinal/Hepatic: Denies nausea, vomiting  Sleep: see HPI      PHYSICAL  "EXAM  /78 (BP Location: Left arm, Patient Position: Sitting, BP Cuff Size: Adult)   Pulse 78   Resp 16   Ht 1.676 m (5' 6\")   Wt 100 kg (221 lb 3.2 oz)   SpO2 95%   BMI 35.70 kg/m²   Appearance: Well-nourished, well-developed, no acute distress  Eyes:  No scleral icterus , EOMI  Musculoskeletal:  Grossly normal; gait and station normal; digits and nails normal  Skin:  No rashes, petechiae, cyanosis  Neurologic: without focal signs; oriented to person, time, place, and purpose; judgement intact      Medical Decision Making   Assessment and Plan  Chema Cuenca is a 68 y.o.male  with GERD, paroxysmal atrial fibrillation, hypertension, hyperlipidemia, CKD stage III, severe central sleep apnea on ASV with supplemental oxygen 2 L/min.  Presents today to follow-up regarding management of central sleep apnea.    The medical record was reviewed.    Central sleep apnea  Diagnostic and titration nocturnal polysomnogram's, home sleep apnea tests, continuous nocturnal oximetry results, multiple sleep latency tests, and compliance reports reviewed.  Compliance data reviewed showing 73% usage > 4hours in last 30 days. Average AHI 0.3 events/hour. Pt continues to use and benefit from machine.      Current Settings ASV EPAP 9 pressure support 4-10 with bleed and supplemental oxygen 2 L/min    PLAN:   -Order placed for mask and supplies   -Order placed for overnight pulse oximetry study  -Will message results for overnight pulse oximetry study MyChart  -Advised to reach out via MyChart with questions     Has been advised to continue the current ASV with supplemental oxygen 2 L/min, clean equipment frequently, and get new mask and supplies as allowed by insurance and DME. Recommend an earlier appointment, if significant treatment barriers develop.    Patients with sleep apnea are at increased risk of cardiovascular disease including coronary artery disease, systemic arterial hypertension, pulmonary arterial " hypertension, cardiac arrythmias, and stroke. The patient was advised to avoid driving a motor vehicle when drowsy.      Have advised the patient to follow up with the appropriate healthcare practitioners for all other medical problems and issues.    Return in about 1 year (around 3/21/2025).      Please note portions of this record was created using voice recognition software. I have made every reasonable attempt to correct obvious errors, but I expect that there are errors of grammar and possibly content I did not discover before finalizing the note.

## 2024-03-24 LAB
HFE GENE MUT ANL BLD/T: NORMAL
HFE P.C282Y BLD/T QL: NEGATIVE
HFE P.H63D BLD/T QL: NEGATIVE
HFE P.S65C BLD/T QL: NEGATIVE

## 2024-03-26 NOTE — PROGRESS NOTES
Chief Complaint   Patient presents with   • Congestive Heart Failure     F/V: 3 WEEK       Subjective:   Chema Sarabia is a 62 y.o. male who presents today for cardiac care and evaluation in our heart failure clinic after his recent episode of hypertensive emergency for which he went into heart failure exacerbation as well. Patient does have documented transthoracic echocardiogram which showed relatively preserved LV function of 55%. No significant valvular disease.     Patient is feeling better these days. Does get winded upon walking up inclines or for distance. No symptoms at rest or with daily living activities.     At prior visit, patient was able to complete 550 m during his 6 minute walk test. his O2 saturation at baseline was 94% and at the end of the test, the O2 saturation was 95%. he reported 0 level of dyspnea on Jim scale.     Blood pressure continues to be high but better control.    Past Medical History:   Diagnosis Date   • Acute kidney injury (CMS-HCC)    • Hypertensive urgency    • Nephrolithiasis      History reviewed. No pertinent surgical history.  Family History   Problem Relation Age of Onset   • Cancer Mother      lungs, smoker   • Hypertension Father    • Hyperlipidemia Father    • Diabetes Neg Hx    • Heart Disease Neg Hx      Social History     Social History   • Marital status: Single     Spouse name: N/A   • Number of children: N/A   • Years of education: N/A     Occupational History   • Not on file.     Social History Main Topics   • Smoking status: Never Smoker   • Smokeless tobacco: Never Used   • Alcohol use 2.4 oz/week     4 Glasses of wine per week   • Drug use: No   • Sexual activity: Not on file     Other Topics Concern   • Not on file     Social History Narrative   • No narrative on file     No Known Allergies  Outpatient Encounter Prescriptions as of 4/19/2018   Medication Sig Dispense Refill   • amLODIPine (NORVASC) 10 MG Tab Take 1 Tab by mouth every day. 90 Tab 3   •  "spironolactone (ALDACTONE) 25 MG Tab Take 1 Tab by mouth every day. 90 Tab 3   • labetalol (NORMODYNE) 200 MG Tab Take 2 Tabs by mouth 2 times a day. 120 Tab 11   • multivitamin (THERAGRAN) Tab Take 1 Tab by mouth every day.     • [DISCONTINUED] furosemide (LASIX) 20 MG Tab Take 1 Tab by mouth every day. 30 Tab 3     No facility-administered encounter medications on file as of 4/19/2018.      Review of Systems   Constitutional: Negative for chills, fever, malaise/fatigue and weight loss.   HENT: Negative for ear discharge, ear pain, hearing loss and nosebleeds.    Eyes: Negative for blurred vision, double vision, pain and discharge.   Respiratory: Positive for shortness of breath. Negative for cough.    Cardiovascular: Negative for chest pain, palpitations, orthopnea, claudication, leg swelling and PND.   Gastrointestinal: Negative for abdominal pain, blood in stool, melena, nausea and vomiting.   Genitourinary: Negative for dysuria and hematuria.   Musculoskeletal: Negative for falls, joint pain and myalgias.   Skin: Negative for itching and rash.   Neurological: Negative for dizziness, sensory change, speech change, loss of consciousness and headaches.   Endo/Heme/Allergies: Negative for environmental allergies. Does not bruise/bleed easily.   Psychiatric/Behavioral: Negative for depression, hallucinations and suicidal ideas.        Objective:   /84   Pulse 64   Ht 1.676 m (5' 6\")   Wt 91.2 kg (201 lb)   SpO2 95%   BMI 32.44 kg/m²     Physical Exam   Constitutional: He is oriented to person, place, and time. No distress.   HENT:   Head: Normocephalic and atraumatic.   Right Ear: External ear normal.   Left Ear: External ear normal.   Eyes: Right eye exhibits no discharge. Left eye exhibits no discharge.   Neck: No JVD present. No thyromegaly present.   Cardiovascular: Normal rate, regular rhythm, normal heart sounds and intact distal pulses.  Exam reveals no gallop and no friction rub.    No murmur " heard.  Pulmonary/Chest: Breath sounds normal. No respiratory distress.   Abdominal: Bowel sounds are normal. He exhibits no distension. There is no tenderness.   Musculoskeletal: He exhibits no edema or tenderness.   Neurological: He is alert and oriented to person, place, and time. No cranial nerve deficit.   Skin: Skin is warm and dry. He is not diaphoretic.   Psychiatric: He has a normal mood and affect. His behavior is normal.   Nursing note and vitals reviewed.      Assessment:     1. ACC/AHA stage C heart failure with preserved ejection fraction (CMS-HCC)     2. Left ventricular diastolic dysfunction, NYHA class 2     3. HTN (hypertension), malignant     4. High risk medication use     5. Dyspnea on exertion         Medical Decision Making:  Today's Assessment / Status / Plan:   Today, based on physical examination findings, patient is euvolemic. No JVD, lungs are clear to auscultation, no pitting edema in bilateral lower extremities, no ascites.    Dry weight is 201 lbs.    Lasix was stopped due to GFR down to 31.    Will continue to closely monitor.    Being screened for Perspective trial.    Will continue to closely monitor for side effects of patient's high risk medication(s) including liver, renal function and electrolytes.    I will see patient back in our Heart Failure Clinic with lab tests and studies results in 12 weeks.    I thank you for referring patient to our Heart Failure Clinic today.         Walk in [employer]

## 2024-04-16 DIAGNOSIS — T70.29XA: ICD-10-CM

## 2024-04-16 DIAGNOSIS — D75.1: ICD-10-CM

## 2024-04-16 DIAGNOSIS — D75.1 POLYCYTHEMIA: ICD-10-CM

## 2024-04-24 ENCOUNTER — APPOINTMENT (OUTPATIENT)
Dept: MEDICAL GROUP | Facility: MEDICAL CENTER | Age: 69
End: 2024-04-24
Payer: MEDICARE

## 2024-05-15 ENCOUNTER — HOME STUDY (OUTPATIENT)
Dept: SLEEP MEDICINE | Facility: MEDICAL CENTER | Age: 69
End: 2024-05-15
Attending: STUDENT IN AN ORGANIZED HEALTH CARE EDUCATION/TRAINING PROGRAM
Payer: MEDICARE

## 2024-05-15 DIAGNOSIS — G47.34 NOCTURNAL HYPOXIA: ICD-10-CM

## 2024-05-15 DIAGNOSIS — D75.1 POLYCYTHEMIA, SECONDARY: ICD-10-CM

## 2024-05-15 DIAGNOSIS — G47.31 CENTRAL SLEEP APNEA: ICD-10-CM

## 2024-05-15 PROCEDURE — 94762 N-INVAS EAR/PLS OXIMTRY CONT: CPT | Performed by: STUDENT IN AN ORGANIZED HEALTH CARE EDUCATION/TRAINING PROGRAM

## 2024-05-15 RX ORDER — SODIUM CHLORIDE 9 MG/ML
500 INJECTION, SOLUTION INTRAVENOUS ONCE
Status: CANCELLED | OUTPATIENT
Start: 2024-05-16 | End: 2024-05-16

## 2024-05-15 RX ORDER — ATROPINE SULFATE 1 MG/ML
0.5 INJECTION, SOLUTION INTRAVENOUS PRN
Status: CANCELLED | OUTPATIENT
Start: 2024-05-16

## 2024-05-20 ENCOUNTER — OUTPATIENT INFUSION SERVICES (OUTPATIENT)
Dept: ONCOLOGY | Facility: MEDICAL CENTER | Age: 69
End: 2024-05-20
Attending: INTERNAL MEDICINE
Payer: MEDICARE

## 2024-05-20 VITALS
HEART RATE: 102 BPM | TEMPERATURE: 98.1 F | DIASTOLIC BLOOD PRESSURE: 88 MMHG | RESPIRATION RATE: 18 BRPM | OXYGEN SATURATION: 94 % | WEIGHT: 226.85 LBS | HEIGHT: 66 IN | BODY MASS INDEX: 36.46 KG/M2 | SYSTOLIC BLOOD PRESSURE: 134 MMHG

## 2024-05-20 DIAGNOSIS — D75.1 POLYCYTHEMIA, SECONDARY: ICD-10-CM

## 2024-05-20 LAB
FERRITIN SERPL-MCNC: 574 NG/ML (ref 22–322)
HCT VFR BLD AUTO: 51.4 % (ref 42–52)
HGB BLD-MCNC: 18.1 G/DL (ref 14–18)

## 2024-05-20 RX ORDER — ATROPINE SULFATE 1 MG/ML
0.5 INJECTION, SOLUTION INTRAVENOUS PRN
OUTPATIENT
Start: 2024-05-20

## 2024-05-20 RX ORDER — SODIUM CHLORIDE 9 MG/ML
500 INJECTION, SOLUTION INTRAVENOUS ONCE
OUTPATIENT
Start: 2024-05-20 | End: 2024-05-20

## 2024-05-20 ASSESSMENT — FIBROSIS 4 INDEX: FIB4 SCORE: 1.08

## 2024-05-21 NOTE — PROCEDURES
Overnight Pulse Oximetry    Interpretation:    This overnight oximetry was recorded on 5/15/2024 with the patient on ASV EPAP 9 pressure support 4-10 with supplemental oxygen 2 L/min.  The analysis duration was 8hrs 4min.  The saturations were less than 90% for 10% of the recording.  Basal oxygen saturation of 92%. The micaela saturation was 81% (likely artifact).  The patient spent 1.5 minutes with saturations < 88%.  Overall, this continuous nocturnal oximetry demonstrates unremarkable study while on ASV EPAP 9 pressure support 4-10 with supplemental oxygen 2 L/min.    Recommendation:  Continue current therapy  Clinical correlation is needed.

## 2024-05-30 ENCOUNTER — HOSPITAL ENCOUNTER (OUTPATIENT)
Dept: LAB | Facility: MEDICAL CENTER | Age: 69
End: 2024-05-30
Attending: NURSE PRACTITIONER
Payer: MEDICARE

## 2024-05-30 ENCOUNTER — HOSPITAL ENCOUNTER (OUTPATIENT)
Dept: LAB | Facility: MEDICAL CENTER | Age: 69
End: 2024-05-30
Attending: INTERNAL MEDICINE
Payer: MEDICARE

## 2024-05-30 DIAGNOSIS — D75.1 POLYCYTHEMIA: ICD-10-CM

## 2024-05-30 DIAGNOSIS — T70.29XA: ICD-10-CM

## 2024-05-30 DIAGNOSIS — D75.1: ICD-10-CM

## 2024-05-30 LAB
ALBUMIN SERPL BCP-MCNC: 4.5 G/DL (ref 3.2–4.9)
ALBUMIN/GLOB SERPL: 2 G/DL
ALP SERPL-CCNC: 63 U/L (ref 30–99)
ALT SERPL-CCNC: 23 U/L (ref 2–50)
ANION GAP SERPL CALC-SCNC: 13 MMOL/L (ref 7–16)
APTT PPP: 27.8 SEC (ref 24.7–36)
AST SERPL-CCNC: 20 U/L (ref 12–45)
BASOPHILS # BLD AUTO: 0.5 % (ref 0–1.8)
BASOPHILS # BLD AUTO: 0.7 % (ref 0–1.8)
BASOPHILS # BLD: 0.03 K/UL (ref 0–0.12)
BASOPHILS # BLD: 0.04 K/UL (ref 0–0.12)
BILIRUB SERPL-MCNC: 1.2 MG/DL (ref 0.1–1.5)
BUN SERPL-MCNC: 26 MG/DL (ref 8–22)
CALCIUM ALBUM COR SERPL-MCNC: 8.9 MG/DL (ref 8.5–10.5)
CALCIUM SERPL-MCNC: 9.3 MG/DL (ref 8.5–10.5)
CHLORIDE SERPL-SCNC: 104 MMOL/L (ref 96–112)
CHOLEST SERPL-MCNC: 224 MG/DL (ref 100–199)
CO2 SERPL-SCNC: 21 MMOL/L (ref 20–33)
CREAT SERPL-MCNC: 1.51 MG/DL (ref 0.5–1.4)
CRP SERPL HS-MCNC: <0.3 MG/DL (ref 0–0.75)
D DIMER PPP IA.FEU-MCNC: <0.27 UG/ML (FEU) (ref 0–0.5)
EOSINOPHIL # BLD AUTO: 0.08 K/UL (ref 0–0.51)
EOSINOPHIL # BLD AUTO: 0.08 K/UL (ref 0–0.51)
EOSINOPHIL NFR BLD: 1.3 % (ref 0–6.9)
EOSINOPHIL NFR BLD: 1.3 % (ref 0–6.9)
ERYTHROCYTE [DISTWIDTH] IN BLOOD BY AUTOMATED COUNT: 43 FL (ref 35.9–50)
ERYTHROCYTE [DISTWIDTH] IN BLOOD BY AUTOMATED COUNT: 43.5 FL (ref 35.9–50)
FERRITIN SERPL-MCNC: 675 NG/ML (ref 22–322)
FERRITIN SERPL-MCNC: 698 NG/ML (ref 22–322)
FIBRINOGEN PPP-MCNC: 371 MG/DL (ref 215–460)
GFR SERPLBLD CREATININE-BSD FMLA CKD-EPI: 50 ML/MIN/1.73 M 2
GLOBULIN SER CALC-MCNC: 2.3 G/DL (ref 1.9–3.5)
GLUCOSE SERPL-MCNC: 102 MG/DL (ref 65–99)
HCT VFR BLD AUTO: 53.7 % (ref 42–52)
HCT VFR BLD AUTO: 54.6 % (ref 42–52)
HDLC SERPL-MCNC: 72 MG/DL
HGB BLD-MCNC: 18.4 G/DL (ref 14–18)
HGB BLD-MCNC: 18.5 G/DL (ref 14–18)
HGB RETIC QN AUTO: 35 PG/CELL (ref 29–35)
HIV 1+2 AB+HIV1 P24 AG SERPL QL IA: NORMAL
IMM GRANULOCYTES # BLD AUTO: 0.02 K/UL (ref 0–0.11)
IMM GRANULOCYTES # BLD AUTO: 0.03 K/UL (ref 0–0.11)
IMM GRANULOCYTES NFR BLD AUTO: 0.3 % (ref 0–0.9)
IMM GRANULOCYTES NFR BLD AUTO: 0.5 % (ref 0–0.9)
IMM RETICS NFR: 5 % (ref 2.6–16.1)
INR PPP: 1.06 (ref 0.87–1.13)
IRON SATN MFR SERPL: 36 % (ref 15–55)
IRON SATN MFR SERPL: 38 % (ref 15–55)
IRON SERPL-MCNC: 124 UG/DL (ref 50–180)
IRON SERPL-MCNC: 125 UG/DL (ref 50–180)
LDH SERPL L TO P-CCNC: 207 U/L (ref 107–266)
LDLC SERPL CALC-MCNC: 126 MG/DL
LYMPHOCYTES # BLD AUTO: 1.18 K/UL (ref 1–4.8)
LYMPHOCYTES # BLD AUTO: 1.22 K/UL (ref 1–4.8)
LYMPHOCYTES NFR BLD: 19.4 % (ref 22–41)
LYMPHOCYTES NFR BLD: 20.4 % (ref 22–41)
MCH RBC QN AUTO: 31.4 PG (ref 27–33)
MCH RBC QN AUTO: 31.5 PG (ref 27–33)
MCHC RBC AUTO-ENTMCNC: 33.9 G/DL (ref 32.3–36.5)
MCHC RBC AUTO-ENTMCNC: 34.3 G/DL (ref 32.3–36.5)
MCV RBC AUTO: 91.6 FL (ref 81.4–97.8)
MCV RBC AUTO: 92.9 FL (ref 81.4–97.8)
MONOCYTES # BLD AUTO: 0.57 K/UL (ref 0–0.85)
MONOCYTES # BLD AUTO: 0.62 K/UL (ref 0–0.85)
MONOCYTES NFR BLD AUTO: 10.2 % (ref 0–13.4)
MONOCYTES NFR BLD AUTO: 9.5 % (ref 0–13.4)
NEUTROPHILS # BLD AUTO: 4.06 K/UL (ref 1.82–7.42)
NEUTROPHILS # BLD AUTO: 4.14 K/UL (ref 1.82–7.42)
NEUTROPHILS NFR BLD: 67.8 % (ref 44–72)
NEUTROPHILS NFR BLD: 68.1 % (ref 44–72)
NRBC # BLD AUTO: 0 K/UL
NRBC # BLD AUTO: 0 K/UL
NRBC BLD-RTO: 0 /100 WBC (ref 0–0.2)
NRBC BLD-RTO: 0 /100 WBC (ref 0–0.2)
PLATELET # BLD AUTO: 203 K/UL (ref 164–446)
PLATELET # BLD AUTO: 205 K/UL (ref 164–446)
PMV BLD AUTO: 9.3 FL (ref 9–12.9)
PMV BLD AUTO: 9.3 FL (ref 9–12.9)
POTASSIUM SERPL-SCNC: 4.6 MMOL/L (ref 3.6–5.5)
PROT SERPL-MCNC: 6.8 G/DL (ref 6–8.2)
PROTHROMBIN TIME: 13.9 SEC (ref 12–14.6)
RBC # BLD AUTO: 5.86 M/UL (ref 4.7–6.1)
RBC # BLD AUTO: 5.88 M/UL (ref 4.7–6.1)
RETICS # AUTO: 0.08 M/UL (ref 0.04–0.12)
RETICS/RBC NFR: 1.4 % (ref 0.8–2.6)
SODIUM SERPL-SCNC: 138 MMOL/L (ref 135–145)
TIBC SERPL-MCNC: 327 UG/DL (ref 250–450)
TIBC SERPL-MCNC: 346 UG/DL (ref 250–450)
TRIGL SERPL-MCNC: 132 MG/DL (ref 0–149)
UIBC SERPL-MCNC: 203 UG/DL (ref 110–370)
UIBC SERPL-MCNC: 221 UG/DL (ref 110–370)
WBC # BLD AUTO: 6 K/UL (ref 4.8–10.8)
WBC # BLD AUTO: 6.1 K/UL (ref 4.8–10.8)

## 2024-05-31 LAB
COPPER SERPL-MCNC: 99 UG/DL (ref 70–140)
HAPTOGLOB SERPL-MCNC: 118 MG/DL (ref 30–200)

## 2024-06-01 LAB
ALBUMIN SERPL ELPH-MCNC: 4.49 G/DL (ref 3.75–5.01)
ALPHA1 GLOB SERPL ELPH-MCNC: 0.26 G/DL (ref 0.19–0.46)
ALPHA2 GLOB SERPL ELPH-MCNC: 0.61 G/DL (ref 0.48–1.05)
B-GLOBULIN SERPL ELPH-MCNC: 0.73 G/DL (ref 0.48–1.1)
EER MONOCLONAL PROTEIN AND FLC, SERUM Q5224: ABNORMAL
GAMMA GLOB SERPL ELPH-MCNC: 0.51 G/DL (ref 0.62–1.51)
IGA SERPL-MCNC: 144 MG/DL (ref 68–408)
IGG SERPL-MCNC: 500 MG/DL (ref 768–1632)
IGM SERPL-MCNC: 42 MG/DL (ref 35–263)
INTERPRETATION SERPL IFE-IMP: ABNORMAL
INTERPRETATION SERPL IFE-IMP: ABNORMAL
KAPPA LC FREE SER-MCNC: 13.72 MG/L (ref 3.3–19.4)
KAPPA LC FREE/LAMBDA FREE SER NEPH: 1.18 {RATIO} (ref 0.26–1.65)
LAMBDA LC FREE SERPL-MCNC: 11.58 MG/L (ref 5.71–26.3)
MONOCLONAL PROTEIN NL11656: ABNORMAL G/DL
PROT SERPL-MCNC: 6.6 G/DL (ref 6.3–8.2)

## 2024-06-04 ENCOUNTER — APPOINTMENT (OUTPATIENT)
Dept: MEDICAL GROUP | Facility: MEDICAL CENTER | Age: 69
End: 2024-06-04
Payer: MEDICARE

## 2024-06-04 VITALS
RESPIRATION RATE: 16 BRPM | OXYGEN SATURATION: 95 % | HEIGHT: 65 IN | SYSTOLIC BLOOD PRESSURE: 128 MMHG | HEART RATE: 87 BPM | WEIGHT: 223.66 LBS | BODY MASS INDEX: 37.26 KG/M2 | DIASTOLIC BLOOD PRESSURE: 76 MMHG | TEMPERATURE: 97.6 F

## 2024-06-04 DIAGNOSIS — T70.29XA: ICD-10-CM

## 2024-06-04 DIAGNOSIS — R79.9 ABNORMAL FINDING OF BLOOD CHEMISTRY, UNSPECIFIED: ICD-10-CM

## 2024-06-04 DIAGNOSIS — D75.1: ICD-10-CM

## 2024-06-04 DIAGNOSIS — N18.30 STAGE 3 CHRONIC KIDNEY DISEASE, UNSPECIFIED WHETHER STAGE 3A OR 3B CKD: ICD-10-CM

## 2024-06-04 DIAGNOSIS — G25.0 BENIGN ESSENTIAL TREMOR: ICD-10-CM

## 2024-06-04 DIAGNOSIS — E78.5 DYSLIPIDEMIA: ICD-10-CM

## 2024-06-04 PROCEDURE — 99214 OFFICE O/P EST MOD 30 MIN: CPT | Performed by: NURSE PRACTITIONER

## 2024-06-04 PROCEDURE — 3078F DIAST BP <80 MM HG: CPT | Performed by: NURSE PRACTITIONER

## 2024-06-04 PROCEDURE — 3074F SYST BP LT 130 MM HG: CPT | Performed by: NURSE PRACTITIONER

## 2024-06-04 RX ORDER — PROPRANOLOL HYDROCHLORIDE 10 MG/1
10 TABLET ORAL 3 TIMES DAILY
Qty: 90 TABLET | Refills: 1 | Status: SHIPPED | OUTPATIENT
Start: 2024-06-04

## 2024-06-04 ASSESSMENT — ENCOUNTER SYMPTOMS
FEVER: 0
PALPITATIONS: 0
CHILLS: 0

## 2024-06-04 ASSESSMENT — FIBROSIS 4 INDEX: FIB4 SCORE: 1.38

## 2024-06-04 NOTE — ASSESSMENT & PLAN NOTE
Latest Reference Range & Units 01/12/24 12:44 05/20/24 00:00 05/30/24 07:12   Hemoglobin 14.0 - 18.0 g/dL  14.0 - 18.0 g/dL 19.4 (H) 18.1 (H) 18.5 (H)  18.4 (H)   Hematocrit 42.0 - 52.0 %  42.0 - 52.0 % 55.2 (H) 51.4 54.6 (H)  53.7 (H)   (H): Data is abnormally high

## 2024-06-04 NOTE — PROGRESS NOTES
Patient agreed to use of LUIS: yes  Chief Complaint   Patient presents with    Follow-Up    Lab Results       HISTORY OF PRESENT ILLNESS: Patient is a pleasant 68 y.o. male, established patient who presents today to discuss medical problems as listed below:    Assessment/Plan:    Chema was seen today for follow-up and lab results.    Diagnoses and all orders for this visit:    Altitude erythrocytosis  -     CBC WITHOUT DIFFERENTIAL; Future  -     IRON/TOTAL IRON BIND; Future  -     FERRITIN; Future  -     ERYTHROPOIETIN; Future    Abnormal finding of blood chemistry, unspecified  -     IRON/TOTAL IRON BIND; Future  -     FERRITIN; Future    Stage 3 chronic kidney disease, unspecified whether stage 3a or 3b CKD  -     Comp Metabolic Panel; Future    Dyslipidemia  -     CT-CARDIAC SCORING; Future    Benign essential tremor  -     propranolol (INDERAL) 10 MG Tab; Take 1 Tablet by mouth 3 times a day.              Assessment & Plan  1. Altitude erythrocytosis.  This is a chronic condition for the patient. The patient has been utilizing a therapeutic phlebotomy. Additionally, the patient is under the care of Dr. Encinas, whom he is scheduled to see in a few days. The current plan is to continue monitoring the patient's labs, as intermittent phlebotomy may be necessary.    2. Stage 3 kidney disease.  This is a chronic and stable condition for the patient. The plan is to continue monitoring the patient's condition and conduct labs.    3. Dyslipidemia.  This condition is a chronic and stable condition for the patient. The patient is cognizant of the potential risks. The patient has chosen to decline statins at this juncture.  Discussed etiology and potential risk factors. Discussed 10-year ASCVD risk. Educated on healthy lifestyle including nutrition and exercise.  Avoid excessive red meat and simple carbohydrates.  Increase fiber intake to 25-30 g daily if tolerated and take fish oil 2 g nightly.  Advised low saturated fat,  low carbohydrate diet.  Quit smoking if you do. Maintain adequate hydration. Advise daily exercise 45-60 mins per tolerance and preference.     4. Benign essential tremor.  This is a chronic but stable condition for the patient. The condition is well-managed with propranolol. Will continue    History of Present Illness  The patient is a 68-year-old male who is here for a 3-month follow-up.    The patient recently underwent laboratory tests, having donated 12 vials of blood on Thursday. He is under the care of a hematologist and is currently on anticoagulant therapy. His altitude level is recorded at 6500, which is likely contributing to his altitude erythrocytosis. The hematologist has recommended continued phlebotomy monitoring. Despite a previous unsuccessful attempt at Vitaleve donation, he was informed that due to his blood thinner status, blood donation was not feasible. Subsequently, he underwent an infusion procedure by Dr. Encinas. His iron levels are satisfactory, however, his ferritin levels are elevated. He plans to consult with Dr. Encinas regarding this matter.    The patient expresses concern regarding his immoglobulin levels. He maintains adequate hydration and consumes water almost daily. His HIV test results were within normal limits. He recounts a trip to [place] the following day, during which he consumed a couple of steaks and an increased intake of red meat. His diet primarily consists of chicken.    The patient acknowledges a slight increase in his cholesterol levels, which he attributes to consumption of steaks and red meat. He occasionally consumes wine, but not on a daily basis.    The patient reports that propranolol has been beneficial in managing his tremors. However, he continues to experience them. He observes that coffee exacerbates his tremors. He does not take a magnesium supplement.    Supplemental Information  He is scheduled to have cataract surgery on XX/19/2023.    [facility]  "Maintenance:  COMPLETED     Allergies, past medical history, past surgical history, family history, social history reviewed and updated.    Review of Systems   Constitutional:  Negative for chills, fever and malaise/fatigue.   Cardiovascular:  Negative for chest pain, palpitations and leg swelling.        Exam:    /76 (BP Location: Left arm, Patient Position: Sitting, BP Cuff Size: Large adult)   Pulse 87   Temp 36.4 °C (97.6 °F) (Temporal)   Resp 16   Ht 1.651 m (5' 5\")   Wt 101 kg (223 lb 10.5 oz)   SpO2 95%   BMI 37.22 kg/m²  Body mass index is 37.22 kg/m².    Physical Exam  Constitutional:       General: He is not in acute distress.     Appearance: He is not ill-appearing.   HENT:      Head: Normocephalic and atraumatic.      Nose: Nose normal.   Eyes:      General:         Right eye: No discharge.         Left eye: No discharge.   Cardiovascular:      Rate and Rhythm: Normal rate.      Pulses: Normal pulses.      Heart sounds: Normal heart sounds. No murmur heard.  Pulmonary:      Effort: Pulmonary effort is normal. No respiratory distress.      Breath sounds: Normal breath sounds. No stridor. No wheezing or rales.   Skin:     Findings: No rash.   Neurological:      General: No focal deficit present.      Mental Status: He is alert. Mental status is at baseline.   Psychiatric:         Mood and Affect: Mood normal.         Behavior: Behavior normal.          Results  Laboratory Studies  Hemoglobin and hematocrit are elevated, previously at 19.4. Iron levels are good. Ferritin is almost 700. Cholesterol levels are slightly elevated. GFR is 50.       Discussed with patient possible alternative diagnoses, patient is to take all medications as prescribed.      If symptoms persist FU w/PCP, if symptoms worsen go to emergency room.      If experiencing any side effects from prescribed medications report to the office immediately or go to emergency room.     Reviewed indication, dosage, usage and potential " adverse effects of prescribed medications.      Reviewed risks and benefits of treatment plan. Patient verbalizes understanding of all instruction and verbally agrees to plan.     Discussed plan with the patient, and patient agrees to the above.      I personally reviewed prior external notes and test results pertinent to today's visit.      No follow-ups on file. 3 mos

## 2024-06-04 NOTE — ASSESSMENT & PLAN NOTE
Latest Reference Range & Units 05/30/24 07:12   Cholesterol,Tot 100 - 199 mg/dL 224 (H)   Triglycerides 0 - 149 mg/dL 132   HDL >=40 mg/dL 72   LDL <100 mg/dL 126 (H)   (H): Data is abnormally high  The ASCVD Risk score (Sandeep PALENCIA, et al., 2019) failed to calculate for the following reasons:    The patient has a prior MI or stroke diagnosis

## 2024-06-08 ENCOUNTER — HOSPITAL ENCOUNTER (OUTPATIENT)
Dept: RADIOLOGY | Facility: MEDICAL CENTER | Age: 69
End: 2024-06-08
Attending: NURSE PRACTITIONER
Payer: COMMERCIAL

## 2024-06-08 DIAGNOSIS — E78.5 DYSLIPIDEMIA: ICD-10-CM

## 2024-06-08 PROCEDURE — 4410556 CT-CARDIAC SCORING (SELF PAY ONLY)

## 2024-09-23 ENCOUNTER — HOSPITAL ENCOUNTER (OUTPATIENT)
Dept: LAB | Facility: MEDICAL CENTER | Age: 69
End: 2024-09-23
Attending: NURSE PRACTITIONER
Payer: MEDICARE

## 2024-09-23 DIAGNOSIS — D75.1: ICD-10-CM

## 2024-09-23 DIAGNOSIS — T70.29XA: ICD-10-CM

## 2024-09-23 DIAGNOSIS — N18.30 STAGE 3 CHRONIC KIDNEY DISEASE, UNSPECIFIED WHETHER STAGE 3A OR 3B CKD: ICD-10-CM

## 2024-09-23 DIAGNOSIS — R79.9 ABNORMAL FINDING OF BLOOD CHEMISTRY, UNSPECIFIED: ICD-10-CM

## 2024-09-23 LAB
ERYTHROCYTE [DISTWIDTH] IN BLOOD BY AUTOMATED COUNT: 45.4 FL (ref 35.9–50)
HCT VFR BLD AUTO: 51.1 % (ref 42–52)
HGB BLD-MCNC: 17.6 G/DL (ref 14–18)
MCH RBC QN AUTO: 31.8 PG (ref 27–33)
MCHC RBC AUTO-ENTMCNC: 34.4 G/DL (ref 32.3–36.5)
MCV RBC AUTO: 92.4 FL (ref 81.4–97.8)
PLATELET # BLD AUTO: 186 K/UL (ref 164–446)
PMV BLD AUTO: 9.5 FL (ref 9–12.9)
RBC # BLD AUTO: 5.53 M/UL (ref 4.7–6.1)
WBC # BLD AUTO: 6.6 K/UL (ref 4.8–10.8)

## 2024-09-23 PROCEDURE — 83550 IRON BINDING TEST: CPT

## 2024-09-23 PROCEDURE — 85027 COMPLETE CBC AUTOMATED: CPT

## 2024-09-23 PROCEDURE — 36415 COLL VENOUS BLD VENIPUNCTURE: CPT

## 2024-09-23 PROCEDURE — 82668 ASSAY OF ERYTHROPOIETIN: CPT

## 2024-09-23 PROCEDURE — 80053 COMPREHEN METABOLIC PANEL: CPT

## 2024-09-23 PROCEDURE — 82728 ASSAY OF FERRITIN: CPT

## 2024-09-23 PROCEDURE — 83540 ASSAY OF IRON: CPT

## 2024-09-24 LAB
ALBUMIN SERPL BCP-MCNC: 4.4 G/DL (ref 3.2–4.9)
ALBUMIN/GLOB SERPL: 2.1 G/DL
ALP SERPL-CCNC: 63 U/L (ref 30–99)
ALT SERPL-CCNC: 18 U/L (ref 2–50)
ANION GAP SERPL CALC-SCNC: 16 MMOL/L (ref 7–16)
AST SERPL-CCNC: 22 U/L (ref 12–45)
BILIRUB SERPL-MCNC: 0.9 MG/DL (ref 0.1–1.5)
BUN SERPL-MCNC: 22 MG/DL (ref 8–22)
CALCIUM ALBUM COR SERPL-MCNC: 9.1 MG/DL (ref 8.5–10.5)
CALCIUM SERPL-MCNC: 9.4 MG/DL (ref 8.5–10.5)
CHLORIDE SERPL-SCNC: 103 MMOL/L (ref 96–112)
CO2 SERPL-SCNC: 21 MMOL/L (ref 20–33)
CREAT SERPL-MCNC: 1.4 MG/DL (ref 0.5–1.4)
FERRITIN SERPL-MCNC: 529 NG/ML (ref 22–322)
GFR SERPLBLD CREATININE-BSD FMLA CKD-EPI: 55 ML/MIN/1.73 M 2
GLOBULIN SER CALC-MCNC: 2.1 G/DL (ref 1.9–3.5)
GLUCOSE SERPL-MCNC: 102 MG/DL (ref 65–99)
IRON SATN MFR SERPL: 23 % (ref 15–55)
IRON SERPL-MCNC: 74 UG/DL (ref 50–180)
POTASSIUM SERPL-SCNC: 5 MMOL/L (ref 3.6–5.5)
PROT SERPL-MCNC: 6.5 G/DL (ref 6–8.2)
SODIUM SERPL-SCNC: 140 MMOL/L (ref 135–145)
TIBC SERPL-MCNC: 316 UG/DL (ref 250–450)
UIBC SERPL-MCNC: 242 UG/DL (ref 110–370)

## 2024-09-25 LAB — EPO SERPL-ACNC: 15 MU/ML (ref 4–27)

## 2024-09-30 ENCOUNTER — OFFICE VISIT (OUTPATIENT)
Dept: CARDIOLOGY | Facility: MEDICAL CENTER | Age: 69
End: 2024-09-30
Attending: INTERNAL MEDICINE
Payer: MEDICARE

## 2024-09-30 ENCOUNTER — TELEPHONE (OUTPATIENT)
Dept: CARDIOLOGY | Facility: MEDICAL CENTER | Age: 69
End: 2024-09-30

## 2024-09-30 VITALS
HEIGHT: 65 IN | SYSTOLIC BLOOD PRESSURE: 130 MMHG | OXYGEN SATURATION: 95 % | RESPIRATION RATE: 16 BRPM | DIASTOLIC BLOOD PRESSURE: 80 MMHG | BODY MASS INDEX: 34.49 KG/M2 | WEIGHT: 207 LBS | HEART RATE: 75 BPM

## 2024-09-30 DIAGNOSIS — G25.0 BENIGN ESSENTIAL TREMOR: ICD-10-CM

## 2024-09-30 DIAGNOSIS — R06.09 DYSPNEA ON EXERTION: ICD-10-CM

## 2024-09-30 DIAGNOSIS — I10 ESSENTIAL HYPERTENSION: ICD-10-CM

## 2024-09-30 DIAGNOSIS — G47.33 OSA ON CPAP: ICD-10-CM

## 2024-09-30 DIAGNOSIS — I48.19 PERSISTENT ATRIAL FIBRILLATION (HCC): ICD-10-CM

## 2024-09-30 DIAGNOSIS — I10 HTN (HYPERTENSION), MALIGNANT: ICD-10-CM

## 2024-09-30 DIAGNOSIS — I48.3 TYPICAL ATRIAL FLUTTER (HCC): ICD-10-CM

## 2024-09-30 DIAGNOSIS — D68.69 HYPERCOAGULABLE STATE DUE TO PERSISTENT ATRIAL FIBRILLATION (HCC): ICD-10-CM

## 2024-09-30 DIAGNOSIS — I63.9 CEREBROVASCULAR ACCIDENT (CVA), UNSPECIFIED MECHANISM (HCC): ICD-10-CM

## 2024-09-30 DIAGNOSIS — I51.89 LEFT VENTRICULAR DIASTOLIC DYSFUNCTION, NYHA CLASS 2: ICD-10-CM

## 2024-09-30 DIAGNOSIS — N18.32 STAGE 3B CHRONIC KIDNEY DISEASE: ICD-10-CM

## 2024-09-30 DIAGNOSIS — I50.30 ACC/AHA STAGE C HEART FAILURE WITH PRESERVED EJECTION FRACTION (HCC): ICD-10-CM

## 2024-09-30 DIAGNOSIS — E78.2 MIXED HYPERLIPIDEMIA: ICD-10-CM

## 2024-09-30 DIAGNOSIS — Z79.899 HIGH RISK MEDICATION USE: ICD-10-CM

## 2024-09-30 DIAGNOSIS — I48.0 PAROXYSMAL ATRIAL FIBRILLATION (HCC): ICD-10-CM

## 2024-09-30 DIAGNOSIS — I48.19 HYPERCOAGULABLE STATE DUE TO PERSISTENT ATRIAL FIBRILLATION (HCC): ICD-10-CM

## 2024-09-30 LAB — EKG IMPRESSION: NORMAL

## 2024-09-30 PROCEDURE — 99211 OFF/OP EST MAY X REQ PHY/QHP: CPT

## 2024-09-30 PROCEDURE — 93005 ELECTROCARDIOGRAM TRACING: CPT | Performed by: INTERNAL MEDICINE

## 2024-09-30 RX ORDER — SPIRONOLACTONE 25 MG/1
25 TABLET ORAL DAILY
Qty: 90 TABLET | Refills: 3 | Status: SHIPPED | OUTPATIENT
Start: 2024-09-30

## 2024-09-30 RX ORDER — AMIODARONE HYDROCHLORIDE 200 MG/1
200 TABLET ORAL DAILY
Qty: 90 TABLET | Refills: 3 | Status: SHIPPED | OUTPATIENT
Start: 2024-09-30

## 2024-09-30 RX ORDER — AMLODIPINE BESYLATE 10 MG/1
10 TABLET ORAL DAILY
Qty: 90 TABLET | Refills: 3 | Status: SHIPPED | OUTPATIENT
Start: 2024-09-30

## 2024-09-30 RX ORDER — PROPRANOLOL HCL 10 MG
10 TABLET ORAL 3 TIMES DAILY
Qty: 90 TABLET | Refills: 1 | Status: SHIPPED | OUTPATIENT
Start: 2024-09-30

## 2024-09-30 RX ORDER — ATORVASTATIN CALCIUM 80 MG/1
80 TABLET, FILM COATED ORAL NIGHTLY
Qty: 100 TABLET | Refills: 4 | Status: SHIPPED | OUTPATIENT
Start: 2024-09-30

## 2024-09-30 ASSESSMENT — ENCOUNTER SYMPTOMS
SENSORY CHANGE: 0
HALLUCINATIONS: 0
DIZZINESS: 0
CHILLS: 0
ABDOMINAL PAIN: 0
ORTHOPNEA: 0
BLURRED VISION: 0
EYE PAIN: 0
SPEECH CHANGE: 0
SHORTNESS OF BREATH: 0
COUGH: 0
BLOOD IN STOOL: 0
LOSS OF CONSCIOUSNESS: 0
HEADACHES: 0
FALLS: 0
MYALGIAS: 0
EYE DISCHARGE: 0
WEIGHT LOSS: 0
PND: 0
CLAUDICATION: 0
BRUISES/BLEEDS EASILY: 0
PALPITATIONS: 0
DEPRESSION: 0
FEVER: 0
DOUBLE VISION: 0
VOMITING: 0
NAUSEA: 0

## 2024-09-30 ASSESSMENT — FIBROSIS 4 INDEX: FIB4 SCORE: 1.87

## 2024-10-04 ENCOUNTER — OFFICE VISIT (OUTPATIENT)
Dept: MEDICAL GROUP | Facility: MEDICAL CENTER | Age: 69
End: 2024-10-04
Payer: MEDICARE

## 2024-10-04 VITALS
HEIGHT: 66 IN | OXYGEN SATURATION: 96 % | HEART RATE: 70 BPM | TEMPERATURE: 97.3 F | BODY MASS INDEX: 33.3 KG/M2 | DIASTOLIC BLOOD PRESSURE: 82 MMHG | SYSTOLIC BLOOD PRESSURE: 124 MMHG | RESPIRATION RATE: 20 BRPM | WEIGHT: 207.23 LBS

## 2024-10-04 DIAGNOSIS — Z23 NEED FOR VACCINATION: ICD-10-CM

## 2024-10-04 DIAGNOSIS — E78.5 DYSLIPIDEMIA: ICD-10-CM

## 2024-10-04 DIAGNOSIS — D68.69 OTHER THROMBOPHILIA (HCC): ICD-10-CM

## 2024-10-04 DIAGNOSIS — I48.91 ATRIAL FIBRILLATION, UNSPECIFIED TYPE (HCC): ICD-10-CM

## 2024-10-04 DIAGNOSIS — I50.30 ACC/AHA STAGE C HEART FAILURE WITH PRESERVED EJECTION FRACTION (HCC): ICD-10-CM

## 2024-10-04 DIAGNOSIS — I63.40 CEREBRAL INFARCTION DUE TO EMBOLISM OF UNSPECIFIED CEREBRAL ARTERY (HCC): ICD-10-CM

## 2024-10-04 DIAGNOSIS — D75.1 POLYCYTHEMIA, SECONDARY: ICD-10-CM

## 2024-10-04 DIAGNOSIS — G25.0 BENIGN ESSENTIAL TREMOR: ICD-10-CM

## 2024-10-04 DIAGNOSIS — Z00.00 MEDICARE ANNUAL WELLNESS VISIT, SUBSEQUENT: ICD-10-CM

## 2024-10-04 DIAGNOSIS — I10 ESSENTIAL HYPERTENSION: ICD-10-CM

## 2024-10-04 DIAGNOSIS — Z12.5 SCREENING FOR PROSTATE CANCER: ICD-10-CM

## 2024-10-04 DIAGNOSIS — E66.01 MORBID (SEVERE) OBESITY DUE TO EXCESS CALORIES (HCC): ICD-10-CM

## 2024-10-04 PROCEDURE — 3074F SYST BP LT 130 MM HG: CPT | Performed by: NURSE PRACTITIONER

## 2024-10-04 PROCEDURE — G0438 PPPS, INITIAL VISIT: HCPCS | Performed by: NURSE PRACTITIONER

## 2024-10-04 PROCEDURE — G0008 ADMIN INFLUENZA VIRUS VAC: HCPCS | Performed by: NURSE PRACTITIONER

## 2024-10-04 PROCEDURE — 90662 IIV NO PRSV INCREASED AG IM: CPT | Performed by: NURSE PRACTITIONER

## 2024-10-04 PROCEDURE — 3079F DIAST BP 80-89 MM HG: CPT | Performed by: NURSE PRACTITIONER

## 2024-10-04 ASSESSMENT — PATIENT HEALTH QUESTIONNAIRE - PHQ9: CLINICAL INTERPRETATION OF PHQ2 SCORE: 0

## 2024-10-04 ASSESSMENT — FIBROSIS 4 INDEX: FIB4 SCORE: 1.87

## 2024-10-04 ASSESSMENT — ACTIVITIES OF DAILY LIVING (ADL): BATHING_REQUIRES_ASSISTANCE: 0

## 2024-10-04 ASSESSMENT — ENCOUNTER SYMPTOMS: GENERAL WELL-BEING: GOOD

## 2024-11-01 ENCOUNTER — APPOINTMENT (OUTPATIENT)
Dept: ADMISSIONS | Facility: MEDICAL CENTER | Age: 69
End: 2024-11-01
Attending: INTERNAL MEDICINE
Payer: MEDICARE

## 2024-11-06 ENCOUNTER — PRE-ADMISSION TESTING (OUTPATIENT)
Dept: ADMISSIONS | Facility: MEDICAL CENTER | Age: 69
End: 2024-11-06
Attending: INTERNAL MEDICINE
Payer: MEDICARE

## 2024-11-07 ENCOUNTER — ANESTHESIA EVENT (OUTPATIENT)
Dept: CARDIOLOGY | Facility: MEDICAL CENTER | Age: 69
End: 2024-11-07
Payer: MEDICARE

## 2024-11-08 ENCOUNTER — APPOINTMENT (OUTPATIENT)
Dept: CARDIOLOGY | Facility: MEDICAL CENTER | Age: 69
End: 2024-11-08
Attending: INTERNAL MEDICINE
Payer: MEDICARE

## 2024-11-08 ENCOUNTER — ANESTHESIA (OUTPATIENT)
Dept: CARDIOLOGY | Facility: MEDICAL CENTER | Age: 69
End: 2024-11-08
Payer: MEDICARE

## 2024-11-08 ENCOUNTER — HOSPITAL ENCOUNTER (OUTPATIENT)
Facility: MEDICAL CENTER | Age: 69
End: 2024-11-08
Attending: INTERNAL MEDICINE | Admitting: INTERNAL MEDICINE
Payer: MEDICARE

## 2024-11-08 VITALS
SYSTOLIC BLOOD PRESSURE: 133 MMHG | DIASTOLIC BLOOD PRESSURE: 85 MMHG | TEMPERATURE: 97.8 F | WEIGHT: 208.78 LBS | HEIGHT: 66 IN | RESPIRATION RATE: 16 BRPM | HEART RATE: 55 BPM | BODY MASS INDEX: 33.55 KG/M2 | OXYGEN SATURATION: 92 %

## 2024-11-08 DIAGNOSIS — I48.19 PERSISTENT ATRIAL FIBRILLATION (HCC): ICD-10-CM

## 2024-11-08 LAB
EKG IMPRESSION: NORMAL
EKG IMPRESSION: NORMAL

## 2024-11-08 PROCEDURE — 4410588 CL-CARDIOVERSION

## 2024-11-08 PROCEDURE — 93010 ELECTROCARDIOGRAM REPORT: CPT | Mod: 76,59 | Performed by: INTERNAL MEDICINE

## 2024-11-08 PROCEDURE — 160035 HCHG PACU - 1ST 60 MINS PHASE I

## 2024-11-08 PROCEDURE — 160036 HCHG PACU - EA ADDL 30 MINS PHASE I

## 2024-11-08 PROCEDURE — 92960 CARDIOVERSION ELECTRIC EXT: CPT | Performed by: INTERNAL MEDICINE

## 2024-11-08 PROCEDURE — 93005 ELECTROCARDIOGRAM TRACING: CPT | Performed by: INTERNAL MEDICINE

## 2024-11-08 PROCEDURE — 160002 HCHG RECOVERY MINUTES (STAT)

## 2024-11-08 PROCEDURE — 700111 HCHG RX REV CODE 636 W/ 250 OVERRIDE (IP): Performed by: ANESTHESIOLOGY

## 2024-11-08 RX ORDER — ONDANSETRON 2 MG/ML
4 INJECTION INTRAMUSCULAR; INTRAVENOUS
Status: DISCONTINUED | OUTPATIENT
Start: 2024-11-08 | End: 2024-11-08 | Stop reason: HOSPADM

## 2024-11-08 RX ORDER — OXYCODONE HCL 5 MG/5 ML
5 SOLUTION, ORAL ORAL
Status: DISCONTINUED | OUTPATIENT
Start: 2024-11-08 | End: 2024-11-08 | Stop reason: HOSPADM

## 2024-11-08 RX ORDER — DIPHENHYDRAMINE HYDROCHLORIDE 50 MG/ML
12.5 INJECTION INTRAMUSCULAR; INTRAVENOUS
Status: DISCONTINUED | OUTPATIENT
Start: 2024-11-08 | End: 2024-11-08 | Stop reason: HOSPADM

## 2024-11-08 RX ORDER — SODIUM CHLORIDE 9 MG/ML
INJECTION, SOLUTION INTRAVENOUS CONTINUOUS
Status: DISCONTINUED | OUTPATIENT
Start: 2024-11-08 | End: 2024-11-08 | Stop reason: HOSPADM

## 2024-11-08 RX ORDER — HYDROMORPHONE HYDROCHLORIDE 1 MG/ML
0.2 INJECTION, SOLUTION INTRAMUSCULAR; INTRAVENOUS; SUBCUTANEOUS
Status: DISCONTINUED | OUTPATIENT
Start: 2024-11-08 | End: 2024-11-08 | Stop reason: HOSPADM

## 2024-11-08 RX ORDER — HYDROMORPHONE HYDROCHLORIDE 1 MG/ML
0.1 INJECTION, SOLUTION INTRAMUSCULAR; INTRAVENOUS; SUBCUTANEOUS
Status: DISCONTINUED | OUTPATIENT
Start: 2024-11-08 | End: 2024-11-08 | Stop reason: HOSPADM

## 2024-11-08 RX ORDER — OXYCODONE HCL 5 MG/5 ML
10 SOLUTION, ORAL ORAL
Status: DISCONTINUED | OUTPATIENT
Start: 2024-11-08 | End: 2024-11-08 | Stop reason: HOSPADM

## 2024-11-08 RX ORDER — HYDROMORPHONE HYDROCHLORIDE 1 MG/ML
0.4 INJECTION, SOLUTION INTRAMUSCULAR; INTRAVENOUS; SUBCUTANEOUS
Status: DISCONTINUED | OUTPATIENT
Start: 2024-11-08 | End: 2024-11-08 | Stop reason: HOSPADM

## 2024-11-08 RX ADMIN — PROPOFOL 50 MG: 10 INJECTION, EMULSION INTRAVENOUS at 12:51

## 2024-11-08 RX ADMIN — PROPOFOL 50 MG: 10 INJECTION, EMULSION INTRAVENOUS at 12:50

## 2024-11-08 ASSESSMENT — FIBROSIS 4 INDEX: FIB4 SCORE: 1.9

## 2024-11-08 ASSESSMENT — PAIN SCALES - GENERAL: PAIN_LEVEL: 0

## 2024-11-08 NOTE — H&P
Cardiology H and P Note:    Santosh Castelan M.D.  Date & Time note created:    11/8/2024   12:36 PM       Patient ID:   Name:             Chema Cuenca   YOB: 1955  Age:                 69 y.o.  male   MRN:               [id number]                                                             Chief Complaint / Reason for consult:  Persistent atrial fibrillation.    History of Present Illness:    This is a 68 yo man who is here for elective cardioversion to restore sinus rhythm.      Review of Systems:      Constitutional: Denies fevers, Denies weight changes  Eyes: Denies changes in vision, no eye pain  Ears/Nose/Throat/Mouth: Denies nasal congestion or sore throat   Cardiovascular: no chest pain, yes palpitations   Respiratory: yes shortness of breath , Denies cough  Gastrointestinal/Hepatic: Denies abdominal pain, nausea, vomiting, diarrhea, constipation or GI bleeding   Genitourinary: Denies dysuria or frequency  Musculoskeletal/Rheum: Denies  joint pain and swelling   Skin: Denies rash  Neurological: Denies headache, confusion, memory loss or focal weakness/parasthesias  Psychiatric: denies mood disorder   Endocrine: Heather thyroid problems  Heme/Oncology/Lymph Nodes: Denies enlarged lymph nodes, denies brusing or known bleeding disorder  All other systems were reviewed and are negative (AMA/CMS criteria)                Past Medical History:   Past Medical History:   Diagnosis Date    Acute kidney injury (HCC)     stage 3 renal failure    Atrial fibrillation (HCC)     takes amiodarone and eliquis    Cataract 07/2024    Chickenpox     history of    Congestive heart failure (HCC) 2019    Frequent urination     [place] Island[place] measles     history of    Heart valve disease     aortic insufficiency    High cholesterol 2016    medicated    Hypertension     medicated    Hypertensive urgency     Influenza     history of    Kidney stone     history of    Longstanding persistent atrial fibrillation  ([facility]) 05/03/2022    Mumps     history of    Nephrolithiasis     Obesity     Sleep apnea 2021    uses cpap    Snoring     sleep study done    Stroke ([facility]) 2021    pt told he had a stroke, but never realized he had one, has tremor right hand    Tonsillitis     history of    Typhoid fever     history of    Wears glasses      Active Hospital Problems    Diagnosis     Atrial fibrillation ([facility]) [I48.91]        Past Surgical History:  Past Surgical History:   Procedure Laterality Date    CATARACT PHACO WITH IOL Bilateral 07/2024    REPAIR, HERNIA, UMBILICAL, ROBOT-ASSISTED, USING DA RANDY  10/10/2023    Procedure: ROBOT UMBILICAL HERNIA REPAIR;  Surgeon: Michael Saavedra M.D.;  Location: SURGERY [facility];  Service: Gen Robotic    OTHER  2022    cardioversion       Hospital Medications:  No current facility-administered medications for this encounter.    Current Outpatient Medications:  Medications Prior to Admission   Medication Sig Dispense Refill Last Dose/Taking    atorvastatin (LIPITOR) 80 MG tablet Take 1 Tablet by mouth every evening. (Patient taking differently: Take 80 mg by mouth every evening. MEDICATION INSTRUCTIONS FOR SURGERY/PROCEDURE SCHEDULED FOR 11/8/2024   CONTINUE TAKING MED PRIOR TO SURGERY) 100 Tablet 4 11/7/2024 Bedtime    amiodarone (CORDARONE) 200 MG Tab Take 1 Tablet by mouth every day. (Patient taking differently: Take 200 mg by mouth every day. MEDICATION INSTRUCTIONS FOR SURGERY/PROCEDURE SCHEDULED FOR 11/8/2024   CONTINUE TAKING MED PRIOR TO SURGERY) 90 Tablet 3 11/8/2024 at  7:00 AM    amLODIPine (NORVASC) 10 MG Tab Take 1 Tablet by mouth every day. (Patient taking differently: Take 10 mg by mouth every day. MEDICATION INSTRUCTIONS FOR SURGERY/PROCEDURE SCHEDULED FOR 11/8/2024   CONTINUE TAKING MED PRIOR TO SURGERY) 90 Tablet 3 11/8/2024 at  7:00 AM    apixaban (ELIQUIS) 5mg Tab Take 1 Tablet by mouth 2 times a day. (Patient taking differently: Take 5 mg by mouth 2 times a day. MEDICATION  INSTRUCTIONS FOR SURGERY/PROCEDURE SCHEDULED FOR 11/8/2024   FOLLOW DR EASON'S INSTRUCTIONS) 180 Tablet 3 11/8/2024 at  7:00 AM    propranolol (INDERAL) 10 MG Tab Take 1 Tablet by mouth 3 times a day. (Patient taking differently: Take 10 mg by mouth every day. MEDICATION INSTRUCTIONS FOR SURGERY/PROCEDURE SCHEDULED FOR 11/8/2024   CONTINUE TAKING MED PRIOR TO SURGERY) 90 Tablet 1 11/8/2024 at  7:00 AM    spironolactone (ALDACTONE) 25 MG Tab Take 1 Tablet by mouth every day. (Patient taking differently: Take 25 mg by mouth every day. MEDICATION INSTRUCTIONS FOR SURGERY/PROCEDURE SCHEDULED FOR 11/8/2024   DO NOT TAKE MORNING OF PROCEDURE.) 90 Tablet 3 11/7/2024 Morning    tamsulosin (FLOMAX) 0.4 MG capsule Take 0.4 mg by mouth every day. MEDICATION INSTRUCTIONS FOR SURGERY/PROCEDURE SCHEDULED FOR 11/8/2024   CONTINUE TAKING MED PRIOR TO SURGERY   11/7/2024 Bedtime    multivitamin Tab Take 1 Tablet by mouth every day. MEDICATION INSTRUCTIONS FOR SURGERY/PROCEDURE SCHEDULED FOR 11/8/2024   DO NOT TAKE 7 DAYS PRIOR TO SURGERY   11/1/2024    MAGNESIUM PO Take 1 Tablet by mouth every day. MEDICATION INSTRUCTIONS FOR SURGERY/PROCEDURE SCHEDULED FOR 11/8/2024   DO NOT TAKE 7 DAYS PRIOR TO SURGERY   11/1/2024       Medication Allergy:  No Known Allergies    Family History:  Family History   Problem Relation Age of Onset    Cancer Mother         lungs, smoker    Lung Cancer Mother     Hypertension Father     Hyperlipidemia Father     Diabetes Neg Hx     Heart Disease Neg Hx        Social History:  Social History     Socioeconomic History    Marital status: Single     Spouse name: Not on file    Number of children: Not on file    Years of education: Not on file    Highest education level: Not on file   Occupational History    Not on file   Tobacco Use    Smoking status: Never     Passive exposure: Never    Smokeless tobacco: Never   Vaping Use    Vaping status: Never Used   Substance and Sexual Activity    Alcohol use: Yes      "Alcohol/week: 6.0 oz     Types: 7 Glasses of wine, 3 Cans of beer per week     Comment: 2 glasses of drinks a week    Drug use: No    Sexual activity: Not Currently   Other Topics Concern    Not on file   Social History Narrative    Not on file     Social Drivers of Health     Financial Resource Strain: Not on file   Food Insecurity: Not on file   Transportation Needs: Not on file   Physical Activity: Not on file   Stress: Not on file   Social Connections: Not on file   Intimate Partner Violence: Not on file   Housing Stability: Not on file         Physical Exam:  Vitals/ General Appearance:   Weight/BMI: Body mass index is 33.7 kg/m².  BP (!) 136/95   Pulse 81   Temp 36.7 °C (98 °F) (Temporal)   Resp 20   Ht 1.676 m (5' 6\")   Wt 94.7 kg (208 lb 12.4 oz)   SpO2 93%   Vitals:    11/08/24 1121   BP: (!) 136/95   Pulse: 81   Resp: 20   Temp: 36.7 °C (98 °F)   TempSrc: Temporal   SpO2: 93%   Weight: 94.7 kg (208 lb 12.4 oz)   Height: 1.676 m (5' 6\")     Oxygen Therapy:  Pulse Oximetry: 93 %, O2 (LPM): 0, O2 Delivery Device: None - Room Air    Constitutional:   No acute distress  HENMT:  Normocephalic, Atraumatic.  Eyes:  EOMI, No discharge.  Neck:  no JVD.  Cardiovascular:  There is presence of an irregularly irregular heartbeats. Extremitites with intact distal pulses, no cyanosis, or edema.  Lungs:  No respiratory distress.  Abdomen: Soft, No tenderness, No guarding, No rebound.  Skin: No significant rash.  Neurologic: Alert & oriented x 3, No focal deficits noted, cranial nerves II through X are intact.  Psychiatric: Affect normal, Judgment normal, Mood normal.      MDM (Data Review):     Records reviewed and summarized in current documentation    Lab Data Review:  Recent Results (from the past 24 hours)   EKG    Collection Time: 11/08/24 11:03 AM   Result Value Ref Range    Report       [facility]    Test Date:  2024-11-08  Pt Name:    TIFFANY MARTINEZ             Department: [facility]  MRN:        [id number]    "                   Room:       [facility]  Gender:     Male                         Technician: VIGNESH  :        1955                   Requested By:SANTOSH  TO  Order #:    [id number]                    Reading MD:    Measurements  Intervals                                Axis  Rate:       83                           P:          0  FL:         0                            QRS:        243  QRSD:       128                          T:          196  QT:         375  QTc:        441    Interpretive Statements  Atrial flutter  Nonspecific IVCD with LAD  Nonspecific T abnormalities, diffuse leads  Compared to ECG 2024 09:57:20  Intraventricular conduction delay now present  Atrial fibrillation no longer present  Left-axis deviation no longer present  T-wave abnormality still present         Imaging/Procedures Review:    Chest Xray:  Reviewed    EKG:   As in HPI.     MDM (Assessment and Plan):     Active Hospital Problems    Diagnosis     Atrial fibrillation (HCC) [I48.91]      Risks and benefits of cardioversion were explained to patient.  Patient showed good understanding.  Risks including heart block, death, bleeding, infection were clearly conveyed to patient.  Patient is willing to accept the risks and proceed with procedure.        Santosh Castelan MD.   Cardiology Inpatient Service.  [facility].  [phone].  [place], [place].

## 2024-11-08 NOTE — OR NURSING
1618 Called and informed patient to drink water and gatorade tonight for hydration. Patient encouraged to drink water tomorrow up to 2 hours prior to procedure patient agreeable.

## 2024-11-08 NOTE — ANESTHESIA TIME REPORT
Anesthesia Start and Stop Event Times       Date Time Event    11/8/2024 1239 Ready for Procedure     1244 Anesthesia Start     1302 Anesthesia Stop          Responsible Staff  11/08/24      Name Role Begin End    Abraham Tate M.D. Anesth 1244 1302          Overtime Reason:  no overtime (within assigned shift)    Comments:

## 2024-11-08 NOTE — ANESTHESIA POSTPROCEDURE EVALUATION
Patient: Chema Cuenca    Procedure Summary       Date: 11/08/24 Room / Location: [facility] - Echocardiology [facility]    Anesthesia Start: 1244 Anesthesia Stop: 1302    Procedure: CL-CARDIOVERSION Diagnosis:       Persistent atrial fibrillation (HCC)      (Palpitations)      (Atrial Fibrillation)      (Arrhythmia)    Scheduled Providers: Santosh Castelan M.D.; Abraham Tate M.D. Responsible Provider: Abraham Tate M.D.    Anesthesia Type: general ASA Status: 3            Final Anesthesia Type: general  Last vitals  BP   Blood Pressure : 118/81    Temp   36 °C (96.8 °F)    Pulse   63   Resp   16    SpO2   99 %      Anesthesia Post Evaluation    Patient location during evaluation: PACU  Patient participation: complete - patient participated  Level of consciousness: awake and alert  Pain score: 0    Airway patency: patent  Anesthetic complications: no  Cardiovascular status: hemodynamically stable  Respiratory status: acceptable  Hydration status: euvolemic    PONV: none          No notable events documented.     Nurse Pain Score: 0 (NPRS)

## 2024-11-08 NOTE — PROGRESS NOTES
1258- Patient arrived to PACU. Report received. Attached to monitors. Verified parameters and alarms. Belongings on gurney with patient.    1302- Patient tolerated PO fluids      1303- report given to NATALIIA Rangel

## 2024-11-08 NOTE — PROCEDURES
Electrical Cardioversion    Date/Time: 11/8/2024 12:52 PM    Performed by: Santosh Castelan M.D.  Authorized by: Santosh Castelan M.D.    Consent:     Consent obtained:  Written and verbal    Consent given by:  Patient    Risks discussed:  Death, cutaneous burn, induced arrhythmia and pain    Alternatives discussed:  No treatment, rate-control medication, alternative treatment and anti-coagulation medication  Sedation:     Patient sedated: Yes      Sedation type:  Per anesthesia    Vital signs: Vital signs monitored during sedation    Pre-procedure details:     Cardioversion basis:  Elective    Rhythm:  Atrial fibrillation    Electrode placement:  Anterior-posterior    Anticoagulation status:  Apixaban  Attempt one:     Cardioversion mode:  Synchronous    Shock (Joules):  200    Shock outcome:  Conversion to normal sinus rhythm  Post-procedure details:     Patient status:  Awake    Patient tolerance of procedure:  Tolerated well, no immediate complications  Comments:      Santosh Castelan M.D.

## 2024-11-08 NOTE — ANESTHESIA PREPROCEDURE EVALUATION
Date/Time: 11/08/24 1300    Scheduled providers: Santosh Castelan M.D.; Abraham Tate M.D.    Procedure: CL-CARDIOVERSION    Diagnosis: Persistent atrial fibrillation (HCC) [I48.19]    Indications:       Palpitations      Atrial Fibrillation      Arrhythmia    Location: [facility] - Echocardiology [facility]            Relevant Problems   ANESTHESIA   (positive) Sleep apnea      CARDIAC   (positive) Atrial fibrillation (HCC)   (positive) Essential hypertension   (positive) Hypertensive disorder      GI   (positive) Gastroesophageal reflux disease         (positive) Acquired renal cyst of right kidney   (positive) Stage 3 chronic kidney disease       Physical Exam    Airway   Mallampati: II  TM distance: >3 FB  Neck ROM: limited       Cardiovascular - normal exam  Rhythm: regular  Rate: normal  (-) murmur     Dental - normal exam           Pulmonary - normal exam  Breath sounds clear to auscultation     Abdominal    Neurological - normal exam                   Anesthesia Plan    ASA 3   ASA physical status 3 criteria: CVA or TIA - history (> 3 months) and a thrombophilic disease requiring anticoagulation    Plan - general               Induction: intravenous    Postoperative Plan: Postoperative administration of opioids is intended.    Pertinent diagnostic labs and testing reviewed    Informed Consent:    Anesthetic plan and risks discussed with patient.    Use of blood products discussed with: patient whom consented to blood products.

## 2024-11-08 NOTE — OR NURSING
1303 assumed care of patient, no needs at this time.     1315 EKG at bedside    1324 patient friend updated patient in recovery    1400 patient ready for dc. All lines and monitors discontinued. Reviewed discharge paperwork with pt and friend. Discussed diet, activity, medications, follow up care and worsening symptoms. No questions at this time.

## 2024-11-08 NOTE — DISCHARGE INSTRUCTIONS
Electrical Cardioversion, Care After  This sheet gives you information about how to care for yourself after your procedure. Your health care provider may also give you more specific instructions. If you have problems or questions, contact your health care provider.  What can I expect after the procedure?  After the procedure, it is common to have:  Some redness on the skin where the shocks were given.  Follow these instructions at home:  Do not drive for 24 hours if you were given a medicine to help you relax (sedative).  Take over-the-counter and prescription medicines only as told by your health care provider.  Ask your health care provider how to check your pulse. Check it often.  Rest for 48 hours after the procedure or as told by your health care provider.  Avoid or limit your caffeine use as told by your health care provider.  Contact a health care provider if:  You feel like your heart is beating too quickly or your pulse is not regular.  You have a serious muscle cramp that does not go away.  Get help right away if:  You have discomfort in your chest.  You are dizzy or you feel faint.  You have trouble breathing or you are short of breath.  Your speech is slurred.  You have trouble moving an arm or leg on one side of your body.  Your fingers or toes turn cold or blue.  This information is not intended to replace advice given to you by your health care provider. Make sure you discuss any questions you have with your health care provider.  Document Released: 10/08/2014 Document Revised: 11/30/2018 Document Reviewed: 06/23/2017  Elsevier Patient Education © 2020 [employer]    What to Expect Post Anesthesia    Rest and take it easy for the first 24 hours.  A responsible adult is recommended to remain with you during that time.  It is normal to feel sleepy.  We encourage you to not do anything that requires balance, judgment or coordination.    FOR 24 HOURS DO NOT:  Drive, operate machinery or run household  appliances.  Drink beer or alcoholic beverages.  Make important decisions or sign legal documents.    To avoid nausea, slowly advance diet as tolerated, avoiding spicy or greasy foods for the first day.  Add more substantial food to your diet according to your provider's instructions.  INCREASE FLUIDS AND FIBER TO AVOID CONSTIPATION.    MILD FLU-LIKE SYMPTOMS ARE NORMAL.  YOU MAY EXPERIENCE GENERALIZED MUSCLE ACHES, THROAT IRRITATION, HEADACHE AND/OR SOME NAUSEA.    If any questions arise, call your provider.  If your provider is not available, please feel free to call the Surgical Center at [phone].    MEDICATIONS: Resume taking daily medication.  Take prescribed pain medication with food.  If no medication is prescribed, you may take non-aspirin pain medication if needed.  PAIN MEDICATION CAN BE VERY CONSTIPATING.  Take a stool softener or laxative such as senokot, pericolace, or milk of magnesia if needed.

## 2024-12-05 ENCOUNTER — HOSPITAL ENCOUNTER (OUTPATIENT)
Facility: MEDICAL CENTER | Age: 69
End: 2024-12-05
Attending: INTERNAL MEDICINE
Payer: MEDICARE

## 2024-12-05 PROCEDURE — 82784 ASSAY IGA/IGD/IGG/IGM EACH: CPT

## 2024-12-06 ENCOUNTER — TELEPHONE (OUTPATIENT)
Dept: CARDIOLOGY | Facility: MEDICAL CENTER | Age: 69
End: 2024-12-06
Payer: MEDICARE

## 2024-12-06 NOTE — TELEPHONE ENCOUNTER
Spoke with patient in regards to his labs that was order on his las office visit with  patient stated that his labs are not due March 2025.

## 2024-12-08 LAB
IGA SERPL-MCNC: 121 MG/DL (ref 68–408)
IGG SERPL-MCNC: 495 MG/DL (ref 768–1632)
IGM SERPL-MCNC: 40 MG/DL (ref 35–263)

## 2024-12-11 ENCOUNTER — OFFICE VISIT (OUTPATIENT)
Dept: CARDIOLOGY | Facility: MEDICAL CENTER | Age: 69
End: 2024-12-11
Attending: INTERNAL MEDICINE
Payer: MEDICARE

## 2024-12-11 VITALS
RESPIRATION RATE: 18 BRPM | BODY MASS INDEX: 34.27 KG/M2 | HEIGHT: 66 IN | HEART RATE: 66 BPM | OXYGEN SATURATION: 95 % | WEIGHT: 213.2 LBS | SYSTOLIC BLOOD PRESSURE: 122 MMHG | DIASTOLIC BLOOD PRESSURE: 88 MMHG

## 2024-12-11 DIAGNOSIS — I10 HTN (HYPERTENSION), MALIGNANT: ICD-10-CM

## 2024-12-11 DIAGNOSIS — G47.33 OSA ON CPAP: ICD-10-CM

## 2024-12-11 DIAGNOSIS — I48.19 PERSISTENT ATRIAL FIBRILLATION (HCC): ICD-10-CM

## 2024-12-11 DIAGNOSIS — I50.30 ACC/AHA STAGE C HEART FAILURE WITH PRESERVED EJECTION FRACTION (HCC): ICD-10-CM

## 2024-12-11 DIAGNOSIS — Z79.899 HIGH RISK MEDICATION USE: ICD-10-CM

## 2024-12-11 DIAGNOSIS — I51.89 LEFT VENTRICULAR DIASTOLIC DYSFUNCTION, NYHA CLASS 2: ICD-10-CM

## 2024-12-11 DIAGNOSIS — N18.32 STAGE 3B CHRONIC KIDNEY DISEASE: ICD-10-CM

## 2024-12-11 DIAGNOSIS — I63.9 CEREBROVASCULAR ACCIDENT (CVA), UNSPECIFIED MECHANISM (HCC): ICD-10-CM

## 2024-12-11 DIAGNOSIS — D68.69 HYPERCOAGULABLE STATE DUE TO PERSISTENT ATRIAL FIBRILLATION (HCC): ICD-10-CM

## 2024-12-11 DIAGNOSIS — E78.2 MIXED HYPERLIPIDEMIA: ICD-10-CM

## 2024-12-11 DIAGNOSIS — I48.19 HYPERCOAGULABLE STATE DUE TO PERSISTENT ATRIAL FIBRILLATION (HCC): ICD-10-CM

## 2024-12-11 DIAGNOSIS — I10 ESSENTIAL HYPERTENSION: ICD-10-CM

## 2024-12-11 DIAGNOSIS — G25.0 BENIGN ESSENTIAL TREMOR: ICD-10-CM

## 2024-12-11 DIAGNOSIS — R06.09 DYSPNEA ON EXERTION: ICD-10-CM

## 2024-12-11 LAB — EKG IMPRESSION: NORMAL

## 2024-12-11 PROCEDURE — 3074F SYST BP LT 130 MM HG: CPT | Performed by: INTERNAL MEDICINE

## 2024-12-11 PROCEDURE — 3079F DIAST BP 80-89 MM HG: CPT | Performed by: INTERNAL MEDICINE

## 2024-12-11 PROCEDURE — 93010 ELECTROCARDIOGRAM REPORT: CPT | Performed by: INTERNAL MEDICINE

## 2024-12-11 PROCEDURE — 99212 OFFICE O/P EST SF 10 MIN: CPT | Performed by: INTERNAL MEDICINE

## 2024-12-11 PROCEDURE — 99214 OFFICE O/P EST MOD 30 MIN: CPT | Performed by: INTERNAL MEDICINE

## 2024-12-11 PROCEDURE — G2211 COMPLEX E/M VISIT ADD ON: HCPCS | Performed by: INTERNAL MEDICINE

## 2024-12-11 PROCEDURE — 93005 ELECTROCARDIOGRAM TRACING: CPT | Mod: TC | Performed by: INTERNAL MEDICINE

## 2024-12-11 ASSESSMENT — ENCOUNTER SYMPTOMS
PND: 0
CLAUDICATION: 0
VOMITING: 0
CHILLS: 0
HEADACHES: 0
ABDOMINAL PAIN: 0
WEIGHT LOSS: 0
BLURRED VISION: 0
DIZZINESS: 0
SENSORY CHANGE: 0
DEPRESSION: 0
FEVER: 0
MYALGIAS: 0
FALLS: 0
EYE PAIN: 0
EYE DISCHARGE: 0
NAUSEA: 0
COUGH: 0
ORTHOPNEA: 0
HALLUCINATIONS: 0
LOSS OF CONSCIOUSNESS: 0
PALPITATIONS: 0
BRUISES/BLEEDS EASILY: 0
BLOOD IN STOOL: 0
DOUBLE VISION: 0
SHORTNESS OF BREATH: 0
SPEECH CHANGE: 0

## 2024-12-11 ASSESSMENT — FIBROSIS 4 INDEX: FIB4 SCORE: 1.9

## 2024-12-11 NOTE — PROGRESS NOTES
Chief Complaint   Patient presents with    Follow-Up     Dx: ACC/AHA stage C heart failure with preserved ejection fraction (HCC)        Atrial Fibrillation     F/V Dx: Longstanding persistent atrial fibrillation (HCC)           Subjective:   Chema Sarabia is a 69 y.o. male who presents today for cardiac care and evaluation in our heart failure clinic after his recent episode of hypertensive emergency for which he went into heart failure exacerbation as well. Patient does have documented transthoracic echocardiogram which showed relatively preserved LV function of 55%. No significant valvular disease.     Feeling very well. No dyspnea.     At prior visit, patient was able to complete 549 m during his 6 minute walk test. his O2 saturation at baseline was 94% and at the end of the test, the O2 saturation was 95%. he reported 0 level of dyspnea on Jim scale.     06/06/2019 Patient was able to complete 549 m during his 6 minute walk test. his O2 saturation at baseline was 95% and at the end of the test, the O2 saturation was 93%. he reported 1 level of dyspnea on Jim scale.    In July of 2021, for 1 week, he had symptoms of difficult finding words and incoherence. MRI of the brain showed evidence of stroke. His symptoms resolved at this time.    Chema was placed on cardiac event monitor and found to have paroxysmal atrial fibrillation.  He was started on Eliquis 5 mg p.o. twice a day.    Most recent transthoracic echocardiogram last week show evidence of slightly reduced left ventricular systolic function of 50%, concentric biventricular hypertrophy.  Patient did have blood tests which did not show increase free light change.  I personally interpreted images of his transthoracic echocardiogram and blood test results.    On May 3, 2022, patient underwent unsuccessful cardioversion for atrial fibrillation at this time.  He was then placed on amiodarone p.o. therapy.      I have independently interpreted and reviewed  blood tests results with patient in clinic which shows elevated LDL level 126 up from 85, normal triglycerides 138 down from 267, reduced GFR of 55 down from 61, K of 5. NT pro BNP of 51. Hgba1c of 5.7. high Ferritin level. + microalbumin in urine.    Serum light chains are within range.    Of note, patient has been taking his Eliquis without missing dose.    He is on CPAP for ANAHY treatment now.    In the past, patient saw electrophysiology service and was plan for ablation procedure.  However, patient came out of atrial fibrillation and that has been on hold.    11/2024 Underwent cardioversion.    He is back in AF now.     I have personally interpreted EKG today with patient, there is no evidence of acute coronary syndrome, no evidence of prior infarct, normal AK and QT interval, no significant conduction disease. Atrial fibrillation with rate of 79.    Is being worked up for hemochromatosis.    Past Medical History:   Diagnosis Date    Acute kidney injury (HCC)     stage 3 renal failure    Atrial fibrillation (HCC)     takes amiodarone and eliquis    Cataract 07/2024    Chickenpox     history of    Congestive heart failure ([facility]) 2019    Frequent urination     Frisian measles     history of    Heart valve disease     aortic insufficiency    High cholesterol 2016    medicated    Hypertension     medicated    Hypertensive urgency     Influenza     history of    Kidney stone     history of    Longstanding persistent atrial fibrillation ([facility]) 05/03/2022    Mumps     history of    Nephrolithiasis     Obesity     Sleep apnea 2021    uses cpap    Snoring     sleep study done    Stroke ([facility]) 2021    pt told he had a stroke, but never realized he had one, has tremor right hand    Tonsillitis     history of    Typhoid fever     history of    Wears glasses      Past Surgical History:   Procedure Laterality Date    CATARACT PHACO WITH IOL Bilateral 07/2024    REPAIR, HERNIA, UMBILICAL, ROBOT-ASSISTED, USING [employer]  10/10/2023     Procedure: ROBOT UMBILICAL HERNIA REPAIR;  Surgeon: Michael Saavedra M.D.;  Location: SURGERY [facility];  Service: Gen Robotic    OTHER  2022    cardioversion     Family History   Problem Relation Age of Onset    Cancer Mother         lungs, smoker    Lung Cancer Mother     Hypertension Father     Hyperlipidemia Father     Diabetes Neg Hx     Heart Disease Neg Hx      Social History     Socioeconomic History    Marital status: Single     Spouse name: Not on file    Number of children: Not on file    Years of education: Not on file    Highest education level: Not on file   Occupational History    Not on file   Tobacco Use    Smoking status: Never     Passive exposure: Never    Smokeless tobacco: Never   Vaping Use    Vaping status: Never Used   Substance and Sexual Activity    Alcohol use: Not Currently     Alcohol/week: 6.0 oz     Types: 7 Glasses of wine, 3 Cans of beer per week    Drug use: No    Sexual activity: Not Currently   Other Topics Concern    Not on file   Social History Narrative    Not on file     Social Drivers of Health     Financial Resource Strain: Not on file   Food Insecurity: Not on file   Transportation Needs: Not on file   Physical Activity: Not on file   Stress: Not on file   Social Connections: Not on file   Intimate Partner Violence: Not on file   Housing Stability: Not on file     No Known Allergies  Outpatient Encounter Medications as of 12/11/2024   Medication Sig Dispense Refill    multivitamin Tab Take 1 Tablet by mouth every day. MEDICATION INSTRUCTIONS FOR SURGERY/PROCEDURE SCHEDULED FOR 11/8/2024   DO NOT TAKE 7 DAYS PRIOR TO SURGERY      MAGNESIUM PO Take 1 Tablet by mouth every day. MEDICATION INSTRUCTIONS FOR SURGERY/PROCEDURE SCHEDULED FOR 11/8/2024   DO NOT TAKE 7 DAYS PRIOR TO SURGERY      atorvastatin (LIPITOR) 80 MG tablet Take 1 Tablet by mouth every evening. (Patient taking differently: Take 80 mg by mouth every evening. MEDICATION INSTRUCTIONS FOR SURGERY/PROCEDURE  SCHEDULED FOR 11/8/2024   CONTINUE TAKING MED PRIOR TO SURGERY) 100 Tablet 4    amLODIPine (NORVASC) 10 MG Tab Take 1 Tablet by mouth every day. (Patient taking differently: Take 10 mg by mouth every day. MEDICATION INSTRUCTIONS FOR SURGERY/PROCEDURE SCHEDULED FOR 11/8/2024   CONTINUE TAKING MED PRIOR TO SURGERY) 90 Tablet 3    apixaban (ELIQUIS) 5mg Tab Take 1 Tablet by mouth 2 times a day. (Patient taking differently: Take 5 mg by mouth 2 times a day. MEDICATION INSTRUCTIONS FOR SURGERY/PROCEDURE SCHEDULED FOR 11/8/2024   FOLLOW DR EASON'S INSTRUCTIONS) 180 Tablet 3    propranolol (INDERAL) 10 MG Tab Take 1 Tablet by mouth 3 times a day. (Patient taking differently: Take 10 mg by mouth every day. MEDICATION INSTRUCTIONS FOR SURGERY/PROCEDURE SCHEDULED FOR 11/8/2024   CONTINUE TAKING MED PRIOR TO SURGERY) 90 Tablet 1    spironolactone (ALDACTONE) 25 MG Tab Take 1 Tablet by mouth every day. (Patient taking differently: Take 25 mg by mouth every day. MEDICATION INSTRUCTIONS FOR SURGERY/PROCEDURE SCHEDULED FOR 11/8/2024   DO NOT TAKE MORNING OF PROCEDURE.) 90 Tablet 3    tamsulosin (FLOMAX) 0.4 MG capsule Take 0.4 mg by mouth every day. MEDICATION INSTRUCTIONS FOR SURGERY/PROCEDURE SCHEDULED FOR 11/8/2024   CONTINUE TAKING MED PRIOR TO SURGERY      [DISCONTINUED] amiodarone (CORDARONE) 200 MG Tab Take 1 Tablet by mouth every day. (Patient taking differently: Take 200 mg by mouth every day. MEDICATION INSTRUCTIONS FOR SURGERY/PROCEDURE SCHEDULED FOR 11/8/2024   CONTINUE TAKING MED PRIOR TO SURGERY) 90 Tablet 3     No facility-administered encounter medications on file as of 12/11/2024.     Review of Systems   Constitutional:  Negative for chills, fever, malaise/fatigue and weight loss.   HENT:  Negative for ear discharge, ear pain, hearing loss and nosebleeds.    Eyes:  Negative for blurred vision, double vision, pain and discharge.   Respiratory:  Negative for cough and shortness of breath.    Cardiovascular:  Negative  "for chest pain, palpitations, orthopnea, claudication, leg swelling and PND.   Gastrointestinal:  Negative for abdominal pain, blood in stool, melena, nausea and vomiting.   Genitourinary:  Negative for dysuria and hematuria.   Musculoskeletal:  Negative for falls, joint pain and myalgias.   Skin:  Negative for itching and rash.   Neurological:  Negative for dizziness, sensory change, speech change, loss of consciousness and headaches.   Endo/Heme/Allergies:  Negative for environmental allergies. Does not bruise/bleed easily.   Psychiatric/Behavioral:  Negative for depression, hallucinations and suicidal ideas.         Objective:   /88 (BP Location: Left arm, Patient Position: Sitting, BP Cuff Size: Adult)   Pulse 66   Resp 18   Ht 1.676 m (5' 6\")   Wt 96.7 kg (213 lb 3.2 oz)   SpO2 95%   BMI 34.41 kg/m²     Physical Exam  Vitals and nursing note reviewed.   Constitutional:       General: He is not in acute distress.     Appearance: He is not diaphoretic.   HENT:      Head: Normocephalic and atraumatic.      Right Ear: External ear normal.      Left Ear: External ear normal.   Eyes:      General:         Right eye: No discharge.         Left eye: No discharge.   Neck:      Thyroid: No thyromegaly.      Vascular: No JVD.   Cardiovascular:      Rate and Rhythm: Normal rate. Rhythm irregular.      Comments: Ausculation was not performed to minimize the risk of COVID spread during current pandemic. This complies with Medicare policies and guidelines.    Pulmonary:      Effort: No respiratory distress.   Abdominal:      General: There is no distension.      Tenderness: There is no abdominal tenderness.   Skin:     General: Skin is warm and dry.   Neurological:      Mental Status: He is alert and oriented to person, place, and time.      Cranial Nerves: No cranial nerve deficit.   Psychiatric:         Behavior: Behavior normal.         Assessment:     1. ACC/AHA stage C heart failure with preserved ejection " fraction (HCC)        2. Left ventricular diastolic dysfunction, NYHA class 2        3. Persistent atrial fibrillation (HCC)  EKG      4. Cerebrovascular accident (CVA), unspecified mechanism (HCC)        5. HTN (hypertension), malignant        6. Mixed hyperlipidemia        7. Hypercoagulable state due to persistent atrial fibrillation (HCC)        8. ANAHY on CPAP        9. Dyspnea on exertion        10. High risk medication use        11. Stage 3b chronic kidney disease        12. Benign essential tremor        13. Essential hypertension            Medical Decision Making:  Today's Assessment / Status / Plan:   Today, based on physical examination findings, patient is euvolemic. No JVD, lungs are clear to auscultation, no pitting edema in bilateral lower extremities, no ascites.    Dry weight is 213 lbs.    He is back in AF. Rate control strategy. Will stop Amiodarone to avoid toxicity.    Continue anticoagulation for stroke risk reduction.    Optimize CPAP use.    Blood pressure is well controlled. Continue amlodipine 10 mg p.o. once a day and spironolactone at 25 mg p.o. once a day.    In the meantime, continue CPAP for obstructive sleep apnea.    Will continue Atorvastatin at 80 mg daily for better LDL. LDL of 70 or less goal.    He would like to hold off of Jardiance.    Continue diet and exercise more.    I will see patient back in clinic with lab tests and studies results in 6 months.    I thank you for referring patient to our Cardiology Clinic today.

## 2025-01-10 ENCOUNTER — PATIENT MESSAGE (OUTPATIENT)
Dept: HEALTH INFORMATION MANAGEMENT | Facility: OTHER | Age: 70
End: 2025-01-10

## 2025-01-24 ENCOUNTER — TELEPHONE (OUTPATIENT)
Dept: FAMILY PLANNING/WOMEN'S HEALTH CLINIC | Facility: PHYSICIAN GROUP | Age: 70
End: 2025-01-24
Payer: MEDICARE

## 2025-01-24 NOTE — TELEPHONE ENCOUNTER
I called and spoke to patient and informed him our NAOMY service, patient stated he would like to decline this visit for this year. States he is keeping up with his PCP and other provider and feels that this visit is not needed. Patient thanked me for call and verbalized understanding.

## 2025-02-20 ENCOUNTER — HOSPITAL ENCOUNTER (OUTPATIENT)
Dept: LAB | Facility: MEDICAL CENTER | Age: 70
End: 2025-02-20
Attending: NURSE PRACTITIONER
Payer: MEDICARE

## 2025-02-20 DIAGNOSIS — Z12.5 SCREENING FOR PROSTATE CANCER: ICD-10-CM

## 2025-02-20 DIAGNOSIS — I48.91 ATRIAL FIBRILLATION, UNSPECIFIED TYPE (HCC): ICD-10-CM

## 2025-02-20 DIAGNOSIS — D75.1 POLYCYTHEMIA, SECONDARY: ICD-10-CM

## 2025-02-20 DIAGNOSIS — E78.5 DYSLIPIDEMIA: ICD-10-CM

## 2025-02-20 LAB
ALBUMIN SERPL BCP-MCNC: 4.1 G/DL (ref 3.2–4.9)
ALBUMIN/GLOB SERPL: 1.8 G/DL
ALP SERPL-CCNC: 62 U/L (ref 30–99)
ALT SERPL-CCNC: 21 U/L (ref 2–50)
ANION GAP SERPL CALC-SCNC: 12 MMOL/L (ref 7–16)
AST SERPL-CCNC: 20 U/L (ref 12–45)
BILIRUB SERPL-MCNC: 1 MG/DL (ref 0.1–1.5)
BUN SERPL-MCNC: 23 MG/DL (ref 8–22)
CALCIUM ALBUM COR SERPL-MCNC: 8.8 MG/DL (ref 8.5–10.5)
CALCIUM SERPL-MCNC: 8.9 MG/DL (ref 8.5–10.5)
CHLORIDE SERPL-SCNC: 105 MMOL/L (ref 96–112)
CHOLEST SERPL-MCNC: 199 MG/DL (ref 100–199)
CO2 SERPL-SCNC: 21 MMOL/L (ref 20–33)
CREAT SERPL-MCNC: 1.25 MG/DL (ref 0.5–1.4)
ERYTHROCYTE [DISTWIDTH] IN BLOOD BY AUTOMATED COUNT: 42.8 FL (ref 35.9–50)
FERRITIN SERPL-MCNC: 499 NG/ML (ref 22–322)
GFR SERPLBLD CREATININE-BSD FMLA CKD-EPI: 63 ML/MIN/1.73 M 2
GLOBULIN SER CALC-MCNC: 2.3 G/DL (ref 1.9–3.5)
GLUCOSE SERPL-MCNC: 94 MG/DL (ref 65–99)
HCT VFR BLD AUTO: 51.9 % (ref 42–52)
HDLC SERPL-MCNC: 84 MG/DL
HGB BLD-MCNC: 17.4 G/DL (ref 14–18)
IRON SATN MFR SERPL: 33 % (ref 15–55)
IRON SERPL-MCNC: 102 UG/DL (ref 50–180)
LDLC SERPL CALC-MCNC: 89 MG/DL
MCH RBC QN AUTO: 30.5 PG (ref 27–33)
MCHC RBC AUTO-ENTMCNC: 33.5 G/DL (ref 32.3–36.5)
MCV RBC AUTO: 90.9 FL (ref 81.4–97.8)
PLATELET # BLD AUTO: 178 K/UL (ref 164–446)
PMV BLD AUTO: 9.4 FL (ref 9–12.9)
POTASSIUM SERPL-SCNC: 4.1 MMOL/L (ref 3.6–5.5)
PROT SERPL-MCNC: 6.4 G/DL (ref 6–8.2)
PSA SERPL DL<=0.01 NG/ML-MCNC: 3.08 NG/ML (ref 0–4)
RBC # BLD AUTO: 5.71 M/UL (ref 4.7–6.1)
SODIUM SERPL-SCNC: 138 MMOL/L (ref 135–145)
TIBC SERPL-MCNC: 306 UG/DL (ref 250–450)
TRIGL SERPL-MCNC: 129 MG/DL (ref 0–149)
UIBC SERPL-MCNC: 204 UG/DL (ref 110–370)
WBC # BLD AUTO: 7.6 K/UL (ref 4.8–10.8)

## 2025-02-20 PROCEDURE — 84153 ASSAY OF PSA TOTAL: CPT

## 2025-02-20 PROCEDURE — 80053 COMPREHEN METABOLIC PANEL: CPT

## 2025-02-20 PROCEDURE — 85027 COMPLETE CBC AUTOMATED: CPT

## 2025-02-20 PROCEDURE — 80061 LIPID PANEL: CPT

## 2025-02-20 PROCEDURE — 83540 ASSAY OF IRON: CPT

## 2025-02-20 PROCEDURE — 82728 ASSAY OF FERRITIN: CPT

## 2025-02-20 PROCEDURE — 83550 IRON BINDING TEST: CPT

## 2025-02-20 PROCEDURE — 36415 COLL VENOUS BLD VENIPUNCTURE: CPT

## 2025-02-26 ENCOUNTER — APPOINTMENT (OUTPATIENT)
Dept: MEDICAL GROUP | Facility: MEDICAL CENTER | Age: 70
End: 2025-02-26
Payer: MEDICARE

## 2025-02-26 VITALS
SYSTOLIC BLOOD PRESSURE: 128 MMHG | DIASTOLIC BLOOD PRESSURE: 78 MMHG | WEIGHT: 213.85 LBS | OXYGEN SATURATION: 94 % | HEART RATE: 85 BPM | BODY MASS INDEX: 33.56 KG/M2 | HEIGHT: 67 IN | TEMPERATURE: 98.3 F

## 2025-02-26 DIAGNOSIS — E66.01 MORBID (SEVERE) OBESITY DUE TO EXCESS CALORIES (HCC): ICD-10-CM

## 2025-02-26 DIAGNOSIS — E78.5 DYSLIPIDEMIA: ICD-10-CM

## 2025-02-26 DIAGNOSIS — G89.29 CHRONIC RIGHT SHOULDER PAIN: ICD-10-CM

## 2025-02-26 DIAGNOSIS — M25.511 CHRONIC RIGHT SHOULDER PAIN: ICD-10-CM

## 2025-02-26 DIAGNOSIS — I48.91 ATRIAL FIBRILLATION, UNSPECIFIED TYPE (HCC): ICD-10-CM

## 2025-02-26 DIAGNOSIS — I50.30 ACC/AHA STAGE C HEART FAILURE WITH PRESERVED EJECTION FRACTION (HCC): ICD-10-CM

## 2025-02-26 DIAGNOSIS — G25.0 BENIGN ESSENTIAL TREMOR: ICD-10-CM

## 2025-02-26 DIAGNOSIS — J96.11 CHRONIC RESPIRATORY FAILURE WITH HYPOXIA (HCC): ICD-10-CM

## 2025-02-26 DIAGNOSIS — N18.30 STAGE 3 CHRONIC KIDNEY DISEASE, UNSPECIFIED WHETHER STAGE 3A OR 3B CKD: ICD-10-CM

## 2025-02-26 DIAGNOSIS — R20.0 RIGHT ARM NUMBNESS: ICD-10-CM

## 2025-02-26 ASSESSMENT — ENCOUNTER SYMPTOMS
PALPITATIONS: 0
FEVER: 0
CHILLS: 0

## 2025-02-26 ASSESSMENT — PATIENT HEALTH QUESTIONNAIRE - PHQ9: CLINICAL INTERPRETATION OF PHQ2 SCORE: 0

## 2025-02-26 ASSESSMENT — FIBROSIS 4 INDEX: FIB4 SCORE: 1.67

## 2025-02-27 NOTE — PROGRESS NOTES
Patient agreed to use of LUIS: yes  Chief Complaint   Patient presents with    Lab Results       HISTORY OF PRESENT ILLNESS: Patient is a pleasant 69 y.o. male, established patient who presents today to discuss medical problems as listed below:    Assessment/Plan:    Chema was seen today for lab results.    Diagnoses and all orders for this visit:    Benign essential tremor  -     Referral to Neurology    Right arm numbness  -     Referral to Neurology  -     Referral to Orthopedics    Chronic right shoulder pain  -     DX-SHOULDER 2+ RIGHT; Future  -     Referral to Orthopedics    ACC/AHA stage C heart failure with preserved ejection fraction (HCC)    Atrial fibrillation, unspecified type (HCC)    Stage 3 chronic kidney disease, unspecified whether stage 3a or 3b CKD    Dyslipidemia  -     Comp Metabolic Panel; Future  -     CBC WITHOUT DIFFERENTIAL; Future  -     Lipid Profile; Future    Chronic respiratory failure with hypoxia (HCC)    Morbid (severe) obesity due to excess calories (HCC)         HCC Gap Form    Diagnosis to address: I50.30 - ACC/AHA stage C heart failure with preserved ejection fraction (HCC)  Assessment and plan: Chronic and stable condition. Taking spironolactone 25 mg. Following with Dr. Jn ignacio   Diagnosis: I48.91 - Atrial fibrillation, unspecified type (HCC)  Assessment and plan: Chronic and stable condition. On Eliquis 5 mg BID. No bleeding   Diagnosis: N18.30 - Stage 3 chronic kidney disease, unspecified whether stage 3a or 3b CKD  Assessment and plan: Chronic and stable condition. Staying well hydrated.   02/20/25 10:25  GFR (CKD-EPI): 63  Diagnosis: J96.11 - Chronic respiratory failure with hypoxia (HCC)  Assessment and plan: Stable condition.  On CPAP.  Last edited 02/26/25 16:45 PST by ROBEL Treviño          Assessment & Plan  1.  Benign essential tremor  Chronic and stable condition, uncontrolled.  More noted in the right hand than left.  The tremor in the right hand  persists and is not well controlled with the current propranolol regimen. The patient reports that increasing the dose to 3 tablets causes significant discomfort and immobility in the arm. A referral to neurology has been made for further evaluation and management of the tremor.    2. Right shoulder pain.  3.  Right arm numbness  New and Stable condition.  The patient reports numbness and pain in the right shoulder, especially at night and during walking. This may be due to nerve impingement from inflammation. An x-ray of the right shoulder has been ordered. A referral to orthopedics has been made for further evaluation and management.    4. Atrial fibrillation.  Chronic and stable condition.  The patient continues to experience atrial fibrillation. He is currently taking Eliquis 5 mg twice a day and reports no palpitations, bleeding, or other complications.  Continue    5.  CHF   Chronic and stable condition.  Following with Dr. Castelan.  The patient reports occasional shortness of breath and ankle swelling but is otherwise stable. He is currently taking spironolactone and following a good diet. His kidney function is excellent based on recent labs. The patient will continue with the current medication regimen and follow up with his cardiologist as scheduled in June.    6.  CKD  Chronic and stable condition.  Patient is following with urology.  No current concerns.    Follow-up  The patient will follow up in 6 months.    PROCEDURE  The patient underwent cardioversion in 11/2024.    History of Present Illness  The patient presents for evaluation of right hand tremor, right shoulder pain, atrial fibrillation, and heart failure.    He reports persistent right hand tremors, which are not well-controlled. He expresses concern about a potential diagnosis of Parkinson's disease. He has not sought neurological consultation for this issue. He does not experience any spasticity or tingling sensations. He also reports numbness in  his right hand during ambulation. He reports no changes in his lower extremities or balance. He has been on propranolol, which he finds beneficial at a dosage of 1 tablet, but an increase to 3 tablets results in immobility of his arm. A dosage of 2 tablets causes elbow soreness.    He experiences numbness in his right shoulder when sleeping on that side, necessitating a change in sleeping position after approximately 1 to 2 hours. He has been experiencing shoulder discomfort for the past 6 months to 1 year. He maintains a regular exercise routine of walking 2 miles daily, but notes that his hand becomes almost numb post-exercise, requiring rest before he can resume normal activities. He reports no history of neck or shoulder surgery. He reports no leg issues.    He continues to experience atrial fibrillation, which is not optimally managed. He attributes this to occasional alcohol consumption, although he does not identify as an alcoholic. He underwent cardioversion in 11/2024, which was initially successful but required repetition due to recurrence of atrial fibrillation. He declined further cardioversion. He reports no palpitations, bleeding, hematuria, or hematochezia. He reports no depressive symptoms. He is currently on Eliquis 5 mg twice daily for atrial fibrillation.    He has a diagnosis of heart failure, with occasional episodes of dyspnea and ankle edema. He is under the care of Dr. Gonzalez in Cardiology, who is satisfied with his progress. He uses a CPAP machine nightly. He is compliant with his spironolactone regimen and adheres to a healthy diet.    SOCIAL HISTORY  The patient admits to drinking more than he should but does not consider himself an alcoholic.    MEDICATIONS  Current: propranolol, spironolactone, Eliquis    Health Maintenance:  COMPLETED     Allergies, past medical history, past surgical history, family history, social history reviewed and updated.    Review of Systems   Constitutional:   "Negative for chills, fever and malaise/fatigue.   Cardiovascular:  Negative for chest pain, palpitations and leg swelling.        Exam:    /78   Pulse 85   Temp 36.8 °C (98.3 °F) (Temporal)   Ht 1.707 m (5' 7.2\")   Wt 97 kg (213 lb 13.5 oz)   SpO2 94%   BMI 33.29 kg/m²  Body mass index is 33.29 kg/m².    Physical Exam  Constitutional:       General: He is not in acute distress.     Appearance: He is not ill-appearing.   HENT:      Head: Normocephalic and atraumatic.      Nose: Nose normal.   Eyes:      General:         Right eye: No discharge.         Left eye: No discharge.   Cardiovascular:      Rate and Rhythm: Normal rate.      Pulses: Normal pulses.      Heart sounds: Normal heart sounds. No murmur heard.  Pulmonary:      Effort: Pulmonary effort is normal. No respiratory distress.      Breath sounds: Normal breath sounds. No stridor. No wheezing or rales.   Skin:     Findings: No rash.   Neurological:      General: No focal deficit present.      Mental Status: He is alert. Mental status is at baseline.   Psychiatric:         Mood and Affect: Mood normal.         Behavior: Behavior normal.          Results  Laboratory Studies  Kidney function is excellent.   Discussed with patient possible alternative diagnoses, patient is to take all medications as prescribed.      If symptoms persist FU w/PCP, if symptoms worsen go to emergency room.      If experiencing any side effects from prescribed medications report to the office immediately or go to emergency room.     Reviewed indication, dosage, usage and potential adverse effects of prescribed medications.      Reviewed risks and benefits of treatment plan. Patient verbalizes understanding of all instruction and verbally agrees to plan.     Discussed plan with the patient, and patient agrees to the above.      I personally reviewed prior external notes and test results pertinent to today's visit.      No follow-ups on file. 6 mos  "

## 2025-03-01 ENCOUNTER — HOSPITAL ENCOUNTER (OUTPATIENT)
Dept: RADIOLOGY | Facility: MEDICAL CENTER | Age: 70
End: 2025-03-01
Attending: NURSE PRACTITIONER
Payer: MEDICARE

## 2025-03-01 DIAGNOSIS — M25.511 CHRONIC RIGHT SHOULDER PAIN: ICD-10-CM

## 2025-03-01 DIAGNOSIS — G89.29 CHRONIC RIGHT SHOULDER PAIN: ICD-10-CM

## 2025-03-01 PROCEDURE — 73030 X-RAY EXAM OF SHOULDER: CPT | Mod: RT

## 2025-03-03 ENCOUNTER — RESULTS FOLLOW-UP (OUTPATIENT)
Dept: MEDICAL GROUP | Facility: MEDICAL CENTER | Age: 70
End: 2025-03-03
Payer: MEDICARE

## 2025-03-11 ENCOUNTER — OFFICE VISIT (OUTPATIENT)
Dept: NEPHROLOGY | Facility: MEDICAL CENTER | Age: 70
End: 2025-03-11
Payer: MEDICARE

## 2025-03-11 VITALS
SYSTOLIC BLOOD PRESSURE: 128 MMHG | HEIGHT: 66 IN | BODY MASS INDEX: 35.03 KG/M2 | TEMPERATURE: 98.9 F | HEART RATE: 83 BPM | OXYGEN SATURATION: 94 % | DIASTOLIC BLOOD PRESSURE: 62 MMHG | WEIGHT: 218 LBS

## 2025-03-11 DIAGNOSIS — I10 ESSENTIAL HYPERTENSION: ICD-10-CM

## 2025-03-11 DIAGNOSIS — N18.9 CHRONIC KIDNEY DISEASE, UNSPECIFIED CKD STAGE: ICD-10-CM

## 2025-03-11 DIAGNOSIS — R80.9 PROTEINURIA, UNSPECIFIED TYPE: ICD-10-CM

## 2025-03-11 PROCEDURE — 3074F SYST BP LT 130 MM HG: CPT | Performed by: INTERNAL MEDICINE

## 2025-03-11 PROCEDURE — 99214 OFFICE O/P EST MOD 30 MIN: CPT | Performed by: INTERNAL MEDICINE

## 2025-03-11 PROCEDURE — 3078F DIAST BP <80 MM HG: CPT | Performed by: INTERNAL MEDICINE

## 2025-03-11 PROCEDURE — G2211 COMPLEX E/M VISIT ADD ON: HCPCS | Performed by: INTERNAL MEDICINE

## 2025-03-11 ASSESSMENT — ENCOUNTER SYMPTOMS
COUGH: 0
CHILLS: 0
VOMITING: 0
NAUSEA: 0
FEVER: 0
HYPERTENSION: 1
SHORTNESS OF BREATH: 0

## 2025-03-11 ASSESSMENT — FIBROSIS 4 INDEX: FIB4 SCORE: 1.67

## 2025-03-11 NOTE — PROGRESS NOTES
"Subjective     Chema Sarabia is a 69 y.o. male who presents with Chronic Kidney Disease and Hypertension            Chronic Kidney Disease  This is a chronic problem. The current episode started more than 1 year ago. The problem occurs constantly. The problem has been unchanged. Pertinent negatives include no chest pain, chills, coughing, fever, nausea, urinary symptoms or vomiting.   Hypertension  This is a chronic problem. The current episode started more than 1 year ago. The problem is unchanged. The problem is controlled. Pertinent negatives include no chest pain, malaise/fatigue or shortness of breath. Risk factors for coronary artery disease include male gender and obesity. Past treatments include calcium channel blockers. The current treatment provides significant improvement. There are no compliance problems.  Hypertensive end-organ damage includes kidney disease. Identifiable causes of hypertension include chronic renal disease.       Review of Systems   Constitutional:  Negative for chills, fever and malaise/fatigue.   Respiratory:  Negative for cough and shortness of breath.    Cardiovascular:  Negative for chest pain and leg swelling.   Gastrointestinal:  Negative for nausea and vomiting.   Genitourinary:  Negative for dysuria, frequency and urgency.              Objective     /62 (BP Location: Right arm, Patient Position: Sitting, BP Cuff Size: Adult)   Pulse 83   Temp 37.2 °C (98.9 °F) (Temporal)   Ht 1.676 m (5' 6\")   Wt 98.9 kg (218 lb)   SpO2 94%   BMI 35.19 kg/m²      Physical Exam  Vitals and nursing note reviewed.   Constitutional:       General: He is not in acute distress.     Appearance: He is not ill-appearing.   HENT:      Head: Normocephalic and atraumatic.      Right Ear: External ear normal.      Left Ear: External ear normal.      Nose: Nose normal.   Eyes:      General:         Right eye: No discharge.         Left eye: No discharge.      Conjunctiva/sclera: " Conjunctivae normal.   Cardiovascular:      Rate and Rhythm: Normal rate and regular rhythm.      Heart sounds: No murmur heard.  Pulmonary:      Effort: Pulmonary effort is normal. No respiratory distress.      Breath sounds: Normal breath sounds. No wheezing.   Musculoskeletal:         General: No tenderness or deformity.      Right lower leg: No edema.      Left lower leg: No edema.   Skin:     General: Skin is warm.   Neurological:      General: No focal deficit present.      Mental Status: He is alert and oriented to person, place, and time.   Psychiatric:         Mood and Affect: Mood normal.         Behavior: Behavior normal.       Past Medical History:   Diagnosis Date    Acute kidney injury ([facility])     stage 3 renal failure    Atrial fibrillation ([facility])     takes amiodarone and eliquis    Cataract 07/2024    Chickenpox     history of    Congestive heart failure ([facility]) 2019    Frequent urination     [place] measles     history of    Heart valve disease     aortic insufficiency    High cholesterol 2016    medicated    Hypertension     medicated    Hypertensive urgency     Influenza     history of    Kidney stone     history of    Longstanding persistent atrial fibrillation ([facility]) 05/03/2022    Mumps     history of    Nephrolithiasis     Obesity     Sleep apnea 2021    uses cpap    Snoring     sleep study done    Stroke ([facility]) 2021    pt told he had a stroke, but never realized he had one, has tremor right hand    Tonsillitis     history of    Typhoid fever     history of    Wears glasses      Social History     Socioeconomic History    Marital status: Single     Spouse name: Not on file    Number of children: Not on file    Years of education: Not on file    Highest education level: Not on file   Occupational History    Not on file   Tobacco Use    Smoking status: Never     Passive exposure: Never    Smokeless tobacco: Never   Vaping Use    Vaping status: Never Used   Substance and Sexual Activity    Alcohol use:  Not Currently     Alcohol/week: 6.0 oz     Types: 7 Glasses of wine, 3 Cans of beer per week    Drug use: No    Sexual activity: Not Currently   Other Topics Concern    Not on file   Social History Narrative    Not on file     Social Drivers of Health     Financial Resource Strain: Not on file   Food Insecurity: Not on file   Transportation Needs: Not on file   Physical Activity: Not on file   Stress: Not on file   Social Connections: Not on file   Intimate Partner Violence: Not on file   Housing Stability: Not on file     Family History   Problem Relation Age of Onset    Cancer Mother         lungs, smoker    Lung Cancer Mother     Hypertension Father     Hyperlipidemia Father     Diabetes Neg Hx     Heart Disease Neg Hx      Recent Labs     05/30/24  0712 09/23/24  1151 02/20/25  1025   ALBUMIN 4.49  4.5 4.4 4.1   HDL 72  --  84   TRIGLYCERIDE 132  --  129   SODIUM 138 140 138   POTASSIUM 4.6 5.0 4.1   CHLORIDE 104 103 105   CO2 21 21 21   BUN 26* 22 23*   CREATININE 1.51* 1.40 1.25                                  Assessment & Plan  Chronic kidney disease, unspecified CKD stage  Stable  No uremic symptoms  Renal dose of medication  Avoid nephrotoxins  Continue same medication regimen  Patient was advised to call us if symptoms worsen    Orders:    Basic Metabolic Panel; Future    CBC WITHOUT DIFFERENTIAL; Future    MICROALB/CREAT RATIO RAND. UR    Essential hypertension  Controlled  Continue same medication regimen  Continue low-sodium diet    Orders:    Basic Metabolic Panel; Future    CBC WITHOUT DIFFERENTIAL; Future    MICROALB/CREAT RATIO RAND. UR    Proteinuria, unspecified type  Possibly secondary to mild FSGS from obesity or obstructive sleep apnea  Repeat labs and if persistent consider ACE inhibitor or ARB

## 2025-03-26 ENCOUNTER — HOSPITAL ENCOUNTER (OUTPATIENT)
Dept: RADIOLOGY | Facility: MEDICAL CENTER | Age: 70
End: 2025-03-26
Attending: PHYSICAL MEDICINE & REHABILITATION
Payer: MEDICARE

## 2025-03-26 DIAGNOSIS — M54.2 CERVICALGIA: ICD-10-CM

## 2025-03-26 PROCEDURE — 72040 X-RAY EXAM NECK SPINE 2-3 VW: CPT

## 2025-06-18 DIAGNOSIS — I48.19 PERSISTENT ATRIAL FIBRILLATION (HCC): ICD-10-CM

## 2025-06-18 DIAGNOSIS — I51.89 LEFT VENTRICULAR DIASTOLIC DYSFUNCTION, NYHA CLASS 2: ICD-10-CM

## 2025-06-18 DIAGNOSIS — I50.30 ACC/AHA STAGE C HEART FAILURE WITH PRESERVED EJECTION FRACTION (HCC): Primary | ICD-10-CM

## 2025-06-18 DIAGNOSIS — G25.0 BENIGN ESSENTIAL TREMOR: ICD-10-CM

## 2025-06-18 NOTE — TELEPHONE ENCOUNTER
Is the patient due for a refill? Yes    Was the patient seen the last 15 months? Yes    Date of last office visit: 12/11/24    Does the patient have an upcoming appointment?  Yes   If yes, When? 12/11/24    Provider to refill:TT    Does the patient have [facility] and need 100-day supply? (This applies to ALL medications) Yes, quantity updated to 100 days

## 2025-06-19 RX ORDER — PROPRANOLOL HYDROCHLORIDE 10 MG/1
10 TABLET ORAL 3 TIMES DAILY
Qty: 270 TABLET | Refills: 1 | Status: SHIPPED | OUTPATIENT
Start: 2025-06-19

## 2025-07-01 ENCOUNTER — TELEPHONE (OUTPATIENT)
Dept: CARDIOLOGY | Facility: MEDICAL CENTER | Age: 70
End: 2025-07-01

## 2025-07-01 ENCOUNTER — OFFICE VISIT (OUTPATIENT)
Dept: NEUROLOGY | Facility: MEDICAL CENTER | Age: 70
End: 2025-07-01
Attending: INTERNAL MEDICINE
Payer: MEDICARE

## 2025-07-01 VITALS
SYSTOLIC BLOOD PRESSURE: 114 MMHG | HEART RATE: 88 BPM | RESPIRATION RATE: 16 BRPM | DIASTOLIC BLOOD PRESSURE: 80 MMHG | OXYGEN SATURATION: 96 % | BODY MASS INDEX: 32.92 KG/M2 | HEIGHT: 66 IN | TEMPERATURE: 97.3 F | WEIGHT: 204.81 LBS

## 2025-07-01 DIAGNOSIS — G20.C PRIMARY PARKINSONISM (HCC): Primary | ICD-10-CM

## 2025-07-01 PROCEDURE — G2211 COMPLEX E/M VISIT ADD ON: HCPCS | Performed by: INTERNAL MEDICINE

## 2025-07-01 PROCEDURE — 3074F SYST BP LT 130 MM HG: CPT | Performed by: INTERNAL MEDICINE

## 2025-07-01 PROCEDURE — 99204 OFFICE O/P NEW MOD 45 MIN: CPT | Performed by: INTERNAL MEDICINE

## 2025-07-01 PROCEDURE — 3079F DIAST BP 80-89 MM HG: CPT | Performed by: INTERNAL MEDICINE

## 2025-07-01 PROCEDURE — 99203 OFFICE O/P NEW LOW 30 MIN: CPT

## 2025-07-01 PROCEDURE — 99213 OFFICE O/P EST LOW 20 MIN: CPT

## 2025-07-01 RX ORDER — CARBIDOPA AND LEVODOPA 25; 100 MG/1; MG/1
1 TABLET ORAL 3 TIMES DAILY
Qty: 270 TABLET | Refills: 3 | Status: SHIPPED | OUTPATIENT
Start: 2025-07-01 | End: 2026-06-26

## 2025-07-01 ASSESSMENT — FIBROSIS 4 INDEX: FIB4 SCORE: 1.67

## 2025-07-01 ASSESSMENT — PATIENT HEALTH QUESTIONNAIRE - PHQ9: CLINICAL INTERPRETATION OF PHQ2 SCORE: 0

## 2025-07-01 NOTE — TELEPHONE ENCOUNTER
Called pt in regards to lab work that was ordered at previous OV. Patient states he'll be getting his labs done before the end of this week. Pt has follow up appointment scheduled with TT on 07.11.2025.

## 2025-07-01 NOTE — PROGRESS NOTES
[facility]  [address] [place] [address], [address]. [place] [address]  Phone: [phone], Fax: [phone]    Jordan Corbett DO  Neurology, Movement Disorders    ASSESSMENT / PLAN   Chema Sarabia is a 69 y.o. LHD male presenting for tremors    Parkinsonism  Right arm tremor onset approximately 2023.  Exam notable for right arm rest tremor, rigidity, bradykinesia.  Diminished right arm swing when walking.  This is consistent with parkinsonism.  Discussed at length lifestyle interventions to help slow the progression.    - Okay to stop propranolol, but he may be having some mild benefit with tremors  - START carbidopa/levodopa 25/100mg:   Morning Noon Evening   Week 1 - - Half-tablet   Week 2 - Half-tablet Half-tablet   Week 3 Half-tablet Half-tablet Half-tablet   Week 4 Half-tablet Half-tablet One tablet   Week 5 Half-tablet One tablet One tablet   Week 6 One tablet One tablet One tablet     Take the first dose when you wake up, then about 5 hours apart.   If it causes nausea, take it with fruit or carbohydrates (cookie, bread, cracker, etc.)    Avoid proteins (nuts/diary/meat) for about 30 - 60 minutes before taking medicine. If you have already eaten protein, wait about 30-60 minutes before taking medication.        Orders Placed This Encounter    carbidopa-levodopa (SINEMET)  MG Tab     Return in about 4 months (around 11/1/2025).    BILLING DOCUMENTATION:   Excluding time spent on procedures during visit, I spent 45 minutes reviewing the medical record, interviewing and examining the patient, discussing diagnosis and treatment, and coordinating care.    No procedure          HISTORY OF PRESENT ILLNESS   Chema Sarabia is a 69 y.o. LHD male presenting for tremors  PMHx: Heart failure, atrial fibrillation on Eliquis, CKD, hyperlipidemia, ANAHY on CPAP    Initial HPI 07/01/25    Tremors  Started 2 years ago on right hand. Right hand feels less coordinated, trouble buttoning or  putting on belt. Stress worsens the tremors.     When he walks for 20 minutes, the right hand starts getting numb. Stretching upwards seem to help regain sensation. No new numbness or tingling in the hands otherwise.   NCS/EMG showed carpal tunnel syndrome, working with PT at the moment.    Propranolol 10mg tolerated well. >20mg seem to worsen the stiffness.     Neuroleptics/pesticide exposure: worked in oil industry at the [employer]  Family history: none      Current Regimen  none  Latency:   Duration:   Efficacy:  Dyskinesia/Dystonia:   Sfx:      ROS  Gait:  No issues    Orthostasis:   Rare occasions    GI:   No constipation, more diarrhea    :   No issues    Speech/Swallow:   No voice changes  No anosmia  No dysphagia    Cognition:   Occasional word finding difficulty    Mood:   07/01/25 PHQ-9 = 0, C-SSRS = neg  No anxiety    Hallucinations:   No issues    Sleep/RBD:   No issues with sleep. Using CPAP  No known dream enactment  More easily fatigued, but also has been working on his house more      Interval history  07/01/25: First visit with me, start carbidopa levodopa      Past Medical History[1]  Past Surgical History[2]  Family History   Problem Relation Age of Onset    Cancer Mother         lungs, smoker    Lung Cancer Mother     Hypertension Father     Hyperlipidemia Father     Diabetes Neg Hx     Heart Disease Neg Hx      Social History     Socioeconomic History    Marital status: Single     Spouse name: Not on file    Number of children: Not on file    Years of education: Not on file    Highest education level: Not on file   Occupational History    Not on file   Tobacco Use    Smoking status: Never     Passive exposure: Never    Smokeless tobacco: Never   Vaping Use    Vaping status: Never Used   Substance and Sexual Activity    Alcohol use: Not Currently     Alcohol/week: 6.0 oz     Types: 7 Glasses of wine, 3 Cans of beer per week     Comment: rare    Drug use: No    Sexual activity: Not Currently   Other  Topics Concern    Not on file   Social History Narrative    Not on file     Social Drivers of Health     Financial Resource Strain: Not on file   Food Insecurity: Not on file   Transportation Needs: Not on file   Physical Activity: Not on file   Stress: Not on file   Social Connections: Not on file   Intimate Partner Violence: Not on file   Housing Stability: Not on file     Current Outpatient Medications   Medication    carbidopa-levodopa (SINEMET)  MG Tab    propranolol (INDERAL) 10 MG Tab    multivitamin Tab    MAGNESIUM PO    atorvastatin (LIPITOR) 80 MG tablet    amLODIPine (NORVASC) 10 MG Tab    apixaban (ELIQUIS) 5mg Tab    spironolactone (ALDACTONE) 25 MG Tab     No current facility-administered medications for this visit.     Allergies[3]          DATA / RESULTS       MRI brain without 9/2021 --- images reviewed  1.  Mild cerebral atrophy.  2.  Minimal-mild supratentorial white matter disease consistent with microvascular ischemic change. Stable findings compared with prior exam.  3.  Multiple areas of unchanged chronic cortical and subcortical encephalomalacia involving the left occipital lobe, left posterior parietal lobe, left posterior temporal lobe.  4.  New small areas of encephalomalacia surrounding a sulcus in the left posterior frontal lobe and at the left parietal convexity consistent with cerebral infarcts which have occurred since the prior exam from 7/6/2020.  5.  Punctate focus of old microhemorrhage in the right far posterior-medial temporal lobe/right occipital lobe and at least 4 punctate foci of old microhemorrhage in the cerebellar hemispheres. These are most consistent with old hypertensive microhemorrhage, less likely amyloid angiopathy considering the patient's relatively young age. These lesions are stable since prior exam. No evidence of acute or new hemorrhage.  6.  No evidence of acute cerebral infarction or mass lesion.      25-Hydroxy   Vitamin D 25   Date Value Ref Range  "Status   08/10/2021 40 30 - 100 ng/mL Final     Comment:     Adult Ranges:   <20 ng/mL - Deficiency  20-29 ng/mL - Insufficiency   ng/mL - Sufficiency  Effective 3/18/2020, this electrochemiluminescence binding assay is  performed using Roche miladis e immunoassay analyzer.  The [employer] Vitamin D  total II assay is intended for the quantitative determination of total 25  hydroxyvitamin D in human serum and plasma. This assay is to be used as an  aid in the assessment of vitamin D sufficiency in adults.       TSH   Date Value Ref Range Status   01/12/2024 3.180 0.380 - 5.330 uIU/mL Final     Comment:     The 2011 American Thyroid Association (DIVINA) guidelines  recommended that the interpretation of thyroid function in  pregnancy be based on trimester specific reference ranges.    1st Trimester  0.100-2.500 mIU/L  2nd Trimester  0.200-3.000 mIU/L  3rd Trimester  0.300-3.500 mIU/L    These established reference ranges have not been validated  at "[employer]       Glycohemoglobin   Date Value Ref Range Status   07/15/2023 5.7 (H) 4.0 - 5.6 % Final     Comment:     Increased risk for diabetes:  5.7 -6.4%  Diabetes:  >6.4%  Glycemic control for adults with diabetes:  <7.0%    The above interpretations are per ADA guidelines.  Diagnosis  of diabetes mellitus on the basis of elevated Hemoglobin A1c  should be confirmed by repeating the Hb A1c test.       LDL   Date Value Ref Range Status   02/20/2025 89 <100 mg/dL Final                OBJECTIVE      Vitals:    07/01/25 0716   BP: 114/80   BP Location: Left arm   Patient Position: Sitting   BP Cuff Size: Large adult   Pulse: 88   Resp: 16   Temp: 36.3 °C (97.3 °F)   TempSrc: Temporal   SpO2: 96%   Weight: 92.9 kg (204 lb 12.9 oz)   Height: 1.676 m (5' 6\")     Physical Exam  Last dose of C/L taken --     General: NAD, appears stated age.      Mental status: Speech clear and fluent without tremor. Fund of knowledge is good.      Cranial Nerves:  CN2: PERRL. " Visual fields are full to finger confrontation.   CN3/4/6: EOMI. There is no nystagmus.   CN5: V1-V3 intact to light touch    CN7: Symmetric face.   CN8: Hearing grossly intact.   CN9/10/12: Soft palate and uvula rise symmetrically. Tongue midline.   CN11: Shoulder shrug intact bilaterally.     Strength  Right Left   Shoulder Abduction  5 5   Elbow Flexion 5 5   Elbow Extension  5 5   Wrist Extension  5 5   Finger Extension  5 5   Hip Flexion  5 5   Knee Extension 5 5   Ankle Dorsiflexion  5 5     Deep Tendon Reflexes: 2+ biceps, brachioradialis, patella and ankles    Abnormal movements:    UPDRS Right Left   Finger tapping 1 0   Hand Movement 1 0   Toe Tapping 2 0   Leg Agility 1 0   Rigidity 2 1   Rest Tremor 2 0   Postural Tremor 1 0   Kinetic Tremor 1 0      No dystonia, dyskinesias, tics, stereotypies, athetosis, akathisia, or chorea noted.      Sensory: normal to sharp    Quantitative tuning fork Right Left   Hands 8 8   Feet 8 8       Cerebellar: No dysmetria with FTN    Gait:   Posture - normal   Base - narrow   Stride length - normal   Arm swing - decreased RIGHT arm swing 1-3cm   Speed - normal  Shuffling/freezing - none  U-Turn - normal         PROCEDURE     N/A         [1]   Past Medical History:  Diagnosis Date    Acute kidney injury (HCC)     stage 3 renal failure    Atrial fibrillation (formerly [facility])     takes amiodarone and eliquis    Cataract 07/2024    Chickenpox     history of    Congestive heart failure (formerly [facility]) 2019    Frequent urination     [place]alese measles     history of    Heart valve disease     aortic insufficiency    High cholesterol 2016    medicated    Hypertension     medicated    Hypertensive urgency     Influenza     history of    Kidney stone     history of    Longstanding persistent atrial fibrillation (formerly [facility]) 05/03/2022    Mumps     history of    Nephrolithiasis     Obesity     Sleep apnea 2021    uses cpap    Snoring     sleep study done    Stroke (formerly [facility]) 2021    pt told he had a stroke, but never  realized he had one, has tremor right hand    Tonsillitis     history of    Typhoid fever     history of    Wears glasses    [2]   Past Surgical History:  Procedure Laterality Date    CATARACT PHACO WITH IOL Bilateral 07/2024    REPAIR, HERNIA, UMBILICAL, ROBOT-ASSISTED, USING [employer]Y  10/10/2023    Procedure: ROBOT UMBILICAL HERNIA REPAIR;  Surgeon: Michael Saavedra M.D.;  Location: SURGERY [facility];  Service: Gen Robotic    OTHER  2022    cardioversion   [3] No Known Allergies     Detail Level: Detailed

## 2025-07-01 NOTE — PATIENT INSTRUCTIONS
START carbidopa/levodopa 25/100mg:   Morning Noon Evening   Week 1 - - Half-tablet   Week 2 - Half-tablet Half-tablet   Week 3 Half-tablet Half-tablet Half-tablet   Week 4 Half-tablet Half-tablet One tablet   Week 5 Half-tablet One tablet One tablet   Week 6 One tablet One tablet One tablet     Take the first dose when you wake up, then about 5 hours apart.   If it causes nausea, take it with fruit or carbohydrates (cookie, bread, cracker, etc.)    Avoid proteins (nuts/diary/meat) for about 30 - 60 minutes before taking medicine. If you have already eaten protein, wait about 30-60 minutes before taking medication.

## 2025-07-02 ENCOUNTER — HOSPITAL ENCOUNTER (OUTPATIENT)
Dept: LAB | Facility: MEDICAL CENTER | Age: 70
End: 2025-07-02
Attending: INTERNAL MEDICINE
Payer: MEDICARE

## 2025-07-02 DIAGNOSIS — E78.2 MIXED HYPERLIPIDEMIA: ICD-10-CM

## 2025-07-02 DIAGNOSIS — Z79.899 HIGH RISK MEDICATION USE: ICD-10-CM

## 2025-07-02 LAB
ANION GAP SERPL CALC-SCNC: 14 MMOL/L (ref 7–16)
BUN SERPL-MCNC: 24 MG/DL (ref 8–22)
CALCIUM SERPL-MCNC: 9.4 MG/DL (ref 8.5–10.5)
CHLORIDE SERPL-SCNC: 105 MMOL/L (ref 96–112)
CHOLEST SERPL-MCNC: 137 MG/DL (ref 100–199)
CO2 SERPL-SCNC: 22 MMOL/L (ref 20–33)
CREAT SERPL-MCNC: 1.44 MG/DL (ref 0.5–1.4)
GFR SERPLBLD CREATININE-BSD FMLA CKD-EPI: 53 ML/MIN/1.73 M 2
GLUCOSE SERPL-MCNC: 97 MG/DL (ref 65–99)
HDLC SERPL-MCNC: 61 MG/DL
LDLC SERPL CALC-MCNC: 54 MG/DL
POTASSIUM SERPL-SCNC: 4.8 MMOL/L (ref 3.6–5.5)
SODIUM SERPL-SCNC: 141 MMOL/L (ref 135–145)
T4 FREE SERPL-MCNC: 1.9 NG/DL (ref 0.93–1.7)
TRIGL SERPL-MCNC: 109 MG/DL (ref 0–149)
TSH SERPL-ACNC: 0.27 UIU/ML (ref 0.38–5.33)

## 2025-07-02 PROCEDURE — 83695 ASSAY OF LIPOPROTEIN(A): CPT

## 2025-07-02 PROCEDURE — 84443 ASSAY THYROID STIM HORMONE: CPT

## 2025-07-02 PROCEDURE — 80048 BASIC METABOLIC PNL TOTAL CA: CPT

## 2025-07-02 PROCEDURE — 84439 ASSAY OF FREE THYROXINE: CPT

## 2025-07-02 PROCEDURE — 80061 LIPID PANEL: CPT

## 2025-07-02 PROCEDURE — 36415 COLL VENOUS BLD VENIPUNCTURE: CPT

## 2025-07-04 LAB — LPA SERPL-MCNC: <6 MG/DL

## 2025-07-11 ENCOUNTER — OFFICE VISIT (OUTPATIENT)
Dept: CARDIOLOGY | Facility: MEDICAL CENTER | Age: 70
End: 2025-07-11
Attending: INTERNAL MEDICINE
Payer: MEDICARE

## 2025-07-11 VITALS
OXYGEN SATURATION: 96 % | HEART RATE: 141 BPM | RESPIRATION RATE: 18 BRPM | DIASTOLIC BLOOD PRESSURE: 88 MMHG | BODY MASS INDEX: 32.92 KG/M2 | SYSTOLIC BLOOD PRESSURE: 130 MMHG | HEIGHT: 66 IN | WEIGHT: 204.8 LBS

## 2025-07-11 DIAGNOSIS — R06.09 DYSPNEA ON EXERTION: ICD-10-CM

## 2025-07-11 DIAGNOSIS — I48.92 ATRIAL FIBRILLATION AND FLUTTER (HCC): ICD-10-CM

## 2025-07-11 DIAGNOSIS — I48.91 ATRIAL FIBRILLATION AND FLUTTER (HCC): ICD-10-CM

## 2025-07-11 DIAGNOSIS — I51.89 LEFT VENTRICULAR DIASTOLIC DYSFUNCTION, NYHA CLASS 2: ICD-10-CM

## 2025-07-11 DIAGNOSIS — I10 HTN (HYPERTENSION), MALIGNANT: ICD-10-CM

## 2025-07-11 DIAGNOSIS — I48.19 PERSISTENT ATRIAL FIBRILLATION (HCC): ICD-10-CM

## 2025-07-11 DIAGNOSIS — I48.19 HYPERCOAGULABLE STATE DUE TO PERSISTENT ATRIAL FIBRILLATION (HCC): ICD-10-CM

## 2025-07-11 DIAGNOSIS — I63.9 CEREBROVASCULAR ACCIDENT (CVA), UNSPECIFIED MECHANISM (HCC): ICD-10-CM

## 2025-07-11 DIAGNOSIS — D68.69 HYPERCOAGULABLE STATE DUE TO PERSISTENT ATRIAL FIBRILLATION (HCC): ICD-10-CM

## 2025-07-11 DIAGNOSIS — Z79.899 HIGH RISK MEDICATION USE: ICD-10-CM

## 2025-07-11 DIAGNOSIS — I50.30 ACC/AHA STAGE C HEART FAILURE WITH PRESERVED EJECTION FRACTION (HCC): Primary | ICD-10-CM

## 2025-07-11 DIAGNOSIS — I50.9 CONGESTIVE HEART FAILURE, UNSPECIFIED HF CHRONICITY, UNSPECIFIED HEART FAILURE TYPE (HCC): ICD-10-CM

## 2025-07-11 DIAGNOSIS — G47.33 OSA ON CPAP: ICD-10-CM

## 2025-07-11 DIAGNOSIS — E78.2 MIXED HYPERLIPIDEMIA: ICD-10-CM

## 2025-07-11 LAB — EKG IMPRESSION: NORMAL

## 2025-07-11 PROCEDURE — 99213 OFFICE O/P EST LOW 20 MIN: CPT | Performed by: INTERNAL MEDICINE

## 2025-07-11 PROCEDURE — 99214 OFFICE O/P EST MOD 30 MIN: CPT | Performed by: INTERNAL MEDICINE

## 2025-07-11 PROCEDURE — 93010 ELECTROCARDIOGRAM REPORT: CPT | Performed by: INTERNAL MEDICINE

## 2025-07-11 PROCEDURE — 3075F SYST BP GE 130 - 139MM HG: CPT | Performed by: INTERNAL MEDICINE

## 2025-07-11 PROCEDURE — G2211 COMPLEX E/M VISIT ADD ON: HCPCS | Performed by: INTERNAL MEDICINE

## 2025-07-11 PROCEDURE — 93005 ELECTROCARDIOGRAM TRACING: CPT | Mod: TC | Performed by: INTERNAL MEDICINE

## 2025-07-11 PROCEDURE — 3079F DIAST BP 80-89 MM HG: CPT | Performed by: INTERNAL MEDICINE

## 2025-07-11 RX ORDER — NEBIVOLOL 10 MG/1
10 TABLET ORAL DAILY
Qty: 90 TABLET | Refills: 4 | Status: SHIPPED | OUTPATIENT
Start: 2025-07-11

## 2025-07-11 ASSESSMENT — ENCOUNTER SYMPTOMS
WEIGHT LOSS: 0
BLOOD IN STOOL: 0
NAUSEA: 0
PALPITATIONS: 0
CHILLS: 0
HALLUCINATIONS: 0
SHORTNESS OF BREATH: 0
BLURRED VISION: 0
COUGH: 0
SENSORY CHANGE: 0
SPEECH CHANGE: 0
CLAUDICATION: 0
EYE DISCHARGE: 0
PND: 0
DEPRESSION: 0
EYE PAIN: 0
LOSS OF CONSCIOUSNESS: 0
FEVER: 0
ABDOMINAL PAIN: 0
BRUISES/BLEEDS EASILY: 0
DIZZINESS: 0
HEADACHES: 0
FALLS: 0
MYALGIAS: 0
DOUBLE VISION: 0
ORTHOPNEA: 0
VOMITING: 0

## 2025-07-11 ASSESSMENT — FIBROSIS 4 INDEX: FIB4 SCORE: 1.67

## 2025-07-11 NOTE — PROGRESS NOTES
Chief Complaint   Patient presents with    Follow-Up     Dx: ACC/AHA stage C heart failure with preserved ejection fraction (HCC)        Atrial Fibrillation     F/V Dx: Longstanding persistent atrial fibrillation (HCC)           Subjective:   Chema Sarabia is a 69 y.o. male who presents today for cardiac care and evaluation in our heart failure clinic after his recent episode of hypertensive emergency for which he went into heart failure exacerbation as well. Patient does have documented transthoracic echocardiogram which showed relatively preserved LV function of 55%. No significant valvular disease.     Feeling very well. No dyspnea.     At prior visit, patient was able to complete 549 m during his 6 minute walk test. his O2 saturation at baseline was 94% and at the end of the test, the O2 saturation was 95%. he reported 0 level of dyspnea on Jim scale.     06/06/2019 Patient was able to complete 549 m during his 6 minute walk test. his O2 saturation at baseline was 95% and at the end of the test, the O2 saturation was 93%. he reported 1 level of dyspnea on Jim scale.    In July of 2021, for 1 week, he had symptoms of difficult finding words and incoherence. MRI of the brain showed evidence of stroke. His symptoms resolved at this time.    Chema was placed on cardiac event monitor and found to have paroxysmal atrial fibrillation.  He was started on Eliquis 5 mg p.o. twice a day.    Most recent transthoracic echocardiogram last week show evidence of slightly reduced left ventricular systolic function of 50%, concentric biventricular hypertrophy.  Patient did have blood tests which did not show increase free light change.  I personally interpreted images of his transthoracic echocardiogram and blood test results.    On May 3, 2022, patient underwent unsuccessful cardioversion for atrial fibrillation at this time.  He was then placed on amiodarone p.o. therapy.      I have independently interpreted and reviewed  blood tests results with patient in clinic which shows reduced LDL level 54, normal triglycerides 109 down from 267, reduced GFR of 53 down from 63, K of 4.8. NT pro BNP of 51. Hgba1c of 5.7. high Ferritin level. + microalbumin in urine.    Serum light chains are within range.    Of note, patient has been taking his Eliquis without missing dose.    He is on CPAP for ANAHY treatment now.    In the past, patient saw electrophysiology service and was plan for ablation procedure.  However, patient came out of atrial fibrillation and that has been on hold.    11/2024 Underwent cardioversion.    He is back in AF now.     I have personally interpreted EKG today with patient, there is no evidence of acute coronary syndrome, no evidence of prior infarct, normal IA and QT interval, no significant conduction disease. Atrial fibrillation with rate of 120.    Is being worked up for hemochromatosis.    Past Medical History:   Diagnosis Date    Acute kidney injury (HCC)     stage 3 renal failure    Atrial fibrillation (HCC)     takes amiodarone and eliquis    Cataract 07/2024    Chickenpox     history of    Congestive heart failure ([facility]) 2019    Frequent urination     [place] measles     history of    Heart valve disease     aortic insufficiency    High cholesterol 2016    medicated    Hypertension     medicated    Hypertensive urgency     Influenza     history of    Kidney stone     history of    Longstanding persistent atrial fibrillation ([facility]) 05/03/2022    Mumps     history of    Nephrolithiasis     Obesity     Sleep apnea 2021    uses cpap    Snoring     sleep study done    Stroke ([facility]) 2021    pt told he had a stroke, but never realized he had one, has tremor right hand    Tonsillitis     history of    Typhoid fever     history of    Wears glasses      Past Surgical History:   Procedure Laterality Date    CATARACT PHACO WITH IOL Bilateral 07/2024    REPAIR, HERNIA, UMBILICAL, ROBOT-ASSISTED, USING [employer]  10/10/2023     Procedure: ROBOT UMBILICAL HERNIA REPAIR;  Surgeon: Michael Saavedra M.D.;  Location: SURGERY [facility];  Service: Gen Robotic    OTHER  2022    cardioversion     Family History   Problem Relation Age of Onset    Cancer Mother         lungs, smoker    Lung Cancer Mother     Hypertension Father     Hyperlipidemia Father     Diabetes Neg Hx     Heart Disease Neg Hx      Social History     Socioeconomic History    Marital status: Single     Spouse name: Not on file    Number of children: Not on file    Years of education: Not on file    Highest education level: Not on file   Occupational History    Not on file   Tobacco Use    Smoking status: Never     Passive exposure: Never    Smokeless tobacco: Never   Vaping Use    Vaping status: Never Used   Substance and Sexual Activity    Alcohol use: Not Currently     Alcohol/week: 6.0 oz     Types: 7 Glasses of wine, 3 Cans of beer per week     Comment: rare    Drug use: No    Sexual activity: Not Currently   Other Topics Concern    Not on file   Social History Narrative    Not on file     Social Drivers of Health     Financial Resource Strain: Not on file   Food Insecurity: Not on file   Transportation Needs: Not on file   Physical Activity: Not on file   Stress: Not on file   Social Connections: Not on file   Intimate Partner Violence: Not on file   Housing Stability: Not on file     No Known Allergies  Outpatient Encounter Medications as of 7/11/2025   Medication Sig Dispense Refill    nebivolol (BYSTOLIC) 10 MG Tab Take 1 Tablet by mouth every day. 90 Tablet 4    carbidopa-levodopa (SINEMET)  MG Tab Take 1 Tablet by mouth 3 times a day for 360 days. Start according to instructions. For Parkinson's 270 Tablet 3    multivitamin Tab Take 1 Tablet by mouth every day. MEDICATION INSTRUCTIONS FOR SURGERY/PROCEDURE SCHEDULED FOR 11/8/2024   DO NOT TAKE 7 DAYS PRIOR TO SURGERY      MAGNESIUM PO Take 1 Tablet by mouth every day. MEDICATION INSTRUCTIONS FOR  "SURGERY/PROCEDURE SCHEDULED FOR 11/8/2024   DO NOT TAKE 7 DAYS PRIOR TO SURGERY      atorvastatin (LIPITOR) 80 MG tablet Take 1 Tablet by mouth every evening. 100 Tablet 4    amLODIPine (NORVASC) 10 MG Tab Take 1 Tablet by mouth every day. 90 Tablet 3    apixaban (ELIQUIS) 5mg Tab Take 1 Tablet by mouth 2 times a day. 180 Tablet 3    spironolactone (ALDACTONE) 25 MG Tab Take 1 Tablet by mouth every day. 90 Tablet 3    [DISCONTINUED] propranolol (INDERAL) 10 MG Tab Take 1 Tablet by mouth 3 times a day. 270 Tablet 1     No facility-administered encounter medications on file as of 7/11/2025.     Review of Systems   Constitutional:  Negative for chills, fever, malaise/fatigue and weight loss.   HENT:  Negative for ear discharge, ear pain, hearing loss and nosebleeds.    Eyes:  Negative for blurred vision, double vision, pain and discharge.   Respiratory:  Negative for cough and shortness of breath.    Cardiovascular:  Negative for chest pain, palpitations, orthopnea, claudication, leg swelling and PND.   Gastrointestinal:  Negative for abdominal pain, blood in stool, melena, nausea and vomiting.   Genitourinary:  Negative for dysuria and hematuria.   Musculoskeletal:  Negative for falls, joint pain and myalgias.   Skin:  Negative for itching and rash.   Neurological:  Negative for dizziness, sensory change, speech change, loss of consciousness and headaches.   Endo/Heme/Allergies:  Negative for environmental allergies. Does not bruise/bleed easily.   Psychiatric/Behavioral:  Negative for depression, hallucinations and suicidal ideas.         Objective:   /88 (BP Location: Left arm, Patient Position: Sitting, BP Cuff Size: Adult)   Pulse (!) 141   Resp 18   Ht 1.676 m (5' 6\")   Wt 92.9 kg (204 lb 12.8 oz)   SpO2 96%   BMI 33.06 kg/m²     Physical Exam  Vitals and nursing note reviewed.   Constitutional:       General: He is not in acute distress.     Appearance: He is not diaphoretic.   HENT:      Head: " Normocephalic and atraumatic.      Right Ear: External ear normal.      Left Ear: External ear normal.   Eyes:      General:         Right eye: No discharge.         Left eye: No discharge.   Neck:      Thyroid: No thyromegaly.      Vascular: No JVD.   Cardiovascular:      Rate and Rhythm: Normal rate. Rhythm irregular.      Comments: Ausculation was not performed to minimize the risk of COVID spread during current pandemic. This complies with Medicare policies and guidelines.    Pulmonary:      Effort: No respiratory distress.   Abdominal:      General: There is no distension.      Tenderness: There is no abdominal tenderness.   Skin:     General: Skin is warm and dry.   Neurological:      Mental Status: He is alert and oriented to person, place, and time.      Cranial Nerves: No cranial nerve deficit.   Psychiatric:         Behavior: Behavior normal.         Assessment:     1. ACC/AHA stage C heart failure with preserved ejection fraction (HCC)        2. Left ventricular diastolic dysfunction, NYHA class 2        3. Congestive heart failure, unspecified HF chronicity, unspecified heart failure type (HCC)  EKG      4. Atrial fibrillation and flutter (HCC)  EKG    nebivolol (BYSTOLIC) 10 MG Tab      5. Hypercoagulable state due to persistent atrial fibrillation (HCC)        6. HTN (hypertension), malignant        7. Mixed hyperlipidemia        8. ANAHY on CPAP        9. Cerebrovascular accident (CVA), unspecified mechanism (HCC)        10. Persistent atrial fibrillation (HCC)        11. Dyspnea on exertion  proBrain Natriuretic Peptide, NT      12. High risk medication use            Medical Decision Making:  Today's Assessment / Status / Plan:   Today, based on physical examination findings, patient is euvolemic. No JVD, lungs are clear to auscultation, no pitting edema in bilateral lower extremities, no ascites.    Dry weight is 204 lbs.    He is back in AF. Rate control strategy. Will stop Amiodarone to avoid  toxicity. Will add Bystolic 10 mg daily and stop Propranolol.    Continue anticoagulation for stroke risk reduction.    Optimize CPAP use.    Blood pressure is well controlled. Continue amlodipine 10 mg p.o. once a day and spironolactone at 25 mg p.o. once a day.    In the meantime, continue CPAP for obstructive sleep apnea.    Will continue Atorvastatin at 80 mg daily for better LDL. LDL of 70 or less goal.    He would like to hold off of Jardiance.    Continue diet and exercise more.    I will see patient back in clinic with lab tests and studies results in 6 months.    I thank you for referring patient to our Cardiology Clinic today.

## 2025-08-24 ENCOUNTER — HOSPITAL ENCOUNTER (OUTPATIENT)
Dept: RADIOLOGY | Facility: MEDICAL CENTER | Age: 70
End: 2025-08-24
Attending: PHYSICAL MEDICINE & REHABILITATION
Payer: MEDICARE

## 2025-08-24 DIAGNOSIS — M75.41 IMPINGEMENT SYNDROME OF RIGHT SHOULDER: ICD-10-CM

## 2025-08-24 DIAGNOSIS — M54.12 BRACHIAL NEURITIS: ICD-10-CM

## 2025-08-24 PROCEDURE — 72141 MRI NECK SPINE W/O DYE: CPT

## 2025-08-24 PROCEDURE — 73221 MRI JOINT UPR EXTREM W/O DYE: CPT | Mod: RT

## (undated) DEVICE — Device

## (undated) DEVICE — GLOVE BIOGEL SZ 7 SURGICAL PF LTX - (50PR/BX 4BX/CA)

## (undated) DEVICE — BLADE SURGICAL #11 - (50/BX)

## (undated) DEVICE — GOWN WARMING STANDARD FLEX - (30/CA)

## (undated) DEVICE — TUBING CLEARLINK DUO-VENT - C-FLO (48EA/CA)

## (undated) DEVICE — SEAL 5MM-8MM UNIVERSAL  BOX OF 10

## (undated) DEVICE — CANISTER SUCTION 3000ML MECHANICAL FILTER AUTO SHUTOFF MEDI-VAC NONSTERILE LF DISP  (40EA/CA)

## (undated) DEVICE — GLOVE BIOGEL PI INDICATOR SZ 8.0 SURGICAL PF LF -(50/BX 4BX/CA)

## (undated) DEVICE — TUBE E-T HI-LO CUFF 7.5MM (10EA/PK)

## (undated) DEVICE — COVER LIGHT HANDLE ALC PLUS DISP (18EA/BX)

## (undated) DEVICE — SUTURE GENERAL

## (undated) DEVICE — OBTURATOR BLADELESS STANDARD 8MM (6EA/BX)

## (undated) DEVICE — DRAPE COLUMN  BOX OF 20

## (undated) DEVICE — COVER TIP ENDOWRIST HOT SHEAR - (10EA/BX) DA VINCI

## (undated) DEVICE — GLOVE SZ 6.5 BIOGEL PI MICRO - PF LF (50PR/BX)

## (undated) DEVICE — SLEEVE VASO CALF MED - (10PR/CA)

## (undated) DEVICE — SUCTION INSTRUMENT YANKAUER BULBOUS TIP W/O VENT (50EA/CA)

## (undated) DEVICE — SEAL CANNULA STAPLER 12 MM (10EA/BX)

## (undated) DEVICE — BINDER ABDOMINAL 30X45X12IN - FITS 30-45IN WAIST 12IN HIGH

## (undated) DEVICE — DRAPE ARM  BOX OF 20

## (undated) DEVICE — GLOVE SZ 7.5 BIOGEL PI MICRO - PF LF (50PR/BX)

## (undated) DEVICE — ELECTRODE 5MM LHK LAPSCP STERILE DISP- MEGADYNE  (5/CA)

## (undated) DEVICE — DERMABOND ADVANCED - (12EA/BX)

## (undated) DEVICE — REDUCER XI STAPLER 12MM TO 8MM (6EA/BX)

## (undated) DEVICE — GLOVE BIOGEL INDICATOR SZ 7SURGICAL PF LTX - (50/BX 4BX/CA)

## (undated) DEVICE — SUTURE 4-0 MONOCRYL PLUS PS-2 - 27 INCH (36/BX)

## (undated) DEVICE — SYSTEM CLEARIFY VISUALIZATION (10EA/PK)

## (undated) DEVICE — ROBOTIC SURGERY SERVICES

## (undated) DEVICE — GLOVE BIOGEL PI INDICATOR SZ 6.5 SURGICAL PF LF - (50/BX 4BX/CA)

## (undated) DEVICE — SUTURE 2-0 20CM STRATAFIX SPIRAL SH NEEDLE (12/BX)

## (undated) DEVICE — SET EXTENSION WITH 2 PORTS (48EA/CA) ***PART #2C8610 IS A SUBSTITUTE*****